# Patient Record
Sex: FEMALE | Race: WHITE | Employment: UNEMPLOYED | URBAN - METROPOLITAN AREA
[De-identification: names, ages, dates, MRNs, and addresses within clinical notes are randomized per-mention and may not be internally consistent; named-entity substitution may affect disease eponyms.]

---

## 2017-05-07 ENCOUNTER — HOSPITAL ENCOUNTER (EMERGENCY)
Age: 27
Discharge: HOME | End: 2017-05-07
Attending: EMERGENCY MEDICINE
Payer: MEDICARE

## 2017-05-07 VITALS
DIASTOLIC BLOOD PRESSURE: 60 MMHG | HEART RATE: 86 BPM | BODY MASS INDEX: 17.67 KG/M2 | OXYGEN SATURATION: 98 % | TEMPERATURE: 97.3 F | HEIGHT: 60 IN | RESPIRATION RATE: 18 BRPM | SYSTOLIC BLOOD PRESSURE: 106 MMHG | WEIGHT: 90 LBS

## 2017-05-07 DIAGNOSIS — M79.605 PAIN IN LEFT LEG: Primary | ICD-10-CM

## 2017-05-07 PROCEDURE — 99283 EMERGENCY DEPT VISIT LOW MDM: CPT

## 2017-05-07 RX ORDER — DIAZEPAM 2 MG/1
2 TABLET ORAL EVERY 8 HOURS PRN
Qty: 10 TABLET | Refills: 0 | Status: SHIPPED | OUTPATIENT
Start: 2017-05-07 | End: 2017-12-12

## 2017-05-07 RX ORDER — IBUPROFEN 800 MG/1
800 TABLET ORAL EVERY 8 HOURS PRN
Qty: 30 TABLET | Refills: 0 | Status: SHIPPED | OUTPATIENT
Start: 2017-05-07 | End: 2017-12-12

## 2017-05-07 ASSESSMENT — ENCOUNTER SYMPTOMS
DIARRHEA: 0
NAUSEA: 0
VOMITING: 0
BACK PAIN: 0
SHORTNESS OF BREATH: 0
COUGH: 0
ABDOMINAL PAIN: 0
COLOR CHANGE: 0

## 2017-05-07 ASSESSMENT — PAIN DESCRIPTION - LOCATION: LOCATION: LEG

## 2017-05-07 ASSESSMENT — PAIN SCALES - GENERAL: PAINLEVEL_OUTOF10: 1

## 2017-05-07 ASSESSMENT — PAIN DESCRIPTION - ORIENTATION: ORIENTATION: LEFT

## 2017-05-07 ASSESSMENT — PAIN DESCRIPTION - PAIN TYPE: TYPE: ACUTE PAIN

## 2017-05-07 ASSESSMENT — PAIN DESCRIPTION - DESCRIPTORS: DESCRIPTORS: ACHING

## 2017-12-07 DIAGNOSIS — M67.40 GANGLION CYST: Primary | ICD-10-CM

## 2017-12-12 ENCOUNTER — HOSPITAL ENCOUNTER (OUTPATIENT)
Dept: GENERAL RADIOLOGY | Age: 27
Discharge: HOME | End: 2017-12-12
Payer: MEDICARE

## 2017-12-12 ENCOUNTER — OFFICE VISIT (OUTPATIENT)
Dept: ORTHOPEDIC SURGERY | Age: 27
End: 2017-12-12
Payer: MEDICARE

## 2017-12-12 VITALS
BODY MASS INDEX: 17.67 KG/M2 | WEIGHT: 90 LBS | HEART RATE: 64 BPM | DIASTOLIC BLOOD PRESSURE: 60 MMHG | SYSTOLIC BLOOD PRESSURE: 102 MMHG | HEIGHT: 60 IN

## 2017-12-12 DIAGNOSIS — M67.40 GANGLION CYST: ICD-10-CM

## 2017-12-12 DIAGNOSIS — M25.531 RIGHT WRIST PAIN: Primary | ICD-10-CM

## 2017-12-12 PROCEDURE — 73110 X-RAY EXAM OF WRIST: CPT

## 2017-12-12 PROCEDURE — 99202 OFFICE O/P NEW SF 15 MIN: CPT | Performed by: PHYSICIAN ASSISTANT

## 2017-12-12 PROCEDURE — G8427 DOCREV CUR MEDS BY ELIG CLIN: HCPCS | Performed by: PHYSICIAN ASSISTANT

## 2017-12-12 PROCEDURE — G8484 FLU IMMUNIZE NO ADMIN: HCPCS | Performed by: PHYSICIAN ASSISTANT

## 2017-12-12 PROCEDURE — G8419 CALC BMI OUT NRM PARAM NOF/U: HCPCS | Performed by: PHYSICIAN ASSISTANT

## 2017-12-12 PROCEDURE — 4004F PT TOBACCO SCREEN RCVD TLK: CPT | Performed by: PHYSICIAN ASSISTANT

## 2018-02-26 DIAGNOSIS — M25.512 LEFT SHOULDER PAIN, UNSPECIFIED CHRONICITY: Primary | ICD-10-CM

## 2018-04-10 ENCOUNTER — HOSPITAL ENCOUNTER (EMERGENCY)
Age: 28
Discharge: HOME | End: 2018-04-11
Attending: EMERGENCY MEDICINE
Payer: MEDICARE

## 2018-04-10 ENCOUNTER — APPOINTMENT (OUTPATIENT)
Dept: GENERAL RADIOLOGY | Age: 28
End: 2018-04-10
Payer: MEDICARE

## 2018-04-10 VITALS
DIASTOLIC BLOOD PRESSURE: 70 MMHG | TEMPERATURE: 97.3 F | OXYGEN SATURATION: 97 % | HEART RATE: 80 BPM | SYSTOLIC BLOOD PRESSURE: 108 MMHG | BODY MASS INDEX: 16.69 KG/M2 | HEIGHT: 60 IN | RESPIRATION RATE: 15 BRPM | WEIGHT: 85 LBS

## 2018-04-10 DIAGNOSIS — M25.572 ACUTE LEFT ANKLE PAIN: Primary | ICD-10-CM

## 2018-04-10 PROCEDURE — 99284 EMERGENCY DEPT VISIT MOD MDM: CPT

## 2018-04-10 PROCEDURE — 73630 X-RAY EXAM OF FOOT: CPT

## 2018-04-10 PROCEDURE — 73610 X-RAY EXAM OF ANKLE: CPT

## 2018-04-10 RX ORDER — ACETAMINOPHEN 325 MG/1
650 TABLET ORAL ONCE
Status: DISCONTINUED | OUTPATIENT
Start: 2018-04-10 | End: 2018-04-10

## 2018-04-10 ASSESSMENT — PAIN SCALES - GENERAL: PAINLEVEL_OUTOF10: 9

## 2018-04-10 ASSESSMENT — PAIN DESCRIPTION - LOCATION: LOCATION: FOOT

## 2018-04-10 ASSESSMENT — PAIN DESCRIPTION - ORIENTATION: ORIENTATION: LEFT

## 2018-04-10 ASSESSMENT — PAIN DESCRIPTION - PAIN TYPE: TYPE: ACUTE PAIN

## 2018-04-17 ENCOUNTER — HOSPITAL ENCOUNTER (EMERGENCY)
Age: 28
Discharge: HOME | End: 2018-04-18
Attending: EMERGENCY MEDICINE
Payer: MEDICARE

## 2018-04-17 VITALS
OXYGEN SATURATION: 98 % | SYSTOLIC BLOOD PRESSURE: 94 MMHG | RESPIRATION RATE: 12 BRPM | BODY MASS INDEX: 16.3 KG/M2 | DIASTOLIC BLOOD PRESSURE: 52 MMHG | TEMPERATURE: 97.2 F | WEIGHT: 83 LBS | HEIGHT: 60 IN | HEART RATE: 70 BPM

## 2018-04-17 DIAGNOSIS — R11.2 NAUSEA AND VOMITING, INTRACTABILITY OF VOMITING NOT SPECIFIED, UNSPECIFIED VOMITING TYPE: Primary | ICD-10-CM

## 2018-04-17 LAB
ABSOLUTE EOS #: 0.32 K/UL (ref 0–0.44)
ABSOLUTE IMMATURE GRANULOCYTE: <0.03 K/UL (ref 0–0.3)
ABSOLUTE LYMPH #: 3.21 K/UL (ref 1.1–3.7)
ABSOLUTE MONO #: 0.6 K/UL (ref 0.1–1.2)
ANION GAP SERPL CALCULATED.3IONS-SCNC: 11 MMOL/L (ref 9–17)
BASOPHILS # BLD: 0 % (ref 0–2)
BASOPHILS ABSOLUTE: 0.04 K/UL (ref 0–0.2)
BUN BLDV-MCNC: 17 MG/DL (ref 6–20)
BUN/CREAT BLD: ABNORMAL (ref 9–20)
CALCIUM SERPL-MCNC: 8.7 MG/DL (ref 8.6–10.4)
CHLORIDE BLD-SCNC: 96 MMOL/L (ref 98–107)
CO2: 28 MMOL/L (ref 20–31)
CREAT SERPL-MCNC: 0.7 MG/DL (ref 0.5–0.9)
DIFFERENTIAL TYPE: NORMAL
EOSINOPHILS RELATIVE PERCENT: 4 % (ref 1–4)
GFR AFRICAN AMERICAN: >60 ML/MIN
GFR NON-AFRICAN AMERICAN: >60 ML/MIN
GFR SERPL CREATININE-BSD FRML MDRD: ABNORMAL ML/MIN/{1.73_M2}
GFR SERPL CREATININE-BSD FRML MDRD: ABNORMAL ML/MIN/{1.73_M2}
GLUCOSE BLD-MCNC: 86 MG/DL (ref 70–99)
HCT VFR BLD CALC: 37.9 % (ref 36.3–47.1)
HEMOGLOBIN: 12.5 G/DL (ref 11.9–15.1)
IMMATURE GRANULOCYTES: 0 %
LYMPHOCYTES # BLD: 36 % (ref 24–43)
MCH RBC QN AUTO: 30 PG (ref 25.2–33.5)
MCHC RBC AUTO-ENTMCNC: 33 G/DL (ref 28.4–34.8)
MCV RBC AUTO: 90.9 FL (ref 82.6–102.9)
MONOCYTES # BLD: 7 % (ref 3–12)
NRBC AUTOMATED: 0 PER 100 WBC
PDW BLD-RTO: 13 % (ref 11.8–14.4)
PLATELET # BLD: 204 K/UL (ref 138–453)
PLATELET ESTIMATE: NORMAL
PMV BLD AUTO: 10.2 FL (ref 8.1–13.5)
POTASSIUM SERPL-SCNC: 3.3 MMOL/L (ref 3.7–5.3)
RBC # BLD: 4.17 M/UL (ref 3.95–5.11)
RBC # BLD: NORMAL 10*6/UL
SEG NEUTROPHILS: 53 % (ref 36–65)
SEGMENTED NEUTROPHILS ABSOLUTE COUNT: 4.79 K/UL (ref 1.5–8.1)
SODIUM BLD-SCNC: 135 MMOL/L (ref 135–144)
WBC # BLD: 9 K/UL (ref 3.5–11.3)
WBC # BLD: NORMAL 10*3/UL

## 2018-04-17 PROCEDURE — 85025 COMPLETE CBC W/AUTO DIFF WBC: CPT

## 2018-04-17 PROCEDURE — 99284 EMERGENCY DEPT VISIT MOD MDM: CPT

## 2018-04-17 PROCEDURE — 2580000003 HC RX 258: Performed by: STUDENT IN AN ORGANIZED HEALTH CARE EDUCATION/TRAINING PROGRAM

## 2018-04-17 PROCEDURE — 80048 BASIC METABOLIC PNL TOTAL CA: CPT

## 2018-04-17 PROCEDURE — 96374 THER/PROPH/DIAG INJ IV PUSH: CPT

## 2018-04-17 PROCEDURE — 6360000002 HC RX W HCPCS: Performed by: STUDENT IN AN ORGANIZED HEALTH CARE EDUCATION/TRAINING PROGRAM

## 2018-04-17 RX ORDER — 0.9 % SODIUM CHLORIDE 0.9 %
1000 INTRAVENOUS SOLUTION INTRAVENOUS ONCE
Status: COMPLETED | OUTPATIENT
Start: 2018-04-17 | End: 2018-04-18

## 2018-04-17 RX ORDER — ONDANSETRON 2 MG/ML
4 INJECTION INTRAMUSCULAR; INTRAVENOUS ONCE
Status: COMPLETED | OUTPATIENT
Start: 2018-04-17 | End: 2018-04-17

## 2018-04-17 RX ADMIN — ONDANSETRON 4 MG: 2 INJECTION INTRAMUSCULAR; INTRAVENOUS at 23:14

## 2018-04-17 RX ADMIN — SODIUM CHLORIDE 1000 ML: 9 INJECTION, SOLUTION INTRAVENOUS at 23:14

## 2018-07-16 ENCOUNTER — HOSPITAL ENCOUNTER (EMERGENCY)
Age: 28
Discharge: HOME | End: 2018-07-16
Attending: EMERGENCY MEDICINE
Payer: MEDICARE

## 2018-07-16 ENCOUNTER — APPOINTMENT (OUTPATIENT)
Dept: GENERAL RADIOLOGY | Age: 28
End: 2018-07-16
Payer: MEDICARE

## 2018-07-16 VITALS
HEART RATE: 59 BPM | RESPIRATION RATE: 18 BRPM | TEMPERATURE: 97.7 F | SYSTOLIC BLOOD PRESSURE: 107 MMHG | DIASTOLIC BLOOD PRESSURE: 64 MMHG | OXYGEN SATURATION: 98 %

## 2018-07-16 DIAGNOSIS — F41.1 ANXIETY STATE: Primary | ICD-10-CM

## 2018-07-16 DIAGNOSIS — M79.672 LEFT FOOT PAIN: ICD-10-CM

## 2018-07-16 LAB
EKG ATRIAL RATE: 57 BPM
EKG P AXIS: 57 DEGREES
EKG P-R INTERVAL: 128 MS
EKG Q-T INTERVAL: 442 MS
EKG QRS DURATION: 84 MS
EKG QTC CALCULATION (BAZETT): 430 MS
EKG R AXIS: 76 DEGREES
EKG T AXIS: 54 DEGREES
EKG VENTRICULAR RATE: 57 BPM

## 2018-07-16 PROCEDURE — 99283 EMERGENCY DEPT VISIT LOW MDM: CPT

## 2018-07-16 PROCEDURE — 93005 ELECTROCARDIOGRAM TRACING: CPT

## 2018-07-16 PROCEDURE — 73630 X-RAY EXAM OF FOOT: CPT

## 2018-07-16 PROCEDURE — 6370000000 HC RX 637 (ALT 250 FOR IP): Performed by: EMERGENCY MEDICINE

## 2018-07-16 RX ORDER — HYDROXYZINE HYDROCHLORIDE 10 MG/1
10 TABLET, FILM COATED ORAL ONCE
Status: COMPLETED | OUTPATIENT
Start: 2018-07-16 | End: 2018-07-16

## 2018-07-16 RX ORDER — IBUPROFEN 800 MG/1
800 TABLET ORAL EVERY 8 HOURS PRN
Qty: 20 TABLET | Refills: 0 | Status: SHIPPED | OUTPATIENT
Start: 2018-07-16 | End: 2018-07-17

## 2018-07-16 RX ORDER — HYDROXYZINE HYDROCHLORIDE 10 MG/1
10 TABLET, FILM COATED ORAL EVERY 6 HOURS PRN
Qty: 6 TABLET | Refills: 0 | Status: SHIPPED | OUTPATIENT
Start: 2018-07-16 | End: 2018-07-18

## 2018-07-16 RX ORDER — IBUPROFEN 800 MG/1
800 TABLET ORAL ONCE
Status: COMPLETED | OUTPATIENT
Start: 2018-07-16 | End: 2018-07-16

## 2018-07-16 RX ADMIN — HYDROXYZINE HYDROCHLORIDE 10 MG: 10 TABLET ORAL at 19:33

## 2018-07-16 RX ADMIN — IBUPROFEN 800 MG: 800 TABLET ORAL at 20:26

## 2018-07-16 ASSESSMENT — PAIN SCALES - GENERAL
PAINLEVEL_OUTOF10: 8
PAINLEVEL_OUTOF10: 8

## 2018-07-16 ASSESSMENT — PAIN DESCRIPTION - LOCATION: LOCATION: CHEST

## 2018-07-16 ASSESSMENT — PAIN DESCRIPTION - DESCRIPTORS: DESCRIPTORS: DISCOMFORT

## 2018-07-16 ASSESSMENT — PAIN DESCRIPTION - ORIENTATION: ORIENTATION: MID

## 2018-07-16 NOTE — ED PROVIDER NOTES
years.   Denys Mix Sexual activity: Not on file     Other Topics Concern    Not on file     Social History Narrative    No narrative on file       Family History   Problem Relation Age of Onset    Diabetes Mother         G.Parents    Hypertension Mother         G.Parents    Diabetes Father     Hypertension Father     Stroke Father         G.Parents    Cancer Other         G.Parents, Pancrease and ??    Coronary Art Dis Other         G.Parents       Allergies:  Diclofenac; Morphine; and Pcn [penicillins]    Home Medications:  Prior to Admission medications    Medication Sig Start Date End Date Taking? Authorizing Provider   hydrOXYzine (ATARAX) 10 MG tablet Take 1 tablet by mouth every 6 hours as needed for Itching 7/16/18 7/18/18 Yes Kirk Gonzalez DO   ibuprofen (ADVIL;MOTRIN) 800 MG tablet Take 1 tablet by mouth every 8 hours as needed for Pain 7/16/18  Yes Kirk Gonzalez DO   Elastic Bandages & Supports (WRIST BRACE/LEFT SMALL) MISC Use as directed 9/28/13   LISA Sofia       REVIEW OF SYSTEMS    (2-9 systems for level 4, 10 or more for level 5)      Review of Systems   Constitutional: Negative for chills and fever. HENT: Negative for congestion and rhinorrhea. Eyes: Negative for pain and visual disturbance. Respiratory: Negative for cough and shortness of breath. Cardiovascular: Negative for chest pain and palpitations. Gastrointestinal: Negative for abdominal pain, nausea and vomiting. Genitourinary: Negative for difficulty urinating and dysuria. Musculoskeletal: Negative for neck pain. Left foot pain   Skin: Negative for rash and wound. Neurological: Negative for weakness and numbness. Psychiatric/Behavioral: Negative for self-injury and suicidal ideas. The patient is nervous/anxious.         PHYSICAL EXAM   (up to 7 for level 4, 8 or more for level 5)      INITIAL VITALS:   /64   Pulse 59   Temp 97.7 °F (36.5 °C) (Oral)   Resp 18   LMP 07/01/2018 SpO2 98%     Physical Exam   Constitutional: She is oriented to person, place, and time. She appears well-developed and well-nourished. No distress. HENT:   Head: Normocephalic and atraumatic. Mouth/Throat: Oropharynx is clear and moist.   Eyes: Conjunctivae and EOM are normal. Pupils are equal, round, and reactive to light. Neck: Neck supple. No tracheal deviation present. Cardiovascular: Normal rate, regular rhythm, normal heart sounds and intact distal pulses. Exam reveals no gallop and no friction rub. No murmur heard. Pulmonary/Chest: Effort normal and breath sounds normal. No respiratory distress. She has no wheezes. She has no rales. Abdominal: Soft. She exhibits no distension. There is no tenderness. There is no rebound and no guarding. Musculoskeletal: She exhibits no edema, tenderness or deformity. Mild tenderness to palpation left second toe. No significant swelling. Patient able to wiggle all toes left foot. Sensation intact throughout left foot. DP and PT pulses +2/4 left foot. Neurological: She is alert and oriented to person, place, and time. Skin: Skin is warm and dry. No rash noted. She is not diaphoretic. Psychiatric: She expresses no homicidal and no suicidal ideation.        DIFFERENTIAL  DIAGNOSIS     PLAN (LABS / IMAGING / EKG):  Orders Placed This Encounter   Procedures    XR FOOT LEFT (MIN 3 VIEWS)    EKG 12 Lead       MEDICATIONS ORDERED:  Orders Placed This Encounter   Medications    hydrOXYzine (ATARAX) tablet 10 mg    ibuprofen (ADVIL;MOTRIN) tablet 800 mg    hydrOXYzine (ATARAX) 10 MG tablet     Sig: Take 1 tablet by mouth every 6 hours as needed for Itching     Dispense:  6 tablet     Refill:  0    ibuprofen (ADVIL;MOTRIN) 800 MG tablet     Sig: Take 1 tablet by mouth every 8 hours as needed for Pain     Dispense:  20 tablet     Refill:  0       DDX: Anxiety, panic attack, malingering            Left foot pain, fracture, dislocation    DIAGNOSTIC

## 2018-07-17 ENCOUNTER — HOSPITAL ENCOUNTER (EMERGENCY)
Age: 28
Discharge: HOME | End: 2018-07-17
Attending: EMERGENCY MEDICINE
Payer: MEDICARE

## 2018-07-17 VITALS
OXYGEN SATURATION: 100 % | RESPIRATION RATE: 14 BRPM | SYSTOLIC BLOOD PRESSURE: 115 MMHG | DIASTOLIC BLOOD PRESSURE: 75 MMHG | TEMPERATURE: 97.3 F | HEART RATE: 70 BPM

## 2018-07-17 DIAGNOSIS — K08.89 PAIN, DENTAL: Primary | ICD-10-CM

## 2018-07-17 PROCEDURE — 6370000000 HC RX 637 (ALT 250 FOR IP): Performed by: EMERGENCY MEDICINE

## 2018-07-17 PROCEDURE — 99283 EMERGENCY DEPT VISIT LOW MDM: CPT

## 2018-07-17 RX ORDER — IBUPROFEN 800 MG/1
800 TABLET ORAL ONCE
Status: COMPLETED | OUTPATIENT
Start: 2018-07-17 | End: 2018-07-17

## 2018-07-17 RX ORDER — CLINDAMYCIN HYDROCHLORIDE 150 MG/1
450 CAPSULE ORAL ONCE
Status: COMPLETED | OUTPATIENT
Start: 2018-07-17 | End: 2018-07-17

## 2018-07-17 RX ORDER — CLINDAMYCIN HYDROCHLORIDE 150 MG/1
450 CAPSULE ORAL 3 TIMES DAILY
Qty: 90 CAPSULE | Refills: 0 | Status: SHIPPED | OUTPATIENT
Start: 2018-07-17 | End: 2018-07-27

## 2018-07-17 RX ORDER — IBUPROFEN 800 MG/1
800 TABLET ORAL EVERY 8 HOURS PRN
Qty: 30 TABLET | Refills: 0 | Status: SHIPPED | OUTPATIENT
Start: 2018-07-17 | End: 2019-01-15

## 2018-07-17 RX ADMIN — CLINDAMYCIN HYDROCHLORIDE 450 MG: 150 CAPSULE ORAL at 05:15

## 2018-07-17 RX ADMIN — IBUPROFEN 800 MG: 800 TABLET ORAL at 05:15

## 2018-07-17 ASSESSMENT — ENCOUNTER SYMPTOMS
ABDOMINAL PAIN: 0
COUGH: 0
EYE PAIN: 0
VOMITING: 0
TROUBLE SWALLOWING: 0
COUGH: 0
ABDOMINAL PAIN: 0
EYE PAIN: 0
VOMITING: 0
SHORTNESS OF BREATH: 0
NAUSEA: 0
SHORTNESS OF BREATH: 0
DIARRHEA: 0
EYE DISCHARGE: 0
NAUSEA: 0
SORE THROAT: 0
RHINORRHEA: 0

## 2018-07-17 ASSESSMENT — PAIN DESCRIPTION - DESCRIPTORS: DESCRIPTORS: ACHING

## 2018-07-17 ASSESSMENT — PAIN DESCRIPTION - ORIENTATION: ORIENTATION: RIGHT;LOWER;POSTERIOR

## 2018-07-17 ASSESSMENT — PAIN DESCRIPTION - FREQUENCY: FREQUENCY: CONTINUOUS

## 2018-07-17 ASSESSMENT — PAIN DESCRIPTION - LOCATION: LOCATION: TEETH

## 2018-07-17 ASSESSMENT — PAIN SCALES - GENERAL
PAINLEVEL_OUTOF10: 10
PAINLEVEL_OUTOF10: 10

## 2018-07-17 ASSESSMENT — PAIN DESCRIPTION - PAIN TYPE: TYPE: ACUTE PAIN

## 2018-07-17 NOTE — ED PROVIDER NOTES
[facility]     Emergency Department     Faculty Attestation    I performed a history and physical examination of the patient and discussed management with the resident. I reviewed the residents note and agree with the documented findings and plan of care. Any areas of disagreement are noted on the chart. I was personally present for the key portions of any procedures. I have documented in the chart those procedures where I was not present during the key portions. I have reviewed the emergency nurses triage note. I agree with the chief complaint, past medical history, past surgical history, allergies, medications, social and family history as documented unless otherwise noted below. Documentation of the HPI, Physical Exam and Medical Decision Making performed by medical students or scribes is based on my personal performance of the HPI, PE and MDM. For Physician Assistant/ Nurse Practitioner cases/documentation I have personally evaluated this patient and have completed at least one if not all key elements of the E/M (history, physical exam, and MDM). Additional findings are as noted. Primary Care Physician: NEIL Pedroza - CNP    History: This is a 32 y.o. female who presents to the Emergency Department with complaint of Dental pain. Is been ongoing for last 2 days. Patient denies any fever, chills or sweats. The patient denies any difficulty swallowing or shortness of breath    Physical:   oral temperature is 97.3 °F (36.3 °C). Her blood pressure is 115/75 and her pulse is 70. Her respiration is 14 and oxygen saturation is 100%. The patient has multiple dental caries noted. There is no tongue elevation noted. The patient has no abscess. Airways patent there is no pooling of oral secretions.      Impression: Dental caries    Plan: Antibiotics, analgesia and dentist follow-up    Pino Wren MD  Attending Emergency  Physician

## 2018-07-17 NOTE — ED PROVIDER NOTES
70   Temp 97.3 °F (36.3 °C) (Oral)   Resp 14   LMP 07/01/2018   SpO2 100%     Physical Exam   Constitutional: She is oriented to person, place, and time. She appears well-developed and well-nourished. HENT:   Head: Normocephalic and atraumatic. Mouth/Throat: Oropharynx is clear and moist.   Patient with dental clara to premolar of the right lower jaw, no periapical abscess   Eyes: Conjunctivae are normal. Pupils are equal, round, and reactive to light. Neck: Normal range of motion. Neck supple. Cardiovascular: Normal rate and regular rhythm. Exam reveals no gallop and no friction rub. No murmur heard. Pulmonary/Chest: Effort normal and breath sounds normal. No respiratory distress. She has no wheezes. She has no rales. Abdominal: Soft. Bowel sounds are normal. There is no tenderness. There is no rebound and no guarding. Musculoskeletal: Normal range of motion. She exhibits no edema. Neurological: She is alert and oriented to person, place, and time. She has normal reflexes. Skin: Skin is warm and dry. No rash noted. Psychiatric: She has a normal mood and affect. Thought content normal.   Nursing note and vitals reviewed. DIFFERENTIAL  DIAGNOSIS     PLAN (LABS / IMAGING / EKG):  No orders of the defined types were placed in this encounter. MEDICATIONS ORDERED:  Orders Placed This Encounter   Medications    clindamycin (CLEOCIN) capsule 450 mg    ibuprofen (ADVIL;MOTRIN) tablet 800 mg    clindamycin (CLEOCIN) 150 MG capsule     Sig: Take 3 capsules by mouth 3 times daily for 10 days     Dispense:  90 capsule     Refill:  0    ibuprofen (ADVIL;MOTRIN) 800 MG tablet     Sig: Take 1 tablet by mouth every 8 hours as needed for Pain     Dispense:  30 tablet     Refill:  0       DDX: Dental Clara, periapical abscess, pulpitis    DIAGNOSTIC RESULTS / EMERGENCY DEPARTMENT COURSE / MDM     LABS:  No results found for this visit on 07/17/18.     IMPRESSION: 78-year-old female with dental pain, with dental cavity to right lower jaw, no bony tenderness to the jaw, no swelling or abscess. We'll provide antibiotic, ibuprofen, patient states she will follow-up with her dentist.    Hal Parham:  None    EKG  None    All EKG's are interpreted by the Emergency Department Physician who either signs or Co-signs this chart in the absence of a cardiologist.    EMERGENCY DEPARTMENT COURSE:  Patient given first dose of antibiotic here in the emergency department, feeling better after ibuprofen, discussed discharge plan patient, she states she understands and agrees with discharge plan, will follow up with her dentist    PROCEDURES:  None    CONSULTS:  None    CRITICAL CARE:  None    FINAL IMPRESSION      1.  Pain, dental          DISPOSITION / PLAN     DISPOSITION Decision To Discharge 07/17/2018 05:31:37 AM      PATIENT REFERRED TO:  NEIL Calloway - CNP  [address]  [phone]    Schedule an appointment as soon as possible for a visit in 1 day        DISCHARGE MEDICATIONS:  Discharge Medication List as of 7/17/2018  5:33 AM      START taking these medications    Details   clindamycin (CLEOCIN) 150 MG capsule Take 3 capsules by mouth 3 times daily for 10 days, Disp-90 capsule, R-0Print             Carrie Lundberg DO  Emergency Medicine Resident    (Please note that portions of this note were completed with a voice recognition program.  Efforts were made to edit the dictations but occasionally words are mis-transcribed.)       Carrie Lundberg DO  Resident  07/17/18 [id number]

## 2018-12-20 ENCOUNTER — HOSPITAL ENCOUNTER (EMERGENCY)
Age: 28
Discharge: HOME | End: 2018-12-20
Attending: EMERGENCY MEDICINE
Payer: COMMERCIAL

## 2018-12-20 VITALS
SYSTOLIC BLOOD PRESSURE: 113 MMHG | TEMPERATURE: 98.3 F | OXYGEN SATURATION: 99 % | BODY MASS INDEX: 17.97 KG/M2 | RESPIRATION RATE: 15 BRPM | HEART RATE: 80 BPM | WEIGHT: 92 LBS | DIASTOLIC BLOOD PRESSURE: 69 MMHG

## 2018-12-20 DIAGNOSIS — F41.1 ANXIETY STATE: Primary | ICD-10-CM

## 2018-12-20 PROCEDURE — G0382 LEV 3 HOSP TYPE B ED VISIT: HCPCS

## 2018-12-20 ASSESSMENT — PAIN DESCRIPTION - DESCRIPTORS: DESCRIPTORS: ACHING

## 2018-12-20 ASSESSMENT — PAIN DESCRIPTION - PAIN TYPE: TYPE: ACUTE PAIN

## 2018-12-20 ASSESSMENT — ENCOUNTER SYMPTOMS
NAUSEA: 0
ABDOMINAL PAIN: 0
CHEST TIGHTNESS: 0
SHORTNESS OF BREATH: 0

## 2018-12-20 ASSESSMENT — PAIN DESCRIPTION - FREQUENCY: FREQUENCY: CONTINUOUS

## 2018-12-20 ASSESSMENT — PAIN DESCRIPTION - ORIENTATION: ORIENTATION: LOWER

## 2018-12-20 ASSESSMENT — PAIN SCALES - GENERAL: PAINLEVEL_OUTOF10: 5

## 2018-12-20 ASSESSMENT — PAIN DESCRIPTION - LOCATION: LOCATION: BACK

## 2018-12-20 ASSESSMENT — PAIN DESCRIPTION - ONSET: ONSET: ON-GOING

## 2018-12-20 NOTE — ED PROVIDER NOTES
Diabetes Mother         G.Parents    Hypertension Mother         G.Parents    Diabetes Father     Hypertension Father     Stroke Father         G.Parents    Cancer Other         G.Parents, Pancrease and ??    Coronary Art Dis Other         G.Parents       Allergies:  Diclofenac; Morphine; and Pcn [penicillins]    Home Medications:  Prior to Admission medications    Medication Sig Start Date End Date Taking? Authorizing Provider   ibuprofen (ADVIL;MOTRIN) 800 MG tablet Take 1 tablet by mouth every 8 hours as needed for Pain 7/17/18   Allegra Aase, DO   Elastic Bandages & Supports (WRIST BRACE/LEFT SMALL) MISC Use as directed 9/28/13   LISA Callahan       REVIEW OF SYSTEMS    (2-9 systems for level 4, 10 or more for level 5)      Review of Systems   Constitutional: Negative for diaphoresis, fatigue and fever. Respiratory: Negative for chest tightness and shortness of breath. Cardiovascular: Negative for chest pain and palpitations. Gastrointestinal: Negative for abdominal pain and nausea. Musculoskeletal: Negative for arthralgias and joint swelling. PHYSICAL EXAM   (up to 7 for level 4, 8 or more for level 5)      INITIAL VITALS:   /69   Pulse 80   Temp 98.3 °F (36.8 °C) (Oral)   Resp 15   Wt 92 lb (41.7 kg)   SpO2 99%   BMI 17.97 kg/m²     Physical Exam   Constitutional: She is oriented to person, place, and time. She appears well-developed and well-nourished. No distress. HENT:   Head: Normocephalic and atraumatic. Right Ear: External ear normal.   Left Ear: External ear normal.   Eyes: Pupils are equal, round, and reactive to light. Conjunctivae and EOM are normal. Right eye exhibits no discharge. Left eye exhibits no discharge. Neck: Normal range of motion. Cardiovascular: Normal rate and regular rhythm. Pulmonary/Chest: No stridor. No respiratory distress. Neurological: She is alert and oriented to person, place, and time. Skin: Skin is warm and dry.  She is

## 2019-01-15 ENCOUNTER — HOSPITAL ENCOUNTER (EMERGENCY)
Age: 29
Discharge: HOME | End: 2019-01-15
Attending: EMERGENCY MEDICINE
Payer: COMMERCIAL

## 2019-01-15 ENCOUNTER — APPOINTMENT (OUTPATIENT)
Dept: GENERAL RADIOLOGY | Age: 29
End: 2019-01-15
Payer: COMMERCIAL

## 2019-01-15 VITALS
SYSTOLIC BLOOD PRESSURE: 108 MMHG | OXYGEN SATURATION: 99 % | TEMPERATURE: 98.3 F | DIASTOLIC BLOOD PRESSURE: 67 MMHG | HEIGHT: 58 IN | RESPIRATION RATE: 16 BRPM | WEIGHT: 95 LBS | HEART RATE: 79 BPM | BODY MASS INDEX: 19.94 KG/M2

## 2019-01-15 DIAGNOSIS — S93.609A SPRAIN OF FOOT, UNSPECIFIED LATERALITY, INITIAL ENCOUNTER: Primary | ICD-10-CM

## 2019-01-15 PROCEDURE — 6370000000 HC RX 637 (ALT 250 FOR IP): Performed by: PHYSICIAN ASSISTANT

## 2019-01-15 PROCEDURE — 73610 X-RAY EXAM OF ANKLE: CPT

## 2019-01-15 PROCEDURE — 73630 X-RAY EXAM OF FOOT: CPT

## 2019-01-15 PROCEDURE — 73590 X-RAY EXAM OF LOWER LEG: CPT

## 2019-01-15 PROCEDURE — 99283 EMERGENCY DEPT VISIT LOW MDM: CPT

## 2019-01-15 RX ORDER — IBUPROFEN 800 MG/1
800 TABLET ORAL ONCE
Status: COMPLETED | OUTPATIENT
Start: 2019-01-15 | End: 2019-01-15

## 2019-01-15 RX ORDER — IBUPROFEN 800 MG/1
800 TABLET ORAL EVERY 8 HOURS PRN
Qty: 20 TABLET | Refills: 0 | Status: SHIPPED | OUTPATIENT
Start: 2019-01-15 | End: 2019-03-21

## 2019-01-15 RX ADMIN — IBUPROFEN 800 MG: 800 TABLET, FILM COATED ORAL at 18:54

## 2019-01-15 ASSESSMENT — PAIN DESCRIPTION - PAIN TYPE: TYPE: ACUTE PAIN

## 2019-01-15 ASSESSMENT — PAIN DESCRIPTION - LOCATION: LOCATION: ANKLE

## 2019-01-15 ASSESSMENT — ENCOUNTER SYMPTOMS
RHINORRHEA: 0
VOMITING: 0
SORE THROAT: 0
COLOR CHANGE: 0
BACK PAIN: 0
WHEEZING: 0
COUGH: 0
NAUSEA: 0

## 2019-01-15 ASSESSMENT — PAIN DESCRIPTION - ORIENTATION: ORIENTATION: LEFT

## 2019-01-15 ASSESSMENT — PAIN SCALES - GENERAL: PAINLEVEL_OUTOF10: 8

## 2019-01-17 ENCOUNTER — OFFICE VISIT (OUTPATIENT)
Dept: PRIMARY CARE CLINIC | Age: 29
End: 2019-01-17
Payer: COMMERCIAL

## 2019-01-17 VITALS
DIASTOLIC BLOOD PRESSURE: 72 MMHG | HEART RATE: 69 BPM | SYSTOLIC BLOOD PRESSURE: 118 MMHG | OXYGEN SATURATION: 99 % | WEIGHT: 101 LBS | BODY MASS INDEX: 21.11 KG/M2 | TEMPERATURE: 97.2 F

## 2019-01-17 DIAGNOSIS — F31.60 BIPOLAR DISORDER, MIXED (HCC): ICD-10-CM

## 2019-01-17 DIAGNOSIS — R53.1 WEAKNESS: Primary | ICD-10-CM

## 2019-01-17 LAB — GLUCOSE BLD-MCNC: 121 MG/DL

## 2019-01-17 PROCEDURE — G0444 DEPRESSION SCREEN ANNUAL: HCPCS | Performed by: INTERNAL MEDICINE

## 2019-01-17 PROCEDURE — 4004F PT TOBACCO SCREEN RCVD TLK: CPT | Performed by: INTERNAL MEDICINE

## 2019-01-17 PROCEDURE — 82962 GLUCOSE BLOOD TEST: CPT | Performed by: INTERNAL MEDICINE

## 2019-01-17 PROCEDURE — G8427 DOCREV CUR MEDS BY ELIG CLIN: HCPCS | Performed by: INTERNAL MEDICINE

## 2019-01-17 PROCEDURE — 99202 OFFICE O/P NEW SF 15 MIN: CPT | Performed by: INTERNAL MEDICINE

## 2019-01-17 PROCEDURE — G8420 CALC BMI NORM PARAMETERS: HCPCS | Performed by: INTERNAL MEDICINE

## 2019-01-17 PROCEDURE — G8484 FLU IMMUNIZE NO ADMIN: HCPCS | Performed by: INTERNAL MEDICINE

## 2019-01-17 ASSESSMENT — PATIENT HEALTH QUESTIONNAIRE - PHQ9
3. TROUBLE FALLING OR STAYING ASLEEP: 0
SUM OF ALL RESPONSES TO PHQ QUESTIONS 1-9: 12
5. POOR APPETITE OR OVEREATING: 1
SUM OF ALL RESPONSES TO PHQ QUESTIONS 1-9: 12
7. TROUBLE CONCENTRATING ON THINGS, SUCH AS READING THE NEWSPAPER OR WATCHING TELEVISION: 3
1. LITTLE INTEREST OR PLEASURE IN DOING THINGS: 1
8. MOVING OR SPEAKING SO SLOWLY THAT OTHER PEOPLE COULD HAVE NOTICED. OR THE OPPOSITE, BEING SO FIGETY OR RESTLESS THAT YOU HAVE BEEN MOVING AROUND A LOT MORE THAN USUAL: 1
10. IF YOU CHECKED OFF ANY PROBLEMS, HOW DIFFICULT HAVE THESE PROBLEMS MADE IT FOR YOU TO DO YOUR WORK, TAKE CARE OF THINGS AT HOME, OR GET ALONG WITH OTHER PEOPLE: 0
9. THOUGHTS THAT YOU WOULD BE BETTER OFF DEAD, OR OF HURTING YOURSELF: 0
SUM OF ALL RESPONSES TO PHQ9 QUESTIONS 1 & 2: 4
2. FEELING DOWN, DEPRESSED OR HOPELESS: 3
6. FEELING BAD ABOUT YOURSELF - OR THAT YOU ARE A FAILURE OR HAVE LET YOURSELF OR YOUR FAMILY DOWN: 0
4. FEELING TIRED OR HAVING LITTLE ENERGY: 3

## 2019-03-21 ENCOUNTER — HOSPITAL ENCOUNTER (EMERGENCY)
Age: 29
Discharge: HOME | End: 2019-03-21
Attending: EMERGENCY MEDICINE
Payer: MEDICARE

## 2019-03-21 ENCOUNTER — APPOINTMENT (OUTPATIENT)
Dept: GENERAL RADIOLOGY | Age: 29
End: 2019-03-21
Payer: MEDICARE

## 2019-03-21 VITALS
HEART RATE: 82 BPM | TEMPERATURE: 98.3 F | SYSTOLIC BLOOD PRESSURE: 104 MMHG | RESPIRATION RATE: 16 BRPM | BODY MASS INDEX: 20.69 KG/M2 | OXYGEN SATURATION: 96 % | WEIGHT: 99 LBS | DIASTOLIC BLOOD PRESSURE: 70 MMHG

## 2019-03-21 DIAGNOSIS — F41.1 ANXIETY STATE: ICD-10-CM

## 2019-03-21 DIAGNOSIS — R07.89 CHEST WALL PAIN: Primary | ICD-10-CM

## 2019-03-21 LAB
-: ABNORMAL
ABSOLUTE EOS #: 0.12 K/UL (ref 0–0.44)
ABSOLUTE IMMATURE GRANULOCYTE: <0.03 K/UL (ref 0–0.3)
ABSOLUTE LYMPH #: 1.72 K/UL (ref 1.1–3.7)
ABSOLUTE MONO #: 0.61 K/UL (ref 0.1–1.2)
AMORPHOUS: ABNORMAL
ANION GAP SERPL CALCULATED.3IONS-SCNC: 11 MMOL/L (ref 9–17)
BACTERIA: ABNORMAL
BASOPHILS # BLD: 0 % (ref 0–2)
BASOPHILS ABSOLUTE: 0.03 K/UL (ref 0–0.2)
BILIRUBIN URINE: NEGATIVE
BUN BLDV-MCNC: 13 MG/DL (ref 6–20)
BUN/CREAT BLD: NORMAL (ref 9–20)
CALCIUM SERPL-MCNC: 8.8 MG/DL (ref 8.6–10.4)
CASTS UA: ABNORMAL /LPF (ref 0–8)
CHLORIDE BLD-SCNC: 105 MMOL/L (ref 98–107)
CO2: 22 MMOL/L (ref 20–31)
COLOR: YELLOW
CREAT SERPL-MCNC: 0.89 MG/DL (ref 0.5–0.9)
CRYSTALS, UA: ABNORMAL /HPF
DIFFERENTIAL TYPE: ABNORMAL
EOSINOPHILS RELATIVE PERCENT: 2 % (ref 1–4)
EPITHELIAL CELLS UA: ABNORMAL /HPF (ref 0–5)
GFR AFRICAN AMERICAN: >60 ML/MIN
GFR NON-AFRICAN AMERICAN: >60 ML/MIN
GFR SERPL CREATININE-BSD FRML MDRD: NORMAL ML/MIN/{1.73_M2}
GFR SERPL CREATININE-BSD FRML MDRD: NORMAL ML/MIN/{1.73_M2}
GLUCOSE BLD-MCNC: 97 MG/DL (ref 70–99)
GLUCOSE URINE: NEGATIVE
HCG QUALITATIVE: NEGATIVE
HCT VFR BLD CALC: 40.8 % (ref 36.3–47.1)
HEMOGLOBIN: 13.6 G/DL (ref 11.9–15.1)
IMMATURE GRANULOCYTES: 0 %
KETONES, URINE: ABNORMAL
LEUKOCYTE ESTERASE, URINE: ABNORMAL
LYMPHOCYTES # BLD: 22 % (ref 24–43)
MCH RBC QN AUTO: 30 PG (ref 25.2–33.5)
MCHC RBC AUTO-ENTMCNC: 33.3 G/DL (ref 28.4–34.8)
MCV RBC AUTO: 89.9 FL (ref 82.6–102.9)
MONOCYTES # BLD: 8 % (ref 3–12)
MUCUS: ABNORMAL
NITRITE, URINE: NEGATIVE
NRBC AUTOMATED: 0 PER 100 WBC
OTHER OBSERVATIONS UA: ABNORMAL
PDW BLD-RTO: 13.6 % (ref 11.8–14.4)
PH UA: 6.5 (ref 5–8)
PLATELET # BLD: 219 K/UL (ref 138–453)
PLATELET ESTIMATE: ABNORMAL
PMV BLD AUTO: 10.4 FL (ref 8.1–13.5)
POTASSIUM SERPL-SCNC: 3.8 MMOL/L (ref 3.7–5.3)
PROTEIN UA: ABNORMAL
RBC # BLD: 4.54 M/UL (ref 3.95–5.11)
RBC # BLD: ABNORMAL 10*6/UL
RBC UA: ABNORMAL /HPF (ref 0–4)
RENAL EPITHELIAL, UA: ABNORMAL /HPF
SEG NEUTROPHILS: 68 % (ref 36–65)
SEGMENTED NEUTROPHILS ABSOLUTE COUNT: 5.46 K/UL (ref 1.5–8.1)
SODIUM BLD-SCNC: 138 MMOL/L (ref 135–144)
SPECIFIC GRAVITY UA: 1.02 (ref 1–1.03)
TRICHOMONAS: ABNORMAL
TURBIDITY: CLEAR
URINE HGB: ABNORMAL
UROBILINOGEN, URINE: NORMAL
WBC # BLD: 8 K/UL (ref 3.5–11.3)
WBC # BLD: ABNORMAL 10*3/UL
WBC UA: ABNORMAL /HPF (ref 0–5)
YEAST: ABNORMAL

## 2019-03-21 PROCEDURE — 84703 CHORIONIC GONADOTROPIN ASSAY: CPT

## 2019-03-21 PROCEDURE — 6360000002 HC RX W HCPCS: Performed by: STUDENT IN AN ORGANIZED HEALTH CARE EDUCATION/TRAINING PROGRAM

## 2019-03-21 PROCEDURE — 99285 EMERGENCY DEPT VISIT HI MDM: CPT

## 2019-03-21 PROCEDURE — 93005 ELECTROCARDIOGRAM TRACING: CPT

## 2019-03-21 PROCEDURE — 85025 COMPLETE CBC W/AUTO DIFF WBC: CPT

## 2019-03-21 PROCEDURE — 80048 BASIC METABOLIC PNL TOTAL CA: CPT

## 2019-03-21 PROCEDURE — 96374 THER/PROPH/DIAG INJ IV PUSH: CPT

## 2019-03-21 PROCEDURE — 81001 URINALYSIS AUTO W/SCOPE: CPT

## 2019-03-21 PROCEDURE — 71046 X-RAY EXAM CHEST 2 VIEWS: CPT

## 2019-03-21 RX ORDER — LORAZEPAM 2 MG/ML
0.5 INJECTION INTRAMUSCULAR ONCE
Status: COMPLETED | OUTPATIENT
Start: 2019-03-21 | End: 2019-03-21

## 2019-03-21 RX ADMIN — LORAZEPAM 0.5 MG: 2 INJECTION INTRAMUSCULAR; INTRAVENOUS at 19:23

## 2019-03-21 ASSESSMENT — PAIN DESCRIPTION - PROGRESSION: CLINICAL_PROGRESSION: GRADUALLY WORSENING

## 2019-03-21 ASSESSMENT — PAIN DESCRIPTION - ORIENTATION: ORIENTATION: LEFT

## 2019-03-21 ASSESSMENT — PAIN DESCRIPTION - LOCATION: LOCATION: CHEST

## 2019-03-21 ASSESSMENT — PAIN DESCRIPTION - ONSET: ONSET: SUDDEN

## 2019-03-21 ASSESSMENT — PAIN DESCRIPTION - PAIN TYPE: TYPE: ACUTE PAIN

## 2019-03-21 ASSESSMENT — PAIN SCALES - GENERAL: PAINLEVEL_OUTOF10: 9

## 2019-03-21 ASSESSMENT — PAIN DESCRIPTION - FREQUENCY: FREQUENCY: CONTINUOUS

## 2019-03-21 ASSESSMENT — PAIN DESCRIPTION - DIRECTION: RADIATING_TOWARDS: MIDDLE CHEST

## 2019-03-22 ASSESSMENT — ENCOUNTER SYMPTOMS
COUGH: 0
CONSTIPATION: 0
ABDOMINAL PAIN: 1
DIARRHEA: 0
VOMITING: 0
SHORTNESS OF BREATH: 1
NAUSEA: 0
WHEEZING: 0

## 2019-03-25 LAB
EKG ATRIAL RATE: 70 BPM
EKG P AXIS: 72 DEGREES
EKG P-R INTERVAL: 100 MS
EKG Q-T INTERVAL: 392 MS
EKG QRS DURATION: 72 MS
EKG QTC CALCULATION (BAZETT): 423 MS
EKG R AXIS: 86 DEGREES
EKG T AXIS: 69 DEGREES
EKG VENTRICULAR RATE: 70 BPM

## 2019-08-10 ENCOUNTER — HOSPITAL ENCOUNTER (EMERGENCY)
Age: 29
Discharge: HOME | End: 2019-08-10
Attending: EMERGENCY MEDICINE
Payer: MEDICARE

## 2019-08-10 VITALS
HEART RATE: 64 BPM | HEIGHT: 58 IN | WEIGHT: 100 LBS | TEMPERATURE: 97.9 F | SYSTOLIC BLOOD PRESSURE: 92 MMHG | BODY MASS INDEX: 20.99 KG/M2 | DIASTOLIC BLOOD PRESSURE: 51 MMHG | OXYGEN SATURATION: 99 % | RESPIRATION RATE: 18 BRPM

## 2019-08-10 DIAGNOSIS — K08.89 PAIN, DENTAL: Primary | ICD-10-CM

## 2019-08-10 PROCEDURE — 6370000000 HC RX 637 (ALT 250 FOR IP): Performed by: STUDENT IN AN ORGANIZED HEALTH CARE EDUCATION/TRAINING PROGRAM

## 2019-08-10 PROCEDURE — 99283 EMERGENCY DEPT VISIT LOW MDM: CPT

## 2019-08-10 RX ORDER — CLINDAMYCIN HYDROCHLORIDE 300 MG/1
300 CAPSULE ORAL 3 TIMES DAILY
Qty: 21 CAPSULE | Refills: 0 | Status: SHIPPED | OUTPATIENT
Start: 2019-08-10 | End: 2019-08-17

## 2019-08-10 RX ORDER — OXYCODONE HYDROCHLORIDE AND ACETAMINOPHEN 5; 325 MG/1; MG/1
2 TABLET ORAL ONCE
Status: COMPLETED | OUTPATIENT
Start: 2019-08-10 | End: 2019-08-10

## 2019-08-10 RX ADMIN — OXYCODONE HYDROCHLORIDE AND ACETAMINOPHEN 2 TABLET: 5; 325 TABLET ORAL at 05:17

## 2019-08-10 RX ADMIN — BENZOCAINE 1 EACH: 220 GEL, DENTIFRICE DENTAL at 05:37

## 2019-08-10 ASSESSMENT — PAIN SCALES - GENERAL
PAINLEVEL_OUTOF10: 10
PAINLEVEL_OUTOF10: 10

## 2019-08-10 ASSESSMENT — PAIN DESCRIPTION - LOCATION: LOCATION: TEETH

## 2019-08-10 ASSESSMENT — ENCOUNTER SYMPTOMS
SHORTNESS OF BREATH: 0
FACIAL SWELLING: 0
SINUS PAIN: 0
RHINORRHEA: 0
SORE THROAT: 0
SINUS PRESSURE: 0
ABDOMINAL PAIN: 0

## 2019-08-10 ASSESSMENT — PAIN DESCRIPTION - ORIENTATION: ORIENTATION: LEFT

## 2019-08-10 ASSESSMENT — PAIN DESCRIPTION - PAIN TYPE: TYPE: ACUTE PAIN

## 2019-08-10 NOTE — ED PROVIDER NOTES
[facility]     Emergency Department     Faculty Attestation    I performed a history and physical examination of the patient and discussed management with the resident. I reviewed the residents note and agree with the documented findings and plan of care. Any areas of disagreement are noted on the chart. I was personally present for the key portions of any procedures. I have documented in the chart those procedures where I was not present during the key portions. I have reviewed the emergency nurses triage note. I agree with the chief complaint, past medical history, past surgical history, allergies, medications, social and family history as documented unless otherwise noted below. For Physician Assistant/ Nurse Practitioner cases/documentation I have personally evaluated this patient and have completed at least one if not all key elements of the E/M (history, physical exam, and MDM). Additional findings are as noted. I have personally seen and evaluated the patient. I find the patient's history and physical exam are consistent with the NP/PA documentation. I agree with the care provided, treatment rendered, disposition and follow-up plan. No Abscess      Critical Care     Catherine Quintero M.D.   Attending Emergency  Physician              eBtty Kong MD  08/10/19 2084

## 2019-08-10 NOTE — ED NOTES
Bed: 17  Expected date: 8/10/19  Expected time: 4:50 AM  Means of arrival: [facility] SURGICAL AND CARDIOVASCULAR [facility]  Comments:  Medic 6     [facility]  08/10/19 7090

## 2019-08-17 ENCOUNTER — HOSPITAL ENCOUNTER (EMERGENCY)
Age: 29
Discharge: HOME | End: 2019-08-17
Attending: EMERGENCY MEDICINE
Payer: MEDICARE

## 2019-08-17 ENCOUNTER — APPOINTMENT (OUTPATIENT)
Dept: GENERAL RADIOLOGY | Age: 29
End: 2019-08-17
Payer: MEDICARE

## 2019-08-17 VITALS
OXYGEN SATURATION: 100 % | HEIGHT: 58 IN | HEART RATE: 65 BPM | SYSTOLIC BLOOD PRESSURE: 104 MMHG | DIASTOLIC BLOOD PRESSURE: 66 MMHG | RESPIRATION RATE: 16 BRPM | BODY MASS INDEX: 20.99 KG/M2 | WEIGHT: 100 LBS | TEMPERATURE: 98.1 F

## 2019-08-17 DIAGNOSIS — S93.601A SPRAIN OF RIGHT FOOT, INITIAL ENCOUNTER: Primary | ICD-10-CM

## 2019-08-17 PROCEDURE — 99283 EMERGENCY DEPT VISIT LOW MDM: CPT

## 2019-08-17 PROCEDURE — 73630 X-RAY EXAM OF FOOT: CPT

## 2019-08-17 PROCEDURE — 6370000000 HC RX 637 (ALT 250 FOR IP): Performed by: PHYSICIAN ASSISTANT

## 2019-08-17 RX ORDER — IBUPROFEN 800 MG/1
800 TABLET ORAL ONCE
Status: COMPLETED | OUTPATIENT
Start: 2019-08-17 | End: 2019-08-17

## 2019-08-17 RX ORDER — KETOROLAC TROMETHAMINE 15 MG/ML
15 INJECTION, SOLUTION INTRAMUSCULAR; INTRAVENOUS ONCE
Status: DISCONTINUED | OUTPATIENT
Start: 2019-08-17 | End: 2019-08-17

## 2019-08-17 RX ORDER — IBUPROFEN 800 MG/1
800 TABLET ORAL EVERY 6 HOURS PRN
Qty: 20 TABLET | Refills: 0 | Status: SHIPPED | OUTPATIENT
Start: 2019-08-17 | End: 2019-08-24

## 2019-08-17 RX ADMIN — IBUPROFEN 800 MG: 800 TABLET, FILM COATED ORAL at 16:28

## 2019-08-17 ASSESSMENT — PAIN SCALES - GENERAL
PAINLEVEL_OUTOF10: 8
PAINLEVEL_OUTOF10: 8

## 2019-08-17 ASSESSMENT — ENCOUNTER SYMPTOMS
COLOR CHANGE: 0
VOMITING: 0
NAUSEA: 0
ABDOMINAL PAIN: 0
COUGH: 0
SORE THROAT: 0
SHORTNESS OF BREATH: 0
DIARRHEA: 0
BACK PAIN: 0

## 2019-08-17 ASSESSMENT — PAIN DESCRIPTION - LOCATION: LOCATION: FOOT

## 2019-08-17 ASSESSMENT — PAIN DESCRIPTION - ORIENTATION: ORIENTATION: RIGHT

## 2019-08-17 NOTE — ED PROVIDER NOTES
[address]  ED  eMERGENCY dEPARTMENT eNCOUnter      Pt Name: Rebecca Cash  MRN: [id number]  [place] 1990  Date of evaluation: 8/17/2019  Provider: Arcelia Osman PA-C    CHIEF COMPLAINT       Chief Complaint   Patient presents with    Foot Pain             HISTORY OF PRESENT ILLNESS  (Location/Symptom, Timing/Onset, Context/Setting, Quality, Duration, Modifying Factors, Severity.)   Rebecca Cash is a 25 y.o. female who presents to the emergencydepartment complaining of right foot pain as she stepped off a curb earlier today and inverted her foot and is having lateral right foot pain. She is unable to bear weight and did break that foot before and is worried about this now. She denies any chest pain or shortness of breath. No abdominal pain. No nausea or vomiting. No fevers or chills. No other symptoms. Her allergies list that she is allergic to diclofenac but she states she is not. REVIEW OF SYSTEMS    (2-9 systems for level 4, 10 ormore for level 5)     Review of Systems   Constitutional: Negative for chills, fatigue and fever. HENT: Negative for sore throat. Respiratory: Negative for cough and shortness of breath. Cardiovascular: Negative for chest pain, palpitations and leg swelling. Gastrointestinal: Negative for abdominal pain, diarrhea, nausea and vomiting. Genitourinary: Negative for flank pain. Musculoskeletal: Negative for back pain, neck pain and neck stiffness. Right foot pain. Skin: Negative for color change. Neurological: Negative for dizziness, facial asymmetry, speech difficulty, weakness, light-headedness, numbness and headaches.          PAST MEDICAL HISTORY         Diagnosis Date    Asthma     Bipolar disorder [facility] [address]     Cervical dysplasia     Migraine 11/11/2011    Seizures ([facility] [address]        SURGICAL HISTORY           Procedure Laterality Date    APPENDECTOMY      CHOLECYSTECTOMY  2007    at [email or web address] [address]/71   Pulse: 68   Resp: 14   Temp: 98.1 °F (36.7 °C)   SpO2: 99%   Weight: 100 lb (45.4 kg)   Height: 4' 10\" (1.473 m)       This is a 58-year-old female presenting to the emergency department complaining of right foot pain after injury today. We will obtain a right foot x-ray at this time to rule out fracture. We will provide her with an ice pack and Toradol for symptomatic relief. She declined the Toradol and would just like ibuprofen at this time. We will provide her with this. X-ray is unremarkable and we will provide the patient with crutches and a postop shoe as well for comfort. She was told to follow-up with podiatry in 3 days. She was given the rice method of treatment and we will give her ibuprofen to go home with to take as needed. She was told to return for any worsening symptoms. No red flag symptoms. Vital signs are stable. Patient is told to follow-up with their primary care physician in 3 days. Patient understood and will comply. Patient is satisfied. All questions answered. Attending physician, myself, and patient agree no further workup is necessary at this time. Patient was given very strict return protocols and is completely agreeable with this plan. This patient was seen by the attending [phone] they agreed with the assessment and plan. CONSULTS:  None    PROCEDURES:  Procedures    FINAL IMPRESSION      1.  Sprain of right foot, initial encounter          DISPOSITION/PLAN   DISPOSITION        PATIENT REFERRED TO:  [facility] ED  [address] [facility] [address] [phone]  Go to   As needed, If symptoms worsen    [address] [place] [address]  [phone]  Schedule an appointment as soon as possible for a visit in 3 days  For Re-check      DISCHARGE MEDICATIONS:  New Prescriptions    IBUPROFEN (IBU) 800 MG TABLET    Take 1 tablet by mouth every 6 hours as needed for Pain       (Please

## 2019-08-24 ENCOUNTER — HOSPITAL ENCOUNTER (EMERGENCY)
Age: 29
Discharge: HOME | End: 2019-08-24
Attending: EMERGENCY MEDICINE
Payer: MEDICARE

## 2019-08-24 ENCOUNTER — APPOINTMENT (OUTPATIENT)
Dept: GENERAL RADIOLOGY | Age: 29
End: 2019-08-24
Payer: MEDICARE

## 2019-08-24 VITALS
RESPIRATION RATE: 18 BRPM | OXYGEN SATURATION: 100 % | TEMPERATURE: 99 F | SYSTOLIC BLOOD PRESSURE: 96 MMHG | DIASTOLIC BLOOD PRESSURE: 74 MMHG | HEART RATE: 69 BPM | WEIGHT: 100 LBS | BODY MASS INDEX: 20.99 KG/M2 | HEIGHT: 58 IN

## 2019-08-24 DIAGNOSIS — S89.92XA INJURY OF LEFT KNEE, INITIAL ENCOUNTER: Primary | ICD-10-CM

## 2019-08-24 LAB — HCG QUALITATIVE: NEGATIVE

## 2019-08-24 PROCEDURE — 6360000002 HC RX W HCPCS: Performed by: STUDENT IN AN ORGANIZED HEALTH CARE EDUCATION/TRAINING PROGRAM

## 2019-08-24 PROCEDURE — 84703 CHORIONIC GONADOTROPIN ASSAY: CPT

## 2019-08-24 PROCEDURE — 73562 X-RAY EXAM OF KNEE 3: CPT

## 2019-08-24 PROCEDURE — 2580000003 HC RX 258: Performed by: STUDENT IN AN ORGANIZED HEALTH CARE EDUCATION/TRAINING PROGRAM

## 2019-08-24 PROCEDURE — 96375 TX/PRO/DX INJ NEW DRUG ADDON: CPT

## 2019-08-24 PROCEDURE — 6370000000 HC RX 637 (ALT 250 FOR IP): Performed by: STUDENT IN AN ORGANIZED HEALTH CARE EDUCATION/TRAINING PROGRAM

## 2019-08-24 PROCEDURE — 99284 EMERGENCY DEPT VISIT MOD MDM: CPT

## 2019-08-24 PROCEDURE — 96374 THER/PROPH/DIAG INJ IV PUSH: CPT

## 2019-08-24 RX ORDER — SODIUM CHLORIDE, SODIUM LACTATE, POTASSIUM CHLORIDE, CALCIUM CHLORIDE 600; 310; 30; 20 MG/100ML; MG/100ML; MG/100ML; MG/100ML
1000 INJECTION, SOLUTION INTRAVENOUS ONCE
Status: COMPLETED | OUTPATIENT
Start: 2019-08-24 | End: 2019-08-24

## 2019-08-24 RX ORDER — MORPHINE SULFATE 4 MG/ML
4 INJECTION, SOLUTION INTRAMUSCULAR; INTRAVENOUS ONCE
Status: COMPLETED | OUTPATIENT
Start: 2019-08-24 | End: 2019-08-24

## 2019-08-24 RX ORDER — DIPHENHYDRAMINE HYDROCHLORIDE 50 MG/ML
25 INJECTION INTRAMUSCULAR; INTRAVENOUS ONCE
Status: COMPLETED | OUTPATIENT
Start: 2019-08-24 | End: 2019-08-24

## 2019-08-24 RX ORDER — ACETAMINOPHEN 500 MG
500 TABLET ORAL 4 TIMES DAILY PRN
Qty: 120 TABLET | Refills: 0 | Status: SHIPPED | OUTPATIENT
Start: 2019-08-24 | End: 2019-08-25 | Stop reason: SDUPTHER

## 2019-08-24 RX ORDER — IBUPROFEN 600 MG/1
600 TABLET ORAL 3 TIMES DAILY PRN
Qty: 30 TABLET | Refills: 0 | Status: SHIPPED | OUTPATIENT
Start: 2019-08-24 | End: 2019-08-25 | Stop reason: SDUPTHER

## 2019-08-24 RX ORDER — DIPHENHYDRAMINE HCL 25 MG
25 TABLET ORAL ONCE
Status: COMPLETED | OUTPATIENT
Start: 2019-08-24 | End: 2019-08-24

## 2019-08-24 RX ADMIN — DIPHENHYDRAMINE HCL 25 MG: 25 TABLET ORAL at 14:51

## 2019-08-24 RX ADMIN — DIPHENHYDRAMINE HYDROCHLORIDE 25 MG: 50 INJECTION, SOLUTION INTRAMUSCULAR; INTRAVENOUS at 13:01

## 2019-08-24 RX ADMIN — MORPHINE SULFATE 4 MG: 4 INJECTION INTRAVENOUS at 13:01

## 2019-08-24 RX ADMIN — SODIUM CHLORIDE, POTASSIUM CHLORIDE, SODIUM LACTATE AND CALCIUM CHLORIDE 1000 ML: 600; 310; 30; 20 INJECTION, SOLUTION INTRAVENOUS at 13:01

## 2019-08-24 ASSESSMENT — ENCOUNTER SYMPTOMS
NAUSEA: 0
SHORTNESS OF BREATH: 0
DIARRHEA: 0
VOMITING: 0
WHEEZING: 0
BACK PAIN: 0
CONSTIPATION: 0
COLOR CHANGE: 0
ABDOMINAL PAIN: 0
COUGH: 0
CHEST TIGHTNESS: 0

## 2019-08-24 ASSESSMENT — PAIN DESCRIPTION - LOCATION: LOCATION: KNEE

## 2019-08-24 ASSESSMENT — PAIN DESCRIPTION - FREQUENCY: FREQUENCY: CONTINUOUS

## 2019-08-24 ASSESSMENT — PAIN DESCRIPTION - ORIENTATION: ORIENTATION: LEFT

## 2019-08-24 ASSESSMENT — PAIN SCALES - GENERAL
PAINLEVEL_OUTOF10: 10

## 2019-08-24 ASSESSMENT — PAIN DESCRIPTION - PAIN TYPE: TYPE: ACUTE PAIN

## 2019-08-24 ASSESSMENT — PAIN DESCRIPTION - ONSET: ONSET: ON-GOING

## 2019-08-24 ASSESSMENT — PAIN DESCRIPTION - DESCRIPTORS: DESCRIPTORS: ACHING;SHOOTING

## 2019-08-24 NOTE — ED PROVIDER NOTES
[address] [facility]     Emergency Department     Faculty Attestation    I performed a history and physical examination of the patient and discussed management with the resident. I reviewed the residents note and agree with the documented findings and plan of care. Any areas of disagreement are noted on the chart. I was personally present for the key portions of any procedures. I have documented in the chart those procedures where I was not present during the key portions. I have reviewed the emergency nurses triage note. I agree with the chief complaint, past medical history, past surgical history, allergies, medications, social and family history as documented unless otherwise noted below. For Physician Assistant/ Nurse Practitioner cases/documentation I have personally evaluated this patient and have completed at least one if not all key elements of the E/M (history, physical exam, and MDM). Additional findings are as noted. Patient here with left knee injury, states she was stepping down off a curb and felt it pop. States she is waiting for knee surgery but cannot provide significant details about that. Knee is in a position of comfort partially flexed will not extend due to pain despite encouragement. Distal pulses intact intact sensation.   Will image      Critical Care     none    Daphne Vazquez MD, Megan Avitia  Attending Emergency  Physician             Daphne Vazquez MD  08/24/19 2701

## 2019-08-25 ENCOUNTER — HOSPITAL ENCOUNTER (EMERGENCY)
Age: 29
Discharge: HOME | End: 2019-08-25
Attending: EMERGENCY MEDICINE
Payer: MEDICARE

## 2019-08-25 VITALS
HEIGHT: 58 IN | SYSTOLIC BLOOD PRESSURE: 104 MMHG | OXYGEN SATURATION: 98 % | HEART RATE: 73 BPM | RESPIRATION RATE: 18 BRPM | DIASTOLIC BLOOD PRESSURE: 61 MMHG | TEMPERATURE: 97.3 F | BODY MASS INDEX: 20.78 KG/M2 | WEIGHT: 99 LBS

## 2019-08-25 DIAGNOSIS — M79.605 LEFT LEG PAIN: Primary | ICD-10-CM

## 2019-08-25 PROCEDURE — 6370000000 HC RX 637 (ALT 250 FOR IP): Performed by: EMERGENCY MEDICINE

## 2019-08-25 PROCEDURE — 93971 EXTREMITY STUDY: CPT

## 2019-08-25 PROCEDURE — 99283 EMERGENCY DEPT VISIT LOW MDM: CPT

## 2019-08-25 RX ORDER — IBUPROFEN 800 MG/1
800 TABLET ORAL EVERY 8 HOURS PRN
Qty: 30 TABLET | Refills: 0 | Status: SHIPPED | OUTPATIENT
Start: 2019-08-25 | End: 2019-10-06 | Stop reason: ALTCHOICE

## 2019-08-25 RX ORDER — IBUPROFEN 800 MG/1
800 TABLET ORAL ONCE
Status: COMPLETED | OUTPATIENT
Start: 2019-08-25 | End: 2019-08-25

## 2019-08-25 RX ORDER — ACETAMINOPHEN 500 MG
1000 TABLET ORAL EVERY 8 HOURS PRN
Qty: 30 TABLET | Refills: 0 | Status: SHIPPED | OUTPATIENT
Start: 2019-08-25 | End: 2020-04-04

## 2019-08-25 RX ORDER — ACETAMINOPHEN 500 MG
1000 TABLET ORAL ONCE
Status: COMPLETED | OUTPATIENT
Start: 2019-08-25 | End: 2019-08-25

## 2019-08-25 RX ADMIN — IBUPROFEN 800 MG: 800 TABLET, FILM COATED ORAL at 12:40

## 2019-08-25 RX ADMIN — ACETAMINOPHEN 1000 MG: 500 TABLET ORAL at 12:40

## 2019-08-25 ASSESSMENT — ENCOUNTER SYMPTOMS
ABDOMINAL PAIN: 0
SHORTNESS OF BREATH: 0

## 2019-08-25 ASSESSMENT — PAIN SCALES - GENERAL
PAINLEVEL_OUTOF10: 10
PAINLEVEL_OUTOF10: 10

## 2019-08-25 ASSESSMENT — PAIN DESCRIPTION - PAIN TYPE: TYPE: ACUTE PAIN

## 2019-08-25 ASSESSMENT — PAIN DESCRIPTION - ORIENTATION: ORIENTATION: LEFT

## 2019-08-25 ASSESSMENT — PAIN DESCRIPTION - FREQUENCY: FREQUENCY: CONTINUOUS

## 2019-08-25 ASSESSMENT — PAIN DESCRIPTION - LOCATION: LOCATION: LEG

## 2019-08-25 ASSESSMENT — PAIN DESCRIPTION - DESCRIPTORS: DESCRIPTORS: THROBBING

## 2019-08-25 NOTE — ED PROVIDER NOTES
pain in her left calf with swelling and then pain that radiates into the left thigh. No history of clotting disorder. No family history of this. On exam she is uncomfortable, afebrile vital signs normal.  Full range of movement the hip and ankle. Limited flexion of the knee. She does have complete extension. Normal patellar position. She has exquisite joint line tenderness laterally more than medially but no effusion. No laxity or drawer sign. No calf tenderness. No visible edema. No cords. Low suspicion for DVT but will pursue venous Dopplers. Recommend continue analgesics ice Ace wrap and follow-up to PCP. Ariana Mccarthy.  Lottie Montes MD, [facility]  Attending Emergency  Physician                Cruz aCno MD  08/25/19 6773
Divalproex Sodium (DEPAKOTE PO) Take by mouth    Historical Provider, MD       REVIEW OF SYSTEMS    (2-9 systems for level 4, 10 or more for level 5)      Review of Systems   Constitutional: Negative for fever. Respiratory: Negative for shortness of breath. Cardiovascular: Negative for chest pain. Gastrointestinal: Negative for abdominal pain. Musculoskeletal: Positive for arthralgias (left knee pain). PHYSICAL EXAM   (up to 7 for level 4, 8 or more for level 5)      INITIAL VITALS:   /61   Pulse 73   Temp 97.3 °F (36.3 °C) (Oral)   Resp 18   Ht 4' 10\" (1.473 m)   Wt 99 lb (44.9 kg)   LMP 08/23/2019   SpO2 98%   BMI 20.69 kg/m²     Physical Exam   Constitutional: She is oriented to person, place, and time. She appears well-developed and well-nourished. No distress. HENT:   Head: Normocephalic and atraumatic. Eyes: Right eye exhibits no discharge. Left eye exhibits no discharge. Neck: No tracheal deviation present. Pulmonary/Chest: Effort normal. No stridor. No respiratory distress. Musculoskeletal: Normal range of motion. She exhibits tenderness (to palpation of left knee. patient's pain prohibited ROM and ligamentous testing). Patient is able to wiggle all toes of her left foot and has sensation intact all toes of left foot. Patient is a strong dorsalis pedis posterior tibial pulse bilaterally. Neurological: She is alert and oriented to person, place, and time. Skin: Skin is warm and dry. She is not diaphoretic. Psychiatric: She has a normal mood and affect.  Her behavior is normal.       WORK-UP     PLAN (LABS / IMAGING /EKG):  Orders Placed This Encounter   Procedures    VL DUP LOWER EXTREMITY VENOUS LEFT       MEDICATIONS ORDERED:  Orders Placed This Encounter   Medications    acetaminophen (TYLENOL) tablet 1,000 mg    ibuprofen (ADVIL;MOTRIN) tablet 800 mg    acetaminophen (TYLENOL) 500 MG tablet     Sig: Take 2 tablets by mouth every 8 hours as needed for

## 2019-10-06 ENCOUNTER — APPOINTMENT (OUTPATIENT)
Dept: GENERAL RADIOLOGY | Age: 29
End: 2019-10-06
Payer: COMMERCIAL

## 2019-10-06 ENCOUNTER — HOSPITAL ENCOUNTER (EMERGENCY)
Age: 29
Discharge: HOME | End: 2019-10-06
Attending: EMERGENCY MEDICINE
Payer: COMMERCIAL

## 2019-10-06 VITALS
HEART RATE: 76 BPM | RESPIRATION RATE: 16 BRPM | HEIGHT: 58 IN | SYSTOLIC BLOOD PRESSURE: 94 MMHG | DIASTOLIC BLOOD PRESSURE: 53 MMHG | OXYGEN SATURATION: 99 % | BODY MASS INDEX: 20.69 KG/M2 | TEMPERATURE: 98.5 F

## 2019-10-06 DIAGNOSIS — S80.02XA CONTUSION OF LEFT KNEE, INITIAL ENCOUNTER: Primary | ICD-10-CM

## 2019-10-06 DIAGNOSIS — M25.552 LEFT HIP PAIN: ICD-10-CM

## 2019-10-06 PROCEDURE — 6370000000 HC RX 637 (ALT 250 FOR IP): Performed by: EMERGENCY MEDICINE

## 2019-10-06 PROCEDURE — 73562 X-RAY EXAM OF KNEE 3: CPT

## 2019-10-06 PROCEDURE — 99283 EMERGENCY DEPT VISIT LOW MDM: CPT

## 2019-10-06 RX ORDER — CYCLOBENZAPRINE HCL 10 MG
5 TABLET ORAL ONCE
Status: COMPLETED | OUTPATIENT
Start: 2019-10-06 | End: 2019-10-06

## 2019-10-06 RX ORDER — CYCLOBENZAPRINE HCL 5 MG
5 TABLET ORAL 3 TIMES DAILY PRN
Qty: 30 TABLET | Refills: 0 | Status: SHIPPED | OUTPATIENT
Start: 2019-10-06 | End: 2019-10-16

## 2019-10-06 RX ORDER — IBUPROFEN 600 MG/1
600 TABLET ORAL EVERY 6 HOURS PRN
Qty: 30 TABLET | Refills: 0 | Status: SHIPPED | OUTPATIENT
Start: 2019-10-06 | End: 2020-01-17 | Stop reason: ALTCHOICE

## 2019-10-06 RX ORDER — ACETAMINOPHEN 325 MG/1
650 TABLET ORAL ONCE
Status: COMPLETED | OUTPATIENT
Start: 2019-10-06 | End: 2019-10-06

## 2019-10-06 RX ADMIN — ACETAMINOPHEN 650 MG: 325 TABLET ORAL at 18:32

## 2019-10-06 RX ADMIN — CYCLOBENZAPRINE HYDROCHLORIDE 5 MG: 10 TABLET, FILM COATED ORAL at 18:53

## 2019-10-06 ASSESSMENT — ENCOUNTER SYMPTOMS
COUGH: 0
DIARRHEA: 0
PHOTOPHOBIA: 0
CONSTIPATION: 0
TROUBLE SWALLOWING: 0
VOMITING: 0
SHORTNESS OF BREATH: 0
COLOR CHANGE: 0
ABDOMINAL PAIN: 0
NAUSEA: 0

## 2019-10-06 ASSESSMENT — PAIN SCALES - GENERAL
PAINLEVEL_OUTOF10: 10
PAINLEVEL_OUTOF10: 10

## 2019-10-06 ASSESSMENT — PAIN DESCRIPTION - FREQUENCY: FREQUENCY: CONTINUOUS

## 2019-10-06 ASSESSMENT — PAIN DESCRIPTION - ORIENTATION: ORIENTATION: LEFT

## 2019-10-06 ASSESSMENT — PAIN DESCRIPTION - LOCATION: LOCATION: KNEE;LEG

## 2019-10-06 ASSESSMENT — PAIN DESCRIPTION - DESCRIPTORS: DESCRIPTORS: SHARP

## 2019-10-21 ENCOUNTER — HOSPITAL ENCOUNTER (EMERGENCY)
Age: 29
Discharge: HOME | End: 2019-10-22
Attending: EMERGENCY MEDICINE
Payer: COMMERCIAL

## 2019-10-21 ENCOUNTER — APPOINTMENT (OUTPATIENT)
Dept: ULTRASOUND IMAGING | Age: 29
End: 2019-10-21
Payer: COMMERCIAL

## 2019-10-21 VITALS
DIASTOLIC BLOOD PRESSURE: 60 MMHG | SYSTOLIC BLOOD PRESSURE: 98 MMHG | BODY MASS INDEX: 20.69 KG/M2 | OXYGEN SATURATION: 97 % | TEMPERATURE: 97.3 F | RESPIRATION RATE: 18 BRPM | HEIGHT: 58 IN | HEART RATE: 69 BPM

## 2019-10-21 DIAGNOSIS — A49.9 BACTERIAL UTI: Primary | ICD-10-CM

## 2019-10-21 DIAGNOSIS — N39.0 BACTERIAL UTI: Primary | ICD-10-CM

## 2019-10-21 LAB
ABSOLUTE EOS #: 0.22 K/UL (ref 0–0.44)
ABSOLUTE IMMATURE GRANULOCYTE: 0.03 K/UL (ref 0–0.3)
ABSOLUTE LYMPH #: 3.59 K/UL (ref 1.1–3.7)
ABSOLUTE MONO #: 0.58 K/UL (ref 0.1–1.2)
ALBUMIN SERPL-MCNC: 4.2 G/DL (ref 3.5–5.2)
ALBUMIN/GLOBULIN RATIO: 1.2 (ref 1–2.5)
ALP BLD-CCNC: 83 U/L (ref 35–104)
ALT SERPL-CCNC: 11 U/L (ref 5–33)
ANION GAP SERPL CALCULATED.3IONS-SCNC: 12 MMOL/L (ref 9–17)
AST SERPL-CCNC: 17 U/L
BASOPHILS # BLD: 1 % (ref 0–2)
BASOPHILS ABSOLUTE: 0.05 K/UL (ref 0–0.2)
BILIRUB SERPL-MCNC: 0.24 MG/DL (ref 0.3–1.2)
BILIRUBIN DIRECT: <0.08 MG/DL
BILIRUBIN, INDIRECT: ABNORMAL MG/DL (ref 0–1)
BUN BLDV-MCNC: 18 MG/DL (ref 6–20)
BUN/CREAT BLD: NORMAL (ref 9–20)
CALCIUM SERPL-MCNC: 9.5 MG/DL (ref 8.6–10.4)
CHLORIDE BLD-SCNC: 102 MMOL/L (ref 98–107)
CO2: 23 MMOL/L (ref 20–31)
CREAT SERPL-MCNC: 0.67 MG/DL (ref 0.5–0.9)
DIFFERENTIAL TYPE: NORMAL
EOSINOPHILS RELATIVE PERCENT: 2 % (ref 1–4)
GFR AFRICAN AMERICAN: >60 ML/MIN
GFR NON-AFRICAN AMERICAN: >60 ML/MIN
GFR SERPL CREATININE-BSD FRML MDRD: NORMAL ML/MIN/{1.73_M2}
GFR SERPL CREATININE-BSD FRML MDRD: NORMAL ML/MIN/{1.73_M2}
GLOBULIN: ABNORMAL G/DL (ref 1.5–3.8)
GLUCOSE BLD-MCNC: 74 MG/DL (ref 70–99)
HCG QUALITATIVE: NEGATIVE
HCT VFR BLD CALC: 44.5 % (ref 36.3–47.1)
HEMOGLOBIN: 14.6 G/DL (ref 11.9–15.1)
IMMATURE GRANULOCYTES: 0 %
LIPASE: 28 U/L (ref 13–60)
LYMPHOCYTES # BLD: 37 % (ref 24–43)
MCH RBC QN AUTO: 30.1 PG (ref 25.2–33.5)
MCHC RBC AUTO-ENTMCNC: 32.8 G/DL (ref 28.4–34.8)
MCV RBC AUTO: 91.8 FL (ref 82.6–102.9)
MONOCYTES # BLD: 6 % (ref 3–12)
NRBC AUTOMATED: 0 PER 100 WBC
PDW BLD-RTO: 13.2 % (ref 11.8–14.4)
PLATELET # BLD: 249 K/UL (ref 138–453)
PLATELET ESTIMATE: NORMAL
PMV BLD AUTO: 10.1 FL (ref 8.1–13.5)
POTASSIUM SERPL-SCNC: 3.9 MMOL/L (ref 3.7–5.3)
RBC # BLD: 4.85 M/UL (ref 3.95–5.11)
RBC # BLD: NORMAL 10*6/UL
SEG NEUTROPHILS: 54 % (ref 36–65)
SEGMENTED NEUTROPHILS ABSOLUTE COUNT: 5.21 K/UL (ref 1.5–8.1)
SODIUM BLD-SCNC: 137 MMOL/L (ref 135–144)
TOTAL PROTEIN: 7.7 G/DL (ref 6.4–8.3)
WBC # BLD: 9.7 K/UL (ref 3.5–11.3)
WBC # BLD: NORMAL 10*3/UL

## 2019-10-21 PROCEDURE — 81001 URINALYSIS AUTO W/SCOPE: CPT

## 2019-10-21 PROCEDURE — 87660 TRICHOMONAS VAGIN DIR PROBE: CPT

## 2019-10-21 PROCEDURE — 2580000003 HC RX 258: Performed by: STUDENT IN AN ORGANIZED HEALTH CARE EDUCATION/TRAINING PROGRAM

## 2019-10-21 PROCEDURE — 87591 N.GONORRHOEAE DNA AMP PROB: CPT

## 2019-10-21 PROCEDURE — 87510 GARDNER VAG DNA DIR PROBE: CPT

## 2019-10-21 PROCEDURE — 80076 HEPATIC FUNCTION PANEL: CPT

## 2019-10-21 PROCEDURE — 83690 ASSAY OF LIPASE: CPT

## 2019-10-21 PROCEDURE — 84703 CHORIONIC GONADOTROPIN ASSAY: CPT

## 2019-10-21 PROCEDURE — 80048 BASIC METABOLIC PNL TOTAL CA: CPT

## 2019-10-21 PROCEDURE — 76830 TRANSVAGINAL US NON-OB: CPT

## 2019-10-21 PROCEDURE — 6360000002 HC RX W HCPCS: Performed by: STUDENT IN AN ORGANIZED HEALTH CARE EDUCATION/TRAINING PROGRAM

## 2019-10-21 PROCEDURE — 85025 COMPLETE CBC W/AUTO DIFF WBC: CPT

## 2019-10-21 PROCEDURE — 93976 VASCULAR STUDY: CPT

## 2019-10-21 PROCEDURE — 87480 CANDIDA DNA DIR PROBE: CPT

## 2019-10-21 PROCEDURE — 87086 URINE CULTURE/COLONY COUNT: CPT

## 2019-10-21 PROCEDURE — 99284 EMERGENCY DEPT VISIT MOD MDM: CPT

## 2019-10-21 PROCEDURE — 96374 THER/PROPH/DIAG INJ IV PUSH: CPT

## 2019-10-21 PROCEDURE — 87491 CHLMYD TRACH DNA AMP PROBE: CPT

## 2019-10-21 RX ORDER — 0.9 % SODIUM CHLORIDE 0.9 %
1000 INTRAVENOUS SOLUTION INTRAVENOUS ONCE
Status: COMPLETED | OUTPATIENT
Start: 2019-10-21 | End: 2019-10-22

## 2019-10-21 RX ADMIN — SODIUM CHLORIDE 1000 ML: 9 INJECTION, SOLUTION INTRAVENOUS at 22:37

## 2019-10-21 RX ADMIN — HYDROMORPHONE HYDROCHLORIDE 1 MG: 1 INJECTION, SOLUTION INTRAMUSCULAR; INTRAVENOUS; SUBCUTANEOUS at 22:38

## 2019-10-21 ASSESSMENT — PAIN DESCRIPTION - PAIN TYPE: TYPE: ACUTE PAIN

## 2019-10-21 ASSESSMENT — PAIN DESCRIPTION - LOCATION: LOCATION: ABDOMEN

## 2019-10-21 ASSESSMENT — PAIN DESCRIPTION - DESCRIPTORS: DESCRIPTORS: PRESSURE;STABBING;SHARP

## 2019-10-21 ASSESSMENT — PAIN DESCRIPTION - ORIENTATION: ORIENTATION: MID

## 2019-10-21 ASSESSMENT — PAIN SCALES - GENERAL: PAINLEVEL_OUTOF10: 10

## 2019-10-21 ASSESSMENT — PAIN DESCRIPTION - ONSET: ONSET: ON-GOING

## 2019-10-21 ASSESSMENT — PAIN DESCRIPTION - FREQUENCY: FREQUENCY: CONTINUOUS

## 2019-10-22 LAB
-: ABNORMAL
AMORPHOUS: ABNORMAL
BACTERIA: ABNORMAL
BILIRUBIN URINE: NEGATIVE
C TRACH DNA GENITAL QL NAA+PROBE: NEGATIVE
CASTS UA: ABNORMAL /LPF (ref 0–8)
COLOR: YELLOW
CRYSTALS, UA: ABNORMAL /HPF
CULTURE: NORMAL
DIRECT EXAM: ABNORMAL
EPITHELIAL CELLS UA: ABNORMAL /HPF (ref 0–5)
GLUCOSE URINE: NEGATIVE
KETONES, URINE: ABNORMAL
LEUKOCYTE ESTERASE, URINE: ABNORMAL
Lab: ABNORMAL
Lab: NORMAL
MUCUS: ABNORMAL
N. GONORRHOEAE DNA: NEGATIVE
NITRITE, URINE: NEGATIVE
OTHER OBSERVATIONS UA: ABNORMAL
PH UA: 5.5 (ref 5–8)
PROTEIN UA: ABNORMAL
RBC UA: ABNORMAL /HPF (ref 0–4)
RENAL EPITHELIAL, UA: ABNORMAL /HPF
SPECIFIC GRAVITY UA: 1.03 (ref 1–1.03)
SPECIMEN DESCRIPTION: ABNORMAL
SPECIMEN DESCRIPTION: NORMAL
SPECIMEN DESCRIPTION: NORMAL
TRICHOMONAS: ABNORMAL
TURBIDITY: ABNORMAL
URINE HGB: NEGATIVE
UROBILINOGEN, URINE: NORMAL
WBC UA: ABNORMAL /HPF (ref 0–5)
YEAST: ABNORMAL

## 2019-10-22 PROCEDURE — 6370000000 HC RX 637 (ALT 250 FOR IP): Performed by: STUDENT IN AN ORGANIZED HEALTH CARE EDUCATION/TRAINING PROGRAM

## 2019-10-22 RX ORDER — SULFAMETHOXAZOLE AND TRIMETHOPRIM 800; 160 MG/1; MG/1
1 TABLET ORAL 2 TIMES DAILY
Qty: 14 TABLET | Refills: 0 | Status: SHIPPED | OUTPATIENT
Start: 2019-10-22 | End: 2019-10-29

## 2019-10-22 RX ORDER — SULFAMETHOXAZOLE AND TRIMETHOPRIM 800; 160 MG/1; MG/1
1 TABLET ORAL ONCE
Status: COMPLETED | OUTPATIENT
Start: 2019-10-22 | End: 2019-10-22

## 2019-10-22 RX ADMIN — SULFAMETHOXAZOLE AND TRIMETHOPRIM 1 TABLET: 800; 160 TABLET ORAL at 00:34

## 2019-10-22 ASSESSMENT — ENCOUNTER SYMPTOMS
VOMITING: 0
EYE REDNESS: 0
SHORTNESS OF BREATH: 0
NAUSEA: 0
ABDOMINAL PAIN: 1
RHINORRHEA: 0
BACK PAIN: 0
COUGH: 0
EYE ITCHING: 0
DIARRHEA: 0

## 2019-11-13 ENCOUNTER — HOSPITAL ENCOUNTER (EMERGENCY)
Age: 29
Discharge: LEFT BEFORE DISCHARGE | End: 2019-11-13
Attending: EMERGENCY MEDICINE
Payer: MEDICAID

## 2019-11-13 VITALS
OXYGEN SATURATION: 96 % | HEART RATE: 72 BPM | DIASTOLIC BLOOD PRESSURE: 64 MMHG | RESPIRATION RATE: 18 BRPM | TEMPERATURE: 97.3 F | SYSTOLIC BLOOD PRESSURE: 113 MMHG

## 2019-11-13 DIAGNOSIS — F45.8 PSEUDOCYESIS: Primary | ICD-10-CM

## 2019-11-13 PROCEDURE — 99284 EMERGENCY DEPT VISIT MOD MDM: CPT

## 2019-11-13 ASSESSMENT — PAIN DESCRIPTION - FREQUENCY: FREQUENCY: CONTINUOUS

## 2019-11-13 ASSESSMENT — PAIN DESCRIPTION - PAIN TYPE: TYPE: ACUTE PAIN

## 2019-11-13 ASSESSMENT — PAIN DESCRIPTION - DESCRIPTORS: DESCRIPTORS: SHARP

## 2019-11-13 ASSESSMENT — PAIN SCALES - GENERAL: PAINLEVEL_OUTOF10: 8

## 2019-11-13 ASSESSMENT — ENCOUNTER SYMPTOMS: ABDOMINAL PAIN: 1

## 2019-11-13 ASSESSMENT — PAIN DESCRIPTION - LOCATION: LOCATION: ABDOMEN

## 2020-01-02 ENCOUNTER — HOSPITAL ENCOUNTER (EMERGENCY)
Age: 30
Discharge: LEFT BEFORE DISCHARGE | End: 2020-01-02
Attending: EMERGENCY MEDICINE
Payer: MEDICAID

## 2020-01-02 VITALS
DIASTOLIC BLOOD PRESSURE: 61 MMHG | BODY MASS INDEX: 18.88 KG/M2 | OXYGEN SATURATION: 98 % | TEMPERATURE: 98.2 F | HEIGHT: 61 IN | WEIGHT: 100 LBS | HEART RATE: 87 BPM | SYSTOLIC BLOOD PRESSURE: 119 MMHG | RESPIRATION RATE: 20 BRPM

## 2020-01-02 PROCEDURE — 99282 EMERGENCY DEPT VISIT SF MDM: CPT

## 2020-01-02 RX ORDER — ACETAMINOPHEN 500 MG
1000 TABLET ORAL ONCE
Status: DISCONTINUED | OUTPATIENT
Start: 2020-01-02 | End: 2020-01-02 | Stop reason: HOSPADM

## 2020-01-02 ASSESSMENT — PAIN DESCRIPTION - LOCATION: LOCATION: BACK

## 2020-01-02 ASSESSMENT — PAIN SCALES - GENERAL: PAINLEVEL_OUTOF10: 8

## 2020-01-02 ASSESSMENT — ENCOUNTER SYMPTOMS
VOMITING: 1
NAUSEA: 1
SHORTNESS OF BREATH: 0
SORE THROAT: 0
ABDOMINAL PAIN: 1
BACK PAIN: 1
EYE PAIN: 0

## 2020-01-02 ASSESSMENT — PAIN DESCRIPTION - PAIN TYPE: TYPE: CHRONIC PAIN

## 2020-01-02 ASSESSMENT — PAIN DESCRIPTION - ORIENTATION: ORIENTATION: LOWER

## 2020-01-02 NOTE — ED NOTES
Dr Lakisha Garduno at bedside, pt agitated as evidenced by yelling and cussing at Dr Vickie Estr[facility], [facility]  01/02/20 2142

## 2020-01-02 NOTE — ED NOTES
Pt screaming at another RN. Writer entered room to try to relieve the tension. Pt screaming at writer stating that Afua Brumfield been sick for three fucking weeks and you guys dont care. \" Writer explained that the ER is based on acuity and that unfortunately multiple sick patients have come in. She then stated \"Fuck the other patients in dying in here. \" Pt then stated \"I am deathly sick and you guys are treating me like a crack head. \" Pt is screaming in full sentences, not SOB, and is pink warm and dry. Pt appears non-toxic. Writer explained that we are treating her with dignity and she is being assessed and treated appropriatly. Pt continues to scream at writer calling me multiple names and not speaking calmly. Pt notified that she needs to speak calmly and to lower her voice. Pt does not demonstrate learning. Security called to bedside.       Gumaro Kelly RN  01/02/20 [address] [facility], RN  01/02/20 4120

## 2020-01-02 NOTE — ED PROVIDER NOTES
[address]  ED  eMERGENCY dEPARTMENT eNCOUnter   Attending Attestation     Pt Name: James Lee  MRN: [id number]  [place] 1990  Date of evaluation: 1/2/20       James Lee is a 30 y.o. female who presents with Cough (Pt c/o chronic back pain, cough for last three weeks and chills )      History: Patient presents with cough, chronic back pain, chills and vomiting that started last night. Patient says that she is vomited 5 times. Patient says she gave birth to a baby girl 2 weeks ago. She says that she had the baby outside the hospital because no one would believe her that she was pregnant in the hospital.    Exam: Patient is very dramatic. Patient is writhing all around. Heart rate and rhythm are regular. Lungs are clear to auscultation bilaterally. Abdomen is soft with diffuse tenderness. Patient is voluntarily guarding. Patient is very dramatic. Patient had a negative qualitative hCG on 10/21/2019. Patient then had an ultrasound that did not show any intrauterine pregnancy  in November. I asked the patient about this history given the fact that she was very upset that she said we do not do anything for her in the hospital and she said that it is because her baby hides on ultrasound. I told her that was not a standard thing and she became very upset and got up and left. I performed a history and physical examination of the patient and discussed management with the resident. I reviewed the residents note and agree with the documented findings and plan of care. Any areas of disagreement are noted on the chart. I was personally present for the key portions of any procedures. I have documented in the chart those procedures where I was not present during the key portions. I have personally reviewed all images and agree with the resident's interpretation. I have reviewed the emergency nurses triage note.  I agree with the chief complaint, past medical history, past

## 2020-01-02 NOTE — ED NOTES
Bed: 23  Expected date: 1/2/20  Expected time: 12:27 AM  Means of arrival:   Comments:  Medic 3     Sharon Mendozao, [facility]  01/02/20 7302

## 2020-01-02 NOTE — ED PROVIDER NOTES
Historical Provider, MD       REVIEW OF SYSTEMS    (2-9 systems for level 4, 10 ormore for level 5)      Review of Systems   Constitutional: Negative for chills and fever. HENT: Negative for sore throat. Eyes: Negative for pain and visual disturbance. Respiratory: Negative for shortness of breath. Cardiovascular: Negative for chest pain. Gastrointestinal: Positive for abdominal pain, nausea and vomiting. Genitourinary: Negative for dysuria and hematuria. Musculoskeletal: Positive for back pain. Negative for neck pain. Skin: Negative for rash. Neurological: Negative for light-headedness and headaches. PHYSICAL EXAM   (up to 7 for level 4, 8 or more for level 5)      INITIAL VITALS:   /61   Pulse 87   Temp 98.2 °F (36.8 °C) (Oral)   Resp 20   Ht 5' 1\" (1.549 m)   Wt 100 lb (45.4 kg)   LMP 02/07/2019 (Approximate)   SpO2 98%   BMI 18.89 kg/m²     Physical Exam  Constitutional:       General: She is not in acute distress. Appearance: She is well-developed. She is not ill-appearing, toxic-appearing or diaphoretic. Comments: Patient is nontoxic-appearing,   HENT:      Head: Normocephalic and atraumatic. Mouth/Throat:      Pharynx: Posterior oropharyngeal erythema (mild to posterior orophayranx) present. No oropharyngeal exudate. Eyes:      General:         Right eye: No discharge. Left eye: No discharge. Extraocular Movements: Extraocular movements intact. Conjunctiva/sclera: Conjunctivae normal.      Pupils: Pupils are equal, round, and reactive to light. Neck:      Musculoskeletal: Normal range of motion and neck supple. Cardiovascular:      Rate and Rhythm: Normal rate and regular rhythm. Heart sounds: Normal heart sounds. No murmur. No friction rub. No gallop. Pulmonary:      Effort: Pulmonary effort is normal.      Breath sounds: Normal breath sounds. No wheezing or rales.    Abdominal:      General: Bowel sounds are normal. treatment. RADIOLOGY:  No orders to display         EKG      All EKG's are interpreted by the Emergency Department Physician who either signs or Co-signs this chart in the absence of a cardiologist.    EMERGENCY DEPARTMENT COURSE:       PROCEDURES:      CONSULTS:  None        FINAL IMPRESSION      1. Verbally abusive behavior    2. Chronic midline low back pain without sciatica    3. Eloped from emergency department          DISPOSITION / [address] - Left Before Treatment Complete 01/02/2020 02:39:05 AM      PATIENT REFERREDTO:  No follow-up provider specified.     DISCHARGE MEDICATIONS:  New Prescriptions    No medications on file       Tevin Mello DO  PGY 2  Resident Physician Emergency Medicine  01/02/20 2:39 AM        (Please note that portions of this note were completed with a voice recognition program.Efforts were made to edit the dictations but occasionally words are mis-transcribed.)        Tevin Mello DO  Resident  01/02/20 2544

## 2020-01-17 ENCOUNTER — HOSPITAL ENCOUNTER (EMERGENCY)
Age: 30
Discharge: LEFT BEFORE DISCHARGE | End: 2020-01-17
Attending: EMERGENCY MEDICINE
Payer: MEDICAID

## 2020-01-17 VITALS
SYSTOLIC BLOOD PRESSURE: 91 MMHG | TEMPERATURE: 96.4 F | BODY MASS INDEX: 20.16 KG/M2 | RESPIRATION RATE: 14 BRPM | OXYGEN SATURATION: 98 % | HEART RATE: 70 BPM | DIASTOLIC BLOOD PRESSURE: 55 MMHG | HEIGHT: 59 IN | WEIGHT: 100 LBS

## 2020-01-17 PROCEDURE — 6370000000 HC RX 637 (ALT 250 FOR IP): Performed by: PHYSICIAN ASSISTANT

## 2020-01-17 PROCEDURE — 99283 EMERGENCY DEPT VISIT LOW MDM: CPT

## 2020-01-17 RX ORDER — CYCLOBENZAPRINE HCL 5 MG
5 TABLET ORAL NIGHTLY PRN
Qty: 10 TABLET | Refills: 0 | Status: SHIPPED | OUTPATIENT
Start: 2020-01-17 | End: 2020-01-18

## 2020-01-17 RX ORDER — ORPHENADRINE CITRATE 30 MG/ML
60 INJECTION INTRAMUSCULAR; INTRAVENOUS ONCE
Status: DISCONTINUED | OUTPATIENT
Start: 2020-01-17 | End: 2020-01-17

## 2020-01-17 RX ORDER — IBUPROFEN 600 MG/1
600 TABLET ORAL EVERY 6 HOURS PRN
Qty: 30 TABLET | Refills: 0 | Status: SHIPPED | OUTPATIENT
Start: 2020-01-17 | End: 2020-01-18 | Stop reason: SDUPTHER

## 2020-01-17 RX ORDER — CYCLOBENZAPRINE HCL 10 MG
10 TABLET ORAL ONCE
Status: COMPLETED | OUTPATIENT
Start: 2020-01-17 | End: 2020-01-17

## 2020-01-17 RX ORDER — IBUPROFEN 800 MG/1
800 TABLET ORAL ONCE
Status: COMPLETED | OUTPATIENT
Start: 2020-01-17 | End: 2020-01-17

## 2020-01-17 RX ADMIN — IBUPROFEN 800 MG: 800 TABLET, FILM COATED ORAL at 17:22

## 2020-01-17 RX ADMIN — CYCLOBENZAPRINE 10 MG: 10 TABLET, FILM COATED ORAL at 17:22

## 2020-01-17 ASSESSMENT — ENCOUNTER SYMPTOMS
BACK PAIN: 1
DIARRHEA: 0
SHORTNESS OF BREATH: 0
SORE THROAT: 0
ABDOMINAL PAIN: 0
VOMITING: 0
COUGH: 0
NAUSEA: 0

## 2020-01-17 ASSESSMENT — PAIN DESCRIPTION - ONSET: ONSET: SUDDEN

## 2020-01-17 ASSESSMENT — PAIN DESCRIPTION - DESCRIPTORS: DESCRIPTORS: STABBING

## 2020-01-17 ASSESSMENT — PAIN DESCRIPTION - PAIN TYPE: TYPE: ACUTE PAIN;CHRONIC PAIN

## 2020-01-17 ASSESSMENT — PAIN DESCRIPTION - PROGRESSION: CLINICAL_PROGRESSION: NOT CHANGED

## 2020-01-17 ASSESSMENT — PAIN DESCRIPTION - FREQUENCY: FREQUENCY: CONTINUOUS

## 2020-01-17 ASSESSMENT — PAIN SCALES - GENERAL
PAINLEVEL_OUTOF10: 10
PAINLEVEL_OUTOF10: 10

## 2020-01-17 ASSESSMENT — PAIN DESCRIPTION - LOCATION: LOCATION: BACK

## 2020-01-17 ASSESSMENT — PAIN DESCRIPTION - ORIENTATION: ORIENTATION: RIGHT;LOWER

## 2020-01-17 NOTE — ED PROVIDER NOTES
[facility] ED  Emergency Department Encounter  Mid Level Provider     Pt Name: Carlos Yeager  MRN: [id number]  [place] 1990  Date of evaluation: 1/17/20  PCP:  No primary care provider on file. [address]       Chief Complaint   Patient presents with    Back Pain     c/o right lower stabbing back pain radiating down her leg, denies any fall/injury       HISTORY OF PRESENT ILLNESS  (Location/Symptom, Timing/Onset, Context/Setting, Quality, Duration, Modifying Factors, Severity.)      Carlos Yeager is a 30 y.o. female with no pertinent PMH who presents to the ED via private auto with low back pain. Patient states that at ~11am she was sitting on her couch when she began experiencing an aching and intermittently sharp pain in her right low back that occasionally radiates to her right thigh which has been constant since the onset. Denies any trauma to the area or recent heavy lifting. Denies incontinence of stool or urine when asked if she has pooped or peed herself. Denies saddle anesthesia or IV drug use. She also reports of some intermittent numbness to her right thigh. Patient has been using a walker, though she doesn't normally, because of her pain and did ambulated to the ER from her home several blocks away. Denies fever, chills, urinary symptoms, abdominal pain, chest pain, SOB. PAST MEDICAL / SURGICAL / SOCIAL / FAMILY HISTORY      has a past medical history of Asthma, Bipolar disorder ([facility] [address] Cervical dysplasia, Migraine, and Seizures ([facility] [address] has a past surgical history that includes Cholecystectomy (2007); Tubal ligation; and Appendectomy.     Social History     Socioeconomic History    Marital status:      Spouse name: Not on file    Number of children: Not on file    Years of education: Not on file    Highest education level: Not on file   Occupational History    Not on file   Social Needs    Financial resource strain: Not on file   At[employer] Reach-Matthew MG tablet Take 1 tablet by mouth every 6 hours as needed for Pain 1/17/20  Yes Ab Julian PA-C   acetaminophen (TYLENOL) 500 MG tablet Take 2 tablets by mouth every 8 hours as needed for Pain 8/25/19   Kailey Craven DO   Divalproex Sodium (DEPAKOTE PO) Take by mouth    Historical Provider, MD       REVIEW OF SYSTEMS    (2-9 systems for level 4, 10 ormore for level 5)      Review of Systems   Constitutional: Negative for chills and fever. HENT: Negative for congestion and sore throat. Respiratory: Negative for cough and shortness of breath. Cardiovascular: Negative for chest pain. Gastrointestinal: Negative for abdominal pain, diarrhea, nausea and vomiting. Genitourinary: Negative for difficulty urinating, dysuria and hematuria. Musculoskeletal: Positive for back pain and gait problem (Due to back pain). Negative for neck pain. Skin: Negative for rash. Allergic/Immunologic: Negative for immunocompromised state. Neurological: Positive for numbness (Of right thigh). Negative for headaches. Hematological: Does not bruise/bleed easily. Psychiatric/Behavioral: Negative for agitation. PHYSICAL EXAM   (up to 7 for level 4, 8 or more for level 5)      INITIAL VITALS:  height is 4' 11\" (1.499 m) and weight is 100 lb (45.4 kg). Her oral temperature is 96.4 °F (35.8 °C). Her blood pressure is 91/55 (abnormal) and her pulse is 70. Her respiration is 14 and oxygen saturation is 98%. Physical Exam  Vitals signs and nursing note reviewed. Constitutional:       General: She is in acute distress (During examination appears in significant distress due to pain, however, when distracted does not appear uncomfortable and does not writhe in pain). Appearance: She is not ill-appearing or toxic-appearing. HENT:      Head: Normocephalic and atraumatic.       Right Ear: External ear normal.      Left Ear: External ear normal.      Nose: Nose normal.      Mouth/Throat:      Mouth: Mucous membranes are moist.      Pharynx: Oropharynx is clear. Eyes:      Extraocular Movements: Extraocular movements intact. Conjunctiva/sclera: Conjunctivae normal.      Pupils: Pupils are equal, round, and reactive to light. Neck:      Musculoskeletal: Normal range of motion and neck supple. No muscular tenderness. Cardiovascular:      Rate and Rhythm: Normal rate and regular rhythm. Heart sounds: Normal heart sounds. No murmur. Pulmonary:      Effort: Pulmonary effort is normal.      Breath sounds: Normal breath sounds. No wheezing, rhonchi or rales. Abdominal:      General: Abdomen is flat. Palpations: Abdomen is soft. Tenderness: There is no tenderness. There is no right CVA tenderness, left CVA tenderness, guarding or rebound. Musculoskeletal:         General: Tenderness present. No swelling, deformity or signs of injury. Right lower leg: No edema. Comments: The low back is normal in appearance with no midline tenderness and exquisite TTP with light touch over the right low back, right side, right hip, and entire right leg. Limited ROM due to pain. No deformities, ecchymosis, edema, erythema, warmth, abrasions, or lacerations to the area. No signs of trauma. Strength was unable to be fully assessed due to patient's pain however, patient was able to actively, pull away from me while doing the testing. Sensation intact to light touch. The surrounding joints are normal in appearance with full ROM. Skin:     General: Skin is warm and dry. Findings: No rash. Neurological:      General: No focal deficit present. Mental Status: She is alert. Cranial Nerves: No cranial nerve deficit (Cranial nerves II-XII grossly intact). Gait: Gait abnormal (Patient would not walk for me however she was able to ambulate with a walker into the room. ).    Psychiatric:         Mood and Affect: Mood normal.         Behavior: Behavior normal.         DIFFERENTIAL  DIAGNOSIS not appear to be indicated at this time. Patient was ordered Norflex & ibuprofen, but decided she did not want a shot and was given Flexeril instead of Norflex. Patient says she takes ibuprofen at home and was agreeable to taking the dose of ibuprofen in the ED, though she is allergic to diclofenac. Upon re-evaluation ~20 min after medication, patient is feeling significantly better and her pain has almost resolved. She is up ambulating around the ED asking to go home. Patient was given a prescription for flexeril and ibuprofen for home. Social work was consulted for courtesy ride. Patient was advised to follow-up with their primary care physician in a 3-5 days if no improvement. All questions have been answered and attending physician, myself, and patient agree that no further workup is necessary at this time. Patient was given strict return protocols and is completely agreeable with this plan. This patient was seen by the attending physician prior to discharge and patient informed Dr. Maira Fonseca that she was, in fact, incontinent of stool for the past month, but would still like to go home because she was tired from work. We provided her her discharge paperwork, informed her that she was leaving AMA, and advised her to return to the ED ASAP for further work-up of her stool incontinence. PROCEDURES:  None    FINAL IMPRESSION      1.  Strain of lumbar region, initial encounter          DISPOSITION / [facility]:    See clinic list provided below to establish care          DISCHARGE MEDICATIONS:  Discharge Medication List as of 1/17/2020  5:57 PM      START taking these medications    Details   cyclobenzaprine (FLEXERIL) 5 MG tablet Take 1 tablet by mouth nightly as needed for Muscle spasms, Disp-10 tablet, R-0Print      ibuprofen (ADVIL;MOTRIN) 600 MG tablet Take 1 tablet by mouth every 6 hours as needed for Pain, Disp-30 tablet, R-0Print             Iesha Berger PA-C

## 2020-01-18 ENCOUNTER — APPOINTMENT (OUTPATIENT)
Dept: MRI IMAGING | Age: 30
End: 2020-01-18
Payer: MEDICAID

## 2020-01-18 ENCOUNTER — HOSPITAL ENCOUNTER (EMERGENCY)
Age: 30
Discharge: HOME | End: 2020-01-18
Attending: EMERGENCY MEDICINE
Payer: MEDICAID

## 2020-01-18 VITALS
OXYGEN SATURATION: 97 % | SYSTOLIC BLOOD PRESSURE: 107 MMHG | WEIGHT: 100 LBS | HEART RATE: 67 BPM | TEMPERATURE: 97.9 F | RESPIRATION RATE: 15 BRPM | BODY MASS INDEX: 20.2 KG/M2 | DIASTOLIC BLOOD PRESSURE: 59 MMHG

## 2020-01-18 PROCEDURE — 6370000000 HC RX 637 (ALT 250 FOR IP): Performed by: EMERGENCY MEDICINE

## 2020-01-18 PROCEDURE — 6360000002 HC RX W HCPCS: Performed by: EMERGENCY MEDICINE

## 2020-01-18 PROCEDURE — 72148 MRI LUMBAR SPINE W/O DYE: CPT

## 2020-01-18 PROCEDURE — 99283 EMERGENCY DEPT VISIT LOW MDM: CPT

## 2020-01-18 RX ORDER — DEXAMETHASONE 4 MG/1
6 TABLET ORAL ONCE
Status: COMPLETED | OUTPATIENT
Start: 2020-01-18 | End: 2020-01-18

## 2020-01-18 RX ORDER — CEPHALEXIN 500 MG/1
500 CAPSULE ORAL 4 TIMES DAILY
Qty: 28 CAPSULE | Refills: 0 | Status: SHIPPED | OUTPATIENT
Start: 2020-01-18 | End: 2020-01-25

## 2020-01-18 RX ORDER — PHENAZOPYRIDINE HYDROCHLORIDE 100 MG/1
200 TABLET, FILM COATED ORAL ONCE
Status: DISCONTINUED | OUTPATIENT
Start: 2020-01-18 | End: 2020-01-18 | Stop reason: HOSPADM

## 2020-01-18 RX ORDER — DIAZEPAM 2 MG/1
2 TABLET ORAL ONCE
Status: COMPLETED | OUTPATIENT
Start: 2020-01-18 | End: 2020-01-18

## 2020-01-18 RX ORDER — LIDOCAINE 50 MG/G
1 PATCH TOPICAL DAILY
Qty: 10 PATCH | Refills: 0 | Status: SHIPPED | OUTPATIENT
Start: 2020-01-18 | End: 2020-01-28

## 2020-01-18 RX ORDER — TRAMADOL HYDROCHLORIDE 50 MG/1
50 TABLET ORAL ONCE
Status: COMPLETED | OUTPATIENT
Start: 2020-01-18 | End: 2020-01-18

## 2020-01-18 RX ORDER — ONDANSETRON 4 MG/1
4 TABLET, ORALLY DISINTEGRATING ORAL ONCE
Status: COMPLETED | OUTPATIENT
Start: 2020-01-18 | End: 2020-01-18

## 2020-01-18 RX ORDER — LIDOCAINE 4 G/G
1 PATCH TOPICAL ONCE
Status: DISCONTINUED | OUTPATIENT
Start: 2020-01-18 | End: 2020-01-18 | Stop reason: HOSPADM

## 2020-01-18 RX ORDER — DIAZEPAM 2 MG/1
2 TABLET ORAL EVERY 8 HOURS PRN
Qty: 20 TABLET | Refills: 0 | Status: SHIPPED | OUTPATIENT
Start: 2020-01-18 | End: 2020-01-28

## 2020-01-18 RX ORDER — CEPHALEXIN 250 MG/1
500 CAPSULE ORAL ONCE
Status: DISCONTINUED | OUTPATIENT
Start: 2020-01-18 | End: 2020-01-18 | Stop reason: HOSPADM

## 2020-01-18 RX ORDER — IBUPROFEN 600 MG/1
600 TABLET ORAL EVERY 6 HOURS PRN
Qty: 30 TABLET | Refills: 0 | Status: SHIPPED | OUTPATIENT
Start: 2020-01-18 | End: 2020-02-06 | Stop reason: SDUPTHER

## 2020-01-18 RX ORDER — PHENAZOPYRIDINE HYDROCHLORIDE 200 MG/1
200 TABLET, FILM COATED ORAL 3 TIMES DAILY PRN
Qty: 6 TABLET | Refills: 0 | Status: SHIPPED | OUTPATIENT
Start: 2020-01-18 | End: 2020-01-21

## 2020-01-18 RX ADMIN — TRAMADOL HYDROCHLORIDE 50 MG: 50 TABLET, FILM COATED ORAL at 10:07

## 2020-01-18 RX ADMIN — DIAZEPAM 2 MG: 2 TABLET ORAL at 10:07

## 2020-01-18 RX ADMIN — ONDANSETRON 4 MG: 4 TABLET, ORALLY DISINTEGRATING ORAL at 10:07

## 2020-01-18 RX ADMIN — DEXAMETHASONE 6 MG: 4 TABLET ORAL at 10:06

## 2020-01-18 ASSESSMENT — PAIN DESCRIPTION - LOCATION
LOCATION: BACK
LOCATION: BACK

## 2020-01-18 ASSESSMENT — PAIN DESCRIPTION - PAIN TYPE
TYPE: ACUTE PAIN
TYPE: ACUTE PAIN

## 2020-01-18 ASSESSMENT — ENCOUNTER SYMPTOMS
VOMITING: 0
SHORTNESS OF BREATH: 0
BACK PAIN: 1
SORE THROAT: 0
COUGH: 0
EYE PAIN: 0
DIARRHEA: 0
ABDOMINAL PAIN: 0
NAUSEA: 0

## 2020-01-18 ASSESSMENT — PAIN DESCRIPTION - ORIENTATION: ORIENTATION: LOWER;RIGHT

## 2020-01-18 ASSESSMENT — PAIN DESCRIPTION - DESCRIPTORS
DESCRIPTORS: ACHING
DESCRIPTORS: ACHING;SHARP;SHOOTING

## 2020-01-18 ASSESSMENT — PAIN SCALES - GENERAL
PAINLEVEL_OUTOF10: 10
PAINLEVEL_OUTOF10: 9

## 2020-01-18 ASSESSMENT — PAIN DESCRIPTION - PROGRESSION: CLINICAL_PROGRESSION: GRADUALLY IMPROVING

## 2020-01-18 NOTE — ED NOTES
Patient returns from MRI, calls out x2 asking for food box  Awaiting MRI results  Significant other remains at bedside    Nondistressed  GCS=15    Will continue to monitor     Iban Del Rosario RN  01/18/20 1200

## 2020-01-18 NOTE — ED PROVIDER NOTES
[facility]     Emergency Department     Faculty Attestation    I performed a history and physical examination of the patient and discussed management with the resident. I have reviewed and agree with the residents findings including all diagnostic interpretations, and treatment plans as written at the time of my review. Any areas of disagreement are noted on the chart. I was personally present for the key portions of any procedures. I have documented in the chart those procedures where I was not present during the key portions. For Physician Assistant/ Nurse Practitioner cases/documentation I have personally evaluated this patient and have completed at least one if not all key elements of the E/M (history, physical exam, and MDM). Additional findings are as noted. The patient presents emergency room with complaints of worsening of back pain. Patient was seen yesterday and noted to have stool incontinence 2 days ago and was recommended to have an MRI however she left AMA at that time. She returns today for persistent pain as well as MRI. Patient does appear to be uncomfortable. Patient will be given analgesia and an MRI will be performed due to the history of a stool incontinence. (Please note that portions of this note were completed with a voice recognition program.  Efforts were made to edit the dictations but occasionally words are mis-transcribed.)    Solomon Garcia.  Harsh Yost MD, [facility], [place], operated [facility],[address]  Attending Emergency Medicine Physician        Eddie Frias MD  01/18/20 3895

## 2020-01-18 NOTE — ED PROVIDER NOTES
[facility] ED  Emergency Department Encounter  EmergencyMedicine Resident     Pt Name:Janet Ott  MRN: [id number]  [place] 1990  Date of evaluation: 1/18/20  PCP:  No primary care provider on file. CHIEF COMPLAINT       Chief Complaint   Patient presents with    Back Pain     Reports that she was seen last night in ER and told to come back to ER today for MRI. Denies injuries. Reports loss of bowels again today on self, on going x1  month       HISTORY OF PRESENT ILLNESS  (Location/Symptom, Timing/Onset, Context/Setting, Quality, Duration, Modifying Factors, Severity.)      Wade Coe is a 30 y.o. female who presents for a MRI. Patient presented to the ED yesterday with complaints of bilateral lower extremity weakness and numbness as well as fecal incontinence. She was determined to need a MRI of the lumbar spine, but left the ED AMA yesterday because she was tired from work. She is coming back today to get her MRI. She reports that there is been no significant change in her symptoms. She was able to ambulate okay with a walker yesterday. There was some snow overnight, patient was able to ambulate without her walker most of the way here to the emergency department and was carried by her  the rest of the way. She reports continued weakness and numbness in her bilateral lower extremities, worse on the right side. She has pain with urination and defecation. She can sometimes tell when she has to defecate sometimes she cannot tell. She can always tell when she has to urinate, but is it is painful and is unable to get to the restroom in time at times. She has no fevers or chills. No nausea or vomiting. No shortness of breath or chest pain. No IV drug use, no known trauma or injury. PAST MEDICAL / SURGICAL / SOCIAL / FAMILY HISTORY      has a past medical history of Asthma, Bipolar disorder ([address] Cervical dysplasia, Migraine, and Seizures ([address] has a past surgical history that includes Cholecystectomy (2007); Tubal ligation; and Appendectomy. Social History     Socioeconomic History    Marital status:      Spouse name: Not on file    Number of children: Not on file    Years of education: Not on file    Highest education level: Not on file   Occupational History    Not on file   Social Needs    Financial resource strain: Not on file    Food insecurity:     Worry: Not on file     Inability: Not on file    Transportation needs:     Medical: Not on file     Non-medical: Not on file   Tobacco Use    Smoking status: Current Every Day Smoker     Packs/day: 1.00     Years: 11.00     Pack years: 11.00     Types: Cigarettes    Smokeless tobacco: Never Used   Substance and Sexual Activity    Alcohol use: Yes     Alcohol/week: 0.0 standard drinks     Comment: occassional, heavy 3 years ago    Drug use: Yes     Types: Opiates , Marijuana     Comment: Hx Percocet abuse sober for 2 years.     Sexual activity: Not on file   Lifestyle    Physical activity:     Days per week: Not on file     Minutes per session: Not on file    Stress: Not on file   Relationships    Social connections:     Talks on phone: Not on file     Gets together: Not on file     Attends [employer] service: Not on file     Active member of club or organization: Not on file     Attends meetings of clubs or organizations: Not on file     Relationship status: Not on file    Intimate partner violence:     Fear of current or ex partner: Not on file     Emotionally abused: Not on file     Physically abused: Not on file     Forced sexual activity: Not on file   Other Topics Concern    Not on file   Social History Narrative    Not on file       Family History   Problem Relation Age of Onset    Diabetes Mother         G.Parents    Hypertension Mother         G.Parents    Diabetes Father     Hypertension Father     Stroke Father         G.Parents    Cancer Other         G.Parents, for environmental allergies and food allergies. Neurological: Positive for weakness and numbness. Negative for syncope. Hematological: Negative for adenopathy. Does not bruise/bleed easily. Psychiatric/Behavioral: Negative for confusion. The patient is not nervous/anxious. PHYSICAL EXAM   (up to 7 for level 4, 8 or more for level 5)      INITIAL VITALS:   BP (!) 107/59   Pulse 67   Temp 97.9 °F (36.6 °C) (Oral)   Resp 15   Wt 100 lb (45.4 kg)   LMP 01/07/2019   SpO2 97%   BMI 20.20 kg/m²     Physical Exam  Constitutional:       General: She is not in acute distress. Appearance: She is well-developed. HENT:      Head: Normocephalic and atraumatic. Eyes:      Conjunctiva/sclera: Conjunctivae normal.      Pupils: Pupils are equal, round, and reactive to light. Neck:      Musculoskeletal: Normal range of motion and neck supple. Cardiovascular:      Rate and Rhythm: Normal rate and regular rhythm. Heart sounds: Normal heart sounds. No murmur. No friction rub. No gallop. Pulmonary:      Effort: Pulmonary effort is normal. No respiratory distress. Breath sounds: Normal breath sounds. No wheezing. Abdominal:      General: Bowel sounds are normal. There is no distension. Palpations: Abdomen is soft. Tenderness: There is no tenderness. There is no guarding or rebound. Musculoskeletal:      Lumbar back: She exhibits decreased range of motion and tenderness. She exhibits no bony tenderness, no swelling and no edema. Back:    Skin:     General: Skin is warm and dry. Findings: No rash. Neurological:      Mental Status: She is alert and oriented to person, place, and time. GCS: GCS eye subscore is 4. GCS verbal subscore is 5. GCS motor subscore is 6. Sensory: Sensory deficit present. Motor: Weakness present.       Gait: Gait abnormal.      Comments: Patient reports a decreased sensation of bilateral lower extremities, worse in the right lower extremity. There is also weakness in the bilateral lower extremities, worse in the right lower extremity. The left lower extremity is 4 5 strength in all muscle groups. The right lower extremity is 0/5 with flexion and extension at the knee 1 of 5 with flexion at the ankle to 5 with extension at the ankle   Psychiatric:         Behavior: Behavior normal.         DIFFERENTIAL  DIAGNOSIS     PLAN ([facility] [place] / [facility] / EKG):  Orders Placed This Encounter   Procedures    MRI LUMBAR SPINE WO CONTRAST       MEDICATIONS ORDERED:  Orders Placed This Encounter   Medications    diazepam (VALIUM) tablet 2 mg    dexamethasone (DECADRON) tablet 6 mg    traMADol (ULTRAM) tablet 50 mg    ondansetron (ZOFRAN-ODT) disintegrating tablet 4 mg    lidocaine 4 % external patch 1 patch    cephALEXin (KEFLEX) capsule 500 mg    phenazopyridine (PYRIDIUM) tablet 200 mg    ibuprofen (ADVIL;MOTRIN) 600 MG tablet     Sig: Take 1 tablet by mouth every 6 hours as needed for Pain     Dispense:  30 tablet     Refill:  0    cephALEXin (KEFLEX) 500 MG capsule     Sig: Take 1 capsule by mouth 4 times daily for 7 days     Dispense:  28 capsule     Refill:  0    phenazopyridine (PYRIDIUM) 200 MG tablet     Sig: Take 1 tablet by mouth 3 times daily as needed for Pain (bladder spasm/pain)     Dispense:  6 tablet     Refill:  0    diazepam (VALIUM) 2 MG tablet     Sig: Take 1 tablet by mouth every 8 hours as needed for Anxiety (Pain/Spasm) for up to 10 doses. Dispense:  20 tablet     Refill:  0    lidocaine (LIDODERM) 5 %     Sig: Place 1 patch onto the skin daily for 10 days 12 hours on, 12 hours off. Dispense:  10 patch     Refill:  0       DIAGNOSTIC RESULTS / EMERGENCY DEPARTMENT COURSE / MDM     LABS:  No results found for this visit on 01/18/20.     RADIOLOGY:  Mri Lumbar Spine Wo Contrast    Result Date: 1/18/2020  EXAMINATION: MRI OF THE LUMBAR SPINE WITHOUT CONTRAST, 1/18/2020 11:09 am TECHNIQUE: Multiplanar multisequence MRI of the lumbar spine was performed without the administration of intravenous contrast. COMPARISON: February 27, 2016 HISTORY: ORDERING SYSTEM PROVIDED HISTORY: bowel incontinence, LE weakness TECHNOLOGIST PROVIDED HISTORY: bowel incontinence, LE weakness Is the patient pregnant?->No FINDINGS: BONES/ALIGNMENT: No acute fracture. No subluxation. No suspicious bone marrow replacing lesion. SPINAL CORD: Normal signal within the conus which terminates at approximately L1-L2. SOFT TISSUES: T2 hyperintensity surrounding the aorta at L2 is nonspecific but was present on the previous study. L1-L2: No central canal or foraminal stenosis. L2-L3: Mild right paracentral disc bulge without significant central canal or foraminal stenosis. L3-L4: No significant central canal or foraminal stenosis. L4-L5: Minimal broad-based disc bulge with extension into the left foramina does not cause significant central canal stenosis. Right foramina is patent. Disc bulge causes minimal left foraminal stenosis. L5-S1: No significant central canal stenosis. Disc bulge extends into the right foramina causing minimal right foraminal stenosis. Left foramina is patent. Minimal degenerative changes. No acute findings. Fluid signal intensity lesion adjacent to the aorta at L2 is unchanged since 2016 and suspected to be a benign cystic lesion. EKG  None    All EKG's are interpreted by the Emergency Department Physician who either signs or Co-signs this chart in the absence of a cardiologist.    EMERGENCY DEPARTMENT COURSE:  Patient seen and evaluated, she is histrionic and occasionally moaning out in pain. Nontoxic-appearing. On examination, patient is in regular rate and rhythm, lungs are clear to auscultation bilaterally. She has no reproducible chest pain, no abdominal pain. She does have superior tenderness to palpation in the right paraspinal right flank area that extends down into her right leg.   She also reports moderate sensation

## 2020-01-19 ENCOUNTER — HOSPITAL ENCOUNTER (EMERGENCY)
Age: 30
Discharge: HOME | End: 2020-01-20
Attending: EMERGENCY MEDICINE
Payer: MEDICAID

## 2020-01-19 ENCOUNTER — APPOINTMENT (OUTPATIENT)
Dept: GENERAL RADIOLOGY | Age: 30
End: 2020-01-19
Payer: MEDICAID

## 2020-01-19 VITALS
DIASTOLIC BLOOD PRESSURE: 85 MMHG | WEIGHT: 85 LBS | RESPIRATION RATE: 18 BRPM | OXYGEN SATURATION: 99 % | TEMPERATURE: 98.5 F | SYSTOLIC BLOOD PRESSURE: 127 MMHG | HEIGHT: 59 IN | BODY MASS INDEX: 17.14 KG/M2 | HEART RATE: 88 BPM

## 2020-01-19 LAB
-: NORMAL
ABSOLUTE EOS #: <0.03 K/UL (ref 0–0.44)
ABSOLUTE IMMATURE GRANULOCYTE: <0.03 K/UL (ref 0–0.3)
ABSOLUTE LYMPH #: 4.16 K/UL (ref 1.1–3.7)
ABSOLUTE MONO #: 0.65 K/UL (ref 0.1–1.2)
ALBUMIN SERPL-MCNC: 4.1 G/DL (ref 3.5–5.2)
ALBUMIN/GLOBULIN RATIO: 1.6 (ref 1–2.5)
ALP BLD-CCNC: 62 U/L (ref 35–104)
ALT SERPL-CCNC: 13 U/L (ref 5–33)
AMORPHOUS: NORMAL
ANION GAP SERPL CALCULATED.3IONS-SCNC: 13 MMOL/L (ref 9–17)
AST SERPL-CCNC: 13 U/L
BACTERIA: NORMAL
BASOPHILS # BLD: 1 % (ref 0–2)
BASOPHILS ABSOLUTE: 0.05 K/UL (ref 0–0.2)
BILIRUB SERPL-MCNC: 0.44 MG/DL (ref 0.3–1.2)
BILIRUBIN URINE: NEGATIVE
BUN BLDV-MCNC: 16 MG/DL (ref 6–20)
BUN/CREAT BLD: ABNORMAL (ref 9–20)
CALCIUM SERPL-MCNC: 9.2 MG/DL (ref 8.6–10.4)
CASTS UA: NORMAL /LPF (ref 0–8)
CHLORIDE BLD-SCNC: 101 MMOL/L (ref 98–107)
CO2: 24 MMOL/L (ref 20–31)
COLOR: YELLOW
COMMENT UA: ABNORMAL
CREAT SERPL-MCNC: 0.79 MG/DL (ref 0.5–0.9)
CRYSTALS, UA: NORMAL /HPF
DIFFERENTIAL TYPE: ABNORMAL
EOSINOPHILS RELATIVE PERCENT: 0 % (ref 1–4)
EPITHELIAL CELLS UA: NORMAL /HPF (ref 0–5)
GFR AFRICAN AMERICAN: >60 ML/MIN
GFR NON-AFRICAN AMERICAN: >60 ML/MIN
GFR SERPL CREATININE-BSD FRML MDRD: ABNORMAL ML/MIN/{1.73_M2}
GFR SERPL CREATININE-BSD FRML MDRD: ABNORMAL ML/MIN/{1.73_M2}
GLUCOSE BLD-MCNC: 84 MG/DL (ref 70–99)
GLUCOSE URINE: NEGATIVE
HCT VFR BLD CALC: 40 % (ref 36.3–47.1)
HEMOGLOBIN: 13.3 G/DL (ref 11.9–15.1)
IMMATURE GRANULOCYTES: 0 %
KETONES, URINE: NEGATIVE
LEUKOCYTE ESTERASE, URINE: ABNORMAL
LYMPHOCYTES # BLD: 39 % (ref 24–43)
MCH RBC QN AUTO: 29.6 PG (ref 25.2–33.5)
MCHC RBC AUTO-ENTMCNC: 33.3 G/DL (ref 28.4–34.8)
MCV RBC AUTO: 89.1 FL (ref 82.6–102.9)
MONOCYTES # BLD: 6 % (ref 3–12)
MUCUS: NORMAL
NITRITE, URINE: NEGATIVE
NRBC AUTOMATED: 0 PER 100 WBC
OTHER OBSERVATIONS UA: NORMAL
PDW BLD-RTO: 13.2 % (ref 11.8–14.4)
PH UA: 5.5 (ref 5–8)
PLATELET # BLD: 206 K/UL (ref 138–453)
PLATELET ESTIMATE: ABNORMAL
PMV BLD AUTO: 10.1 FL (ref 8.1–13.5)
POTASSIUM SERPL-SCNC: 3.5 MMOL/L (ref 3.7–5.3)
PROLACTIN: 18.31 UG/L (ref 4.79–23.3)
PROTEIN UA: ABNORMAL
RBC # BLD: 4.49 M/UL (ref 3.95–5.11)
RBC # BLD: ABNORMAL 10*6/UL
RBC UA: NORMAL /HPF (ref 0–4)
RENAL EPITHELIAL, UA: NORMAL /HPF
SEG NEUTROPHILS: 54 % (ref 36–65)
SEGMENTED NEUTROPHILS ABSOLUTE COUNT: 5.76 K/UL (ref 1.5–8.1)
SODIUM BLD-SCNC: 138 MMOL/L (ref 135–144)
SPECIFIC GRAVITY UA: 1.02 (ref 1–1.03)
TOTAL PROTEIN: 6.7 G/DL (ref 6.4–8.3)
TRICHOMONAS: NORMAL
TROPONIN INTERP: NORMAL
TROPONIN T: NORMAL NG/ML
TROPONIN, HIGH SENSITIVITY: 10 NG/L (ref 0–14)
TURBIDITY: ABNORMAL
URINE HGB: NEGATIVE
UROBILINOGEN, URINE: NORMAL
VALPROIC ACID LEVEL: <3 UG/ML (ref 50–125)
VALPROIC DATE LAST DOSE: ABNORMAL
VALPROIC DOSE AMOUNT: ABNORMAL
VALPROIC TIME LAST DOSE: ABNORMAL
WBC # BLD: 10.7 K/UL (ref 3.5–11.3)
WBC # BLD: ABNORMAL 10*3/UL
WBC UA: NORMAL /HPF (ref 0–5)
YEAST: NORMAL

## 2020-01-19 PROCEDURE — 80164 ASSAY DIPROPYLACETIC ACD TOT: CPT

## 2020-01-19 PROCEDURE — 84146 ASSAY OF PROLACTIN: CPT

## 2020-01-19 PROCEDURE — 96365 THER/PROPH/DIAG IV INF INIT: CPT

## 2020-01-19 PROCEDURE — 85025 COMPLETE CBC W/AUTO DIFF WBC: CPT

## 2020-01-19 PROCEDURE — 87086 URINE CULTURE/COLONY COUNT: CPT

## 2020-01-19 PROCEDURE — 81001 URINALYSIS AUTO W/SCOPE: CPT

## 2020-01-19 PROCEDURE — 71046 X-RAY EXAM CHEST 2 VIEWS: CPT

## 2020-01-19 PROCEDURE — 80053 COMPREHEN METABOLIC PANEL: CPT

## 2020-01-19 PROCEDURE — 99283 EMERGENCY DEPT VISIT LOW MDM: CPT

## 2020-01-19 PROCEDURE — 84484 ASSAY OF TROPONIN QUANT: CPT

## 2020-01-19 PROCEDURE — 2580000003 HC RX 258: Performed by: STUDENT IN AN ORGANIZED HEALTH CARE EDUCATION/TRAINING PROGRAM

## 2020-01-19 PROCEDURE — 2500000003 HC RX 250 WO HCPCS: Performed by: STUDENT IN AN ORGANIZED HEALTH CARE EDUCATION/TRAINING PROGRAM

## 2020-01-19 PROCEDURE — 93005 ELECTROCARDIOGRAM TRACING: CPT | Performed by: STUDENT IN AN ORGANIZED HEALTH CARE EDUCATION/TRAINING PROGRAM

## 2020-01-19 RX ORDER — NITROFURANTOIN 25; 75 MG/1; MG/1
100 CAPSULE ORAL 2 TIMES DAILY
Qty: 10 CAPSULE | Refills: 0 | Status: SHIPPED | OUTPATIENT
Start: 2020-01-19 | End: 2020-01-19 | Stop reason: SDUPTHER

## 2020-01-19 RX ORDER — LORAZEPAM 2 MG/ML
INJECTION INTRAMUSCULAR
Status: DISCONTINUED
Start: 2020-01-19 | End: 2020-01-19 | Stop reason: WASHOUT

## 2020-01-19 RX ORDER — NITROFURANTOIN 25; 75 MG/1; MG/1
100 CAPSULE ORAL ONCE
Status: DISCONTINUED | OUTPATIENT
Start: 2020-01-19 | End: 2020-01-20 | Stop reason: HOSPADM

## 2020-01-19 RX ORDER — NITROFURANTOIN 25; 75 MG/1; MG/1
100 CAPSULE ORAL 2 TIMES DAILY
Qty: 10 CAPSULE | Refills: 0 | Status: SHIPPED | OUTPATIENT
Start: 2020-01-19 | End: 2020-01-24

## 2020-01-19 RX ADMIN — VALPROATE SODIUM 1000 MG: 100 INJECTION, SOLUTION INTRAVENOUS at 23:21

## 2020-01-19 ASSESSMENT — PAIN DESCRIPTION - PAIN TYPE: TYPE: CHRONIC PAIN

## 2020-01-19 ASSESSMENT — PAIN DESCRIPTION - PROGRESSION: CLINICAL_PROGRESSION: NOT CHANGED

## 2020-01-19 ASSESSMENT — PAIN DESCRIPTION - LOCATION: LOCATION: BACK

## 2020-01-19 ASSESSMENT — PAIN DESCRIPTION - DESCRIPTORS: DESCRIPTORS: ACHING

## 2020-01-19 ASSESSMENT — PAIN DESCRIPTION - ORIENTATION: ORIENTATION: LOWER

## 2020-01-19 ASSESSMENT — PAIN DESCRIPTION - FREQUENCY: FREQUENCY: CONTINUOUS

## 2020-01-19 ASSESSMENT — PAIN SCALES - GENERAL: PAINLEVEL_OUTOF10: 9

## 2020-01-20 LAB
CULTURE: NORMAL
EKG ATRIAL RATE: 74 BPM
EKG P AXIS: 65 DEGREES
EKG P-R INTERVAL: 132 MS
EKG Q-T INTERVAL: 404 MS
EKG QRS DURATION: 84 MS
EKG QTC CALCULATION (BAZETT): 448 MS
EKG R AXIS: 80 DEGREES
EKG T AXIS: 69 DEGREES
EKG VENTRICULAR RATE: 74 BPM
Lab: NORMAL
SPECIMEN DESCRIPTION: NORMAL

## 2020-01-20 PROCEDURE — 93010 ELECTROCARDIOGRAM REPORT: CPT | Performed by: INTERNAL MEDICINE

## 2020-01-20 NOTE — ED NOTES
Pt has had x4 seizures with in 5 min, each lasting about 20 seconds. Dr. Aura Motta notified.       Heron Craig RN  01/19/20 1953

## 2020-01-20 NOTE — ED NOTES
Dr Susan Kelly & Dr Jamie Cantrell at bedside, d/c plans discussed with pt, pt does not agree with plans, states that she has had N/V at home, states that she has been having seizure while in ER, no seizure activity noted by staff, po challenge being offered to pt, pt refusing po challenge at this time, Peg LINDSAY at bedside, valproate 1 gram IV initiated as ok per Dr Latrice Pepe, [facility]  01/19/20 [id number]

## 2020-01-20 NOTE — ED PROVIDER NOTES
strain: Not on file    Food insecurity:     Worry: Not on file     Inability: Not on file    Transportation needs:     Medical: Not on file     Non-medical: Not on file   Tobacco Use    Smoking status: Current Every Day Smoker     Packs/day: 1.00     Years: 11.00     Pack years: 11.00     Types: Cigarettes    Smokeless tobacco: Never Used   Substance and Sexual Activity    Alcohol use: Yes     Alcohol/week: 0.0 standard drinks     Comment: occassional, heavy 3 years ago    Drug use: Yes     Types: Opiates , Marijuana     Comment: Hx Percocet abuse sober for 2 years.  Sexual activity: Not on file   Lifestyle    Physical activity:     Days per week: Not on file     Minutes per session: Not on file    Stress: Not on file   Relationships    Social connections:     Talks on phone: Not on file     Gets together: Not on file     Attends Church service: Not on file     Active member of club or organization: Not on file     Attends meetings of clubs or organizations: Not on file     Relationship status: Not on file    Intimate partner violence:     Fear of current or ex partner: Not on file     Emotionally abused: Not on file     Physically abused: Not on file     Forced sexual activity: Not on file   Other Topics Concern    Not on file   Social History Narrative    Not on file     Family History   Problem Relation Age of Onset    Diabetes Mother         G.Parents    Hypertension Mother         G.Parents    Diabetes Father     Hypertension Father     Stroke Father         G.Parents    Cancer Other         G.Parents, Pancrease and ??    Coronary Art Dis Other         G.Parents     Allergies   Allergen Reactions    Diclofenac Swelling     Patient reports she is not allergic to ibuprofen.  Morphine Itching    Pcn [Penicillins] Itching     Prior to Admission medications    Medication Sig Start Date End Date Taking?  Authorizing Provider   nitrofurantoin, macrocrystal-monohydrate, (MACROBID) 100 MG Troponin   Result Value Ref Range    Troponin, High Sensitivity 10 0 - 14 ng/L    Troponin T NOT REPORTED <0.03 ng/mL    Troponin Interp NOT REPORTED    Urinalysis Reflex to Culture   Result Value Ref Range    Color, UA YELLOW YELLOW    Turbidity UA CLOUDY (A) CLEAR    Glucose, Ur NEGATIVE NEGATIVE    Bilirubin Urine NEGATIVE NEGATIVE    Ketones, Urine NEGATIVE NEGATIVE    Specific Gravity, UA 1.025 1.005 - 1.030    Urine Hgb NEGATIVE NEGATIVE    pH, UA 5.5 5.0 - 8.0    Protein, UA TRACE (A) NEGATIVE    Urobilinogen, Urine Normal Normal    Nitrite, Urine NEGATIVE NEGATIVE    Leukocyte Esterase, Urine MODERATE (A) NEGATIVE    Urinalysis Comments NOT REPORTED    Microscopic Urinalysis   Result Value Ref Range    -          WBC, UA TOO NUMEROUS TO COUNT 0 - 5 /HPF    RBC, UA 10 TO 20 0 - 4 /HPF    Casts UA  0 - 8 /LPF     10 TO 20 HYALINE Reference range defined for non-centrifuged specimen. Crystals UA NOT REPORTED None /HPF    Epithelial Cells UA 2 TO 5 0 - 5 /HPF    Renal Epithelial, Urine NOT REPORTED 0 /HPF    Bacteria, UA NOT REPORTED None    Mucus, UA NOT REPORTED None    Trichomonas, UA NOT REPORTED None    Amorphous, UA NOT REPORTED None    Other Observations UA NOT REPORTED NOT REQ. Yeast, UA NOT REPORTED None     RADIOLOGY:  XR CHEST STANDARD (2 VW)   Final Result   1. No acute cardiopulmonary disease. Medical decision making  (MDM) / ED Course     This patient presents with Awake, alert, and appropriate. Presentation not consistent with acute organic dysfunction. Given the H&P, the patient is stable for discharge. Patient was found to have a urinary tract infection and was given a prescription for Macrobid as she is allergic to penicillins. The patient's valproate level was subtherapeutic and subsequently she received a gram of IV valproate. Patient does not know what her oral dosing regimen is and as result I did not feel comfortable prescribing antiepileptic medications.   A referral

## 2020-02-06 ENCOUNTER — HOSPITAL ENCOUNTER (EMERGENCY)
Age: 30
Discharge: HOME | End: 2020-02-06
Attending: EMERGENCY MEDICINE
Payer: MEDICAID

## 2020-02-06 ENCOUNTER — APPOINTMENT (OUTPATIENT)
Dept: GENERAL RADIOLOGY | Age: 30
End: 2020-02-06
Payer: MEDICAID

## 2020-02-06 VITALS
HEIGHT: 58 IN | DIASTOLIC BLOOD PRESSURE: 57 MMHG | TEMPERATURE: 98.2 F | OXYGEN SATURATION: 97 % | HEART RATE: 66 BPM | SYSTOLIC BLOOD PRESSURE: 103 MMHG | BODY MASS INDEX: 19.94 KG/M2 | WEIGHT: 95 LBS | RESPIRATION RATE: 18 BRPM

## 2020-02-06 PROCEDURE — 73030 X-RAY EXAM OF SHOULDER: CPT

## 2020-02-06 PROCEDURE — 6370000000 HC RX 637 (ALT 250 FOR IP): Performed by: STUDENT IN AN ORGANIZED HEALTH CARE EDUCATION/TRAINING PROGRAM

## 2020-02-06 PROCEDURE — 99283 EMERGENCY DEPT VISIT LOW MDM: CPT

## 2020-02-06 RX ORDER — IBUPROFEN 600 MG/1
600 TABLET ORAL EVERY 6 HOURS PRN
Qty: 30 TABLET | Refills: 0 | Status: SHIPPED | OUTPATIENT
Start: 2020-02-06 | End: 2020-09-13 | Stop reason: SDUPTHER

## 2020-02-06 RX ORDER — IBUPROFEN 800 MG/1
800 TABLET ORAL ONCE
Status: COMPLETED | OUTPATIENT
Start: 2020-02-06 | End: 2020-02-06

## 2020-02-06 RX ADMIN — IBUPROFEN 800 MG: 800 TABLET, FILM COATED ORAL at 20:37

## 2020-02-06 ASSESSMENT — ENCOUNTER SYMPTOMS
ABDOMINAL PAIN: 0
SHORTNESS OF BREATH: 0
NAUSEA: 0
VOMITING: 0
WHEEZING: 0
COUGH: 0

## 2020-02-06 ASSESSMENT — PAIN DESCRIPTION - ORIENTATION: ORIENTATION: RIGHT

## 2020-02-06 ASSESSMENT — PAIN DESCRIPTION - LOCATION: LOCATION: ARM;SHOULDER

## 2020-02-06 ASSESSMENT — PAIN SCALES - GENERAL: PAINLEVEL_OUTOF10: 10

## 2020-02-06 ASSESSMENT — PAIN DESCRIPTION - FREQUENCY: FREQUENCY: CONTINUOUS

## 2020-02-06 ASSESSMENT — PAIN DESCRIPTION - PAIN TYPE: TYPE: ACUTE PAIN

## 2020-02-06 ASSESSMENT — PAIN DESCRIPTION - DESCRIPTORS: DESCRIPTORS: ACHING

## 2020-02-07 NOTE — ED PROVIDER NOTES
[facility]  ED  Emergency Department Encounter  Emergency Medicine Resident     Pt Name: Brinda Joy  MRN: [id number]  [place] 1990  Date of evaluation: 2/6/20  PCP:  No primary care provider on file. CHIEF COMPLAINT       Chief Complaint   Patient presents with    Fall     Slip and fall on ice. No LOC       HISTORY OFPRESENT ILLNESS  (Location/Symptom, Timing/Onset, Context/Setting, Quality, Duration, Modifying Factors,Severity.)      Brinda Joy is a 29 yo female who presents with mechanical slip and fall with right shoulder pain. Patient states earlier today she slipped on the ice and fell onto her right shoulder causing extreme pain with numbness and tingling into her arm. She denies any head trauma, neck pain. She denies weakness but states that the arm is weak due to pain. Denies taking any blood thinners. Denies any other trauma or injuries or pain. PAST MEDICAL / SURGICAL / SOCIAL / FAMILY HISTORY      has a past medical history of Asthma, Bipolar disorder ([facility] [address] Cervical dysplasia, Migraine, and Seizures ([facility] [address] has a past surgical history that includes Cholecystectomy (2007); Tubal ligation; and Appendectomy. Social History     Socioeconomic History    Marital status:      Spouse name: Not on file    Number of children: Not on file    Years of education: Not on file    Highest education level: Not on file   Occupational History    Not on file   Social Needs    Financial resource strain: Not on file    Food insecurity:     Worry: Not on file     Inability: Not on file    Transportation needs:     Medical: Not on file     Non-medical: Not on file   Tobacco Use    Smoking status: Current Every Day Smoker     Packs/day: 0.20     Years: 11.00     Pack years: 2.20     Types: Cigarettes    Smokeless tobacco: Never Used   Substance and Sexual Activity    Alcohol use:  Yes     Alcohol/week: 0.0 standard drinks     Comment: occassional, heavy 3 No edema. Left lower leg: No edema. Comments: Tenderness over the right shoulder and scapula, no overlying ecchymosis, abrasions, lacerations. Skin:     General: Skin is warm and dry. Neurological:      General: No focal deficit present. Mental Status: She is alert and oriented to person, place, and time. DIFFERENTIAL  DIAGNOSIS     PLAN (LABS / IMAGING / EKG):  Orders Placed This Encounter   Procedures    XR SHOULDER RIGHT (MIN 2 VIEWS)    [facility] [employer] Sling and Swathe, Right       MEDICATIONS ORDERED:  Orders Placed This Encounter   Medications    ibuprofen (ADVIL;MOTRIN) tablet 800 mg    ibuprofen (ADVIL;MOTRIN) 600 MG tablet     Sig: Take 1 tablet by mouth every 6 hours as needed for Pain     Dispense:  30 tablet     Refill:  0       DDX:     Initial MDM/Plan/ED course: 30 y.o. female who presents with mechanical slip and fall on the ice landing on her right shoulder now presenting with right shoulder pain. On exam vitals are normal patient is in no acute distress. Patient did not hit her head or lose consciousness or have a seizure. No focal neurological deficits. No midline cervical tenderness, no blood thinners. Physical exam reveals tenderness over the right shoulder, trapezius, cervical paraspinal musculature on the right. Right shoulder x-ray including view of the scapula was obtained and showed no evidence of fracture. Patient was treated with anti-inflammatories. Patient requested a sling for work. I instructed the patient that I cannot provide this however I advised that she frequently takes the arm out of the sling and moves around so she does not develop a frozen shoulder. Patient agreed with this plan and was discharged home with Motrin.     DIAGNOSTIC RESULTS / EMERGENCY DEPARTMENT COURSE / MDM     LABS:  Labs Reviewed - No data to display      RADIOLOGY:  Xr Shoulder Right (min 2 Views)    Result Date: 2/6/2020  EXAMINATION: THREE XRAY VIEWS

## 2020-02-15 ENCOUNTER — HOSPITAL ENCOUNTER (EMERGENCY)
Age: 30
Discharge: LEFT BEFORE DISCHARGE | End: 2020-02-16
Attending: EMERGENCY MEDICINE
Payer: MEDICAID

## 2020-02-15 VITALS
HEIGHT: 58 IN | SYSTOLIC BLOOD PRESSURE: 109 MMHG | OXYGEN SATURATION: 98 % | DIASTOLIC BLOOD PRESSURE: 66 MMHG | BODY MASS INDEX: 19.94 KG/M2 | HEART RATE: 65 BPM | WEIGHT: 95 LBS | RESPIRATION RATE: 18 BRPM | TEMPERATURE: 97.3 F

## 2020-02-15 PROCEDURE — 6370000000 HC RX 637 (ALT 250 FOR IP): Performed by: EMERGENCY MEDICINE

## 2020-02-15 PROCEDURE — 99284 EMERGENCY DEPT VISIT MOD MDM: CPT

## 2020-02-15 RX ORDER — IBUPROFEN 800 MG/1
800 TABLET ORAL ONCE
Status: COMPLETED | OUTPATIENT
Start: 2020-02-15 | End: 2020-02-15

## 2020-02-15 RX ORDER — ACETAMINOPHEN 500 MG
1000 TABLET ORAL ONCE
Status: COMPLETED | OUTPATIENT
Start: 2020-02-15 | End: 2020-02-15

## 2020-02-15 RX ADMIN — IBUPROFEN 800 MG: 800 TABLET, FILM COATED ORAL at 23:36

## 2020-02-15 RX ADMIN — ACETAMINOPHEN 1000 MG: 500 TABLET ORAL at 23:35

## 2020-02-15 ASSESSMENT — PAIN SCALES - GENERAL
PAINLEVEL_OUTOF10: 9
PAINLEVEL_OUTOF10: 9

## 2020-02-15 ASSESSMENT — PAIN DESCRIPTION - PROGRESSION: CLINICAL_PROGRESSION: NOT CHANGED

## 2020-02-15 ASSESSMENT — PAIN DESCRIPTION - ORIENTATION: ORIENTATION: RIGHT;LEFT

## 2020-02-15 ASSESSMENT — PAIN DESCRIPTION - LOCATION: LOCATION: BACK;LEG

## 2020-02-15 ASSESSMENT — PAIN DESCRIPTION - ONSET: ONSET: ON-GOING

## 2020-02-15 ASSESSMENT — PAIN DESCRIPTION - FREQUENCY: FREQUENCY: CONTINUOUS

## 2020-02-15 ASSESSMENT — PAIN DESCRIPTION - DESCRIPTORS: DESCRIPTORS: ACHING;TINGLING;SHARP

## 2020-02-15 ASSESSMENT — PAIN DESCRIPTION - PAIN TYPE: TYPE: ACUTE PAIN

## 2020-02-16 ENCOUNTER — APPOINTMENT (OUTPATIENT)
Dept: MRI IMAGING | Age: 30
End: 2020-02-16
Payer: MEDICAID

## 2020-02-16 PROCEDURE — 6360000004 HC RX CONTRAST MEDICATION: Performed by: EMERGENCY MEDICINE

## 2020-02-16 PROCEDURE — 72158 MRI LUMBAR SPINE W/O & W/DYE: CPT

## 2020-02-16 PROCEDURE — A9579 GAD-BASE MR CONTRAST NOS,1ML: HCPCS | Performed by: EMERGENCY MEDICINE

## 2020-02-16 RX ORDER — SODIUM CHLORIDE 0.9 % (FLUSH) 0.9 %
10 SYRINGE (ML) INJECTION PRN
Status: DISCONTINUED | OUTPATIENT
Start: 2020-02-16 | End: 2020-02-16 | Stop reason: HOSPADM

## 2020-02-16 RX ADMIN — GADOTERIDOL 8 ML: 279.3 INJECTION, SOLUTION INTRAVENOUS at 03:39

## 2020-02-16 ASSESSMENT — ENCOUNTER SYMPTOMS
ABDOMINAL PAIN: 0
SORE THROAT: 0
NAUSEA: 0
SHORTNESS OF BREATH: 0
BACK PAIN: 1

## 2020-02-16 NOTE — ED PROVIDER NOTES
well-developed. She is not diaphoretic. HENT:      Head: Normocephalic and atraumatic. Right Ear: External ear normal.      Left Ear: External ear normal.   Eyes:      General: No scleral icterus. Right eye: No discharge. Left eye: No discharge. Neck:      Musculoskeletal: Normal range of motion. Trachea: No tracheal deviation. Pulmonary:      Effort: Pulmonary effort is normal. No respiratory distress. Breath sounds: No stridor. Musculoskeletal: Normal range of motion. Skin:     General: Skin is warm and dry. Neurological:      Mental Status: She is alert and oriented to person, place, and time. Coordination: Coordination normal.      Comments: The pt shifting in bed moving bilateral feet but when asked to move she will not provide effort. The pt has sensation intact to painful stimuli but notes it feels different below the low back. She also has normal DTRs bilaterally in knee and ankle. Psychiatric:         Behavior: Behavior normal.           Comments  Given chief complaint and symptoms of possible Epidural abscess of lumbar cord will obtain MRI if normal unlikely to be organic cause. Price DO, RDMS.   Attending Emergency Physician          Codi Gonzalez DO  02/16/20 [id number]

## 2020-02-16 NOTE — ED NOTES
Patient resting in bed comfortably, in no apparent distress  Respirations even and non-labored  No other needs at this time  Will continue to follow plan of care     Jamie Santo RN  02/16/20 [id number]

## 2020-02-16 NOTE — ED NOTES
Pt becomes upset when resident is in the room. Pt is verbally aggressive and angry. Pt gets up out of bed and puts herself in the wheelchair without any difficulty. Pts family member wheels her out of the ED without any problem.       Des Chawla RN  02/16/20 [id number]

## 2020-02-16 NOTE — ED PROVIDER NOTES
Sexual Activity    Alcohol use: Yes     Alcohol/week: 0.0 standard drinks     Comment: occassional, heavy 3 years ago    Drug use: Yes     Types: Opiates , Marijuana     Comment: Hx Percocet abuse sober for 2 years.  Sexual activity: Not on file   Lifestyle    Physical activity:     Days per week: Not on file     Minutes per session: Not on file    Stress: Not on file   Relationships    Social connections:     Talks on phone: Not on file     Gets together: Not on file     Attends Sabianist service: Not on file     Active member of club or organization: Not on file     Attends meetings of clubs or organizations: Not on file     Relationship status: Not on file    Intimate partner violence:     Fear of current or ex partner: Not on file     Emotionally abused: Not on file     Physically abused: Not on file     Forced sexual activity: Not on file   Other Topics Concern    Not on file   Social History Narrative    Not on file       Family History   Problem Relation Age of Onset    Diabetes Mother         G.Parents    Hypertension Mother         G.Parents    Diabetes Father     Hypertension Father     Stroke Father         G.Parents    Cancer Other         G.Parents, Pancrease and ??    Coronary Art Dis Other         G.Parents       Allergies:  Diclofenac; Morphine; and Pcn [penicillins]    Home Medications:  Prior to Admission medications    Medication Sig Start Date End Date Taking? Authorizing Provider   ibuprofen (ADVIL;MOTRIN) 600 MG tablet Take 1 tablet by mouth every 6 hours as needed for Pain 2/6/20   Akash Hallman, DO   acetaminophen (TYLENOL) 500 MG tablet Take 2 tablets by mouth every 8 hours as needed for Pain 8/25/19   Narvis Bathe, DO   Divalproex Sodium (DEPAKOTE PO) Take by mouth    Historical Provider, MD       REVIEW OF SYSTEMS    (2-9 systems for level 4, 10 or more for level 5)      Review of Systems   Constitutional: Negative for chills and fever. HENT: Negative for sore throat. TECHNOLOGIST PROVIDED HISTORY: fall/trauma, pain Reason for Exam: Fall (Slip and fall on ice. No LOC) Acuity: Acute Type of Exam: Initial FINDINGS: Glenohumeral joint is normally aligned. No evidence of acute fracture or dislocation. No abnormal periarticular calcifications. The [facility] joint is unremarkable in appearance. Visualized lung is unremarkable. Unremarkable right shoulder. Mri Lumbar Spine Wo Contrast    Result Date: 1/18/2020  EXAMINATION: MRI OF THE LUMBAR SPINE WITHOUT CONTRAST, 1/18/2020 11:09 am TECHNIQUE: Multiplanar multisequence MRI of the lumbar spine was performed without the administration of intravenous contrast. COMPARISON: February 27, 2016 HISTORY: ORDERING SYSTEM PROVIDED HISTORY: bowel incontinence, LE weakness TECHNOLOGIST PROVIDED HISTORY: bowel incontinence, LE weakness Is the patient pregnant?->No FINDINGS: BONES/ALIGNMENT: No acute fracture. No subluxation. No suspicious bone marrow replacing lesion. SPINAL CORD: Normal signal within the conus which terminates at approximately L1-L2. SOFT TISSUES: T2 hyperintensity surrounding the aorta at L2 is nonspecific but was present on the previous study. L1-L2: No central canal or foraminal stenosis. L2-L3: Mild right paracentral disc bulge without significant central canal or foraminal stenosis. L3-L4: No significant central canal or foraminal stenosis. L4-L5: Minimal broad-based disc bulge with extension into the left foramina does not cause significant central canal stenosis. Right foramina is patent. Disc bulge causes minimal left foraminal stenosis. L5-S1: No significant central canal stenosis. Disc bulge extends into the right foramina causing minimal right foraminal stenosis. Left foramina is patent. Minimal degenerative changes. No acute findings. Fluid signal intensity lesion adjacent to the aorta at L2 is unchanged since 2016 and suspected to be a benign cystic lesion.      Mri Lumbar Spine W Wo Contrast    Result

## 2020-03-24 ENCOUNTER — HOSPITAL ENCOUNTER (EMERGENCY)
Age: 30
Discharge: HOME | End: 2020-03-24
Attending: EMERGENCY MEDICINE
Payer: MEDICAID

## 2020-03-24 VITALS
SYSTOLIC BLOOD PRESSURE: 124 MMHG | TEMPERATURE: 98.2 F | HEART RATE: 80 BPM | OXYGEN SATURATION: 96 % | HEIGHT: 58 IN | WEIGHT: 95 LBS | BODY MASS INDEX: 19.94 KG/M2 | DIASTOLIC BLOOD PRESSURE: 61 MMHG | RESPIRATION RATE: 16 BRPM

## 2020-03-24 PROCEDURE — 99283 EMERGENCY DEPT VISIT LOW MDM: CPT

## 2020-03-24 RX ORDER — ALBUTEROL SULFATE 90 UG/1
2 AEROSOL, METERED RESPIRATORY (INHALATION) 4 TIMES DAILY PRN
Qty: 3 INHALER | Refills: 1 | Status: ON HOLD | OUTPATIENT
Start: 2020-03-24 | End: 2020-11-18 | Stop reason: HOSPADM

## 2020-03-24 RX ORDER — ACETAMINOPHEN 325 MG/1
325 TABLET ORAL EVERY 6 HOURS PRN
Qty: 120 TABLET | Refills: 0 | Status: SHIPPED | OUTPATIENT
Start: 2020-03-24 | End: 2020-04-04

## 2020-03-24 ASSESSMENT — PAIN DESCRIPTION - DESCRIPTORS: DESCRIPTORS: ACHING

## 2020-03-24 ASSESSMENT — PAIN DESCRIPTION - LOCATION: LOCATION: LEG

## 2020-03-24 ASSESSMENT — PAIN SCALES - GENERAL: PAINLEVEL_OUTOF10: 9

## 2020-03-24 ASSESSMENT — PAIN DESCRIPTION - PAIN TYPE: TYPE: ACUTE PAIN

## 2020-03-24 ASSESSMENT — PAIN DESCRIPTION - ORIENTATION: ORIENTATION: RIGHT;LEFT

## 2020-03-24 NOTE — ED PROVIDER NOTES
[facility]  ED  Emergency Department Encounter  Emergency Medicine Resident     Pt Name: Bharath Amin  MRN: [id number]  [place] 1990  Date of evaluation: 3/24/20  PCP:  No primary care provider on file. CHIEF COMPLAINT       Chief Complaint   Patient presents with    Fever    Abdominal Pain    Cough       HISTORY OFPRESENT ILLNESS  (Location/Symptom, Timing/Onset, Context/Setting, Quality, Duration, Modifying Factors,Severity.)      Bharath Amin is a 30 y.o. female who presents with concern for cough. Patient states that 3 days ago she had a fever that was treated with Motrin. Patient denies any chest pain, shortness of breath, nausea, vomiting, fever, chills currently. Patient has been able to speak in full sentences without difficulty. Patient has no other problems. Patient states she has a history of asthma, however she has no documented asthma medications in her chart. Patient states she has inhalers. PAST MEDICAL / SURGICAL / SOCIAL / FAMILY HISTORY      has a past medical history of Asthma, Bipolar disorder ([facility] [address] Cervical dysplasia, Migraine, and Seizures ([facility] [address] has a past surgical history that includes Cholecystectomy (2007); Tubal ligation; and Appendectomy. Social History     Socioeconomic History    Marital status:      Spouse name: Not on file    Number of children: Not on file    Years of education: Not on file    Highest education level: Not on file   Occupational History    Not on file   Social Needs    Financial resource strain: Not on file    Food insecurity     Worry: Not on file     Inability: Not on file    Transportation needs     Medical: Not on file     Non-medical: Not on file   Tobacco Use    Smoking status: Current Every Day Smoker     Packs/day: 0.20     Years: 11.00     Pack years: 2.20     Types: Cigarettes    Smokeless tobacco: Never Used   Substance and Sexual Activity    Alcohol use:  Yes     Alcohol/week: 0.0 standard drinks     Comment: occassional, heavy 3 years ago    Drug use: Yes     Types: Opiates , Marijuana     Comment: Hx Percocet abuse sober for 2 years.  Sexual activity: Not on file   Lifestyle    Physical activity     Days per week: Not on file     Minutes per session: Not on file    Stress: Not on file   Relationships    Social connections     Talks on phone: Not on file     Gets together: Not on file     Attends Jewish service: Not on file     Active member of club or organization: Not on file     Attends meetings of clubs or organizations: Not on file     Relationship status: Not on file    Intimate partner violence     Fear of current or ex partner: Not on file     Emotionally abused: Not on file     Physically abused: Not on file     Forced sexual activity: Not on file   Other Topics Concern    Not on file   Social History Narrative    Not on file       Family History   Problem Relation Age of Onset    Diabetes Mother         G.Parents    Hypertension Mother         G.Parents    Diabetes Father     Hypertension Father     Stroke Father         G.Parents    Cancer Other         G.Parents, Pancrease and ??    Coronary Art Dis Other         G.Parents        Allergies:  Diclofenac; Morphine; and Pcn [penicillins]    Home Medications:  Prior to Admission medications    Medication Sig Start Date End Date Taking?  Authorizing Provider   acetaminophen (TYLENOL) 325 MG tablet Take 1 tablet by mouth every 6 hours as needed for Pain 3/24/20  Yes Cyn Mckinney, DO   albuterol sulfate HFA (VENTOLIN HFA) 108 (90 Base) MCG/ACT inhaler Inhale 2 puffs into the lungs 4 times daily as needed for Wheezing 3/24/20  Yes Cyn Mckinney, DO   ibuprofen (ADVIL;MOTRIN) 600 MG tablet Take 1 tablet by mouth every 6 hours as needed for Pain 2/6/20   Joanna Fix, DO   acetaminophen (TYLENOL) 500 MG tablet Take 2 tablets by mouth every 8 hours as needed for Pain 8/25/19   Basim Pride, DO   Divalproex 325 MG tablet     Sig: Take 1 tablet by mouth every 6 hours as needed for Pain     Dispense:  120 tablet     Refill:  0    albuterol sulfate HFA (VENTOLIN HFA) 108 (90 Base) MCG/ACT inhaler     Sig: Inhale 2 puffs into the lungs 4 times daily as needed for Wheezing     Dispense:  3 Inhaler     Refill:  1       DDX: Upper respiratory infection, cough, cold, asthma exacerbation    Initial MDM/Plan: 30 y.o. female who presents with concerns for cough, fever couple days ago that was treated with Motrin. Patient states she has a history of asthma and she is out of her inhalers. Patient has no auditory wheezes here, no tissue sounds on auscultation. Patient is alert and oriented, patient is afebrile, speaking in full sentences with normal vital signs. Patient appears well. Plan to discharge patient home with Tylenol and nebulizer therapy. DIAGNOSTIC RESULTS / EMERGENCYDEPARTMENT COURSE / MDM     LABS:  Labs Reviewed - No data to display      RADIOLOGY:  No results found. EKG  NA     All EKG's are interpreted by the Emergency Department Physicianwho either signs or Co-signs this chart in the absence of a cardiologist.    EMERGENCY DEPARTMENT COURSE:   Will discharge patient home with medication such as Tylenol and albuterol for inhaler. PROCEDURES:  None    CONSULTS:  None    CRITICAL CARE:  Please see attending note    FINAL IMPRESSION      1.  Cough          DISPOSITION / PLAN     DISPOSITION Decision To Discharge 03/24/2020 03:43:29 AM      PATIENT REFERRED TO:  [facility]  [address] [place] [address]  [phone]  Call   As needed      DISCHARGE MEDICATIONS:  New Prescriptions    ACETAMINOPHEN (TYLENOL) 325 MG TABLET    Take 1 tablet by mouth every 6 hours as needed for Pain    ALBUTEROL SULFATE HFA (VENTOLIN HFA) 108 (90 BASE) MCG/ACT INHALER    Inhale 2 puffs into the lungs 4 times daily as needed for Wheezing       Melody Wei DO  Emergency Medicine

## 2020-03-24 NOTE — ED TRIAGE NOTES
Pt arrived to the ED with c/o cough, and the \"virus symptoms\" Pt is alert and oriented x4. Pt's vitals are stable. Pt also c/o being out of tylenol at home.

## 2020-04-04 ENCOUNTER — HOSPITAL ENCOUNTER (EMERGENCY)
Age: 30
Discharge: HOME | End: 2020-04-04
Attending: EMERGENCY MEDICINE
Payer: MEDICAID

## 2020-04-04 ENCOUNTER — APPOINTMENT (OUTPATIENT)
Dept: CT IMAGING | Age: 30
End: 2020-04-04
Payer: MEDICAID

## 2020-04-04 VITALS
SYSTOLIC BLOOD PRESSURE: 122 MMHG | HEART RATE: 72 BPM | OXYGEN SATURATION: 98 % | WEIGHT: 105 LBS | RESPIRATION RATE: 18 BRPM | TEMPERATURE: 98.1 F | BODY MASS INDEX: 22.04 KG/M2 | HEIGHT: 58 IN | DIASTOLIC BLOOD PRESSURE: 76 MMHG

## 2020-04-04 PROCEDURE — 70486 CT MAXILLOFACIAL W/O DYE: CPT

## 2020-04-04 PROCEDURE — 99283 EMERGENCY DEPT VISIT LOW MDM: CPT

## 2020-04-04 PROCEDURE — 6370000000 HC RX 637 (ALT 250 FOR IP): Performed by: PHYSICIAN ASSISTANT

## 2020-04-04 RX ORDER — OXYCODONE HYDROCHLORIDE AND ACETAMINOPHEN 5; 325 MG/1; MG/1
1 TABLET ORAL EVERY 6 HOURS PRN
Qty: 10 TABLET | Refills: 0 | Status: SHIPPED | OUTPATIENT
Start: 2020-04-04 | End: 2020-04-07

## 2020-04-04 RX ORDER — ACETAMINOPHEN 325 MG/1
650 TABLET ORAL ONCE
Status: COMPLETED | OUTPATIENT
Start: 2020-04-04 | End: 2020-04-04

## 2020-04-04 RX ORDER — CEPHALEXIN 500 MG/1
500 CAPSULE ORAL 4 TIMES DAILY
Qty: 28 CAPSULE | Refills: 0 | Status: SHIPPED | OUTPATIENT
Start: 2020-04-04 | End: 2020-04-11

## 2020-04-04 RX ORDER — OXYCODONE HYDROCHLORIDE AND ACETAMINOPHEN 5; 325 MG/1; MG/1
1 TABLET ORAL ONCE
Status: COMPLETED | OUTPATIENT
Start: 2020-04-04 | End: 2020-04-04

## 2020-04-04 RX ADMIN — OXYCODONE HYDROCHLORIDE AND ACETAMINOPHEN 1 TABLET: 5; 325 TABLET ORAL at 20:38

## 2020-04-04 RX ADMIN — ACETAMINOPHEN 650 MG: 325 TABLET ORAL at 19:27

## 2020-04-04 ASSESSMENT — PAIN DESCRIPTION - ORIENTATION: ORIENTATION: LEFT

## 2020-04-04 ASSESSMENT — PAIN SCALES - GENERAL
PAINLEVEL_OUTOF10: 10

## 2020-04-04 ASSESSMENT — PAIN DESCRIPTION - ONSET: ONSET: SUDDEN

## 2020-04-04 ASSESSMENT — PAIN DESCRIPTION - PAIN TYPE: TYPE: ACUTE PAIN

## 2020-04-04 ASSESSMENT — PAIN DESCRIPTION - DESCRIPTORS: DESCRIPTORS: THROBBING

## 2020-04-04 ASSESSMENT — PAIN DESCRIPTION - LOCATION: LOCATION: EYE

## 2020-04-04 ASSESSMENT — PAIN DESCRIPTION - FREQUENCY: FREQUENCY: CONTINUOUS
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
## 2020-04-05 ASSESSMENT — ENCOUNTER SYMPTOMS
COLOR CHANGE: 0
NAUSEA: 0
COUGH: 0
VOMITING: 0
ABDOMINAL PAIN: 0
EYE DISCHARGE: 0
SHORTNESS OF BREATH: 0
DIARRHEA: 0
EYE PAIN: 0
SORE THROAT: 0
EYE REDNESS: 0
BACK PAIN: 0
PHOTOPHOBIA: 0
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
## 2020-04-10 ENCOUNTER — HOSPITAL ENCOUNTER (EMERGENCY)
Age: 30
Discharge: LEFT BEFORE DISCHARGE | End: 2020-04-10
Attending: EMERGENCY MEDICINE
Payer: MEDICAID

## 2020-04-10 VITALS
WEIGHT: 105 LBS | BODY MASS INDEX: 21.95 KG/M2 | HEART RATE: 76 BPM | DIASTOLIC BLOOD PRESSURE: 61 MMHG | TEMPERATURE: 98.5 F | RESPIRATION RATE: 16 BRPM | OXYGEN SATURATION: 96 % | SYSTOLIC BLOOD PRESSURE: 100 MMHG

## 2020-04-10 PROCEDURE — 99283 EMERGENCY DEPT VISIT LOW MDM: CPT

## 2020-04-10 RX ORDER — ACETAMINOPHEN 325 MG/1
650 TABLET ORAL ONCE
Status: DISCONTINUED | OUTPATIENT
Start: 2020-04-10 | End: 2020-04-10 | Stop reason: HOSPADM

## 2020-04-10 ASSESSMENT — ENCOUNTER SYMPTOMS
RHINORRHEA: 0
SHORTNESS OF BREATH: 0
COUGH: 0
SORE THROAT: 0
NAUSEA: 0
VOMITING: 0
ABDOMINAL PAIN: 0
PHOTOPHOBIA: 0

## 2020-04-10 ASSESSMENT — PAIN DESCRIPTION - ORIENTATION: ORIENTATION: LEFT

## 2020-04-10 ASSESSMENT — PAIN DESCRIPTION - LOCATION: LOCATION: HEAD;EYE

## 2020-04-10 ASSESSMENT — PAIN DESCRIPTION - DESCRIPTORS: DESCRIPTORS: ACHING;SHARP

## 2020-04-10 ASSESSMENT — PAIN DESCRIPTION - PAIN TYPE: TYPE: ACUTE PAIN

## 2020-04-10 ASSESSMENT — PAIN SCALES - GENERAL: PAINLEVEL_OUTOF10: 8

## 2020-04-10 ASSESSMENT — PAIN DESCRIPTION - FREQUENCY: FREQUENCY: CONTINUOUS

## 2020-04-11 ENCOUNTER — CARE COORDINATION (OUTPATIENT)
Dept: CARE COORDINATION | Age: 30
End: 2020-04-11

## 2020-04-11 NOTE — ED NOTES
Pt reports \"If all they're giving me is tylenol, then I will take it at home. I've got things to do, I'm leaving. \"  Pt stable. Left ED. Dr. Ac Grimes notified.         Guero Wild RN  04/10/20 2014

## 2020-04-24 ENCOUNTER — CARE COORDINATION (OUTPATIENT)
Dept: CARE COORDINATION | Age: 30
End: 2020-04-24

## 2020-05-10 ENCOUNTER — HOSPITAL ENCOUNTER (EMERGENCY)
Age: 30
Discharge: STILL ADMITTED OR ADMINISTRATIVE | End: 2020-05-10
Attending: EMERGENCY MEDICINE
Payer: MEDICAID

## 2020-05-10 ENCOUNTER — HOSPITAL ENCOUNTER (EMERGENCY)
Age: 30
Discharge: HOME | End: 2020-05-10
Attending: EMERGENCY MEDICINE
Payer: MEDICAID

## 2020-05-10 VITALS
TEMPERATURE: 98.6 F | RESPIRATION RATE: 16 BRPM | WEIGHT: 100 LBS | HEIGHT: 57 IN | HEART RATE: 60 BPM | OXYGEN SATURATION: 97 % | SYSTOLIC BLOOD PRESSURE: 110 MMHG | BODY MASS INDEX: 21.57 KG/M2 | DIASTOLIC BLOOD PRESSURE: 65 MMHG

## 2020-05-10 PROCEDURE — 99282 EMERGENCY DEPT VISIT SF MDM: CPT

## 2020-05-10 PROCEDURE — 6370000000 HC RX 637 (ALT 250 FOR IP): Performed by: STUDENT IN AN ORGANIZED HEALTH CARE EDUCATION/TRAINING PROGRAM

## 2020-05-10 RX ORDER — ACETAMINOPHEN 500 MG
1000 TABLET ORAL ONCE
Status: COMPLETED | OUTPATIENT
Start: 2020-05-10 | End: 2020-05-10

## 2020-05-10 RX ORDER — CLINDAMYCIN HYDROCHLORIDE 150 MG/1
450 CAPSULE ORAL 3 TIMES DAILY
Qty: 63 CAPSULE | Refills: 0 | Status: SHIPPED | OUTPATIENT
Start: 2020-05-10 | End: 2020-05-17

## 2020-05-10 RX ADMIN — ACETAMINOPHEN 1000 MG: 500 TABLET ORAL at 21:01

## 2020-05-10 ASSESSMENT — ENCOUNTER SYMPTOMS
BACK PAIN: 0
SORE THROAT: 0
TROUBLE SWALLOWING: 0

## 2020-05-10 ASSESSMENT — PAIN SCALES - GENERAL
PAINLEVEL_OUTOF10: 10
PAINLEVEL_OUTOF10: 10

## 2020-05-10 ASSESSMENT — PAIN DESCRIPTION - LOCATION: LOCATION: TEETH;LEG

## 2020-05-10 ASSESSMENT — PAIN DESCRIPTION - PAIN TYPE: TYPE: ACUTE PAIN

## 2020-05-10 NOTE — ED PROVIDER NOTES
Pt left prior to my evaluation.   I was not involved in this patient's care     Swetha Blair DO  Resident  05/10/20 1925

## 2020-05-11 NOTE — ED PROVIDER NOTES
[facility] ED  Emergency Department Encounter  EmergencyMedicine Resident     Pt Jose Justen  MRN: [id number]  [place] 1990  Date of evaluation: 5/10/20  PCP:  No primary care provider on file. CHIEF COMPLAINT       Chief Complaint   Patient presents with    Dental Pain    Leg Pain     bilateral        HISTORY OF PRESENT ILLNESS  (Location/Symptom, Timing/Onset, Context/Setting, Quality, Duration, Modifying Factors, Severity.)      Francisco Frank is a 30 y.o. female who presents with right lower dental pain for approximately 1 hour and acute on chronic bilateral leg pain. She denies any injury to her legs and states that is due to a history of multiple sclerosis, however this is not noted in the chart. Additionally patient requests hydrocodone by name. She states that she has been referred to a dentist but has not made a follow-up appointment. She reports that she used ibuprofen approximately 3 hours prior to arrival without significant improvement in symptoms. She has a past history of bipolar disorder. PAST MEDICAL / SURGICAL / SOCIAL / FAMILY HISTORY      has a past medical history of Asthma, Bipolar disorder ([facility] [address] Cervical dysplasia, Migraine, and Seizures ([facility] [address] has a past surgical history that includes Cholecystectomy (2007); Tubal ligation; and Appendectomy.     Social History     Socioeconomic History    Marital status:      Spouse name: Not on file    Number of children: Not on file    Years of education: Not on file    Highest education level: Not on file   Occupational History    Not on file   Social Needs    Financial resource strain: Not on file    Food insecurity     Worry: Not on file     Inability: Not on file    Transportation needs     Medical: Not on file     Non-medical: Not on file   Tobacco Use    Smoking status: Current Every Day Smoker     Packs/day: 0.20     Years: 11.00     Pack years: 2.20     Types: Cigarettes   

## 2020-05-16 ENCOUNTER — HOSPITAL ENCOUNTER (EMERGENCY)
Age: 30
Discharge: HOME | End: 2020-05-16
Attending: EMERGENCY MEDICINE
Payer: MEDICAID

## 2020-05-16 VITALS
DIASTOLIC BLOOD PRESSURE: 73 MMHG | BODY MASS INDEX: 20.38 KG/M2 | HEIGHT: 58 IN | OXYGEN SATURATION: 97 % | HEART RATE: 85 BPM | WEIGHT: 97.1 LBS | SYSTOLIC BLOOD PRESSURE: 130 MMHG | RESPIRATION RATE: 18 BRPM | TEMPERATURE: 98.5 F

## 2020-05-16 PROCEDURE — 99282 EMERGENCY DEPT VISIT SF MDM: CPT

## 2020-05-16 PROCEDURE — 6370000000 HC RX 637 (ALT 250 FOR IP): Performed by: STUDENT IN AN ORGANIZED HEALTH CARE EDUCATION/TRAINING PROGRAM

## 2020-05-16 RX ORDER — IBUPROFEN 400 MG/1
600 TABLET ORAL ONCE
Status: COMPLETED | OUTPATIENT
Start: 2020-05-16 | End: 2020-05-16

## 2020-05-16 RX ORDER — CYCLOBENZAPRINE HCL 5 MG
5 TABLET ORAL 2 TIMES DAILY PRN
Qty: 20 TABLET | Refills: 0 | Status: SHIPPED | OUTPATIENT
Start: 2020-05-16 | End: 2020-05-26

## 2020-05-16 RX ORDER — CYCLOBENZAPRINE HCL 10 MG
10 TABLET ORAL ONCE
Status: COMPLETED | OUTPATIENT
Start: 2020-05-16 | End: 2020-05-16

## 2020-05-16 RX ADMIN — IBUPROFEN 600 MG: 400 TABLET, FILM COATED ORAL at 21:38

## 2020-05-16 RX ADMIN — CYCLOBENZAPRINE 10 MG: 10 TABLET, FILM COATED ORAL at 21:39

## 2020-05-16 ASSESSMENT — PAIN DESCRIPTION - LOCATION: LOCATION: BACK

## 2020-05-16 ASSESSMENT — PAIN DESCRIPTION - FREQUENCY: FREQUENCY: CONTINUOUS

## 2020-05-16 ASSESSMENT — PAIN SCALES - GENERAL: PAINLEVEL_OUTOF10: 10

## 2020-05-16 ASSESSMENT — PAIN DESCRIPTION - DESCRIPTORS: DESCRIPTORS: BURNING

## 2020-05-17 ASSESSMENT — ENCOUNTER SYMPTOMS
BACK PAIN: 1
VOMITING: 0
COUGH: 0
SHORTNESS OF BREATH: 0
ABDOMINAL PAIN: 0
RHINORRHEA: 0
NAUSEA: 0

## 2020-05-17 NOTE — ED NOTES
PT to ED via triage a/o x3 with c/o back pain. Pt stated she began having back pain around 6pm today. Pt denies any meds at home. Pt denies any recent injury to back. Pt stated her back pain is chronic.    Vitals complete, call light in reach  Will continue to monitor     You Ortega RN  05/16/20 9891

## 2020-05-17 NOTE — ED PROVIDER NOTES
[address] [facility]     Emergency Department     Faculty Attestation        I performed a history and physical examination of the patient and discussed management with the resident. I reviewed the residents note and agree with the documented findings and plan of care. Any areas of disagreement are noted on the chart. I was personally present for the key portions of any procedures. I have documented in the chart those procedures where I was not present during the key portions. I have reviewed the emergency nurses triage note. I agree with the chief complaint, past medical history, past surgical history, allergies, medications, social and family history as documented unless otherwise noted below. For Physician Assistant/ Nurse Practitioner cases/documentation I have personally evaluated this patient and have completed at least one if not all key elements of the E/M (history, physical exam, and MDM). Additional findings are as noted. Vital Signs: BP: 130/73  Pulse: 85  Resp: 18  Temp: 98.5 °F (36.9 °C) SpO2: 97 %  PCP:  No primary care provider on file. Pertinent Comments:         Critical Care  None    This patient was evaluated in the Emergency Department for symptoms described in the history of present illness. He/she was evaluated in the context of the global COVID-19 pandemic, which necessitated consideration that the patient might be at risk for infection with the SARS-CoV-2 virus that causes COVID-19. Institutional protocols and algorithms that pertain to the evaluation of patients at risk for COVID-19 are in a state of rapid change based on information released by regulatory bodies including the CDC and federal and state organizations. These policies and algorithms were followed during the patient's care in the ED.     (Please note that portions of this note were completed with a voice recognition program. Efforts were made to edit the dictations but

## 2020-05-22 ENCOUNTER — HOSPITAL ENCOUNTER (EMERGENCY)
Age: 30
Discharge: LEFT BEFORE DISCHARGE | End: 2020-05-22
Attending: EMERGENCY MEDICINE
Payer: MEDICAID

## 2020-05-22 VITALS
RESPIRATION RATE: 18 BRPM | HEART RATE: 74 BPM | SYSTOLIC BLOOD PRESSURE: 90 MMHG | DIASTOLIC BLOOD PRESSURE: 53 MMHG | OXYGEN SATURATION: 100 % | TEMPERATURE: 97.5 F

## 2020-05-22 PROCEDURE — 99282 EMERGENCY DEPT VISIT SF MDM: CPT

## 2020-05-22 ASSESSMENT — PAIN DESCRIPTION - FREQUENCY: FREQUENCY: CONTINUOUS

## 2020-05-22 ASSESSMENT — PAIN DESCRIPTION - PAIN TYPE: TYPE: ACUTE PAIN

## 2020-05-22 ASSESSMENT — PAIN SCALES - GENERAL: PAINLEVEL_OUTOF10: 10

## 2020-05-22 ASSESSMENT — PAIN DESCRIPTION - PROGRESSION: CLINICAL_PROGRESSION: NOT CHANGED

## 2020-05-22 ASSESSMENT — PAIN DESCRIPTION - LOCATION: LOCATION: BACK

## 2020-05-23 NOTE — ED NOTES
Patient upset with Dr. Em Ramirez treatment plans.   Patient eloped  Before triage completed     Jaqueline Siu RN  05/22/20 2034

## 2020-05-23 NOTE — ED PROVIDER NOTES
[facility]     Emergency Department     Faculty Attestation    I performed a history and physical examination of the patient and discussed management with the resident. I have reviewed and agree with the residents findings including all diagnostic interpretations, and treatment plans as written. Any areas of disagreement are noted on the chart. I was personally present for the key portions of any procedures. I have documented in the chart those procedures where I was not present during the key portions. I have reviewed the emergency nurses triage note. I agree with the chief complaint, past medical history, past surgical history, allergies, medications, social and family history as documented unless otherwise noted below. Documentation of the HPI, Physical Exam and Medical Decision Making performed by [email or web address] is based on my personal performance of the HPI, PE and MDM. For Physician Assistant/ Nurse Practitioner cases/documentation I have personally evaluated this patient and have completed at least one if not all key elements of the E/M (history, physical exam, and MDM). Additional findings are as noted. Patient with reported fall with loss of consciousness yesterday while washing the dishes. He states she woke up to her  who laid her down. And then she has had worsening right-sided back pain. And unable to walk. Patient comes in in a wheelchair but then states that she is been in a wheelchair and unable to walk her whole life and she has a history of MS. She followed with a doctor out in [place] where she lived previously in [place] but has recently moved back however patient has been through this ER multiple times and frequently over the past few years. Patient story frequently changes.   She states she is having significant back pain and the last time she was here she was told to follow-up with a neurosurgeon but no name was provided to her.    Patient on exam is sitting in a wheelchair. She actively is moving all of her legs well discussing her history. However with an attempt to have her move her legs on command patient is unable to do so. She does have 2+ patellar reflexes bilaterally. She states she cannot feel anything on her right side. Patient's chart was reviewed viewed shortly after this discussion. Shows she did have an MRI in February of this year. That did show some L5-S1 mild right neural foraminal narrowing secondary to encroachment by a foraminal disc extrusion. There also was an L2-L3 through L5-S1 minimal to mild spondylo-low cyst without associated central canal compromise. This was reviewed with the patient. And no neurosurgery follow-up was recommended. Patient then asked what we were going to do about her pain today. I then told her she was going to get some Tylenol. Shortly after this discussion. Patient spoke with the nurse and she wanted to leave.     Berkley Hodge D.O, M.P.H  Attending Emergency Medicine Physician         Berkley Hodge DO  05/22/20 2034

## 2020-06-27 ENCOUNTER — HOSPITAL ENCOUNTER (EMERGENCY)
Age: 30
Discharge: HOME | End: 2020-06-27
Attending: EMERGENCY MEDICINE
Payer: MEDICAID

## 2020-06-27 VITALS
TEMPERATURE: 98.2 F | SYSTOLIC BLOOD PRESSURE: 102 MMHG | DIASTOLIC BLOOD PRESSURE: 69 MMHG | HEART RATE: 88 BPM | RESPIRATION RATE: 16 BRPM | OXYGEN SATURATION: 98 %

## 2020-06-27 PROCEDURE — 6370000000 HC RX 637 (ALT 250 FOR IP): Performed by: GENERAL PRACTICE

## 2020-06-27 PROCEDURE — 99282 EMERGENCY DEPT VISIT SF MDM: CPT

## 2020-06-27 PROCEDURE — 6360000002 HC RX W HCPCS: Performed by: GENERAL PRACTICE

## 2020-06-27 RX ORDER — DIPHENHYDRAMINE HCL 25 MG
25 TABLET ORAL ONCE
Status: COMPLETED | OUTPATIENT
Start: 2020-06-27 | End: 2020-06-27

## 2020-06-27 RX ORDER — DIPHENHYDRAMINE HCL 25 MG
25 CAPSULE ORAL EVERY 6 HOURS PRN
Qty: 12 CAPSULE | Refills: 0 | Status: SHIPPED | OUTPATIENT
Start: 2020-06-27 | End: 2020-07-25

## 2020-06-27 RX ORDER — DEXAMETHASONE 4 MG/1
10 TABLET ORAL ONCE
Status: COMPLETED | OUTPATIENT
Start: 2020-06-27 | End: 2020-06-27

## 2020-06-27 RX ADMIN — DEXAMETHASONE 10 MG: 4 TABLET ORAL at 23:02

## 2020-06-27 RX ADMIN — DIPHENHYDRAMINE HCL 25 MG: 25 TABLET ORAL at 23:01

## 2020-06-27 ASSESSMENT — ENCOUNTER SYMPTOMS
WHEEZING: 0
STRIDOR: 0
COUGH: 0
ABDOMINAL PAIN: 0
VOMITING: 0
SHORTNESS OF BREATH: 0
NAUSEA: 0
CHOKING: 0
CHEST TIGHTNESS: 0

## 2020-06-28 NOTE — ED PROVIDER NOTES
[facility] ED  Emergency Department Encounter  EmergencyMedicine Resident     Pt Chio Benson  MRN: [id number]  [place] 1990  Date of evaluation: 6/27/20  PCP:  No primary care provider on file. CHIEF COMPLAINT       Chief Complaint   Patient presents with    Allergic Reaction    Dental Pain       HISTORY OF PRESENT ILLNESS  (Location/Symptom, Timing/Onset, Context/Setting, Quality, Duration, Modifying Factors, Severity.)      Rob Alcantara is a 30 y.o. female who presents with concerns for allergic reaction. Patient states she is allergic to peanuts however she ate some apricots as well as a couple almonds and felt like her right side of her face was swelling, however patient states that she is been on the mycin for impacted right molar, patient denies any shortness of breath difficulty swallowing, skin rashes. Patient felt like her feet were swelling. Patient denies ever having to use an EpiPen or being prescribed an EpiPen. Patient came in in a wheelchair, states that she has multiple sclerosis. PAST MEDICAL / SURGICAL / SOCIAL / FAMILY HISTORY      has a past medical history of Asthma, Bipolar disorder ([facility] [address] Cervical dysplasia, Migraine, and Seizures ([facility] [address] has a past surgical history that includes Cholecystectomy (2007); Tubal ligation; and Appendectomy.     Social History     Socioeconomic History    Marital status:      Spouse name: Not on file    Number of children: Not on file    Years of education: Not on file    Highest education level: Not on file   Occupational History    Not on file   Social Needs    Financial resource strain: Not on file    Food insecurity     Worry: Not on file     Inability: Not on file    Transportation needs     Medical: Not on file     Non-medical: Not on file   Tobacco Use    Smoking status: Current Every Day Smoker     Packs/day: 0.20     Years: 11.00     Pack years: 2.20     Types: Cigarettes    PO) Take by mouth    Historical Provider, MD       REVIEW OF SYSTEMS    (2-9 systems for level 4, 10 or more for level 5)      Review of Systems   Constitutional: Negative for activity change, appetite change, chills and fever. HENT: Positive for dental problem. Negative for congestion. Respiratory: Negative for cough, choking, chest tightness, shortness of breath, wheezing and stridor. Cardiovascular: Negative for chest pain. Gastrointestinal: Negative for abdominal pain, nausea and vomiting. Musculoskeletal: Positive for gait problem. Skin: Negative for rash and wound. Neurological: Negative for dizziness, syncope, speech difficulty, weakness and headaches. Psychiatric/Behavioral: The patient is nervous/anxious. PHYSICAL EXAM   (up to 7 for level 4, 8 or more for level 5)      INITIAL VITALS:   /69   Pulse 88   Temp 98.2 °F (36.8 °C)   Resp 16   LMP 02/07/2019 (Approximate)   SpO2 98%     Physical Exam  Constitutional:       General: She is not in acute distress. Appearance: Normal appearance. She is normal weight. She is not ill-appearing, toxic-appearing or diaphoretic. Comments: Patient is soaking wet, states that she had to walk here and it was raining, is shivering   HENT:      Head: Normocephalic and atraumatic. Nose: Nose normal.      Mouth/Throat:      Mouth: Mucous membranes are moist.      Pharynx: No oropharyngeal exudate or posterior oropharyngeal erythema. Comments: Decayed right lower molar, no evidence of abscess. Neurological:      Mental Status: She is alert. DIFFERENTIAL  DIAGNOSIS     PLAN (LABS / IMAGING / EKG):  No orders of the defined types were placed in this encounter.       MEDICATIONS ORDERED:  Orders Placed This Encounter   Medications    diphenhydrAMINE (BENADRYL) tablet 25 mg    dexamethasone (DECADRON) tablet 10 mg    diphenhydrAMINE (BENADRYL) 25 MG capsule     Sig: Take 1 capsule by mouth every 6 hours as needed for Itching     Dispense:  12 capsule     Refill:  0       DDX: Allergic reaction, dental pain    DIAGNOSTIC RESULTS / EMERGENCY DEPARTMENT COURSE / MDM   :  No results found for this visit on 06/27/20. RADIOLOGY:  None    EKG  None    All EKG's are interpreted by the Emergency Department Physician who either signs or Co-signs this chart in the absence of a cardiologist.    EMERGENCY DEPARTMENT COURSE/IMPRESSION: 17-year-old female presenting for concern after eating apricots that she was having allergic reaction, upon arrival emergency department patient was speaking in full sentences, no respiratory distress, 98% on room air, patient was soaking wet was placed in gown given warm blanket. Patient was given Benadryl and Decadron. Reevaluated patient and 40 minutes patient is in her wheelchair wheeling around the department asking if she can go. Patient denies any shortness of breath, no skin rashes, no swelling. Patient was discharged with additional return precautions given Benadryl. All questions were answered. PROCEDURES:  None    CONSULTS:  None    CRITICAL CARE:  None    FINAL IMPRESSION      1. Allergic reaction, initial encounter    2.  Pain, dental          DISPOSITION / PLAN     DISPOSITION Decision To Discharge 06/27/2020 11:32:01 PM      PATIENT REFERRED TO:  [facility]  [address]  [address] [address]  [phone]  In 1 week  As needed, If symptoms worsen    [facility]  [address] [facility] [address]  [phone]    As needed, If symptoms worsen      DISCHARGE MEDICATIONS:  Discharge Medication List as of 6/27/2020 11:34 PM      START taking these medications    Details   diphenhydrAMINE (BENADRYL) 25 MG capsule Take 1 capsule by mouth every 6 hours as needed for Itching, Disp-12 capsule, R-0Print             Paula Mariano DO  Emergency Medicine Resident    (Please note that portions of thisnote were completed with a voice recognition program.  Efforts

## 2020-06-28 NOTE — ED NOTES
Patient states that she is allergic to peanuts and she ate apricots and almonds now complaining that all her extermities are swollen. This nurse is not seeing the swelling. Airway intact.       Coni Martinez RN  06/27/20 4752

## 2020-06-28 NOTE — ED PROVIDER NOTES
[facility]     Emergency Department     Faculty Attestation    I performed a history and physical examination of the patient and discussed management with the resident. I reviewed the residents note and agree with the documented findings including all diagnostic interpretations and plan of care. Any areas of disagreement are noted on the chart. I was personally present for the key portions of any procedures. I have documented in the chart those procedures where I was not present during the key portions. I have reviewed the emergency nurses triage note. I agree with the chief complaint, past medical history, past surgical history, allergies, medications, social and family history as documented unless otherwise noted below. Documentation of the HPI, Physical Exam and Medical Decision Making performed by [email or web address] is based on my personal performance of the HPI, PE and MDM. For Physician Assistant/ Nurse Practitioner cases/documentation I have personally evaluated this patient and have completed at least one if not all key elements of the E/M (history, physical exam, and MDM). Additional findings are as noted. This patient was evaluated in the Emergency Department for symptoms described in the history of present illness. He/she was evaluated in the context of the global COVID-19 pandemic, which necessitated consideration that the patient might be at risk for infection with the SARS-CoV-2 virus that causes COVID-19. Institutional protocols and algorithms that pertain to the evaluation of patients at risk for COVID-19 are in a state of rapid change based on information released by regulatory bodies including the CDC and federal and state organizations. These policies and algorithms were followed during the patient's care in the ED. Primary Care Physician: No primary care provider on file. History:  This is a 30 y.o. female who presents to the Emergency

## 2020-07-25 ENCOUNTER — HOSPITAL ENCOUNTER (EMERGENCY)
Age: 30
Discharge: HOME | End: 2020-07-26
Attending: EMERGENCY MEDICINE
Payer: MEDICAID

## 2020-07-25 ENCOUNTER — APPOINTMENT (OUTPATIENT)
Dept: CT IMAGING | Age: 30
End: 2020-07-25
Payer: MEDICAID

## 2020-07-25 PROCEDURE — 70450 CT HEAD/BRAIN W/O DYE: CPT

## 2020-07-25 PROCEDURE — 99283 EMERGENCY DEPT VISIT LOW MDM: CPT

## 2020-07-25 RX ORDER — ACETAMINOPHEN 500 MG
1000 TABLET ORAL ONCE
Status: COMPLETED | OUTPATIENT
Start: 2020-07-26 | End: 2020-07-26

## 2020-07-25 ASSESSMENT — PAIN DESCRIPTION - DESCRIPTORS: DESCRIPTORS: SHARP

## 2020-07-25 ASSESSMENT — PAIN DESCRIPTION - PROGRESSION: CLINICAL_PROGRESSION: NOT CHANGED

## 2020-07-25 ASSESSMENT — PAIN DESCRIPTION - PAIN TYPE: TYPE: ACUTE PAIN

## 2020-07-25 ASSESSMENT — PAIN SCALES - GENERAL: PAINLEVEL_OUTOF10: 10

## 2020-07-25 ASSESSMENT — PAIN DESCRIPTION - FREQUENCY: FREQUENCY: CONTINUOUS

## 2020-07-25 ASSESSMENT — PAIN DESCRIPTION - ORIENTATION: ORIENTATION: RIGHT;LEFT;UPPER

## 2020-07-25 ASSESSMENT — PAIN DESCRIPTION - ONSET: ONSET: ON-GOING

## 2020-07-26 ENCOUNTER — APPOINTMENT (OUTPATIENT)
Dept: CT IMAGING | Age: 30
End: 2020-07-26
Payer: MEDICAID

## 2020-07-26 VITALS
BODY MASS INDEX: 19.94 KG/M2 | TEMPERATURE: 98.2 F | OXYGEN SATURATION: 96 % | SYSTOLIC BLOOD PRESSURE: 104 MMHG | HEART RATE: 79 BPM | WEIGHT: 95 LBS | DIASTOLIC BLOOD PRESSURE: 68 MMHG | RESPIRATION RATE: 14 BRPM | HEIGHT: 58 IN

## 2020-07-26 LAB
CHP ED QC CHECK: YES
PREGNANCY TEST URINE, POC: NEGATIVE

## 2020-07-26 PROCEDURE — 6370000000 HC RX 637 (ALT 250 FOR IP): Performed by: STUDENT IN AN ORGANIZED HEALTH CARE EDUCATION/TRAINING PROGRAM

## 2020-07-26 PROCEDURE — 81025 URINE PREGNANCY TEST: CPT

## 2020-07-26 PROCEDURE — 70450 CT HEAD/BRAIN W/O DYE: CPT

## 2020-07-26 PROCEDURE — 72125 CT NECK SPINE W/O DYE: CPT

## 2020-07-26 PROCEDURE — 99284 EMERGENCY DEPT VISIT MOD MDM: CPT

## 2020-07-26 RX ADMIN — ACETAMINOPHEN 1000 MG: 500 TABLET ORAL at 00:11

## 2020-07-26 ASSESSMENT — PAIN DESCRIPTION - ORIENTATION: ORIENTATION: POSTERIOR;MID

## 2020-07-26 ASSESSMENT — PAIN SCALES - GENERAL
PAINLEVEL_OUTOF10: 10
PAINLEVEL_OUTOF10: 8

## 2020-07-26 ASSESSMENT — ENCOUNTER SYMPTOMS
NAUSEA: 0
ABDOMINAL PAIN: 0
WHEEZING: 0
PHOTOPHOBIA: 0
SHORTNESS OF BREATH: 0
RHINORRHEA: 0
VOMITING: 0

## 2020-07-26 ASSESSMENT — PAIN DESCRIPTION - ONSET: ONSET: SUDDEN

## 2020-07-26 ASSESSMENT — PAIN - FUNCTIONAL ASSESSMENT: PAIN_FUNCTIONAL_ASSESSMENT: ACTIVITIES ARE NOT PREVENTED

## 2020-07-26 ASSESSMENT — PAIN DESCRIPTION - PAIN TYPE: TYPE: ACUTE PAIN

## 2020-07-26 ASSESSMENT — PAIN DESCRIPTION - FREQUENCY: FREQUENCY: CONTINUOUS

## 2020-07-26 ASSESSMENT — PAIN DESCRIPTION - LOCATION: LOCATION: HEAD;NECK

## 2020-07-26 ASSESSMENT — PAIN DESCRIPTION - PROGRESSION: CLINICAL_PROGRESSION: NOT CHANGED

## 2020-07-26 ASSESSMENT — PAIN DESCRIPTION - DESCRIPTORS: DESCRIPTORS: ACHING

## 2020-07-26 NOTE — ED PROVIDER NOTES
FACULTY SIGN-OUT  ADDENDUM       Patient: Jovi Myers   MRN: [id number]  PCP:  No primary care provider on file. Attestation  I was available and discussed any additional care issues that arose and coordinated the management plans with the resident(s) caring for the patient during my duty period. Any areas of disagreement with resident's documentation of care or procedures are noted on the chart. I was personally present for the key portions of any/all procedures during my duty period. I have documented in the chart those procedures where I was not present during the key portions. The patient's initial evaluation and plan have been discussed with the prior provider who initially evaluated the patient. Pertinent Comments: The patient is a 30 y.o. female taken in signout with history of multiple sclerosis causing transfer and wheelchair with mechanical fall with head injury and persistent headache. Neuro intact at her baseline otherwise  We are awaiting CT head and symptomatic control with reevaluation after    ED COURSE      The patient was given the following medications:  No orders of the defined types were placed in this encounter.       RECENT VITALS:                   (Please note that portions of this note were completed with a voice recognition program.  Efforts were made to edit the dictations but occasionally words are mis-transcribed.)    MD Yasmine Esquivel  Attending Emergency Medicine Physician        Kwabena Lowe MD  07/25/20 5678

## 2020-07-26 NOTE — ED NOTES
Resting quietly  Call light at side  Waiting for CT  Will continue to monitor patient         Rick Hamilton RN  07/25/20 0939

## 2020-07-26 NOTE — ED NOTES
Patient to room 40, in personal wheelchair  Patient is a 30year old female who while getting out of her wheelchair fell today  Patient complains of tenderness to RUE, left chest near clavicle and to forehead  Patient has a history of MS and normally uses a wheelchair  NAD  Will continue to assess       Maricarmen Hudson RN  07/25/20 2023

## 2020-07-26 NOTE — ED NOTES
Abrasions cleansed, bacitracin applied and dressings applied (to RUE and left chest)     Queta Evangelista RN  07/25/20 5718

## 2020-07-26 NOTE — ED PROVIDER NOTES
[facility] ED     Emergency Department     Faculty Attestation    I performed a history and physical examination of the patient and discussed management with the resident. I reviewed the residents note and agree with the documented findings and plan of care. Any areas of disagreement are noted on the chart. I was personally present for the key portions of any procedures. I have documented in the chart those procedures where I was not present during the key portions. I have reviewed the emergency nurses triage note. I agree with the chief complaint, past medical history, past surgical history, allergies, medications, social and family history as documented unless otherwise noted below. For Physician Assistant/ Nurse Practitioner cases/documentation I have personally evaluated this patient and have completed at least one if not all key elements of the E/M (history, physical exam, and MDM). Additional findings are as noted. Patient with history of MS presents after she had a fall today. Patient says she was getting up from her wheelchair and took a couple of steps and then fell onto the concrete. She denies any loss of consciousness. She complains of pain to her head as well as abrasions to her extremities. On exam, patient is resting comfortably in the bed. She is alert and oriented and answering my questions appropriately. There is no cervical midline tenderness. Will get CT scan of the head and reassess.       Pratik Issa MD  Attending Emergency  Physician              Juma Mora MD  07/25/20 7009

## 2020-07-26 NOTE — ED PROVIDER NOTES
Alcohol/week: 0.0 standard drinks     Comment: occassional, heavy 3 years ago    Drug use: Not Currently     Types: Opiates , Marijuana     Comment: Hx Percocet abuse sober for 2 years.  Sexual activity: Not on file   Lifestyle    Physical activity     Days per week: Not on file     Minutes per session: Not on file    Stress: Not on file   Relationships    Social connections     Talks on phone: Not on file     Gets together: Not on file     Attends Christian service: Not on file     Active member of club or organization: Not on file     Attends meetings of clubs or organizations: Not on file     Relationship status: Not on file    Intimate partner violence     Fear of current or ex partner: Not on file     Emotionally abused: Not on file     Physically abused: Not on file     Forced sexual activity: Not on file   Other Topics Concern    Not on file   Social History Narrative    Not on file       Family History   Problem Relation Age of Onset    Diabetes Mother         G.Parents    Hypertension Mother         G.Parents    Diabetes Father     Hypertension Father     Stroke Father         G.Parents    Cancer Other         G.Parents, Pancrease and ??    Coronary Art Dis Other         G.Parents        Allergies:  Diclofenac; Morphine; and Pcn [penicillins]    Home Medications:  Prior to Admission medications    Medication Sig Start Date End Date Taking? Authorizing Provider   albuterol sulfate HFA (VENTOLIN HFA) 108 (90 Base) MCG/ACT inhaler Inhale 2 puffs into the lungs 4 times daily as needed for Wheezing 3/24/20   Cyn Mckinney, DO   ibuprofen (ADVIL;MOTRIN) 600 MG tablet Take 1 tablet by mouth every 6 hours as needed for Pain 2/6/20   Mer Rouge Fess, DO       REVIEW OFSYSTEMS    (2-9 systems for level 4, 10 or more for level 5)      Review of Systems   Constitutional: Negative for chills and fever. HENT: Negative for congestion and rhinorrhea.     Eyes: Negative for photophobia and visual disturbance. Respiratory: Negative for shortness of breath and wheezing. Cardiovascular: Negative for chest pain and palpitations. Gastrointestinal: Negative for abdominal pain, nausea and vomiting. Musculoskeletal: Negative for neck pain. Skin: Positive for wound. Neurological: Negative for dizziness and headaches. PHYSICAL EXAM   (up to 7 for level 4, 8 or more forlevel 5)      INITIAL VITALS:   ED Triage Vitals   BP Temp Temp src Pulse Resp SpO2 Height Weight   -- -- -- -- -- -- -- --       Physical Exam  Constitutional:       Appearance: She is well-developed. HENT:      Head: Normocephalic and atraumatic. Eyes:      Conjunctiva/sclera: Conjunctivae normal.      Pupils: Pupils are equal, round, and reactive to light. Neck:      Musculoskeletal: Normal range of motion and neck supple. Cardiovascular:      Rate and Rhythm: Normal rate. Heart sounds: Normal heart sounds. Pulmonary:      Effort: Pulmonary effort is normal. No respiratory distress. Abdominal:      Palpations: Abdomen is soft. Tenderness: There is no abdominal tenderness. Musculoskeletal: Normal range of motion. General: No deformity. Neurological:      Mental Status: She is alert and oriented to person, place, and time. Comments: Occultly with finger-to-nose given that she states she has blurry vision. No other focal neurological deficit. DIFFERENTIAL  DIAGNOSIS     PLAN (LABS / IMAGING / EKG):  Orders Placed This Encounter   Procedures    CT HEAD WO CONTRAST    Misc nursing order (specify)       MEDICATIONS ORDERED:  Orders Placed This Encounter   Medications    acetaminophen (TYLENOL) tablet 1,000 mg       Initial MDM/Plan: 30 y.o. female who presents with fall onto concrete after getting up out of her wheelchair. Patient occasionally falls. History of MS. She states she has blurry vision.   Given the fall now with blurry vision I feel that CT head is appropriate to rule out acute intracranial hemorrhage. She does not have any midline neck pain and she has full range of motion of the cervical spine. Do not feel that fracture or dislocation is likely and do not feel that CT cervical spine is appropriate. Tetanus up-to-date. We will dressed with bacitracin and gauze. Will administer Tylenol. Will reassess. Disposition to be determined. DIAGNOSTIC RESULTS / EMERGENCYDEPARTMENT COURSE / MDM     LABS:  Labs Reviewed - No data to display      RADIOLOGY:  Ct Head Wo Contrast    Result Date: 7/25/2020  EXAMINATION: CT OF THE HEAD WITHOUT CONTRAST  7/25/2020 11:20 pm TECHNIQUE: CT of the head was performed without the administration of intravenous contrast. Dose modulation, iterative reconstruction, and/or weight based adjustment of the mA/kV was utilized to reduce the radiation dose to as low as reasonably achievable. COMPARISON: May 29, 2013. HISTORY: ORDERING SYSTEM PROVIDED HISTORY: fall, double vision, hx of MS TECHNOLOGIST PROVIDED HISTORY: fall, double vision, hx of MS Is the patient pregnant?->No FINDINGS: BRAIN/VENTRICLES: There is no acute intracranial hemorrhage, mass effect or midline shift. No abnormal extra-axial fluid collection. The gray-white differentiation is maintained without evidence of an acute infarct. There is no evidence of hydrocephalus. ORBITS: The visualized portion of the orbits demonstrate no acute abnormality. SINUSES: The visualized paranasal sinuses and mastoid air cells demonstrate no acute abnormality. SOFT TISSUES/SKULL:  No acute abnormality of the visualized skull or soft tissues. No acute intracranial abnormality. EKG  none    All EKG's are interpreted by the Emergency Department Physicianwho either signs or Co-signs this chart in the absence of a cardiologist.    EMERGENCY DEPARTMENT COURSE:  ED Course as of Jul 26 5329   Sat Jul 25, 2020   2347 CT head wnl.  Will re-evaluate patient    [KD]      ED Course User Index  [KD] Elena Gomez Digioia, DO   Patient states her vision is back to normal.  No focal neurological deficit she is feeling better. Patient states that she recently was found to have a positive pregnancy test at home. Bedside transabdominal ultrasound showing a thickened uterus with a gestational sac and possibly a yolk sac. This is likely representative of early pregnancy. Patient has a midwife that she follows up with. I did provide patient neurology follow-up as she has not yet established with a neurologist here in this area. Return precautions have been discussed and patient is appropriate for discharge at this time. PROCEDURES:  None    CONSULTS:  None    CRITICAL CARE:  Please see attending note    FINAL IMPRESSION      1.  Fall, initial encounter          DISPOSITION / PLAN     DISPOSITION Decision To Discharge 07/26/2020 12:03:39 AM      PATIENT REFERRED TO:  [facility]  [address] [place] [id number]  [phone]  Schedule an appointment as soon as possible for a visit       [facility] ED  [address] [place] [address]  [phone]    As needed      DISCHARGE MEDICATIONS:  Discharge Medication List as of 7/26/2020 12:04 AM          Sangeeta Pearson DO  Emergency Medicine Resident    (Please note that portions of this note were completed with a voice recognition program.Efforts were made to edit the dictations but occasionally words are mis-transcribed.)        Sangeeta Pearson DO  Resident  07/26/20 [id number]

## 2020-07-27 LAB — HCG, PREGNANCY URINE (POC): NEGATIVE

## 2020-07-27 ASSESSMENT — ENCOUNTER SYMPTOMS
VOMITING: 0
CONSTIPATION: 0
DIARRHEA: 0
SORE THROAT: 0
ABDOMINAL PAIN: 0
TROUBLE SWALLOWING: 0
SHORTNESS OF BREATH: 0
NAUSEA: 0
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
## 2020-07-29 ENCOUNTER — APPOINTMENT (OUTPATIENT)
Dept: GENERAL RADIOLOGY | Age: 30
End: 2020-07-29
Payer: MEDICAID

## 2020-07-29 ENCOUNTER — HOSPITAL ENCOUNTER (EMERGENCY)
Age: 30
Discharge: HOME | End: 2020-07-29
Attending: EMERGENCY MEDICINE
Payer: MEDICAID

## 2020-07-29 VITALS
HEART RATE: 88 BPM | DIASTOLIC BLOOD PRESSURE: 63 MMHG | RESPIRATION RATE: 18 BRPM | WEIGHT: 95 LBS | OXYGEN SATURATION: 98 % | HEIGHT: 58 IN | BODY MASS INDEX: 19.94 KG/M2 | SYSTOLIC BLOOD PRESSURE: 106 MMHG | TEMPERATURE: 97.8 F

## 2020-07-29 PROCEDURE — 6370000000 HC RX 637 (ALT 250 FOR IP): Performed by: EMERGENCY MEDICINE

## 2020-07-29 PROCEDURE — 99283 EMERGENCY DEPT VISIT LOW MDM: CPT

## 2020-07-29 PROCEDURE — 73562 X-RAY EXAM OF KNEE 3: CPT

## 2020-07-29 RX ORDER — ACETAMINOPHEN 500 MG
500 TABLET ORAL EVERY 6 HOURS PRN
Qty: 20 TABLET | Refills: 0 | Status: SHIPPED | OUTPATIENT
Start: 2020-07-29 | End: 2020-08-20 | Stop reason: SDUPTHER

## 2020-07-29 RX ORDER — ACETAMINOPHEN 325 MG/1
650 TABLET ORAL ONCE
Status: COMPLETED | OUTPATIENT
Start: 2020-07-29 | End: 2020-07-29

## 2020-07-29 RX ADMIN — ACETAMINOPHEN 650 MG: 325 TABLET ORAL at 10:08

## 2020-07-29 ASSESSMENT — ENCOUNTER SYMPTOMS
VOMITING: 0
ABDOMINAL DISTENTION: 0
RHINORRHEA: 0
NAUSEA: 0
DIARRHEA: 0
COUGH: 0
WHEEZING: 0
SORE THROAT: 0
CONSTIPATION: 0
SHORTNESS OF BREATH: 0

## 2020-07-29 ASSESSMENT — PAIN DESCRIPTION - DESCRIPTORS: DESCRIPTORS: SHOOTING

## 2020-07-29 ASSESSMENT — PAIN DESCRIPTION - PAIN TYPE: TYPE: ACUTE PAIN;CHRONIC PAIN

## 2020-07-29 ASSESSMENT — PAIN SCALES - GENERAL
PAINLEVEL_OUTOF10: 8
PAINLEVEL_OUTOF10: 8

## 2020-07-29 ASSESSMENT — PAIN DESCRIPTION - FREQUENCY: FREQUENCY: CONTINUOUS

## 2020-07-29 ASSESSMENT — PAIN DESCRIPTION - ORIENTATION: ORIENTATION: LEFT

## 2020-07-29 ASSESSMENT — PAIN DESCRIPTION - LOCATION: LOCATION: KNEE

## 2020-07-29 ASSESSMENT — PAIN DESCRIPTION - ONSET: ONSET: ON-GOING

## 2020-07-29 NOTE — ED NOTES
Pt to ED with complaints of left knee being \"locked\". Pt states this happens occasionally d/t \"smelling the rain coming in\" Pt with PMS intact. Pt unable to bend left knee. Pt has hx of MS and states that this could be that as well.      Katelin Chapman RN  07/29/20 4102

## 2020-07-29 NOTE — ED NOTES
Pt presents to ED in w/c a&o x4. Pt comes with complaint of \"waking up with L knee unable to bend. \" pt states she was supposed to have surgery on knee approx 6-8 years ago but \"Dr. Audrey Guidry answer my phone calls. \"     Lamonte Robertson RN  07/29/20 [id number]

## 2020-07-29 NOTE — ED PROVIDER NOTES
[facility]  ED  Emergency Department        Pt Name: Isabel Ojeda  MRN: [id number]  [place] 1990  Date of evaluation: 7/29/20    CHIEF COMPLAINT       Chief Complaint   Patient presents with    Knee Pain       HISTORY OF PRESENT ILLNESS  (Location/Symptom, Timing/Onset, Context/Setting, Quality, Duration, ModifyingFactors, Severity.)      Isabel Ojeda is a 30 y.o. female who presents with having her knee lock up, today. Patient reports she has a \"bone missing\" in her left knee and was supposed to have surgery 6 years ago by Dr. Richad Spurling. But has not had insurance since that time. Patient reports occasional knee locking up, and this happened today. She is unable to completely extend at the knee. And has pain,denies any trauma. Does not like to take pain mediations and so has not taken anything for pain. Reports h/o MS. PAST MEDICAL / SURGICAL / SOCIAL / FAMILY HISTORY      has a past medical history of Asthma, Bipolar disorder ([facility] [address] Cervical dysplasia, Migraine, and Seizures ([facility] [address] has a past surgical history that includes Cholecystectomy (2007); Tubal ligation; and Appendectomy. Social History     Socioeconomic History    Marital status:      Spouse name: Not on file    Number of children: Not on file    Years of education: Not on file    Highest education level: Not on file   Occupational History    Not on file   Social Needs    Financial resource strain: Not on file    Food insecurity     Worry: Not on file     Inability: Not on file    Transportation needs     Medical: Not on file     Non-medical: Not on file   Tobacco Use    Smoking status: Current Every Day Smoker     Packs/day: 0.20     Years: 11.00     Pack years: 2.20     Types: Cigarettes    Smokeless tobacco: Never Used   Substance and Sexual Activity    Alcohol use:  Yes     Alcohol/week: 0.0 standard drinks     Comment: occassional, heavy 3 years ago    Drug use: Not Currently     Types: change, appetite change, fatigue and fever. HENT: Negative for congestion, rhinorrhea and sore throat. Respiratory: Negative for cough, shortness of breath and wheezing. Cardiovascular: Negative for chest pain, palpitations and leg swelling. Gastrointestinal: Negative for abdominal distention, constipation, diarrhea, nausea and vomiting. Genitourinary: Negative for decreased urine volume and dysuria. Musculoskeletal: Positive for arthralgias, gait problem, joint swelling and myalgias. Skin: Negative for rash. Neurological: Negative for dizziness, weakness, light-headedness, numbness and headaches. PHYSICAL EXAM   (up to 7 for level 4, 8 or more for level 5)     INITIAL VITALS:   /63   Pulse 88   Temp 97.8 °F (36.6 °C) (Oral)   Resp 18   Ht 4' 10\" (1.473 m)   Wt 95 lb (43.1 kg)   LMP 07/06/2020   SpO2 98%   BMI 19.86 kg/m²     Physical Exam  Vitals signs and nursing note reviewed. Constitutional:       Comments: Nontoxic appearing, answering all questions appropriately no signs of distress   HENT:      Head: Normocephalic and atraumatic. Eyes:      General:         Right eye: No discharge. Left eye: No discharge. Conjunctiva/sclera: Conjunctivae normal.   Cardiovascular:      Rate and Rhythm: Normal rate and regular rhythm. Heart sounds: Normal heart sounds. No murmur. No friction rub. No gallop. Pulmonary:      Effort: Pulmonary effort is normal. No respiratory distress. Breath sounds: Normal breath sounds. No wheezing or rales. Chest:      Chest wall: No tenderness. Abdominal:      General: There is no distension. Palpations: Abdomen is soft. There is no mass. Tenderness: There is no abdominal tenderness. There is no guarding or rebound. Musculoskeletal:      Comments: Left knee held in slight flexion, tenderness diffusely upon palpation, scattered bruises noted over knee, in various stages of healing.  Patient resists extension, but with distraction with talking she is able to full extend, does appear to have pain with movement, no obvious deformity noted, patella tender with ballotment. Patella does not appear to be dislocated. Cap refill <2 seconds, and pedal pulse 2+   Skin:     General: Skin is warm and dry. Findings: No rash. Neurological:      Mental Status: She is alert and oriented to person, place, and time. DIFFERENTIAL  DIAGNOSIS     Patient with chronic Left knee pain, \"knee locked up\" able to fully extend the knee with passive ROM, with distraction from activity. Patient accompanied by \"\" who is known to myself and tends to get verbally abusive during encounters. He is trying to have me evaluated him for an injury he sustained yesterday, I told him if he wants to be evaluated he needs to check in. Patient does no appear to have a patellar dislocation, will plan on xray imaging, likely ace wrap, crutches, and discharge with ortho follow up    PLAN (LABS / IMAGING / EKG):  Orders Placed This Encounter   Procedures    XR KNEE LEFT (3 VIEWS)    Apply ice to affected area    Crutches    Apply ace wrap       MEDICATIONS ORDERED:  Orders Placed This Encounter   Medications    acetaminophen (TYLENOL) tablet 650 mg    acetaminophen (APAP EXTRA STRENGTH) 500 MG tablet     Sig: Take 1 tablet by mouth every 6 hours as needed for Pain     Dispense:  20 tablet     Refill:  0       DIAGNOSTIC RESULTS / EMERGENCY DEPARTMENT COURSE / MDM     LABS:  No results found for this visit on 07/29/20. IMPRESSION: chronic knee pain    RADIOLOGY:  XR KNEE LEFT (3 VIEWS)   Final Result   No acute abnormality of the knee. EMERGENCY DEPARTMENT COURSE:  Patient was updated on results of imaging, and told he has all bones intact. Plan for ace wrap and cruthces        FINAL IMPRESSION      1.  Chronic pain of left knee          DISPOSITION / PLAN     DISPOSITION Discharge - Pending Orders Complete 07/29/2020 10:17:52 AM      PATIENT REFERRED TO:  [address], [address] [address]  [facility]  [phone]  Call in 2 days        DISCHARGE MEDICATIONS:  New Prescriptions    ACETAMINOPHEN (APAP EXTRA STRENGTH) 500 MG TABLET    Take 1 tablet by mouth every 6 hours as needed for Pain       Rosamond Schaumann  10:29 AM    Attending Emergency Physician  [facility] ED    (Please note that portions of this note were completed with a voice recognition program.  Aston Farfan made to edit the dictations but occasionally words are mis-transcribed.)              Rodrigo Perez DO  07/29/20 1029

## 2020-08-02 ENCOUNTER — HOSPITAL ENCOUNTER (EMERGENCY)
Age: 30
Discharge: HOME | End: 2020-08-02
Attending: EMERGENCY MEDICINE
Payer: MEDICAID

## 2020-08-02 VITALS
OXYGEN SATURATION: 98 % | TEMPERATURE: 98.7 F | DIASTOLIC BLOOD PRESSURE: 68 MMHG | WEIGHT: 95 LBS | BODY MASS INDEX: 19.94 KG/M2 | HEART RATE: 67 BPM | RESPIRATION RATE: 16 BRPM | HEIGHT: 58 IN | SYSTOLIC BLOOD PRESSURE: 111 MMHG

## 2020-08-02 LAB — D-DIMER QUANTITATIVE: 0.19 MG/L FEU

## 2020-08-02 PROCEDURE — 85379 FIBRIN DEGRADATION QUANT: CPT

## 2020-08-02 PROCEDURE — 99283 EMERGENCY DEPT VISIT LOW MDM: CPT

## 2020-08-02 PROCEDURE — 6370000000 HC RX 637 (ALT 250 FOR IP): Performed by: STUDENT IN AN ORGANIZED HEALTH CARE EDUCATION/TRAINING PROGRAM

## 2020-08-02 RX ORDER — ACETAMINOPHEN 500 MG
1000 TABLET ORAL ONCE
Status: COMPLETED | OUTPATIENT
Start: 2020-08-02 | End: 2020-08-02

## 2020-08-02 RX ORDER — OXYCODONE HYDROCHLORIDE 5 MG/1
5 TABLET ORAL ONCE
Status: COMPLETED | OUTPATIENT
Start: 2020-08-02 | End: 2020-08-02

## 2020-08-02 RX ADMIN — OXYCODONE HYDROCHLORIDE 5 MG: 5 TABLET ORAL at 23:37

## 2020-08-02 RX ADMIN — ACETAMINOPHEN 1000 MG: 500 TABLET ORAL at 22:49

## 2020-08-02 ASSESSMENT — ENCOUNTER SYMPTOMS
EYE ITCHING: 0
NAUSEA: 0
SINUS PRESSURE: 0
SORE THROAT: 0
COUGH: 0
SINUS PAIN: 0
ABDOMINAL DISTENTION: 0
SHORTNESS OF BREATH: 0
CONSTIPATION: 0
DIARRHEA: 0
ABDOMINAL PAIN: 0
EYE PAIN: 0

## 2020-08-02 ASSESSMENT — PAIN DESCRIPTION - LOCATION: LOCATION: LEG

## 2020-08-02 ASSESSMENT — PAIN SCALES - GENERAL: PAINLEVEL_OUTOF10: 8

## 2020-08-02 ASSESSMENT — PAIN DESCRIPTION - ORIENTATION: ORIENTATION: LEFT

## 2020-08-03 NOTE — ED NOTES
Pt to ED with complaints of left leg pain x2 days  Pt states that yesterday her left ankle was severely swollen but has since subsided  Pt denies any injury to her left leg and can barely put any weight on it  Pt states that she all has some numbness and tingling  Pt states that she does have history of DVT, denies use of any anticoagulants  Pt denies any chest pain or SOB      Salvador Salvador RN  08/02/20 0298

## 2020-08-03 NOTE — ED PROVIDER NOTES
[facility] ED     Emergency Department     Faculty Attestation        I performed a history and physical examination of the patient and discussed management with the resident. I reviewed the residents note and agree with the documented findings and plan of care. Any areas of disagreement are noted on the chart. I was personally present for the key portions of any procedures. I have documented in the chart those procedures where I was not present during the key portions. I have reviewed the emergency nurses triage note. I agree with the chief complaint, past medical history, past surgical history, allergies, medications, social and family history as documented unless otherwise noted below. For Physician Assistant/ Nurse Practitioner cases/documentation I have personally evaluated this patient and have completed at least one if not all key elements of the E/M (history, physical exam, and MDM). Additional findings are as noted. Vital Signs: BP: 111/68  Pulse: 67  Resp: 16  Temp: 98.7 °F (37.1 °C) SpO2: 98 %  PCP:  No primary care provider on file. Pertinent Comments:     Patient is a 25-year-old female with history of chronic weakness since birth in her left lower extremity with blood clot in the left lower extremity 8 years ago when pregnant. Patient states there is been no obvious injury but has noticed some intermittent swelling and pain in her calf and concern for recurrent DVT. Currently no anticoagulation occurring for the last several years as well as no IVC filter. On examination patient has normal color with no erythema or warmth or signs of infection but does have calf pain with minimal swelling however strong DP/PT pulses are palpated and equal bilaterally with capillary refill less than 2 seconds. Of note, patient denies any chest pain or shortness of breath/palpitations. Plan:  We will obtain d-dimer (secondary to being after 10 PM and no Doppler available), as well as bedside point femoral endpoint popliteal vein examination with bedside ER ultrasound. Symptomatic control attempted as well    Critical Care  None    This patient was evaluated in the Emergency Department for symptoms described in the history of present illness. He/she was evaluated in the context of the global COVID-19 pandemic, which necessitated consideration that the patient might be at risk for infection with the SARS-CoV-2 virus that causes COVID-19. Institutional protocols and algorithms that pertain to the evaluation of patients at risk for COVID-19 are in a state of rapid change based on information released by regulatory bodies including the CDC and federal and state organizations. These policies and algorithms were followed during the patient's care in the ED. (Please note that portions of this note were completed with a voice recognition program. Efforts were made to edit the dictations but occasionally words are mis-transcribed.  Whenever words are used in this note in any gender, they shall be construed as though they were used in the gender appropriate to the circumstances; and whenever words are used in this note in the singular or plural form, they shall be construed as though they were used in the form appropriate to the circumstances.)    MD Constance Pugh  Attending Emergency Medicine Physician              Mariama Mosher MD  08/02/20 2643       Mariama Mosher MD  08/02/20 9137       Mariama Mosher MD  08/02/20 9434

## 2020-08-03 NOTE — ED PROVIDER NOTES
[facility] ED  Emergency Department Encounter  EmergencyMedicine Resident     Pt Geraldo Rodas  MRN: [id number]  [place] 1990  Date of evaluation: 8/2/20  PCP:  No primary care provider on file. CHIEF COMPLAINT       Chief Complaint   Patient presents with    Leg Pain     left leg        HISTORY OF PRESENT ILLNESS  (Location/Symptom, Timing/Onset, Context/Setting, Quality, Duration, Modifying Factors, Severity.)      Regina Verde is a 30 y.o. female w/PMHx of MS and previous DVT during pregnancy in 2012 who presents with left lower extremity swelling, pain, numbness and tingling of one day duration. She was recently seen for left sided knee pain which she states she has been told in the past that she will require surgery. She is not on OCP's but is an active smoker. Takes only seroquel at home. Has taken 600mg ibuprofen 3 hours prior to arrival.    PAST MEDICAL / SURGICAL / SOCIAL / FAMILY HISTORY      has a past medical history of Asthma, Bipolar disorder ([facility] [address] Cervical dysplasia, Migraine, and Seizures ([facility] [address] Reviewed, no new additions      has a past surgical history that includes Cholecystectomy (2007); Tubal ligation; and Appendectomy. Reviewed, no new additions     Social History     Socioeconomic History    Marital status:      Spouse name: Not on file    Number of children: Not on file    Years of education: Not on file    Highest education level: Not on file   Occupational History    Not on file   Social Needs    Financial resource strain: Not on file    Food insecurity     Worry: Not on file     Inability: Not on file    Transportation needs     Medical: Not on file     Non-medical: Not on file   Tobacco Use    Smoking status: Current Every Day Smoker     Packs/day: 0.20     Years: 11.00     Pack years: 2.20     Types: Cigarettes    Smokeless tobacco: Never Used   Substance and Sexual Activity    Alcohol use:  Yes     Alcohol/week: 0.0 standard drinks     Comment: occassional, heavy 3 years ago    Drug use: Not Currently     Types: Opiates , Marijuana     Comment: Hx Percocet abuse sober for 2 years.  Sexual activity: Not on file   Lifestyle    Physical activity     Days per week: Not on file     Minutes per session: Not on file    Stress: Not on file   Relationships    Social connections     Talks on phone: Not on file     Gets together: Not on file     Attends Mandaeism service: Not on file     Active member of club or organization: Not on file     Attends meetings of clubs or organizations: Not on file     Relationship status: Not on file    Intimate partner violence     Fear of current or ex partner: Not on file     Emotionally abused: Not on file     Physically abused: Not on file     Forced sexual activity: Not on file   Other Topics Concern    Not on file   Social History Narrative    Not on file       Family History   Problem Relation Age of Onset    Diabetes Mother         G.Parents    Hypertension Mother         G.Parents    Diabetes Father     Hypertension Father     Stroke Father         G.Parents    Cancer Other         G.Parents, Pancrease and ??    Coronary Art Dis Other         G.Parents       Allergies:  Diclofenac; Morphine; and Pcn [penicillins]    Home Medications:  Prior to Admission medications    Medication Sig Start Date End Date Taking?  Authorizing Provider   acetaminophen (APAP EXTRA STRENGTH) 500 MG tablet Take 1 tablet by mouth every 6 hours as needed for Pain 7/29/20   [place]minster Spotted, DO   Divalproex Sodium (DEPAKOTE PO) Take by mouth    Historical Provider, MD   albuterol sulfate HFA (VENTOLIN HFA) 108 (90 Base) MCG/ACT inhaler Inhale 2 puffs into the lungs 4 times daily as needed for Wheezing 3/24/20   Cyn Mckinney, DO   ibuprofen (ADVIL;MOTRIN) 600 MG tablet Take 1 tablet by mouth every 6 hours as needed for Pain 2/6/20   Caleb Gamble, DO       REVIEW OF SYSTEMS    (2-9 systems for level 4, 10 or tablet 5 mg       DDX: r/o DVT, referred pain, herniated disk, chronic knee pain, septic joint,    DIAGNOSTIC RESULTS / EMERGENCY DEPARTMENT COURSE / MDM   LAB RESULTS:  Results for orders placed or performed during the hospital encounter of 08/02/20   D-Dimer, Quantitative   Result Value Ref Range    D-Dimer, Quant 0.19 mg/L FEU       IMPRESSION: Brenda Mcnamara is a 30 y.o. woman presenting for 1 day of left lower extremity pain and swelling. Admits to personal history of DVT, MS and tobacco use as well as a family history of clotting disorders. D-dimer negative. Bedside US negative. Low concern for DVT. Pain likely related to chronic knee pain. RADIOLOGY:  None    EKG  None    All EKG's are interpreted by the Emergency Department Physician who either signs or Co-signs this chart in the absence of a cardiologist.    EMERGENCY DEPARTMENT COURSE:  Patient seen and evaluated, VSS and non-toxic in appearance. Bedside ultrasound performed, femoral and popliteal veins fully compressible. D-dimer collected and negative. Pain managed in ED with tylenol and 1x oxycodone. Recommended for discharge given negative DVT workup, however instructed to return or further evaluation if she develops fever, inability to bear weight, bowel or bladder dysfunction. Recommended follow up with PCP (list provided). PROCEDURES:  None     CONSULTS:  None    CRITICAL CARE:  Please see attending note    FINAL IMPRESSION      1. Left leg pain          DISPOSITION / PLAN     DISPOSITION Decision To Discharge 08/02/2020 11:33:28 PM      PATIENT REFERRED TO:  No follow-up provider specified.     DISCHARGE MEDICATIONS:  Discharge Medication List as of 8/2/2020 11:35 PM          Rita Henson DO  Emergency Medicine Resident    (Please note that portions of thisnote were completed with a voice recognition program.  Efforts were made to edit the dictations but occasionally words are mis-transcribed.)       Rita eHnson DO  Resident  08/03/20 0004

## 2020-08-14 ENCOUNTER — APPOINTMENT (OUTPATIENT)
Dept: GENERAL RADIOLOGY | Age: 30
End: 2020-08-14
Payer: MEDICAID

## 2020-08-14 ENCOUNTER — HOSPITAL ENCOUNTER (EMERGENCY)
Age: 30
Discharge: LEFT BEFORE DISCHARGE | End: 2020-08-15
Attending: EMERGENCY MEDICINE
Payer: MEDICAID

## 2020-08-14 VITALS
RESPIRATION RATE: 14 BRPM | HEIGHT: 58 IN | HEART RATE: 71 BPM | SYSTOLIC BLOOD PRESSURE: 97 MMHG | WEIGHT: 95 LBS | TEMPERATURE: 98.3 F | OXYGEN SATURATION: 96 % | DIASTOLIC BLOOD PRESSURE: 51 MMHG | BODY MASS INDEX: 19.94 KG/M2

## 2020-08-14 PROCEDURE — 73630 X-RAY EXAM OF FOOT: CPT

## 2020-08-14 PROCEDURE — 6370000000 HC RX 637 (ALT 250 FOR IP): Performed by: STUDENT IN AN ORGANIZED HEALTH CARE EDUCATION/TRAINING PROGRAM

## 2020-08-14 PROCEDURE — 99283 EMERGENCY DEPT VISIT LOW MDM: CPT

## 2020-08-14 RX ORDER — ACETAMINOPHEN 325 MG/1
650 TABLET ORAL ONCE
Status: COMPLETED | OUTPATIENT
Start: 2020-08-14 | End: 2020-08-14

## 2020-08-14 RX ADMIN — ACETAMINOPHEN 650 MG: 325 TABLET ORAL at 23:18

## 2020-08-14 ASSESSMENT — PAIN SCALES - GENERAL: PAINLEVEL_OUTOF10: 10

## 2020-08-15 ASSESSMENT — ENCOUNTER SYMPTOMS
ABDOMINAL PAIN: 0
CHEST TIGHTNESS: 0
COLOR CHANGE: 0

## 2020-08-15 NOTE — ED NOTES
RN heard yelling at nurses station. Patient rolling back into room. Few minutes later both patient and male visitor left.       Royce Bonilla RN  08/15/20 8744

## 2020-08-15 NOTE — ED PROVIDER NOTES
[address] [facility]  ED  Emergency Department Encounter  EmergencyMedicine Resident     Pt Demetri Alarcon  MRN: [id number]  [place] 1990  Date of evaluation: 8/14/20  PCP:  No primary care provider on file. CHIEF COMPLAINT       Chief Complaint   Patient presents with    Leg Swelling     Leg swelling again. Seen recently for this       HISTORY OF PRESENT ILLNESS  (Location/Symptom, Timing/Onset, Context/Setting, Quality, Duration, Modifying Factors, Severity.)      Khoa Rojas is a 30 y.o. female who presents with acute on chronic left leg pain and swelling. Patient states she has had this pain for more than 2 weeks, was seen a number of times in the emergency department and told to return if he gets worse. Says that she had Doppler DVT study and d-dimer and blood work done to rule out blood clots and that was all normal.  Says that she cannot walk on her leg at this time. Past medical history of MS that is untreated. Denies headache, dizziness, bowel or bladder dysfunction, shortness of breath. Smokes half a pack per day, denies alcohol, denies street drugs    PAST MEDICAL / SURGICAL / SOCIAL / FAMILY HISTORY      has a past medical history of Asthma, Bipolar disorder ([facility] [address] Cervical dysplasia, Migraine, and Seizures ([facility] [address] has a past surgical history that includes Cholecystectomy (2007); Tubal ligation; and Appendectomy.     Social History     Socioeconomic History    Marital status:      Spouse name: Not on file    Number of children: Not on file    Years of education: Not on file    Highest education level: Not on file   Occupational History    Not on file   Social Needs    Financial resource strain: Not on file    Food insecurity     Worry: Not on file     Inability: Not on file    Transportation needs     Medical: Not on file     Non-medical: Not on file   Tobacco Use    Smoking status: Current Every Day Smoker     Packs/day: 0.20     Years: 11.00 Pack years: 2.20     Types: Cigarettes    Smokeless tobacco: Never Used   Substance and Sexual Activity    Alcohol use: Yes     Alcohol/week: 0.0 standard drinks     Comment: occassional, heavy 3 years ago    Drug use: Not Currently     Types: Opiates , Marijuana     Comment: Hx Percocet abuse sober for 2 years.  Sexual activity: Not on file   Lifestyle    Physical activity     Days per week: Not on file     Minutes per session: Not on file    Stress: Not on file   Relationships    Social connections     Talks on phone: Not on file     Gets together: Not on file     Attends Mandaen service: Not on file     Active member of club or organization: Not on file     Attends meetings of clubs or organizations: Not on file     Relationship status: Not on file    Intimate partner violence     Fear of current or ex partner: Not on file     Emotionally abused: Not on file     Physically abused: Not on file     Forced sexual activity: Not on file   Other Topics Concern    Not on file   Social History Narrative    Not on file       Family History   Problem Relation Age of Onset    Diabetes Mother         G.Parents    Hypertension Mother         G.Parents    Diabetes Father     Hypertension Father     Stroke Father         G.Parents    Cancer Other         G.Parents, Pancrease and ??    Coronary Art Dis Other         G.Parents       Allergies:  Claritin [loratadine]; Diclofenac; Morphine; and Pcn [penicillins]    Home Medications:  Prior to Admission medications    Medication Sig Start Date End Date Taking?  Authorizing Provider   acetaminophen (APAP EXTRA STRENGTH) 500 MG tablet Take 1 tablet by mouth every 6 hours as needed for Pain 7/29/20   [facility],    Divalproex Sodium (DEPAKOTE PO) Take by mouth    Historical Provider, MD   albuterol sulfate HFA (VENTOLIN HFA) 108 (90 Base) MCG/ACT inhaler Inhale 2 puffs into the lungs 4 times daily as needed for Wheezing 3/24/20   Ladi Mckinney, DO normal.      Breath sounds: Normal breath sounds. No wheezing. Abdominal:      General: Abdomen is flat. Bowel sounds are normal.      Tenderness: There is no abdominal tenderness. Musculoskeletal: Normal range of motion. General: Tenderness present. No swelling, deformity or signs of injury. Right lower leg: No edema. Left lower leg: No edema. Comments: There is no swelling, range of motion passively is normal, poor participation with active range of motion. She does grimace in pain. Strength is 5/5 throughout. Skin:     General: Skin is warm and dry. Capillary Refill: Capillary refill takes less than 2 seconds. Coloration: Skin is not jaundiced. Neurological:      General: No focal deficit present. Mental Status: She is alert and oriented to person, place, and time. Mental status is at baseline. Motor: No weakness. Psychiatric:         Mood and Affect: Mood normal.         DIFFERENTIAL  DIAGNOSIS     PLAN (LABS / IMAGING / EKG):  Orders Placed This Encounter   Procedures    XR FOOT LEFT (MIN 3 VIEWS)       MEDICATIONS ORDERED:  Orders Placed This Encounter   Medications    acetaminophen (TYLENOL) tablet 650 mg           DIAGNOSTIC RESULTS / EMERGENCY DEPARTMENT COURSE / MDM     LABS:  No results found for this visit on 08/14/20. RADIOLOGY:  None    EKG  None    All EKG's are interpreted by the Emergency Department Physician who either signs or Co-signs this chart in the absence of a cardiologist.    EMERGENCY DEPARTMENT COURSE:  Patient is calm and comfortable in ED bed. She is breathing comfortably unlabored on room air.  is at bedside. Physical exam is completely unremarkable except for tenderness to palpation over the base of the ankle and fifth metatarsal on the left side. Patient says that she has a chronic fracture from when she was 8years old and has had chronic leg pain since then. Denies any acute trauma.     Previous ED visit notes reviewed, DVT work-up negative    We will plan for x-ray and Tylenol for pain control. Left calf measured 12-1/8 inch, right calf measured 12-1/8 inch at the same point. When explained the normal x-ray results to the patient, and previously normal blood work and DVT work-up she became upset. Demanding admission for \"testing\". High suspicion that the patient is malingering for admission. She came out to the nurses station and public areas became very upset yelling at nursing and physician staff about requiring admission. Dariela, said she is going to [facility]. PROCEDURES:  None    CONSULTS:  None    CRITICAL CARE:  None    FINAL IMPRESSION      1. Pain of left lower extremity    2. Malingering          DISPOSITION / PLAN     DISPOSITION Eloped - Left Before Treatment Complete 08/15/2020 12:22:00 AM      PATIENT REFERRED TO:  No follow-up provider specified.     DISCHARGE MEDICATIONS:  New Prescriptions    No medications on file       Monica Ahn MD  Emergency Medicine Resident    (Please note that portions of thisnote were completed with a voice recognition program.  Efforts were made to edit the dictations but occasionally words are mis-transcribed.)       Monica Ahn MD  Resident  08/15/20 1984

## 2020-08-20 ENCOUNTER — HOSPITAL ENCOUNTER (EMERGENCY)
Age: 30
Discharge: HOME | End: 2020-08-20
Attending: EMERGENCY MEDICINE
Payer: MEDICAID

## 2020-08-20 VITALS
WEIGHT: 95 LBS | BODY MASS INDEX: 19.94 KG/M2 | TEMPERATURE: 97.5 F | HEIGHT: 58 IN | HEART RATE: 54 BPM | OXYGEN SATURATION: 98 % | DIASTOLIC BLOOD PRESSURE: 53 MMHG | RESPIRATION RATE: 14 BRPM | SYSTOLIC BLOOD PRESSURE: 91 MMHG

## 2020-08-20 PROCEDURE — 99281 EMR DPT VST MAYX REQ PHY/QHP: CPT

## 2020-08-20 PROCEDURE — 6370000000 HC RX 637 (ALT 250 FOR IP): Performed by: EMERGENCY MEDICINE

## 2020-08-20 RX ORDER — DIPHENHYDRAMINE HCL 25 MG
25 CAPSULE ORAL EVERY 6 HOURS PRN
Qty: 20 CAPSULE | Refills: 0 | Status: SHIPPED | OUTPATIENT
Start: 2020-08-20 | End: 2020-08-25

## 2020-08-20 RX ORDER — ACETAMINOPHEN 500 MG
1000 TABLET ORAL EVERY 8 HOURS PRN
Qty: 21 TABLET | Refills: 0 | Status: SHIPPED | OUTPATIENT
Start: 2020-08-20 | End: 2020-09-13 | Stop reason: SDUPTHER

## 2020-08-20 RX ORDER — DIPHENHYDRAMINE HCL 25 MG
50 TABLET ORAL ONCE
Status: COMPLETED | OUTPATIENT
Start: 2020-08-20 | End: 2020-08-20

## 2020-08-20 RX ORDER — ACETAMINOPHEN 500 MG
1000 TABLET ORAL ONCE
Status: COMPLETED | OUTPATIENT
Start: 2020-08-20 | End: 2020-08-20

## 2020-08-20 RX ADMIN — ACETAMINOPHEN 1000 MG: 500 TABLET ORAL at 06:33

## 2020-08-20 RX ADMIN — DIPHENHYDRAMINE HCL 50 MG: 25 TABLET ORAL at 06:33

## 2020-08-20 ASSESSMENT — ENCOUNTER SYMPTOMS
STRIDOR: 0
COLOR CHANGE: 1
NAUSEA: 0
SHORTNESS OF BREATH: 0
VOMITING: 0

## 2020-08-20 ASSESSMENT — PAIN SCALES - GENERAL
PAINLEVEL_OUTOF10: 10
PAINLEVEL_OUTOF10: 10

## 2020-08-20 ASSESSMENT — PAIN DESCRIPTION - PAIN TYPE: TYPE: ACUTE PAIN

## 2020-08-20 ASSESSMENT — PAIN DESCRIPTION - ORIENTATION: ORIENTATION: LEFT;MID

## 2020-08-20 ASSESSMENT — PAIN DESCRIPTION - LOCATION: LOCATION: FINGER (COMMENT WHICH ONE)

## 2020-08-20 NOTE — ED PROVIDER NOTES
[facility]     Emergency Department     Faculty Attestation    I performed a history and physical examination of the patient and discussed management with the resident. I have reviewed and agree with the residents findings including all diagnostic interpretations, and treatment plans as written. Any areas of disagreement are noted on the chart. I was personally present for the key portions of any procedures. I have documented in the chart those procedures where I was not present during the key portions. I have reviewed the emergency nurses triage note. I agree with the chief complaint, past medical history, past surgical history, allergies, medications, social and family history as documented unless otherwise noted below. Documentation of the HPI, Physical Exam and Medical Decision Making performed by [email or web address] is based on my personal performance of the HPI, PE and MDM. For Physician Assistant/ Nurse Practitioner cases/documentation I have personally evaluated this patient and have completed at least one if not all key elements of the E/M (history, physical exam, and MDM). Additional findings are as noted. 29 yo F bee sting L  finger yesterday, no throat complaint, no fever, no vomit, no sob, dT current,   pe vss posterior pharynx clear, clear speech, neck supple, minimal swelling L 3rd digit, nv intact, compartment soft, tendon function intact,     No anaphylaxis,     EKG Interpretation    Interpreted by me      CRITICAL CARE: There was a high probability of clinically significant/life threatening deterioration in this patient's condition which required my urgent intervention. Total critical care time was 0 minutes. This excludes any time for separately reportable procedures.        [address]  05/56/89 8905

## 2020-08-20 NOTE — ED NOTES
Pt arrived with c/o swollen left middle finger she states was stung by a wasp yesterday. Pt states she went to be last night and when she woke today it was swollen. Pt states she is allergic, states she did not take any medication. Noted swelling to left middle finger, PMS intact with slight limited movement. Pt alert and oriented. Denies SOB difficulty breathing or swallowing. Will continue to monitor.         Adelaida Dunham RN  08/20/20 0999

## 2020-08-20 NOTE — ED PROVIDER NOTES
Behavior: Behavior normal.         DIFFERENTIAL  DIAGNOSIS   PLAN (LABS / IMAGING / EKG):  Orders Placed This Encounter   Procedures    Apply ice       MEDICATIONS ORDERED:  Orders Placed This Encounter   Medications    diphenhydrAMINE (BENADRYL) tablet 50 mg    acetaminophen (TYLENOL) tablet 1,000 mg    acetaminophen (APAP EXTRA STRENGTH) 500 MG tablet     Sig: Take 2 tablets by mouth every 8 hours as needed for Pain     Dispense:  21 tablet     Refill:  0    diphenhydrAMINE (BENADRYL) 25 MG capsule     Sig: Take 1 capsule by mouth every 6 hours as needed for Itching     Dispense:  20 capsule     Refill:  0     DIAGNOSTIC RESULTS / EMERGENCYDEPARTMENT COURSE / MDM   LABS:  Labs Reviewed - No data to display    RADIOLOGY:  No results found. EMERGENCY DEPARTMENT COURSE:       MDM  Number of Diagnoses or Management Options  Wasp sting, accidental or unintentional, initial encounter:   Diagnosis management comments: 70-year-old female with insect bite to left hand. Local reaction, no signs of anaphylaxis. Given Benadryl and Tylenol in the ED       Amount and/or Complexity of Data Reviewed  Review and summarize past medical records: yes  Discuss the patient with other providers: yes    Patient Progress  Patient progress: stable      CONSULTS:  None    CRITICAL CARE:  Please see attending note    FINAL IMPRESSION     1. Wasp sting, accidental or unintentional, initial encounter        DISPOSITION / PLAN   DISPOSITION Decision To Discharge 08/20/2020 06:22:43 AM      Evaluation and treatment course in the ED, and plan of care upon discharge was discussed in length with the patient. Patient had no further questions prior to being discharged and was instructed to return to the ED for new or worsening symptoms.   Any changes to existing medications or new prescriptions were reviewed with patient and they expressed understanding of how to correctly take their medications and the possible side effects. PATIENT REFERRED TO:  [facility] ED  [address] [facility] [address]  [phone]    As needed, If symptoms worsen      DISCHARGE MEDICATIONS:  Discharge Medication List as of 8/20/2020  6:41 AM      START taking these medications    Details   diphenhydrAMINE (BENADRYL) 25 MG capsule Take 1 capsule by mouth every 6 hours as needed for Itching, Disp-20 capsule,R-0Print             [facility]  Emergency Medicine Resident Physician, PGY-3    (Please note that portions of this note were completed with a voice recognition program.  Efforts were made to edit the dictations but occasionally words are mis-transcribed.)        Boris Perez DO  Resident  08/20/20 0995

## 2020-09-12 ENCOUNTER — HOSPITAL ENCOUNTER (EMERGENCY)
Age: 30
Discharge: HOME | End: 2020-09-12
Attending: EMERGENCY MEDICINE
Payer: MEDICAID

## 2020-09-12 VITALS
TEMPERATURE: 97.7 F | WEIGHT: 95 LBS | OXYGEN SATURATION: 96 % | DIASTOLIC BLOOD PRESSURE: 61 MMHG | RESPIRATION RATE: 16 BRPM | BODY MASS INDEX: 19.86 KG/M2 | SYSTOLIC BLOOD PRESSURE: 94 MMHG | HEART RATE: 72 BPM

## 2020-09-12 NOTE — ED PROVIDER NOTES
[facility] ED  Emergency Department  Faculty Attestation     PERTINENT ATTENDING PHYSICIAN COMMENTS:    Patient left the emergency department prior to taking history or performing physical examination. The patient may have had care initiated by the resident.     Juan Diggs MD, [facility]  Attending Emergency  Physician    (Please note that portions of this note were completed with a voice recognition program.  Efforts were made to edit the dictations but occasionally words are mis-transcribed.)        Juan Diggs MD  09/12/20 2489

## 2020-09-12 NOTE — ED PROVIDER NOTES
[facility] ED  Emergency Department Encounter  Emergency Medicine Resident     Pt Name: Libia Ramirez  MRN: [id number]  [place] 1990  Date of evaluation: 9/12/20  PCP:  No primary care provider on file. CHIEF COMPLAINT       Chief Complaint   Patient presents with    Tailbone Pain       HISTORY OFPRESENT ILLNESS  (Location/Symptom, Timing/Onset, Context/Setting, Quality, Duration, Modifying Factors,Severity.)      Libia Ramirez is a 30 y.o. female who left without being seen. PAST MEDICAL / SURGICAL / SOCIAL / FAMILY HISTORY      has a past medical history of Asthma, Bipolar disorder ([facility] [address] Cervical dysplasia, Migraine, and Seizures ([facility] [address] has a past surgical history that includes Cholecystectomy (2007); Tubal ligation; and Appendectomy. Social History     Socioeconomic History    Marital status:      Spouse name: Not on file    Number of children: Not on file    Years of education: Not on file    Highest education level: Not on file   Occupational History    Not on file   Social Needs    Financial resource strain: Not on file    Food insecurity     Worry: Not on file     Inability: Not on file    Transportation needs     Medical: Not on file     Non-medical: Not on file   Tobacco Use    Smoking status: Current Every Day Smoker     Packs/day: 0.20     Years: 11.00     Pack years: 2.20     Types: Cigarettes    Smokeless tobacco: Never Used   Substance and Sexual Activity    Alcohol use: Yes     Alcohol/week: 0.0 standard drinks     Comment: occassional, heavy 3 years ago    Drug use: Not Currently     Types: Opiates , Marijuana     Comment: Hx Percocet abuse sober for 2 years.     Sexual activity: Not on file   Lifestyle    Physical activity     Days per week: Not on file     Minutes per session: Not on file    Stress: Not on file   Relationships    Social connections     Talks on phone: Not on file     Gets together: Not on file     Attends Jain service: Not on file     Active member of club or organization: Not on file     Attends meetings of clubs or organizations: Not on file     Relationship status: Not on file    Intimate partner violence     Fear of current or ex partner: Not on file     Emotionally abused: Not on file     Physically abused: Not on file     Forced sexual activity: Not on file   Other Topics Concern    Not on file   Social History Narrative    Not on file       Family History   Problem Relation Age of Onset    Diabetes Mother         G.Parents    Hypertension Mother         G.Parents    Diabetes Father     Hypertension Father     Stroke Father         G.Parents    Cancer Other         G.Parents, Pancrease and ??    Coronary Art Dis Other         G.Parents        Allergies:  Claritin [loratadine]; Diclofenac; Morphine; and Pcn [penicillins]    Home Medications:  Prior to Admission medications    Medication Sig Start Date End Date Taking? Authorizing Provider   acetaminophen (APAP EXTRA STRENGTH) 500 MG tablet Take 2 tablets by mouth every 8 hours as needed for Pain 8/20/20   Aaliyah Arnold, DO   Divalproex Sodium (DEPAKOTE PO) Take by mouth    Historical Provider, MD   albuterol sulfate HFA (VENTOLIN HFA) 108 (90 Base) MCG/ACT inhaler Inhale 2 puffs into the lungs 4 times daily as needed for Wheezing 3/24/20   Cyn Mckinney, DO   ibuprofen (ADVIL;MOTRIN) 600 MG tablet Take 1 tablet by mouth every 6 hours as needed for Pain 2/6/20   Jed Diallo, DO       REVIEW OFSYSTEMS    (2-9 systems for level 4, 10 or more for level 5)      Left before being seen or evaluated.   PHYSICAL EXAM   (up to 7 for level 4, 8 or more forlevel 5)      INITIAL VITALS:   ED Triage Vitals   BP Temp Temp Source Pulse Resp SpO2 Height Weight   09/12/20 1600 09/12/20 1549 09/12/20 1549 09/12/20 1600 09/12/20 1600 09/12/20 1600 -- 09/12/20 1600   94/61 97.7 °F (36.5 °C) Temporal 72 16 96 %  95 lb (43.1 kg)       Physical Exam left before being evaluated    DIFFERENTIAL  DIAGNOSIS     PLAN (LABS / IMAGING / EKG):  No orders of the defined types were placed in this encounter. MEDICATIONS ORDERED:  No orders of the defined types were placed in this encounter. DDX: NA     Initial MDM/Plan: 30 y.o. female who left emergency department before being evaluated. DIAGNOSTIC RESULTS / EMERGENCYDEPARTMENT COURSE / MDM     LABS:  Labs Reviewed - No data to display      RADIOLOGY:  No results found. EKG  NA     All EKG's are interpreted by the Emergency Department Physicianwho either signs or Co-signs this chart in the absence of a cardiologist.    EMERGENCY DEPARTMENT COURSE:     Eloped       PROCEDURES:  None    CONSULTS:  None    CRITICAL CARE:  Please see attending note    FINAL IMPRESSION      1. Eloped from emergency department          DISPOSITION / [address] - Left Before Treatment Complete 09/12/2020 04:29:36 PM      PATIENT REFERRED TO:  No follow-up provider specified.     DISCHARGE MEDICATIONS:  New Prescriptions    No medications on file       Romina Graves DO  Emergency Medicine Resident    (Please note that portions of this note were completed with a voice recognition program.Efforts were made to edit the dictations but occasionally words are mis-transcribed.)         Romina Graves DO  Resident  09/12/20 3209

## 2020-09-13 ENCOUNTER — HOSPITAL ENCOUNTER (EMERGENCY)
Age: 30
Discharge: HOME | End: 2020-09-13
Attending: EMERGENCY MEDICINE
Payer: MEDICAID

## 2020-09-13 VITALS
OXYGEN SATURATION: 97 % | SYSTOLIC BLOOD PRESSURE: 126 MMHG | HEART RATE: 65 BPM | RESPIRATION RATE: 18 BRPM | DIASTOLIC BLOOD PRESSURE: 69 MMHG | TEMPERATURE: 98.4 F

## 2020-09-13 PROCEDURE — 6370000000 HC RX 637 (ALT 250 FOR IP): Performed by: STUDENT IN AN ORGANIZED HEALTH CARE EDUCATION/TRAINING PROGRAM

## 2020-09-13 PROCEDURE — 99283 EMERGENCY DEPT VISIT LOW MDM: CPT

## 2020-09-13 RX ORDER — ACETAMINOPHEN 500 MG
500 TABLET ORAL EVERY 8 HOURS PRN
Qty: 30 TABLET | Refills: 0 | Status: SHIPPED | OUTPATIENT
Start: 2020-09-13 | End: 2020-10-07 | Stop reason: SDUPTHER

## 2020-09-13 RX ORDER — IBUPROFEN 600 MG/1
600 TABLET ORAL EVERY 8 HOURS PRN
Qty: 30 TABLET | Refills: 0 | Status: ON HOLD | OUTPATIENT
Start: 2020-09-13 | End: 2020-11-18 | Stop reason: HOSPADM

## 2020-09-13 RX ORDER — LIDOCAINE 4 G/G
1 PATCH TOPICAL ONCE
Status: DISCONTINUED | OUTPATIENT
Start: 2020-09-13 | End: 2020-09-13 | Stop reason: HOSPADM

## 2020-09-13 RX ORDER — IBUPROFEN 400 MG/1
400 TABLET ORAL ONCE
Status: COMPLETED | OUTPATIENT
Start: 2020-09-13 | End: 2020-09-13

## 2020-09-13 RX ORDER — ACETAMINOPHEN 325 MG/1
650 TABLET ORAL ONCE
Status: COMPLETED | OUTPATIENT
Start: 2020-09-13 | End: 2020-09-13

## 2020-09-13 RX ORDER — LIDOCAINE 4 G/G
1 PATCH TOPICAL DAILY
Qty: 10 PATCH | Refills: 0 | Status: SHIPPED | OUTPATIENT
Start: 2020-09-13 | End: 2020-09-22

## 2020-09-13 RX ADMIN — IBUPROFEN 400 MG: 400 TABLET, FILM COATED ORAL at 20:56

## 2020-09-13 RX ADMIN — ACETAMINOPHEN 650 MG: 325 TABLET ORAL at 20:54

## 2020-09-13 ASSESSMENT — ENCOUNTER SYMPTOMS
SHORTNESS OF BREATH: 0
SORE THROAT: 0
RHINORRHEA: 0
EYE ITCHING: 0
NAUSEA: 0
COUGH: 0
BACK PAIN: 1
VOMITING: 0
EYE REDNESS: 0

## 2020-09-13 ASSESSMENT — PAIN SCALES - GENERAL: PAINLEVEL_OUTOF10: 10

## 2020-09-14 NOTE — ED PROVIDER NOTES
[facility]     Emergency Department     Faculty Attestation    I performed a history and physical examination of the patient and discussed management with the resident. I reviewed the resident´s note and agree with the documented findings and plan of care. Any areas of disagreement are noted on the chart. I was personally present for the key portions of any procedures. I have documented in the chart those procedures where I was not present during the key portions. I have reviewed the emergency nurses triage note. I agree with the chief complaint, past medical history, past surgical history, allergies, medications, social and family history as documented unless otherwise noted below. For Physician Assistant/ Nurse Practitioner cases/documentation I have personally evaluated this patient and have completed at least one if not all key elements of the E/M (history, physical exam, and MDM). Additional findings are as noted. Pain over the coccyx without signs of trauma or infection, no signs of abscess, this is chronic pain from an old injury. Patient states she has a history of MS but does not see a physician for this and we encouraged her to start following up in our clinic. Patient has normal deep tendon reflexes in the lower extremities.      Trinity Arteaga MD  09/13/20 4829

## 2020-09-14 NOTE — ED PROVIDER NOTES
[facility] ED  Emergency Department Encounter  Emergency Medicine Resident     Pt Name: Yulisa Mcmanus  MRN: [id number]  [place] 1990  Date ofevaluation: 9/13/20  PCP:  No primary care provider on file. CHIEF COMPLAINT       Chief Complaint   Patient presents with    Back Pain     lower back pain, tailbone that has been present for the past few months. pt staters she has had this pain since she had an injury to tailbone years ago but pain has became more severe over the past few days. Pt has chronic complications with bowel and bladder, incontinence, denies any new issues. HISTORY OF PRESENT ILLNESS  (Location/Symptom, Timing/Onset, Context/Setting, Quality, Duration, Modifying Factors, Severity, Associated signs/symptoms)     Yulisa Mcmanus is a 30 y.o. female who presents with acute on chronic coccyx pain. Patient reports that in 2018 she fell and injured herself, had pain. She denies any recent trauma but states her pain has recurred and currently rates a 10/10. She reports is worse when sitting down has been having to sit on a pillow. Reports that she has a history of MS and has some chronic numbness and tingling in her legs as well as chronic urinary incontinence but denies any new symptoms related to this. No fevers, chills, chest pain, shortness of breath, new weakness numbness or tingling of her legs, saddle anesthesia, or history of IV drug use or recent surgeries to her spine (had an epidural in the distant past). Of note, patient has had numerous MRIs of her spine including 2 within the last several months, as well as several others of the lumbar spine since 2011. None of these have resulted with any significant pathology. PAST MEDICAL / SURGICAL / SOCIAL / FAMILY HISTORY      has a past medical history of Asthma, Bipolar disorder ([address] Cervical dysplasia, Migraine, and Seizures ([facility] [address]      has a past surgical history that includes Cholecystectomy (2007); Tubal ligation; and Appendectomy. Social History     Socioeconomic History    Marital status:      Spouse name: Not on file    Number of children: Not on file    Years of education: Not on file    Highest education level: Not on file   Occupational History    Not on file   Social Needs    Financial resource strain: Not on file    Food insecurity     Worry: Not on file     Inability: Not on file    Transportation needs     Medical: Not on file     Non-medical: Not on file   Tobacco Use    Smoking status: Current Every Day Smoker     Packs/day: 0.20     Years: 11.00     Pack years: 2.20     Types: Cigarettes    Smokeless tobacco: Never Used   Substance and Sexual Activity    Alcohol use: Yes     Alcohol/week: 0.0 standard drinks     Comment: occassional, heavy 3 years ago    Drug use: Not Currently     Types: Opiates , Marijuana     Comment: Hx Percocet abuse sober for 2 years.     Sexual activity: Not on file   Lifestyle    Physical activity     Days per week: Not on file     Minutes per session: Not on file    Stress: Not on file   Relationships    Social connections     Talks on phone: Not on file     Gets together: Not on file     Attends Orthodoxy service: Not on file     Active member of club or organization: Not on file     Attends meetings of clubs or organizations: Not on file     Relationship status: Not on file    Intimate partner violence     Fear of current or ex partner: Not on file     Emotionally abused: Not on file     Physically abused: Not on file     Forced sexual activity: Not on file   Other Topics Concern    Not on file   Social History Narrative    Not on file       Family History   Problem Relation Age of Onset    Diabetes Mother         G.Parents    Hypertension Mother         G.Parents    Diabetes Father     Hypertension Father     Stroke Father         G.Parents    Cancer Other         G.Parents, Pancrease and ??    Coronary Art Dis Other [place].Parents       Allergies:  Claritin [loratadine]; Diclofenac; Morphine; and Pcn [penicillins]    Home Medications:  Prior to Admission medications    Medication Sig Start Date End Date Taking? Authorizing Provider   ibuprofen (ADVIL;MOTRIN) 600 MG tablet Take 1 tablet by mouth every 8 hours as needed for Pain 9/13/20  Yes Shiv Jacobs DO   acetaminophen (APAP EXTRA STRENGTH) 500 MG tablet Take 1 tablet by mouth every 8 hours as needed for Pain 9/13/20  Yes Shiv Jacobs DO   lidocaine 4 % external patch Place 1 patch onto the skin daily for 10 days 9/13/20 9/23/20 Yes Shiv Jacobs DO   Divalproex Sodium (DEPAKOTE PO) Take by mouth    Historical Provider, MD   albuterol sulfate HFA (VENTOLIN HFA) 108 (90 Base) MCG/ACT inhaler Inhale 2 puffs into the lungs 4 times daily as needed for Wheezing 3/24/20   Cyn Mckinney, DO       REVIEW OF SYSTEMS    (2-9 systems for level 4, 10 or more for level 5)      Review of Systems   Constitutional: Negative for chills and fever. HENT: Negative for rhinorrhea and sore throat. Eyes: Negative for redness and itching. Respiratory: Negative for cough and shortness of breath. Cardiovascular: Negative for chest pain. Gastrointestinal: Negative for nausea and vomiting. Genitourinary: Negative for difficulty urinating. Musculoskeletal: Positive for back pain (coccyx pain). Negative for neck pain. Neurological: Negative for weakness and numbness (no saddle anesthesia). PHYSICAL EXAM   (up to 7 for level 4, 8 or more for level 5)      INITIAL VITALS:   /69   Pulse 65   Temp 98.4 °F (36.9 °C) (Oral)   Resp 18   LMP 09/13/2020 (Exact Date)   SpO2 97%     Physical Exam  Vitals signs and nursing note reviewed. Constitutional:       General: She is not in acute distress. Appearance: Normal appearance. She is normal weight. She is not ill-appearing, toxic-appearing or diaphoretic. HENT:      Head: Normocephalic and atraumatic.    Eyes: lidocaine 4 % external patch     Sig: Place 1 patch onto the skin daily for 10 days     Dispense:  10 patch     Refill:  0    lidocaine 4 % external patch 1 patch       DIAGNOSTIC RESULTS / EMERGENCYDEPARTMENT COURSE / MDM     LABS:  No results found for this visit on 09/13/20. EMERGENCY DEPARTMENT COURSE:         MDM: 49-year-old female presenting with coccyx pain. Denies any red flag symptoms. On exam is well-appearing no distress pupils unremarkable. Heart regular rate and rhythm, lungs are clear auscultation bilaterally under soft nontender. She has pain over her coccyx however there is no abscess or otherwise obvious abnormality. No midline CTL or S spine tenderness to palpation. Gives poor effort with strength testing however is moving her legs around in the bed without any significant issue. Sensations active bilateral lower extremities. She has 2+ patellar and Achilles reflexes. No clonus noted. She has been seen numerous times and has had numerous MRIs of her lumbar spine including 2 within the past several months both of which were negative for any acute pathology. Do not feel the need for any additional imaging today. Will treat symptomatically. PROCEDURES:  none    CONSULTS:  None    FINAL IMPRESSION      1.  Coccyx pain          DISPOSITION / PLAN     DISPOSITION Decision To Discharge 09/13/2020 08:53:30 PM      PATIENT REFERRED TO:  [facility] ED  [address] [address]  [phone]  Go to   If symptoms worsen    [facility] [address] [facility]  [id number] [facility]  [phone]  Schedule an appointment as soon as possible for a visit   Follow up of this visit      DISCHARGE MEDICATIONS:  Discharge Medication List as of 9/13/2020  9:06 PM      START taking these medications    Details   lidocaine 4 % external patch Place 1 patch onto the skin daily for 10 days, Transdermal, DAILY Starting Sun 9/13/2020, Until Wed 9/23/2020, For 10 days, Disp-10 patch,R-0, Print             Dory Wayne DO  Emergency Medicine Resident  [address]     (Please note that portions of this note were completed with a voice recognition program.  Efforts were made to edit thedictations but occasionally words are mis-transcribed.)       Dory Wayne DO  Resident  09/13/20 9503

## 2020-09-20 ENCOUNTER — HOSPITAL ENCOUNTER (EMERGENCY)
Age: 30
Discharge: LEFT BEFORE DISCHARGE | End: 2020-09-20
Attending: EMERGENCY MEDICINE
Payer: MEDICAID

## 2020-09-20 VITALS
SYSTOLIC BLOOD PRESSURE: 104 MMHG | TEMPERATURE: 98.4 F | BODY MASS INDEX: 19.94 KG/M2 | HEART RATE: 62 BPM | DIASTOLIC BLOOD PRESSURE: 62 MMHG | HEIGHT: 58 IN | RESPIRATION RATE: 14 BRPM | OXYGEN SATURATION: 100 % | WEIGHT: 95 LBS

## 2020-09-20 PROCEDURE — 99283 EMERGENCY DEPT VISIT LOW MDM: CPT

## 2020-09-20 ASSESSMENT — PAIN DESCRIPTION - LOCATION: LOCATION: WRIST

## 2020-09-20 ASSESSMENT — PAIN DESCRIPTION - PAIN TYPE: TYPE: ACUTE PAIN

## 2020-09-20 ASSESSMENT — PAIN DESCRIPTION - PROGRESSION: CLINICAL_PROGRESSION: NOT CHANGED

## 2020-09-20 ASSESSMENT — PAIN SCALES - GENERAL: PAINLEVEL_OUTOF10: 7

## 2020-09-20 ASSESSMENT — PAIN DESCRIPTION - ONSET: ONSET: ON-GOING

## 2020-09-20 ASSESSMENT — PAIN DESCRIPTION - ORIENTATION: ORIENTATION: RIGHT

## 2020-09-20 ASSESSMENT — PAIN DESCRIPTION - FREQUENCY: FREQUENCY: INTERMITTENT

## 2020-09-20 ASSESSMENT — PAIN DESCRIPTION - DESCRIPTORS: DESCRIPTORS: NUMBNESS

## 2020-09-20 NOTE — ED PROVIDER NOTES
Patient was here for wrist pain and has been seen in the emergency department multiple times with elopement occurring several those times. Immediately after resident presented the case to me I went to see the patient unfortunately, she had eloped prior to this. She is welcome back at any time she changes her mind and will resume treatment and assessment.       Lis Curtis MD [facility]  Attending Emergency Medicine Physician     Hari Suarez MD  09/20/20 [id number]

## 2020-09-20 NOTE — ED NOTES
Pt. To ER room 11 via wheelchair from triage  Pt. Presents with (R) wrist cyst and (R) wrist hand/nubmness intermittently for the last few months. Pt. Has intact cyst to (R) wrist, no drainage. Pt. Denies all other concerns at this time. Vitals stable. Call light given. Awaiting further orders.  Will continue to monitor and reassess     Zeina Hoff RN  09/20/20 1111

## 2020-09-20 NOTE — ED NOTES
Pt. States, \"we will come back,\" and proceeds to exit the ER via wheelchair with her significant other      Cheko Conde RN  09/20/20 9608

## 2020-09-20 NOTE — ED PROVIDER NOTES
[facility] ED  Emergency Department Encounter  Emergency Medicine Resident     Pt Name: Елена Valencia  MRN: [id number]  [place] 1990  Date of evaluation: 9/20/20  PCP:  No primary care provider on file. CHIEF COMPLAINT       Chief Complaint   Patient presents with    Cyst     (R) wrist    Numbness     (R) hand/wrist       HISTORY OFPRESENT ILLNESS  (Location/Symptom, Timing/Onset, Context/Setting, Quality, Duration, Modifying Factors,Severity.)      Елена Valencia is a 30 y.o. female who presents in left emergency department without being evaluated by me. PAST MEDICAL / SURGICAL / SOCIAL / FAMILY HISTORY      has a past medical history of Asthma, Bipolar disorder ([facility] [address] Cervical dysplasia, Migraine, and Seizures ([facility] [address] has a past surgical history that includes Cholecystectomy (2007); Tubal ligation; and Appendectomy. Social History     Socioeconomic History    Marital status:      Spouse name: Not on file    Number of children: Not on file    Years of education: Not on file    Highest education level: Not on file   Occupational History    Not on file   Social Needs    Financial resource strain: Not on file    Food insecurity     Worry: Not on file     Inability: Not on file    Transportation needs     Medical: Not on file     Non-medical: Not on file   Tobacco Use    Smoking status: Current Every Day Smoker     Packs/day: 0.20     Years: 11.00     Pack years: 2.20     Types: Cigarettes    Smokeless tobacco: Never Used   Substance and Sexual Activity    Alcohol use: Yes     Alcohol/week: 0.0 standard drinks     Comment: occassional, heavy 3 years ago    Drug use: Not Currently     Types: Opiates , Marijuana     Comment: Hx Percocet abuse sober for 2 years.     Sexual activity: Not on file   Lifestyle    Physical activity     Days per week: Not on file     Minutes per session: Not on file    Stress: Not on file   Relationships    Social connections congestion  Respiratory ROS - No cough, No shortness of breath  Cardiovascular ROS - No chest pain, No palpitations   Gastrointestinal ROS - No abdominal pain, No nausea, No vomiting  Genito-Urinary ROS - No dysuria, Nohematuria  Musculoskeletal ROS - No back pain, No neck pain  Dermatological ROS - No wound, No rash  PHYSICAL EXAM   (up to 7 for level 4, 8 or more forlevel 5)      INITIAL VITALS:   ED Triage Vitals [09/20/20 1054]   BP Temp Temp Source Pulse Resp SpO2 Height Weight   -- 98.4 °F (36.9 °C) Oral -- -- -- -- --       Physical Exam patient not evaluated, left before being seen    DIFFERENTIAL  DIAGNOSIS     PLAN (LABS / IMAGING / EKG):  No orders of the defined types were placed in this encounter. MEDICATIONS ORDERED:  No orders of the defined types were placed in this encounter. DDX:     Initial MDM/Plan: 30 y.o. female who presents and left without being seen by me or attending physician. DIAGNOSTIC RESULTS / EMERGENCYDEPARTMENT COURSE / MDM     LABS:  Labs Reviewed - No data to display      RADIOLOGY:  No results found. EKG  NA     All EKG's are interpreted by the Emergency Department Physicianwho either signs or Co-signs this chart in the absence of a cardiologist.    EMERGENCY DEPARTMENT COURSE:     Patient left before being evaluated. PROCEDURES:  None    CONSULTS:  None    CRITICAL CARE:  Please see attending note    FINAL IMPRESSION      eloped    DISPOSITION / PLAN     DISPOSITION Eloped - Left Before Treatment Complete 09/20/2020 11:55:21 AM      PATIENT REFERRED TO:  No follow-up provider specified.     DISCHARGE MEDICATIONS:  Discharge Medication List as of 9/20/2020 11:59 AM          Daniel Beckman DO  Emergency Medicine Resident    (Please note that portions of this note were completed with a voice recognition program.Efforts were made to edit the dictations but occasionally words are mis-transcribed.)        Daniel Beckman DO  Resident  09/20/20 3970

## 2020-09-22 ENCOUNTER — HOSPITAL ENCOUNTER (EMERGENCY)
Age: 30
Discharge: HOME | End: 2020-09-22
Attending: EMERGENCY MEDICINE
Payer: MEDICAID

## 2020-09-22 VITALS
RESPIRATION RATE: 18 BRPM | HEART RATE: 66 BPM | HEIGHT: 58 IN | BODY MASS INDEX: 19.94 KG/M2 | TEMPERATURE: 98.3 F | DIASTOLIC BLOOD PRESSURE: 56 MMHG | WEIGHT: 95 LBS | SYSTOLIC BLOOD PRESSURE: 96 MMHG | OXYGEN SATURATION: 100 %

## 2020-09-22 PROCEDURE — 6370000000 HC RX 637 (ALT 250 FOR IP): Performed by: EMERGENCY MEDICINE

## 2020-09-22 PROCEDURE — 99281 EMR DPT VST MAYX REQ PHY/QHP: CPT

## 2020-09-22 RX ORDER — LIDOCAINE 4 G/G
1 PATCH TOPICAL ONCE
Status: DISCONTINUED | OUTPATIENT
Start: 2020-09-22 | End: 2020-09-22 | Stop reason: HOSPADM

## 2020-09-22 RX ORDER — KETOROLAC TROMETHAMINE 30 MG/ML
30 INJECTION, SOLUTION INTRAMUSCULAR; INTRAVENOUS ONCE
Status: DISCONTINUED | OUTPATIENT
Start: 2020-09-22 | End: 2020-09-22

## 2020-09-22 RX ORDER — LIDOCAINE 50 MG/G
1 PATCH TOPICAL DAILY
Qty: 15 PATCH | Refills: 0 | Status: SHIPPED | OUTPATIENT
Start: 2020-09-22 | End: 2020-10-02

## 2020-09-22 ASSESSMENT — ENCOUNTER SYMPTOMS
VOMITING: 0
EYE PAIN: 0
COUGH: 0
SHORTNESS OF BREATH: 0
SORE THROAT: 0
DIARRHEA: 0
ABDOMINAL PAIN: 0
NAUSEA: 0

## 2020-09-22 ASSESSMENT — PAIN DESCRIPTION - LOCATION: LOCATION: BACK

## 2020-09-22 ASSESSMENT — PAIN DESCRIPTION - PAIN TYPE: TYPE: CHRONIC PAIN

## 2020-09-22 ASSESSMENT — PAIN SCALES - GENERAL: PAINLEVEL_OUTOF10: 10

## 2020-09-23 NOTE — ED PROVIDER NOTES
[address] [place] [address] ED  Emergency Department Encounter  EmergencyMedicine Resident     Pt Cheryle Cruz  MRN: [id number]  [place] 1990  Date of evaluation: 9/22/20  PCP:  No primary care provider on file. CHIEF COMPLAINT       Chief Complaint   Patient presents with    Medication Refill     Pt requests medication refill of lidocaine patches for back        HISTORY OF PRESENT ILLNESS  (Location/Symptom, Timing/Onset, Context/Setting, Quality, Duration, Modifying Factors, Severity.)      Елена Valencia is a 30 y.o. female who presents with complaints of wanting a refill of the lidocaine patches that she uses for her chronic coccygeal pain as well as wanting evaluation of her right wrist ganglion cyst.  Patient reports that she has this chronic pain in her lower back and that the Motrin or Tylenol does not really work for her, but she does believe that the lidocaine patches patches help a lot and would like a refill of that. She reports no numbness no tingling or weakness. She has normal urination and defecation. No saddle anesthesia. She is not on any blood thinners. No new trauma or injury that she can recall. Patient is been seen multiple times here for various issues and has a history of MS and is trying to get in with the neurologist for further treatment. She reports that she has had this ganglion cyst for the past several weeks and would like treatment for that as well. Intermittently she will get some numbness in her hand especially in the thumb area secondary to the cyst, but does not have any at this time. The cyst is slightly tender. No redness or swelling around the cyst.  No other issues. No fevers or chills. No nausea vomiting. No chest pain, shortness of breath or cough. No abdominal pain. Normal urination and defecation.     PAST MEDICAL / SURGICAL / SOCIAL / FAMILY HISTORY      has a past medical history of Asthma, Bipolar disorder ([address] Cervical dysplasia, Migraine, and Seizures ([facility] [address] has a past surgical history that includes Cholecystectomy (2007); Tubal ligation; and Appendectomy. Social History     Socioeconomic History    Marital status:      Spouse name: Not on file    Number of children: Not on file    Years of education: Not on file    Highest education level: Not on file   Occupational History    Not on file   Social Needs    Financial resource strain: Not on file    Food insecurity     Worry: Not on file     Inability: Not on file    Transportation needs     Medical: Not on file     Non-medical: Not on file   Tobacco Use    Smoking status: Current Every Day Smoker     Packs/day: 0.20     Years: 11.00     Pack years: 2.20     Types: Cigarettes    Smokeless tobacco: Never Used   Substance and Sexual Activity    Alcohol use: Yes     Alcohol/week: 0.0 standard drinks     Comment: occassional, heavy 3 years ago    Drug use: Not Currently     Types: Opiates , Marijuana     Comment: Hx Percocet abuse sober for 2 years.     Sexual activity: Not on file   Lifestyle    Physical activity     Days per week: Not on file     Minutes per session: Not on file    Stress: Not on file   Relationships    Social connections     Talks on phone: Not on file     Gets together: Not on file     Attends Sikh service: Not on file     Active member of club or organization: Not on file     Attends meetings of clubs or organizations: Not on file     Relationship status: Not on file    Intimate partner violence     Fear of current or ex partner: Not on file     Emotionally abused: Not on file     Physically abused: Not on file     Forced sexual activity: Not on file   Other Topics Concern    Not on file   Social History Narrative    Not on file       Family History   Problem Relation Age of Onset    Diabetes Mother         G.Parents    Hypertension Mother         G.Parents    Diabetes Father     Hypertension Father     Stroke Father G.Parents    Cancer Other         G.Parents, Pancrease and ??    Coronary Art Dis Other         G.Parents       Allergies:  Claritin [loratadine]; Diclofenac; Morphine; and Pcn [penicillins]    Home Medications:  Prior to Admission medications    Medication Sig Start Date End Date Taking? Authorizing Provider   lidocaine (LIDODERM) 5 % Place 1 patch onto the skin daily for 10 days 12 hours on, 12 hours off. 9/22/20 10/2/20 Yes Crystal Strange MD   ibuprofen (ADVIL;MOTRIN) 600 MG tablet Take 1 tablet by mouth every 8 hours as needed for Pain 9/13/20   Salvador Frausto DO   acetaminophen (APAP EXTRA STRENGTH) 500 MG tablet Take 1 tablet by mouth every 8 hours as needed for Pain 9/13/20   Salvador Frausto DO   Divalproex Sodium (DEPAKOTE PO) Take by mouth    Historical Provider, MD   albuterol sulfate HFA (VENTOLIN HFA) 108 (90 Base) MCG/ACT inhaler Inhale 2 puffs into the lungs 4 times daily as needed for Wheezing 3/24/20   Cyn Mckinney, DO       REVIEW OF SYSTEMS    (2-9 systems for level 4, 10 or more for level 5)      Review of Systems   Constitutional: Negative for activity change, appetite change and fever. HENT: Negative for congestion and sore throat. Eyes: Negative for pain and visual disturbance. Respiratory: Negative for cough and shortness of breath. Cardiovascular: Negative for chest pain and leg swelling. Gastrointestinal: Negative for abdominal pain, diarrhea, nausea and vomiting. Endocrine: Negative for polyphagia and polyuria. Genitourinary: Negative for dysuria and frequency. Musculoskeletal: Positive for arthralgias, joint swelling and myalgias. Skin: Negative for rash and wound. Allergic/Immunologic: Negative for environmental allergies and food allergies. Neurological: Negative for syncope and weakness. Hematological: Negative for adenopathy. Does not bruise/bleed easily. Psychiatric/Behavioral: Negative for confusion. The patient is not nervous/anxious. PHYSICAL EXAM   (up to 7 for level 4, 8 or more for level 5)      INITIAL VITALS:   BP (!) 96/56   Pulse 66   Temp 98.3 °F (36.8 °C) (Oral)   Resp 18   Ht 4' 10\" (1.473 m)   Wt 95 lb (43.1 kg)   LMP 09/13/2020 (Exact Date)   SpO2 100%   BMI 19.86 kg/m²     Physical Exam  Constitutional:       General: She is not in acute distress. Appearance: She is underweight. HENT:      Head: Normocephalic and atraumatic. Eyes:      Conjunctiva/sclera: Conjunctivae normal.      Pupils: Pupils are equal, round, and reactive to light. Neck:      Musculoskeletal: Normal range of motion and neck supple. Cardiovascular:      Rate and Rhythm: Normal rate and regular rhythm. Heart sounds: Normal heart sounds. No murmur. No friction rub. No gallop. Pulmonary:      Effort: Pulmonary effort is normal. No respiratory distress. Breath sounds: Normal breath sounds. No wheezing, rhonchi or rales. Abdominal:      General: Bowel sounds are normal. There is no distension. Palpations: Abdomen is soft. Tenderness: There is no abdominal tenderness. There is no right CVA tenderness, left CVA tenderness, guarding or rebound. Musculoskeletal: Normal range of motion. Right wrist: She exhibits tenderness and swelling. She exhibits normal range of motion, no bony tenderness and no effusion. Arms:    Skin:     General: Skin is warm and dry. Findings: No rash. Neurological:      Mental Status: She is alert and oriented to person, place, and time. GCS: GCS eye subscore is 4. GCS verbal subscore is 5. GCS motor subscore is 6. Sensory: Sensation is intact. Motor: Motor function is intact.       Comments: Neuro exam intact   Psychiatric:         Behavior: Behavior normal.         DIFFERENTIAL  DIAGNOSIS     PLAN (LABS / IMAGING / EKG):  Orders Placed This Encounter   Procedures    [address] Wrist Brace, Right       MEDICATIONS ORDERED:  Orders Placed This Encounter   Medications    lidocaine 4 % external patch 1 patch    DISCONTD: ketorolac (TORADOL) injection 30 mg    lidocaine (LIDODERM) 5 %     Sig: Place 1 patch onto the skin daily for 10 days 12 hours on, 12 hours off. Dispense:  15 patch     Refill:  0       DIAGNOSTIC RESULTS / EMERGENCY DEPARTMENT COURSE / [facility]     LABS:  No results found for this visit on 09/22/20. RADIOLOGY:  None    EKG  None    All EKG's are interpreted by the Emergency Department Physician who either signs or Co-signs this chart in the absence of a cardiologist.    EMERGENCY DEPARTMENT COURSE:  Patient seen and evaluated. She is sitting in the bed comfortably, in no acute distress. Nontoxic-appearing. On examination, patient is very thin. She is in regular rate and rhythm, lungs are clear to auscultation bilaterally. She has no reproducible chest pain, no abdominal pain. She is neurovascularly intact. She does have direct point tenderness over the coccyx with no deformity noted no erythema, induration or drainage no sign of infection over the site. No sign of trauma. Patient also has a ganglion cyst noted on the volar aspect of the right wrist near the base of the first MCP. She does have full range of motion and currently her sensation is intact but she reports that she does lose sensation in that part of the hand. No sign of infection around the cyst no erythema or induration. Discussed the findings and treatment plan. Patient was given lidocaine patch for her back. She is also given a brace wrist brace for her ganglion cyst and general surgery clinic follow-up information if desired. Patient voiced understanding of the findings and plan and is in agreement. Stable and ready for discharge home. PROCEDURES:  None    CONSULTS:  None    CRITICAL CARE:  None    FINAL IMPRESSION      1. Coccygeal pain, chronic    2.  Ganglion cyst of volar aspect of right wrist          DISPOSITION / PLAN     DISPOSITION Decision To Discharge 09/22/2020 08:09:04 PM      PATIENT REFERRED TO:  [facility]   [phone]  Call in 2 days  To establish a PCP and follow-up.     [address] [place] [id number] [phone]  Call in 2 days  To schedule surgery follow-up to discuss possible removal of your ganglion cyst.      DISCHARGE MEDICATIONS:  Discharge Medication List as of 9/22/2020  8:12 PM      START taking these medications    Details   lidocaine (LIDODERM) 5 % Place 1 patch onto the skin daily for 10 days 12 hours on, 12 hours off., Disp-15 patch,R-0Print             Jeyson King MD  Emergency Medicine Resident    (Please note that portions of thisnote were completed with a voice recognition program.  Efforts were made to edit the dictations but occasionally words are mis-transcribed.)       Jeyson King MD  Resident  09/22/20 [id number]

## 2020-09-23 NOTE — ED PROVIDER NOTES
[facility]     Emergency Department     Faculty Attestation    I performed a history and physical examination of the patient and discussed management with the resident. I reviewed the residents note and agree with the documented findings and plan of care. Any areas of disagreement are noted on the chart. I was personally present for the key portions of any procedures. I have documented in the chart those procedures where I was not present during the key portions. I have reviewed the emergency nurses triage note. I agree with the chief complaint, past medical history, past surgical history, allergies, medications, social and family history as documented unless otherwise noted below. For Physician Assistant/ Nurse Practitioner cases/documentation I have personally evaluated this patient and have completed at least one if not all key elements of the E/M (history, physical exam, and MDM). Additional findings are as noted. I have personally seen and evaluated the patient. I find the patient's history and physical exam are consistent with the NP/PA documentation. I agree with the care provided, treatment rendered, disposition and follow-up plan. Chronic back pain, unchanged, here for refill of lidocaine patches. No new numbness or tingling. Also complaining of raised bump on her wrist that has been present since she was born. When she presses on it it makes her hand numb. She denies any redness or swelling. No fevers or chills. Exam:  General: Sitting on the bed, awake, alert and in no acute distress  CV: normal rate and regular rhythm  Lungs: Breathing comfortably on room air with no tachypnea, hypoxia, or increased work of breathing  MSK: raised, mobile lesion on wrist. No redness or swelling. Neuro: awake, alert, ambulating around room without assistance.  No numbness or tingling    Plan:  Refill lidocaine patch  Refer to general surgery for likely ganglion cyst  Discharge home        Fern Forde MD   Attending Emergency  Physician              Fern Forde MD  09/22/20 2026

## 2020-10-07 ENCOUNTER — HOSPITAL ENCOUNTER (EMERGENCY)
Age: 30
Discharge: HOME | End: 2020-10-07
Attending: EMERGENCY MEDICINE
Payer: MEDICAID

## 2020-10-07 VITALS
SYSTOLIC BLOOD PRESSURE: 107 MMHG | DIASTOLIC BLOOD PRESSURE: 67 MMHG | TEMPERATURE: 97.4 F | OXYGEN SATURATION: 100 % | HEART RATE: 72 BPM | RESPIRATION RATE: 16 BRPM

## 2020-10-07 PROCEDURE — 6370000000 HC RX 637 (ALT 250 FOR IP): Performed by: EMERGENCY MEDICINE

## 2020-10-07 PROCEDURE — 99282 EMERGENCY DEPT VISIT SF MDM: CPT

## 2020-10-07 RX ORDER — ACETAMINOPHEN 500 MG
1000 TABLET ORAL ONCE
Status: COMPLETED | OUTPATIENT
Start: 2020-10-07 | End: 2020-10-07

## 2020-10-07 RX ORDER — ACETAMINOPHEN 500 MG
500 TABLET ORAL EVERY 8 HOURS PRN
Qty: 30 TABLET | Refills: 0 | Status: SHIPPED | OUTPATIENT
Start: 2020-10-07 | End: 2020-10-29 | Stop reason: SDUPTHER

## 2020-10-07 RX ORDER — ORPHENADRINE CITRATE 30 MG/ML
60 INJECTION INTRAMUSCULAR; INTRAVENOUS ONCE
Status: DISCONTINUED | OUTPATIENT
Start: 2020-10-07 | End: 2020-10-07 | Stop reason: HOSPADM

## 2020-10-07 RX ADMIN — ACETAMINOPHEN 1000 MG: 500 TABLET ORAL at 09:18

## 2020-10-07 ASSESSMENT — ENCOUNTER SYMPTOMS
VOMITING: 0
WHEEZING: 0
SORE THROAT: 0
RHINORRHEA: 0
CONSTIPATION: 0
BACK PAIN: 1
ABDOMINAL DISTENTION: 0
DIARRHEA: 0
SHORTNESS OF BREATH: 0
COUGH: 0
NAUSEA: 0

## 2020-10-07 ASSESSMENT — PAIN DESCRIPTION - ORIENTATION: ORIENTATION: LOWER;MID;RIGHT;LEFT

## 2020-10-07 ASSESSMENT — PAIN DESCRIPTION - ONSET: ONSET: ON-GOING

## 2020-10-07 ASSESSMENT — PAIN DESCRIPTION - PAIN TYPE: TYPE: ACUTE PAIN

## 2020-10-07 ASSESSMENT — PAIN SCALES - WONG BAKER: WONGBAKER_NUMERICALRESPONSE: 2

## 2020-10-07 ASSESSMENT — PAIN SCALES - GENERAL: PAINLEVEL_OUTOF10: 5

## 2020-10-07 ASSESSMENT — PAIN DESCRIPTION - LOCATION: LOCATION: BACK

## 2020-10-07 ASSESSMENT — PAIN DESCRIPTION - DESCRIPTORS: DESCRIPTORS: ACHING

## 2020-10-07 ASSESSMENT — PAIN DESCRIPTION - FREQUENCY: FREQUENCY: CONTINUOUS

## 2020-10-07 NOTE — ED PROVIDER NOTES
[facility]  ED  Emergency Department        Pt Name: Missy Gomes  MRN: [id number]  [place] 1990  Date of evaluation: 10/7/20    CHIEF COMPLAINT       Chief Complaint   Patient presents with    Back Pain       HISTORY OF PRESENT ILLNESS  (Location/Symptom, Timing/Onset, Context/Setting, Quality, Duration, ModifyingFactors, Severity.)      Missy Gomes is a 30 y.o. female who presents with back spasm, reports woke up this morning with back pain, and her significant other typically cracks it but he was unable to do so this morning, and pain has continued. Patient also c/o pain to her coccyz, reports fracture 2 years ago and still having pain, is using lidoderm patching. Is in a wheelchair. No fevers no chills    Patient well known to ER. PAST MEDICAL / SURGICAL / SOCIAL / FAMILY HISTORY      has a past medical history of Asthma, Bipolar disorder ([facility] [address] Cervical dysplasia, Migraine, and Seizures ([facility] [address] has a past surgical history that includes Cholecystectomy (2007); Tubal ligation; and Appendectomy. Social History     Socioeconomic History    Marital status:      Spouse name: Not on file    Number of children: Not on file    Years of education: Not on file    Highest education level: Not on file   Occupational History    Not on file   Social Needs    Financial resource strain: Not on file    Food insecurity     Worry: Not on file     Inability: Not on file    Transportation needs     Medical: Not on file     Non-medical: Not on file   Tobacco Use    Smoking status: Current Every Day Smoker     Packs/day: 0.20     Years: 11.00     Pack years: 2.20     Types: Cigarettes    Smokeless tobacco: Never Used   Substance and Sexual Activity    Alcohol use: Yes     Alcohol/week: 0.0 standard drinks     Comment: occassional, heavy 3 years ago    Drug use: Not Currently     Types: Opiates , Marijuana     Comment: Hx Percocet abuse sober for 2 years.     Sexual activity: Not on file   Lifestyle    Physical activity     Days per week: Not on file     Minutes per session: Not on file    Stress: Not on file   Relationships    Social connections     Talks on phone: Not on file     Gets together: Not on file     Attends Holiness service: Not on file     Active member of club or organization: Not on file     Attends meetings of clubs or organizations: Not on file     Relationship status: Not on file    Intimate partner violence     Fear of current or ex partner: Not on file     Emotionally abused: Not on file     Physically abused: Not on file     Forced sexual activity: Not on file   Other Topics Concern    Not on file   Social History Narrative    Not on file       Family History   Problem Relation Age of Onset    Diabetes Mother         G.Parents    Hypertension Mother         G.Parents    Diabetes Father     Hypertension Father     Stroke Father         G.Parents    Cancer Other         G.Parents, Pancrease and ??    Coronary Art Dis Other         G.Parents       Allergies:  Claritin [loratadine]; Diclofenac; Morphine; and Pcn [penicillins]    Home Medications:  Prior to Admission medications    Medication Sig Start Date End Date Taking? Authorizing Provider   ibuprofen (ADVIL;MOTRIN) 600 MG tablet Take 1 tablet by mouth every 8 hours as needed for Pain 9/13/20   Swetha Brizuela, DO   acetaminophen (APAP EXTRA STRENGTH) 500 MG tablet Take 1 tablet by mouth every 8 hours as needed for Pain 9/13/20   Swetha Brizuela, DO   Divalproex Sodium (DEPAKOTE PO) Take by mouth    Historical Provider, MD   albuterol sulfate HFA (VENTOLIN HFA) 108 (90 Base) MCG/ACT inhaler Inhale 2 puffs into the lungs 4 times daily as needed for Wheezing 3/24/20   Cyn Mckinney, DO       REVIEW OF SYSTEMS    (2-9 systems for level 4, 10 or more for level 5)      Review of Systems   Constitutional: Negative for activity change, appetite change, fatigue and fever.    HENT: Negative for congestion, rhinorrhea and sore throat. Respiratory: Negative for cough, shortness of breath and wheezing. Cardiovascular: Positive for leg swelling. Negative for chest pain and palpitations. Gastrointestinal: Negative for abdominal distention, constipation, diarrhea, nausea and vomiting. Genitourinary: Negative for decreased urine volume and dysuria. Musculoskeletal: Positive for back pain and gait problem (chronic). Skin: Negative for rash. Neurological: Negative for dizziness, weakness, light-headedness, numbness and headaches. PHYSICAL EXAM   (up to 7 for level 4, 8 or more for level 5)     INITIAL VITALS:   /67   Pulse 72   Temp 97.4 °F (36.3 °C)   Resp 16   LMP 09/13/2020 (Exact Date)   SpO2 100%     Physical Exam  Vitals signs and nursing note reviewed. Constitutional:       General: She is not in acute distress. Appearance: She is well-developed. Comments: Non toxic appearing, speaking in full sentences without signs of distress. Sitting in wheelchair, during exam anywhere she is palpated, she cries out in pain. She spontaneously moves her bilateral lower extremities, and walks herself around the room in the wheelchair,   HENT:      Head: Normocephalic and atraumatic. Eyes:      General:         Right eye: No discharge. Left eye: No discharge. Conjunctiva/sclera: Conjunctivae normal.   Cardiovascular:      Rate and Rhythm: Normal rate and regular rhythm. Heart sounds: Normal heart sounds. No murmur. No friction rub. No gallop. Pulmonary:      Effort: Pulmonary effort is normal. No respiratory distress. Breath sounds: Normal breath sounds. No stridor. No wheezing, rhonchi or rales. Chest:      Chest wall: No tenderness. Abdominal:      General: There is no distension. Palpations: Abdomen is soft. There is no mass. Tenderness: There is no abdominal tenderness. There is no guarding or rebound.       Hernia: No hernia is present. Musculoskeletal:      Comments: Thoracic and lumbar paraspinal msk ttp, no midline ttp, no rash or step off notes. Skin:     General: Skin is warm and dry. Findings: No rash. Neurological:      Mental Status: She is alert and oriented to person, place, and time. DIFFERENTIAL  DIAGNOSIS     Patient well known to Er. Drug seeking, multiple complaints,   In the past has been in wheel chair and reports unable to ambulate for h/o MS. In history taking she report she has been ambulating, but then changes her story, does repots she has to work today at 10:30 at a [address] [facility] [employer] in [facility]. Plan for norflex injection, and tylenol. And discharge, she is neuro intact. She has been instructed for her coccyx pain to use a towel and wrap like donut to take pressure off it. Only d/c with tylenol at this time. Low concern for any epidural abscess, or spinal fracture    PLAN (LABS / IMAGING / EKG):  No orders of the defined types were placed in this encounter. MEDICATIONS ORDERED:  Orders Placed This Encounter   Medications    orphenadrine (NORFLEX) injection 60 mg    acetaminophen (TYLENOL) tablet 1,000 mg       DIAGNOSTIC RESULTS / EMERGENCY DEPARTMENT COURSE / MDM     LABS:  No results found for this visit on 10/07/20. IMPRESSION: back spasm      EMERGENCY DEPARTMENT COURSE:  Home with tylenol    FINAL IMPRESSION      1.  Bilateral low back pain without sciatica, unspecified chronicity          DISPOSITION / PLAN     DISPOSITION Decision To Discharge 10/07/2020 09:10:18 AM      PATIENT REFERRED TO:  [facility]  [address] [place] [address]  [phone]  Go to   If symptoms worsen      DISCHARGE MEDICATIONS:  New Prescriptions    No medications on file       Michela Cline  9:11 AM    Attending Emergency Physician  [address] [facility]    (Please note that portions of this note were completed with a voice recognition program.  Effortswere made to edit the dictations but occasionally words are mis-transcribed.)              Ken Harding,   10/07/20 1497

## 2020-10-24 ENCOUNTER — HOSPITAL ENCOUNTER (OUTPATIENT)
Age: 30
Setting detail: OBSERVATION
Discharge: HOME | End: 2020-10-24
Attending: EMERGENCY MEDICINE | Admitting: EMERGENCY MEDICINE
Payer: MEDICAID

## 2020-10-24 VITALS
OXYGEN SATURATION: 98 % | DIASTOLIC BLOOD PRESSURE: 72 MMHG | RESPIRATION RATE: 18 BRPM | HEART RATE: 74 BPM | WEIGHT: 95 LBS | HEIGHT: 58 IN | TEMPERATURE: 98 F | SYSTOLIC BLOOD PRESSURE: 112 MMHG | BODY MASS INDEX: 19.94 KG/M2

## 2020-10-24 PROBLEM — R32 BOWEL AND BLADDER INCONTINENCE: Status: ACTIVE | Noted: 2020-10-24

## 2020-10-24 PROBLEM — R15.9 BOWEL AND BLADDER INCONTINENCE: Status: ACTIVE | Noted: 2020-10-24

## 2020-10-24 LAB
-: ABNORMAL
ABSOLUTE EOS #: 0.12 K/UL (ref 0–0.44)
ABSOLUTE IMMATURE GRANULOCYTE: <0.03 K/UL (ref 0–0.3)
ABSOLUTE LYMPH #: 3.79 K/UL (ref 1.1–3.7)
ABSOLUTE MONO #: 0.47 K/UL (ref 0.1–1.2)
ALBUMIN SERPL-MCNC: 4.2 G/DL (ref 3.5–5.2)
ALBUMIN/GLOBULIN RATIO: 1.8 (ref 1–2.5)
ALP BLD-CCNC: 64 U/L (ref 35–104)
ALT SERPL-CCNC: 10 U/L (ref 5–33)
AMORPHOUS: ABNORMAL
ANION GAP SERPL CALCULATED.3IONS-SCNC: 9 MMOL/L (ref 9–17)
AST SERPL-CCNC: 13 U/L
BACTERIA: ABNORMAL
BASOPHILS # BLD: 0 % (ref 0–2)
BASOPHILS ABSOLUTE: 0.04 K/UL (ref 0–0.2)
BILIRUB SERPL-MCNC: 0.28 MG/DL (ref 0.3–1.2)
BILIRUBIN URINE: NEGATIVE
BUN BLDV-MCNC: 18 MG/DL (ref 6–20)
BUN/CREAT BLD: ABNORMAL (ref 9–20)
C-REACTIVE PROTEIN: 2 MG/L (ref 0–5)
CALCIUM SERPL-MCNC: 9.3 MG/DL (ref 8.6–10.4)
CASTS UA: ABNORMAL /LPF (ref 0–8)
CHLORIDE BLD-SCNC: 98 MMOL/L (ref 98–107)
CO2: 25 MMOL/L (ref 20–31)
COLOR: YELLOW
COMMENT UA: ABNORMAL
CREAT SERPL-MCNC: 0.82 MG/DL (ref 0.5–0.9)
CRYSTALS, UA: ABNORMAL /HPF
DIFFERENTIAL TYPE: ABNORMAL
EOSINOPHILS RELATIVE PERCENT: 1 % (ref 1–4)
EPITHELIAL CELLS UA: ABNORMAL /HPF (ref 0–5)
GFR AFRICAN AMERICAN: >60 ML/MIN
GFR NON-AFRICAN AMERICAN: >60 ML/MIN
GFR SERPL CREATININE-BSD FRML MDRD: ABNORMAL ML/MIN/{1.73_M2}
GFR SERPL CREATININE-BSD FRML MDRD: ABNORMAL ML/MIN/{1.73_M2}
GLUCOSE BLD-MCNC: 91 MG/DL (ref 70–99)
GLUCOSE URINE: NEGATIVE
HCT VFR BLD CALC: 42.4 % (ref 36.3–47.1)
HEMOGLOBIN: 14.3 G/DL (ref 11.9–15.1)
IMMATURE GRANULOCYTES: 0 %
KETONES, URINE: ABNORMAL
LEUKOCYTE ESTERASE, URINE: ABNORMAL
LYMPHOCYTES # BLD: 41 % (ref 24–43)
MCH RBC QN AUTO: 31.2 PG (ref 25.2–33.5)
MCHC RBC AUTO-ENTMCNC: 33.7 G/DL (ref 28.4–34.8)
MCV RBC AUTO: 92.4 FL (ref 82.6–102.9)
MONOCYTES # BLD: 5 % (ref 3–12)
MUCUS: ABNORMAL
NITRITE, URINE: NEGATIVE
NRBC AUTOMATED: 0 PER 100 WBC
OTHER OBSERVATIONS UA: ABNORMAL
PDW BLD-RTO: 13.2 % (ref 11.8–14.4)
PH UA: 6 (ref 5–8)
PLATELET # BLD: 223 K/UL (ref 138–453)
PLATELET ESTIMATE: ABNORMAL
PMV BLD AUTO: 10 FL (ref 8.1–13.5)
POTASSIUM SERPL-SCNC: 3.6 MMOL/L (ref 3.7–5.3)
PROTEIN UA: ABNORMAL
RBC # BLD: 4.59 M/UL (ref 3.95–5.11)
RBC # BLD: ABNORMAL 10*6/UL
RBC UA: ABNORMAL /HPF (ref 0–4)
RENAL EPITHELIAL, UA: ABNORMAL /HPF
SEDIMENTATION RATE, ERYTHROCYTE: 4 MM (ref 0–20)
SEG NEUTROPHILS: 53 % (ref 36–65)
SEGMENTED NEUTROPHILS ABSOLUTE COUNT: 4.84 K/UL (ref 1.5–8.1)
SODIUM BLD-SCNC: 132 MMOL/L (ref 135–144)
SPECIFIC GRAVITY UA: 1.03 (ref 1–1.03)
TOTAL PROTEIN: 6.6 G/DL (ref 6.4–8.3)
TRICHOMONAS: ABNORMAL
TURBIDITY: ABNORMAL
URINE HGB: NEGATIVE
UROBILINOGEN, URINE: NORMAL
WBC # BLD: 9.3 K/UL (ref 3.5–11.3)
WBC # BLD: ABNORMAL 10*3/UL
WBC UA: ABNORMAL /HPF (ref 0–5)
YEAST: ABNORMAL

## 2020-10-24 PROCEDURE — G0378 HOSPITAL OBSERVATION PER HR: HCPCS

## 2020-10-24 PROCEDURE — 86140 C-REACTIVE PROTEIN: CPT

## 2020-10-24 PROCEDURE — 87086 URINE CULTURE/COLONY COUNT: CPT

## 2020-10-24 PROCEDURE — 86403 PARTICLE AGGLUT ANTBDY SCRN: CPT

## 2020-10-24 PROCEDURE — 81001 URINALYSIS AUTO W/SCOPE: CPT

## 2020-10-24 PROCEDURE — 80053 COMPREHEN METABOLIC PANEL: CPT

## 2020-10-24 PROCEDURE — 85025 COMPLETE CBC W/AUTO DIFF WBC: CPT

## 2020-10-24 PROCEDURE — 85652 RBC SED RATE AUTOMATED: CPT

## 2020-10-24 PROCEDURE — 99285 EMERGENCY DEPT VISIT HI MDM: CPT

## 2020-10-24 RX ORDER — SODIUM CHLORIDE 0.9 % (FLUSH) 0.9 %
10 SYRINGE (ML) INJECTION EVERY 12 HOURS SCHEDULED
Status: CANCELLED | OUTPATIENT
Start: 2020-10-24

## 2020-10-24 RX ORDER — ACETAMINOPHEN 325 MG/1
650 TABLET ORAL EVERY 4 HOURS PRN
Status: CANCELLED | OUTPATIENT
Start: 2020-10-24

## 2020-10-24 RX ORDER — SODIUM CHLORIDE 0.9 % (FLUSH) 0.9 %
10 SYRINGE (ML) INJECTION PRN
Status: CANCELLED | OUTPATIENT
Start: 2020-10-24

## 2020-10-24 RX ORDER — IBUPROFEN 800 MG/1
800 TABLET ORAL EVERY 8 HOURS PRN
Status: CANCELLED | OUTPATIENT
Start: 2020-10-24

## 2020-10-24 RX ORDER — ALBUTEROL SULFATE 2.5 MG/3ML
2.5 SOLUTION RESPIRATORY (INHALATION) EVERY 6 HOURS PRN
Status: CANCELLED | OUTPATIENT
Start: 2020-10-24

## 2020-10-24 ASSESSMENT — PAIN DESCRIPTION - PAIN TYPE: TYPE: ACUTE PAIN

## 2020-10-24 ASSESSMENT — PAIN DESCRIPTION - ORIENTATION: ORIENTATION: RIGHT;LEFT;ANTERIOR;POSTERIOR

## 2020-10-24 ASSESSMENT — PAIN DESCRIPTION - ONSET: ONSET: ON-GOING

## 2020-10-24 ASSESSMENT — PAIN - FUNCTIONAL ASSESSMENT: PAIN_FUNCTIONAL_ASSESSMENT: INTOLERABLE, UNABLE TO DO ANY ACTIVE OR PASSIVE ACTIVITIES

## 2020-10-24 ASSESSMENT — PAIN DESCRIPTION - LOCATION: LOCATION: NECK

## 2020-10-24 ASSESSMENT — PAIN DESCRIPTION - DESCRIPTORS: DESCRIPTORS: BURNING;ACHING

## 2020-10-24 ASSESSMENT — PAIN SCALES - GENERAL: PAINLEVEL_OUTOF10: 10

## 2020-10-24 ASSESSMENT — PAIN DESCRIPTION - PROGRESSION: CLINICAL_PROGRESSION: RAPIDLY WORSENING

## 2020-10-24 ASSESSMENT — PAIN DESCRIPTION - FREQUENCY: FREQUENCY: CONTINUOUS

## 2020-10-25 ENCOUNTER — HOSPITAL ENCOUNTER (EMERGENCY)
Age: 30
Discharge: LEFT BEFORE DISCHARGE | End: 2020-10-25
Attending: EMERGENCY MEDICINE
Payer: MEDICAID

## 2020-10-25 ENCOUNTER — APPOINTMENT (OUTPATIENT)
Dept: MRI IMAGING | Age: 30
End: 2020-10-25
Payer: MEDICAID

## 2020-10-25 VITALS
WEIGHT: 95 LBS | RESPIRATION RATE: 20 BRPM | BODY MASS INDEX: 19.94 KG/M2 | HEART RATE: 66 BPM | TEMPERATURE: 98.2 F | OXYGEN SATURATION: 98 % | SYSTOLIC BLOOD PRESSURE: 114 MMHG | HEIGHT: 58 IN | DIASTOLIC BLOOD PRESSURE: 77 MMHG

## 2020-10-25 LAB
CULTURE: ABNORMAL
Lab: ABNORMAL
SPECIMEN DESCRIPTION: ABNORMAL

## 2020-10-25 PROCEDURE — 96374 THER/PROPH/DIAG INJ IV PUSH: CPT

## 2020-10-25 PROCEDURE — 72158 MRI LUMBAR SPINE W/O & W/DYE: CPT

## 2020-10-25 PROCEDURE — A9576 INJ PROHANCE MULTIPACK: HCPCS | Performed by: STUDENT IN AN ORGANIZED HEALTH CARE EDUCATION/TRAINING PROGRAM

## 2020-10-25 PROCEDURE — 72157 MRI CHEST SPINE W/O & W/DYE: CPT

## 2020-10-25 PROCEDURE — 6360000002 HC RX W HCPCS: Performed by: STUDENT IN AN ORGANIZED HEALTH CARE EDUCATION/TRAINING PROGRAM

## 2020-10-25 PROCEDURE — 6360000004 HC RX CONTRAST MEDICATION: Performed by: STUDENT IN AN ORGANIZED HEALTH CARE EDUCATION/TRAINING PROGRAM

## 2020-10-25 PROCEDURE — 99283 EMERGENCY DEPT VISIT LOW MDM: CPT

## 2020-10-25 PROCEDURE — 96375 TX/PRO/DX INJ NEW DRUG ADDON: CPT

## 2020-10-25 PROCEDURE — 72156 MRI NECK SPINE W/O & W/DYE: CPT

## 2020-10-25 PROCEDURE — 2580000003 HC RX 258: Performed by: STUDENT IN AN ORGANIZED HEALTH CARE EDUCATION/TRAINING PROGRAM

## 2020-10-25 RX ORDER — ONDANSETRON 2 MG/ML
4 INJECTION INTRAMUSCULAR; INTRAVENOUS ONCE
Status: COMPLETED | OUTPATIENT
Start: 2020-10-25 | End: 2020-10-25

## 2020-10-25 RX ORDER — LORAZEPAM 2 MG/ML
1 INJECTION INTRAMUSCULAR ONCE
Status: COMPLETED | OUTPATIENT
Start: 2020-10-25 | End: 2020-10-25

## 2020-10-25 RX ORDER — SODIUM CHLORIDE 0.9 % (FLUSH) 0.9 %
10 SYRINGE (ML) INJECTION PRN
Status: DISCONTINUED | OUTPATIENT
Start: 2020-10-25 | End: 2020-10-25 | Stop reason: HOSPADM

## 2020-10-25 RX ADMIN — LORAZEPAM 1 MG: 2 INJECTION INTRAMUSCULAR; INTRAVENOUS at 08:34

## 2020-10-25 RX ADMIN — Medication 10 ML: at 11:11

## 2020-10-25 RX ADMIN — ONDANSETRON 4 MG: 2 INJECTION INTRAMUSCULAR; INTRAVENOUS at 11:10

## 2020-10-25 RX ADMIN — GADOTERIDOL 8 ML: 279.3 INJECTION, SOLUTION INTRAVENOUS at 10:12

## 2020-10-25 ASSESSMENT — ENCOUNTER SYMPTOMS
SHORTNESS OF BREATH: 0
COUGH: 0
BACK PAIN: 1
PHOTOPHOBIA: 1
BACK PAIN: 1
SHORTNESS OF BREATH: 0
COUGH: 0
NAUSEA: 0
WHEEZING: 0
VOMITING: 0
ABDOMINAL PAIN: 0
ABDOMINAL PAIN: 0

## 2020-10-25 ASSESSMENT — PAIN SCALES - GENERAL: PAINLEVEL_OUTOF10: 9

## 2020-10-25 ASSESSMENT — PAIN DESCRIPTION - FREQUENCY: FREQUENCY: CONTINUOUS

## 2020-10-25 ASSESSMENT — PAIN DESCRIPTION - LOCATION: LOCATION: BACK

## 2020-10-25 NOTE — ED NOTES
Pt resting on stretcher, no respiratory distress noted, pt updated on plan of care, will continue to monitor, call light in reach. Pt. Significant other at bedside.       Brian Huerta RN  10/24/20 6722

## 2020-10-25 NOTE — ED NOTES
ED to inpatient nurses report    Chief Complaint   Patient presents with    Incontinence     states ms puri, 2 incontinent episodes per week for the past 2 months    Neck Pain     today    Numbness     on left hand, left leg  \"whole spine\"    Back Pain     6 months       Present to ED from home. LOC: alert and orientated to name, place, date   Vital signs   Vitals:    10/24/20 2045 10/24/20 2053 10/24/20 2056   BP:  112/72    Pulse:  74    Resp:  18    Temp: 98 °F (36.7 °C)     TempSrc: Temporal     SpO2:  98%    Weight:   95 lb (43.1 kg)   Height:   4' 10\" (1.473 m)      Oxygen Baseline room air    Current needs required none. LDAs:   Peripheral IV 10/24/20 Right Antecubital (Active)   Site Assessment Clean;Dry; Intact 10/24/20 2213   Line Status Blood return noted;Brisk blood return;Normal saline locked;Specimen collected; Flushed 10/24/20 2213   Dressing Status Clean;Dry; Intact 10/24/20 2213   Dressing Intervention New 10/24/20 2213     Mobility: Fully dependent  Pending ED orders: none  Present condition: stable  Code Status: full code  Consults:  []  Hospitalist  Completed  [] yes [] no  []  Medicine  Completed  [] yes [] No  []  Cardiology  Completed  [] yes [] No  []  GI   Completed  [] yes [] No  [x]  Neurology  Completed  [x] yes [] No  []  Nephrology Completed  [] yes [] No  []  Vascular  Completed  [] yes [] No   []  Surgery  Completed  [] yes [] No   []  Urology  Completed  [] yes [] No   []  Plastics  Completed  [] yes [] No   []  ENT  Completed  [] yes [] No   []  Other Completed  [] yes [] No  Pertinent event(s) n/a  Pertinent event(s) n/a  Electronically signed by Lawerence Hamman, RN on 10/24/2020 at 11:11 PM       Lawerence Hamman, [address] [place] [address]  10/24/20 [address] [employer]

## 2020-10-25 NOTE — ED PROVIDER NOTES
[facility]  ED  Emergency Department Encounter  Emergency Medicine Resident     Pt Name: Ginger Little  MRN: [id number]  [place] 1990  Date of evaluation: 10/24/20  PCP:  No primary care provider on file. CHIEF COMPLAINT       Chief Complaint   Patient presents with    Incontinence     states ms exhasterbation, 2 incontinent episodes per week for the past 2 months    Neck Pain     today    Numbness     on left hand, left leg  \"whole spine\"    Back Pain     6 months        HISTORY OFPRESENT ILLNESS  (Location/Symptom, Timing/Onset, Context/Setting, Quality, Duration, Modifying Factors,Severity.)      Ginger Little is a 29 yo female who presents with neck pain, back pain, numbness, tingling, incontinence. Patient states that she is history of MS and she was the age of 10 and during her flares she is sometimes restrained to a wheelchair. She states that for the past few weeks she has been having nontraumatic neck and back pain with numbness and tingling to her arms and legs as well as urinary and bowel incontinence. She denies any car accidents or trauma or fevers or chills or history of IV drug use. PAST MEDICAL / SURGICAL / SOCIAL / FAMILY HISTORY      has a past medical history of Asthma, Bipolar disorder ([facility] [address] Cervical dysplasia, Migraine, and Seizures ([facility] [address] has a past surgical history that includes Cholecystectomy (2007); Tubal ligation; and Appendectomy.      Social History     Socioeconomic History    Marital status:      Spouse name: Not on file    Number of children: Not on file    Years of education: Not on file    Highest education level: Not on file   Occupational History    Not on file   Social Needs    Financial resource strain: Not on file    Food insecurity     Worry: Not on file     Inability: Not on file    Transportation needs     Medical: Not on file     Non-medical: Not on file   Tobacco Use    Smoking status: Current Every Day Heart sounds: No murmur. No gallop. Pulmonary:      Effort: Pulmonary effort is normal. No respiratory distress. Breath sounds: Normal breath sounds. No stridor. No wheezing, rhonchi or rales. Abdominal:      General: There is no distension. Palpations: Abdomen is soft. Tenderness: There is no abdominal tenderness. There is no guarding. Musculoskeletal:         General: No tenderness or signs of injury. Right lower leg: No edema. Left lower leg: No edema. Skin:     General: Skin is warm and dry. Findings: No rash. Neurological:      Mental Status: She is alert. Cranial Nerves: No cranial nerve deficit. Comments: 2/5 strength in bilateral upper and lower extremities however when moving on her own the patient seems to have normal strength. 2/4 patellar and Achilles reflexes bilaterally. Subjective loss of sensation to bilateral arms and legs. DIFFERENTIAL  DIAGNOSIS     PLAN (LABS / IMAGING / EKG):  Orders Placed This Encounter   Procedures    Culture, Urine    MRI CERVICAL SPINE WO CONTRAST    MRI LUMBAR SPINE WO CONTRAST    MRI THORACIC SPINE WO CONTRAST    CBC WITH AUTO DIFFERENTIAL    C-REACTIVE PROTEIN    Comprehensive Metabolic Panel    Sedimentation Rate    Urinalysis Reflex to Culture    Microscopic Urinalysis    Inpatient consult to Neurology    PATIENT STATUS (FROM ED OR OR/PROCEDURAL) Observation       MEDICATIONS ORDERED:  No orders of the defined types were placed in this encounter. DDX: MS flare, malingering    Initial MDM/Plan/ED course: 30 y.o. female who presents with bilateral arm and extremity weakness for the past few months as well as urinary and bowel incontinence. On exam vitals normal patient in no acute distress. Patient did arrive in a wheelchair and states that her  had to pick her up to put her on the bed and she cannot walk.   On physical exam there is 2/5 strength in all extremities and severe loss of sensation in all extremities and patient admits to urinary and bowel incontinence, no other deficits noted, patellar reflexes 2/4 normal bilaterally. Patient's exam does seem to fluctuate as when I have not examined her she is using all 4 of her extremities pretty normally. Neurology was consulted due to history of MS and the fact that this is not acute or traumatic and has been chronic. Neurology recommending lab work as well as MRI of the cervical, thoracic, and lumbar spine. After talking with neurology attending MRI can be not emergently in the observation unit. Patient initially agreed to admission to observation however decided to leave [address] because of her anxiety. Patient verbalized her understanding that leaving without full evaluation could lead to permanent disability or death. DIAGNOSTIC RESULTS / EMERGENCY DEPARTMENT COURSE / MDM     LABS:  Labs Reviewed   CBC WITH AUTO DIFFERENTIAL - Abnormal; Notable for the following components:       Result Value    Absolute Lymph # 3.79 (*)     All other components within normal limits   COMPREHENSIVE METABOLIC PANEL - Abnormal; Notable for the following components:    Sodium 132 (*)     Potassium 3.6 (*)     Total Bilirubin 0.28 (*)     All other components within normal limits   URINE RT REFLEX TO CULTURE - Abnormal; Notable for the following components:    Turbidity UA CLOUDY (*)     Ketones, Urine TRACE (*)     Protein, UA 1+ (*)     Leukocyte Esterase, Urine MODERATE (*)     All other components within normal limits   MICROSCOPIC URINALYSIS - Abnormal; Notable for the following components:    Bacteria, UA FEW (*)     All other components within normal limits   CULTURE, URINE   C-REACTIVE PROTEIN   SEDIMENTATION RATE         RADIOLOGY:  No results found.       EKG      All EKG's are interpreted by the [facility] Physician who either signs or Co-signs this chart in the absence of a cardiologist.      PROCEDURES:  None    CONSULTS:  IP CONSULT TO NEUROLOGY    CRITICAL CARE:  Please see attending note    FINAL IMPRESSION      1. General weakness    2. Incontinence of feces, unspecified fecal incontinence type    3. Urinary incontinence, unspecified type          DISPOSITION / PLAN     DISPOSITION Watkins 10/24/2020 11:45:35 PM      PATIENT REFERRED TO:  No follow-up provider specified.     DISCHARGE MEDICATIONS:  Discharge Medication List as of 10/24/2020 11:46 PM          Carlos Wolf DO  Emergency Medicine Resident    (Please note that portions of this note were completed with a voice recognition program.Efforts were made to edit the dictations but occasionally words are mis-transcribed.)       Cecy Simpson DO  Resident  10/25/20 0001

## 2020-10-25 NOTE — ED PROVIDER NOTES
[facility] ED  Emergency Department Encounter  EmergencyMedicine Resident     Pt Yadiel Casey  MRN: [id number]  [place] 1990  Date of evaluation: 10/25/20  PCP:  No primary care provider on file. CHIEF COMPLAINT       Chief Complaint   Patient presents with    Back Pain     \"here for a MRI on the spine\"       HISTORY OF PRESENT ILLNESS  (Location/Symptom, Timing/Onset, Context/Setting, Quality, Duration, Modifying Factors, Severity.)      Paul Mcgowan is a 30 y.o. female who presents with breast complaints including request for an MRI stating that she is having a multiple sclerosis flare. Patient states that she is having significant thoracic lumbar and cervical back pain she has inability to to ambulate on her own requiring her boyfriend to lift her out of a wheelchair. Patient states she is she is having urinary and fecal incontinence intermittent vision loss she is having numbness and loss of sensation of her lower extremities. She denies any fevers or chills. She has been eating and drinking normally. States this in the past she has had \"epidural injections\" for her pain    PAST MEDICAL / SURGICAL / SOCIAL / FAMILY HISTORY      has a past medical history of Asthma, Bipolar disorder ([facility] Cervical dysplasia, Migraine, and Seizures ([facility] has a past surgical history that includes Cholecystectomy (2007); Tubal ligation; and Appendectomy.       Social History     Socioeconomic History    Marital status:      Spouse name: Not on file    Number of children: Not on file    Years of education: Not on file    Highest education level: Not on file   Occupational History    Not on file   Social Needs    Financial resource strain: Not on file    Food insecurity     Worry: Not on file     Inability: Not on file    Transportation needs     Medical: Not on file     Non-medical: Not on file   Tobacco Use    Smoking status: Current Every Day Smoker Packs/day: 1.00     Years: 11.00     Pack years: 11.00     Types: Cigarettes    Smokeless tobacco: Never Used   Substance and Sexual Activity    Alcohol use: Not Currently     Alcohol/week: 0.0 standard drinks     Comment: occassional, heavy 3 years ago    Drug use: Not Currently     Types: Opiates , Marijuana     Comment: Hx Percocet abuse sober for 2 years.  Sexual activity: Not on file   Lifestyle    Physical activity     Days per week: Not on file     Minutes per session: Not on file    Stress: Not on file   Relationships    Social connections     Talks on phone: Not on file     Gets together: Not on file     Attends Bahai service: Not on file     Active member of club or organization: Not on file     Attends meetings of clubs or organizations: Not on file     Relationship status: Not on file    Intimate partner violence     Fear of current or ex partner: Not on file     Emotionally abused: Not on file     Physically abused: Not on file     Forced sexual activity: Not on file   Other Topics Concern    Not on file   Social History Narrative    Not on file       Family History   Problem Relation Age of Onset    Diabetes Mother         G.Parents    Hypertension Mother         G.Parents    Diabetes Father     Hypertension Father     Stroke Father         G.Parents    Cancer Other         G.Parents, Pancrease and ??    Coronary Art Dis Other         G.Parents       Allergies:  Claritin [loratadine]; Diclofenac; Morphine; and Pcn [penicillins]    Home Medications:  Prior to Admission medications    Medication Sig Start Date End Date Taking?  Authorizing Provider   acetaminophen (APAP EXTRA STRENGTH) 500 MG tablet Take 1 tablet by mouth every 8 hours as needed for Pain 10/7/20   Unruly Glass,    ibuprofen (ADVIL;MOTRIN) 600 MG tablet Take 1 tablet by mouth every 8 hours as needed for Pain 9/13/20   Hood Rios,    Divalproex Sodium (DEPAKOTE PO) Take by mouth    Historical Provider, MD   albuterol sulfate HFA (VENTOLIN HFA) 108 (90 Base) MCG/ACT inhaler Inhale 2 puffs into the lungs 4 times daily as needed for Wheezing 3/24/20   Cyn Mckinney DO       REVIEW OF SYSTEMS    (2-9 systems for level 4, 10 or more for level 5)      Review of Systems   Constitutional: Positive for chills and fatigue. Negative for fever. HENT: Negative for congestion. Eyes: Positive for photophobia and visual disturbance. Respiratory: Negative for cough and shortness of breath. Cardiovascular: Negative for chest pain. Gastrointestinal: Negative for abdominal pain. Genitourinary: Positive for difficulty urinating. Musculoskeletal: Positive for back pain, gait problem, neck pain and neck stiffness. Skin: Negative for wound. Neurological: Positive for weakness and numbness. Psychiatric/Behavioral: Positive for behavioral problems. PHYSICAL EXAM   (up to 7 for level 4, 8 or more for level 5)      INITIAL VITALS:   /77   Pulse 66   Temp 98.2 °F (36.8 °C) (Oral)   Resp 20   Ht 4' 10\" (1.473 m)   Wt 95 lb (43.1 kg)   LMP 10/16/2020 (Approximate)   SpO2 98%   BMI 19.86 kg/m²     Physical Exam  Vitals signs reviewed. Constitutional:       Comments: Underweight, unkempt   HENT:      Head: Normocephalic and atraumatic. Right Ear: External ear normal.      Left Ear: External ear normal.      Nose: Nose normal.      Mouth/Throat:      Mouth: Mucous membranes are moist.   Eyes:      General: No scleral icterus. Right eye: No discharge. Left eye: No discharge. Conjunctiva/sclera: Conjunctivae normal.      Pupils: Pupils are equal, round, and reactive to light. Comments: No afferent pupillary defect   Neck:      Musculoskeletal: Normal range of motion. Comments: normal rom when not assessing patient. When examinating patient she complains of difficulty moving her neck. Cardiovascular:      Rate and Rhythm: Normal rate.    Pulmonary:      Breath sounds: Normal breath sounds. Abdominal:      Palpations: Abdomen is soft. Tenderness: There is no abdominal tenderness. Musculoskeletal:         General: No swelling, tenderness, deformity or signs of injury. Right lower leg: No edema. Left lower leg: No edema. Skin:     General: Skin is warm. Capillary Refill: Capillary refill takes less than 2 seconds. Neurological:      Mental Status: She is alert. GCS: GCS eye subscore is 4. GCS verbal subscore is 5. GCS motor subscore is 6. Cranial Nerves: No dysarthria or facial asymmetry. Sensory: Sensation is intact. Comments: Neurologic exam challenging as patient is largely nonparticipatory. Negative burleson's, +marsh's, she is having photophobia when I assess her pupils closing her eyes tightly and refusing to open them but does not appear bothered by the bright lights of the room. She states she is unable to bear weight and has her significant other lift her out of her wheelchair onto the cot but can move all extremities once on the bed removing her laced up boots rapidly and without difficulty. She flexes spine to do so without difficulty or pain and flexes her knees hips however when I range her joints she complains of significant back pain. Negative babinski b/l. Psychiatric:         Attention and Perception: Attention normal.         Mood and Affect: Mood is elated. Speech: Speech is rapid and pressured. Behavior: Behavior is hyperactive. Cognition and Memory: Memory is impaired. Judgment: Judgment is impulsive.          DIFFERENTIAL  DIAGNOSIS     PLAN (LABS / IMAGING / EKG):  Orders Placed This Encounter   Procedures    MRI CERVICAL SPINE W WO CONTRAST    MRI THORACIC SPINE W WO CONTRAST    MRI LUMBAR SPINE W WO CONTRAST    MRI BRAIN W WO CONTRAST    External Referral To Physical Therapy    External Referral To Occupational Therapy    Inpatient consult to Neurology    EEG awake and drowsy    Place CT Compatible peripheral IV       MEDICATIONS ORDERED:  Orders Placed This Encounter   Medications    LORazepam (ATIVAN) injection 1 mg       DDX: multiple sclerosis, non-organic causes    DIAGNOSTIC RESULTS / EMERGENCY DEPARTMENT COURSE / MDM   LAB RESULTS:  No results found for this visit on 10/25/20. IMPRESSION: alert anxious, underweight unkempt 25-year-old female presenting in a wheelchair with her significant other she is complaining of numerous neurologic deficits and requesting an MRI. Patient states she was unable to have this done last evening. Her pain is out of proportion to her examination. Her neurologic examination is limited by poor patient participation. I have a very low suspicion for a neurologic basis of her symptoms as the aspects of her exam which she allows me to complete are without anatomical or physiological basis/pattern. Review of records does not show a formal diagnosis of any neurologic condition including her stated MS. Will proceed with mri imaging to further elucidate a neurologic cause of patient's symptoms. RADIOLOGY:  Mri Cervical Spine W Wo Contrast    Result Date: 10/25/2020  EXAMINATION: MRI OF THE CERVICAL SPINE WITHOUT AND WITH CONTRAST,  10/25/2020 8:49 am TECHNIQUE: Multiplanar multisequence MRI of the cervical spine was performed without and with the administration of intravenous contrast. COMPARISON: May 5, 2012 HISTORY: ORDERING SYSTEM PROVIDED HISTORY: Pain neuro deficits eval for MS exacerbation other causes. TECHNOLOGIST PROVIDED HISTORY: Pain neuro deficits eval for MS exacerbation other causes. Is the patient pregnant?->No Reason for Exam: Pain neuro deficits eval for MS exacerbation other causes ORDERING SYSTEM PROVIDED HISTORY: Pain neuro deficits eval for MS exac. or other causes. TECHNOLOGIST PROVIDED HISTORY: Pain neuro deficits eval for MS exac. or other causes.  Is the patient pregnant?->No Reason for Exam: Pain neuro deficits eval for MS exacerbation other causes ORDERING SYSTEM PROVIDED HISTORY: Pain neuro deficits eval for MS exac and other causes. TECHNOLOGIST PROVIDED HISTORY: Pain neuro deficits eval for MS exac and other causes. Is the patient pregnant?->No Reason for Exam: Pain neuro deficits eval for MS exacerbation other causes. FINDINGS: BONES/ALIGNMENT: No acute fracture. No subluxation. No suspicious bone marrow replacing lesion. SPINAL CORD: Mild increased signal is seen within the cervical spinal cord anteriorly from C3-C7. SOFT TISSUES: No prevertebral soft tissue swelling. No acute paraspinal abnormality. No abnormal postcontrast enhancement. No enhancing spinal cord lesion. DEGENERATIVE CHANGES: No significant central canal or foraminal stenosis. Minimal disc bulges from C3-C5. Mild increased signal within the cervical spinal cord from C3-C7 is nonspecific but could be related to history of demyelinating disease. Mri Thoracic Spine W Wo Contrast    Result Date: 10/25/2020  EXAMINATION: MRI OF THE THORACIC SPINE WITHOUT AND WITH CONTRAST,  10/25/2020 8:49 am TECHNIQUE: Multiplanar multisequence MRI of the thoracic spine was performed without and with the administration of intravenous contrast. COMPARISON: May 5, 2012 HISTORY: ORDERING SYSTEM PROVIDED HISTORY: Pain neuro deficits eval for MS exac. or other causes. TECHNOLOGIST PROVIDED HISTORY: Pain neuro deficits eval for MS exac. or other causes. Is the patient pregnant?->No Reason for Exam: Pain neuro deficits eval for MS exacerbation other causes. FINDINGS: BONES/ALIGNMENT: No acute fracture. No subluxation. No suspicious bone marrow replacing lesion. SPINAL CORD: No abnormal signal within the thoracic spinal cord. SOFT TISSUES:  No soft tissue abnormality. No abnormal postcontrast enhancement. DEGENERATIVE CHANGES: No significant central canal or foraminal stenosis. Negative MRI of the thoracic spine.      Mri Lumbar Spine W Wo Contrast    Result Date: 10/25/2020  EXAMINATION: MRI OF THE LUMBAR SPINE WITHOUT AND WITH CONTRAST  10/25/2020 8:49 am TECHNIQUE: Multiplanar multisequence MRI of the lumbar spine was performed without and with the administration of intravenous contrast. COMPARISON: February 16, 2020 May 5, 2012 HISTORY: ORDERING SYSTEM PROVIDED HISTORY: pain neuro deficits eval for ms exac and other causes TECHNOLOGIST PROVIDED HISTORY: pain neuro deficits eval for ms exac and other causes Is the patient pregnant?->No Reason for Exam: pain neuro deficits eval for ms exacerbation other causes FINDINGS: BONES/ALIGNMENT: No acute fracture. No subluxation. No suspicious bone marrow replacing lesion. SPINAL CORD:  Normal signal within the conus which terminates at L1-L2. No abnormal postcontrast enhancement. SOFT TISSUES: No acute paraspinal abnormality. Cystic appearing area adjacent to the aorta has been present since 2012 and may represent a benign lesion such as a lymphatic malformation or duplication cyst. DEGENERATIVE CHANGES: Mild degenerative changes similar to the previous study. No acute findings. EKG  none    All EKG's are interpreted by the Emergency Department Physician who either signs or Co-signs this chart in the absence of a cardiologist.    EMERGENCY DEPARTMENT COURSE:  ED Course as of Oct 25 1647   Sun Oct 25, 2020   1138 Given nonspecific c spine findings will consult neuro    [BG]   1259 Spoke with dr. Andrea Heller neuro will see pt. [BG]      ED Course User Index  [BG] Nikhil Screws, DO     After neurology saw patient and before they could make recommendations the patient got into an argument with her significant other and subsequently left the hospital prior to completion of her workup. PROCEDURES:  none    CONSULTS:  None    CRITICAL CARE:  Please see attending note    FINAL IMPRESSION      1. Neurologic abnormality    2. Bowel and bladder incontinence    3.  Seizures ([address]         DISPOSITION / PLAN     DISPOSITION  left before treatment complete      PATIENT REFERRED TO:  Rogelio Ag MD  [address]  [phone]    Schedule an appointment as soon as possible for a visit in 3 weeks  MS, seizures      DISCHARGE MEDICATIONS:  Discharge Medication List as of 10/25/2020  1:42 PM          Jm Stephenson DO  Emergency Medicine Resident    (Please note that portions of thisnote were completed with a voice recognition program.  Efforts were made to edit the dictations but occasionally words are mis-transcribed.)       Jm Stephenson DO  Resident  10/25/20 4045

## 2020-10-25 NOTE — ED NOTES
Yelling heard from pt's room, pt and significant other arguing. Pt yelling at her significant other about a female that she heard on the phone with him. Pt informed that the yelling was inappropriate and that if this was going to continue that the significant other needs to leave. Pt's significant other telling writer \"Just let us be. Leave\". Writer asked pt's significant other to waiting in waiting room until pt's treatment was complete because the yelling and arguing was not going to be tolerated and was inappropriate. Pt began yelling at [address] stating \"He is not going anywhere. He is staying here. Leave us alone\" and continued to yell at [address]. SUZANNE Wright at bedside. Pt stating that she wants to leave and does not want to be here if her significant other is not allowed to be in the same room as her. Significant other is refusing to leave the room and pt stating that she wants to leave and is getting dressed to leave. Ney Eli, Student Nurse at bedside to remove IV. [facility] PD remains at bedside. Dr. Arturo Hightower notified and at bedside.      Elvin Larson, RN  10/25/20 2482

## 2020-10-25 NOTE — ED NOTES
Security called to bedside. Pt is arguing and screaming at family at bedside. Pt is cursing and being inapropriate. Jania RN at bedside to attempt to diffuse situation. Asked family member to leave. Patient continues to scream and yell and is demanding that her  be able to stay.       Cheikh Zafar, MARJAN  10/25/20 [id number]

## 2020-10-25 NOTE — ED NOTES
Neurology resident at bedside to evaluate the patient     [facility]  10/24/20 9668

## 2020-10-25 NOTE — ED NOTES
Urine specimen obtained, labeled, and sent to lab via tube system     [facility]  10/24/20 2907

## 2020-10-25 NOTE — CONSULTS
or constipation    GENITOURINARY: negative for incontinence or retention    MUSCULOSKELETAL: negative for neck, negative for extremity pain  Positive for back pain (refer from cervical to lumbar)   NEUROLOGICAL: Negative for seizures, headaches, confusion, aphasia, dysarthria  Positive for numbness, weakness   PSYCHIATRIC: negative for agitation, hallucination, SI/HI   SKIN Negative for spontaneous contusions, rashes, or lesions      PHYSICAL EXAM:    Vitals:  /77   Pulse 66   Temp 98.2 °F (36.8 °C) (Oral)   Resp 20   Ht 4' 10\" (1.473 m)   Wt 95 lb (43.1 kg)   LMP 10/16/2020 (Approximate)   SpO2 98%   BMI 19.86 kg/m²      General Appearance:  Appropiate    MENTAL STATUS:  Alert, Oriented x 3 (Person, Place, Time),    Good Mood, Intact memory (Recent, Remote), No confusion,    Normal speech, Normal language (Spontaneous, Comprehension, Naming, Repetition, Reading, Writing)   No hallucination or delusion   Appropriate, Follows 1, 2, 3 step command, cross over   Normal Glabellar Response   CRANIAL NERVES: II     -      Visual fields intact to confrontation (blink to threat). Report some double vision in each eye independently  Fundoscopic Exam: Not tested  Pupillary Reflex (2 & 3):  PERR (R 3mm, L 3mm) to direct and consensual response bilaterally    III,IV,VI -  EOMs full intact (Patient with difficulty moving eyes due to light sensitivity but able to go to extreme on each direction, no pain), no ptosis, no diplopia, no nystagmus    V     -     Normal facial sensation bilaterally    VII    -     Normal facial symmetry    VIII   -     Intact hearing bilaterally    IX,X -     Symmetrical palate    XI    -     Symmetrical shoulder shrug    XII   -     Midline tongue, no atrophy   MOTOR FUNCTION: Observation: No fasciculations, no atrophy, No tremors/involuntary movements in all 4 extremities proximally and distally    Passive Movement: Normal tone and bulk in all 4 extremities proximally and (Babinski) Response: No response     Negative [facility]   STATION and GAIT  Deferred due to weakness, wheelchair              Additional Examination Elements and Findings:     LUNGS:  CTA x 2  CARDIOVASCULAR:  RRR, S1, S2    DATA  Recent Results (from the past 24 hour(s))   CBC WITH AUTO DIFFERENTIAL    Collection Time: 10/24/20 10:12 PM   Result Value Ref Range    WBC 9.3 3.5 - 11.3 k/uL    RBC 4.59 3.95 - 5.11 m/uL    Hemoglobin 14.3 11.9 - 15.1 g/dL    Hematocrit 42.4 36.3 - 47.1 %    MCV 92.4 82.6 - 102.9 fL    MCH 31.2 25.2 - 33.5 pg    MCHC 33.7 28.4 - 34.8 g/dL    RDW 13.2 11.8 - 14.4 %    Platelets 090 058 - 774 k/uL    MPV 10.0 8.1 - 13.5 fL    NRBC Automated 0.0 0.0 per 100 WBC    Differential Type NOT REPORTED     Seg Neutrophils 53 36 - 65 %    Lymphocytes 41 24 - 43 %    Monocytes 5 3 - 12 %    Eosinophils % 1 1 - 4 %    Basophils 0 0 - 2 %    Immature Granulocytes 0 0 %    Segs Absolute 4.84 1.50 - 8.10 k/uL    Absolute Lymph # 3.79 (H) 1.10 - 3.70 k/uL    Absolute Mono # 0.47 0.10 - 1.20 k/uL    Absolute Eos # 0.12 0.00 - 0.44 k/uL    Basophils Absolute 0.04 0.00 - 0.20 k/uL    Absolute Immature Granulocyte <0.03 0.00 - 0.30 k/uL    WBC Morphology NOT REPORTED     RBC Morphology NOT REPORTED     Platelet Estimate NOT REPORTED    C-REACTIVE PROTEIN    Collection Time: 10/24/20 10:12 PM   Result Value Ref Range    CRP 2.0 0.0 - 5.0 mg/L   Comprehensive Metabolic Panel    Collection Time: 10/24/20 10:12 PM   Result Value Ref Range    Glucose 91 70 - 99 mg/dL    BUN 18 6 - 20 mg/dL    CREATININE 0.82 0.50 - 0.90 mg/dL    Bun/Cre Ratio NOT REPORTED 9 - 20    Calcium 9.3 8.6 - 10.4 mg/dL    Sodium 132 (L) 135 - 144 mmol/L    Potassium 3.6 (L) 3.7 - 5.3 mmol/L    Chloride 98 98 - 107 mmol/L    CO2 25 20 - 31 mmol/L    Anion Gap 9 9 - 17 mmol/L    Alkaline Phosphatase 64 35 - 104 U/L    ALT 10 5 - 33 U/L    AST 13 <32 U/L    Total Bilirubin 0.28 (L) 0.3 - 1.2 mg/dL    Total Protein 6.6 6.4 - 8.3 g/dL    Alb 4.2 3.5 - 5.2 g/dL    Albumin/Globulin Ratio 1.8 1.0 - 2.5    GFR Non-African American >60 >60 mL/min    GFR African American >60 >60 mL/min    GFR Comment          GFR Staging NOT REPORTED    Sedimentation Rate    Collection Time: 10/24/20 10:12 PM   Result Value Ref Range    Sed Rate 4 0 - 20 mm   Urinalysis Reflex to Culture    Collection Time: 10/24/20 11:21 PM    Specimen: Urine, clean catch   Result Value Ref Range    Color, UA YELLOW YELLOW    Turbidity UA CLOUDY (A) CLEAR    Glucose, Ur NEGATIVE NEGATIVE    Bilirubin Urine NEGATIVE NEGATIVE    Ketones, Urine TRACE (A) NEGATIVE    Specific Gravity, UA 1.028 1.005 - 1.030    Urine Hgb NEGATIVE NEGATIVE    pH, UA 6.0 5.0 - 8.0    Protein, UA 1+ (A) NEGATIVE    Urobilinogen, Urine Normal Normal    Nitrite, Urine NEGATIVE NEGATIVE    Leukocyte Esterase, Urine MODERATE (A) NEGATIVE    Urinalysis Comments NOT REPORTED    Microscopic Urinalysis    Collection Time: 10/24/20 11:21 PM   Result Value Ref Range    -          WBC, UA TOO NUMEROUS TO COUNT 0 - 5 /HPF    RBC, UA 2 TO 5 0 - 4 /HPF    Casts UA  0 - 8 /LPF     10 TO 20 HYALINE Reference range defined for non-centrifuged specimen. Crystals, UA NOT REPORTED None /HPF    Epithelial Cells UA 5 TO 10 0 - 5 /HPF    Renal Epithelial, UA NOT REPORTED 0 /HPF    Bacteria, UA FEW (A) None    Mucus, UA NOT REPORTED None    Trichomonas, UA NOT REPORTED None    Amorphous, UA NOT REPORTED None    Other Observations UA NOT REPORTED NOT REQ. Yeast, UA NOT REPORTED None     IMAGING    1. Cervical Spine MRI W/WO (10/25/2020)  Impression    Mild increased signal within the cervical spinal cord from C3-C7 is    nonspecific but could be related to history of demyelinating disease.           2.  Thoracic Spine MRI W/WO (10/25/2020)  Impression    Negative MRI of the thoracic spine.           3. Lumbar MRI W/WO (10/25/2020)  Impression    No acute findings.           4. Head CT WO (7/26/2020)  Impression    No acute RI W/WO done. Patient will benefit from Physical therapy and Occupational therapy. There where some instances for ED staff where patient was able to get out of bed and move around. Other times will refer is unable to move extremities. If is Multiple Sclerosis will recommend to start education along with patient about Disease Modifying Treatments. RECOMMENDATIONS:   1. Will recommend to have Brain MRI W/WO  2. Will recommend to have evaluation by Physical Therapy and Occupational Therapy  3. Will consider Lumbar Puncture once Brain MRI W/WO is done. 4. Continue current treatment and home medications. 5. Will need evaluation for DMT once Brain MRI W/WO is done. 6. Will follow up with General neurology in 3-4 weeks for evaluation    Thank you for the consultation. Will follow.  Case discussed with Dr. Ramiro Green MD [facility]  Adult General Neurology Resident PGY-4  10/25/2020 at 1:30PM

## 2020-10-25 NOTE — ED PROVIDER NOTES
[address] [facility]     Emergency Department     Faculty Attestation    I performed a history and physical examination of the patient and discussed management with the resident. I reviewed the residents note and agree with the documented findings including all diagnostic interpretations and plan of care. Any areas of disagreement are noted on the chart. I was personally present for the key portions of any procedures. I have documented in the chart those procedures where I was not present during the key portions. I have reviewed the emergency nurses triage note. I agree with the chief complaint, past medical history, past surgical history, allergies, medications, social and family history as documented unless otherwise noted below. Documentation of the HPI, Physical Exam and Medical Decision Making performed by [email or web address] is based on my personal performance of the HPI, PE and MDM. For Physician Assistant/ Nurse Practitioner cases/documentation I have personally evaluated this patient and have completed at least one if not all key elements of the E/M (history, physical exam, and MDM). Additional findings are as noted. This patient was evaluated in the Emergency Department for symptoms described in the history of present illness. He/she was evaluated in the context of the global COVID-19 pandemic, which necessitated consideration that the patient might be at risk for infection with the SARS-CoV-2 virus that causes COVID-19. Institutional protocols and algorithms that pertain to the evaluation of patients at risk for COVID-19 are in a state of rapid change based on information released by regulatory bodies including the CDC and federal and state organizations. These policies and algorithms were followed during the patient's care in the ED. Primary Care Physician: No primary care provider on file. History:  This is a 30 y.o. female who presents to the Emergency Department with complaint of back pain. Patient was seen yesterday for same and had MRI of the cervical thoracic and lumbar spine recommended by the neurology service however she left AGAINST MEDICAL ADVICE at that time. She reports urinary incontinence as well as diffuse numbness. She reports that she has had significant pain in the back since fracturing the coccyx 2 years ago. She reports that she has a history of MS however there is no confirmed [place] documentation to support this. She reports that she was diagnosed with this at the age of 10 by her family doctor. Does not have a neurologist that she follows with, although she reports she saw a neurologist briefly when she came back from [place] several years ago. Physical:     height is 4' 10\" (1.473 m) and weight is 95 lb (43.1 kg). Her oral temperature is 98.2 °F (36.8 °C). Her blood pressure is 114/77 and her pulse is 66. Her respiration is 20 and oxygen saturation is 98%.    30 y.o. female no acute distress, mildly anxious, borderline pressured speech, cardiac exam regular rate and rhythm no murmurs rubs gallops, pulmonary clear bilaterally, diffuse tenderness to the upper and lower back to minimal touch. Patient keeps feet plantar flexed but does not show evidence of contracture. Plantarflexion strength is good however dorsiflexion is 2 out of 5 although I do have some suspicion that this is due to poor effort    Impression: back pain, reported weakness. I did review patient's medical records fairly extensively including reading through all MRIs we have on record related to this patient with no evidence of any lesions present on neurologic imaging that would suggest multiple sclerosis. In context of documentation of patient's behavior in the emergency department I suspect there may be some form of secondary gain involved, specifically seems consistent with Munchhausen's.     Plan: Proceed with MRI cervical thoracic and lumbar spine recommended by neurology on last visit. If significant findings may require admission. Nonopioid analgesia.       Elidia Srivastava MD, [facility] Cherry  Attending Emergency Physician         Rodrigue Aguilar MD  10/25/20 1301

## 2020-10-25 NOTE — ED NOTES
Pt. verbalizes to staff that she does not want to stay over night for evaluation because her spouse is unable to stay. Pt. States she will be back in the morning for further evaluation. Dr. Yisel Ferguson and writer at bedside to fill our [place] paperwork and explain possible risks of leaving without full medical evaluation. Pt. Verbalized understanding of these risks.       Kalpana Galvin RN  10/24/20 1134

## 2020-10-25 NOTE — ED NOTES
Pt arrived to the ED via wheelchair for MRI of her spine. Pt was here yesterday, but left AMA because of anxiety about MRI. Pt was told to come back when she felt ready for the MRI. Pt has complaints on spinal pain near the neck and in the lower back. Patient states that she has numbness in both of her lower extremities. Patient states pain to be a 9/10. Patient appears to be very anxious. Patient is currently in bed resting. Will continue to monitor.      Irl Ahr  10/25/20 [id number]

## 2020-10-25 NOTE — CONSULTS
[facility] Neurology   [facility]            Date:   10/25/2020  Patient name:  Rohini Alan  Date of admission:  10/24/2020  8:46 PM  MRN:   [id number]  Account:  [de-identified]  YOB: 1990  PCP:    No primary care provider on file. Room:   20/20  Code Status:    Prior    Chief Complaint:     Chief Complaint   Patient presents with    Incontinence     states ms exhasterbation, 2 incontinent episodes per week for the past 2 months    Neck Pain     today    Numbness     on left hand, left leg  \"whole spine\"    Back Pain     6 months      History Obtained From:     patient, electronic medical record    History of Present Illness:     24-year-old female past medical history of multiple sclerosis presenting with bilateral arm and lower extremity weakness with worsening urinary and bowel incontinence over the past 2 years. As per the patient she used to live in [place] and supposedly had been diagnosed with MS 10 years ago however has not taken any treatments for that. Patient is a frequent flyer to the ER with more than 10 visits in the past 4 months with similar complaints. We were consulted for possible flareup of MS. Had multiple MRIs of the lumbar spine done showing mild foraminal stenosis with no focal lesions. CT of the head in July 2020 normal.  Initial work-up including CRP and CBC normal.  Plan to initiate MRI of the cervical, thoracic and lumbar spine with admission to the obs unit.     Past Medical History:     Past Medical History:   Diagnosis Date    Asthma     Bipolar disorder ([facility] [address]     Cervical dysplasia     Migraine 11/11/2011    Seizures ([facility] [address]         Past Surgical History:     Past Surgical History:   Procedure Laterality Date    APPENDECTOMY      CHOLECYST[address]OMY  [address]    at [address]          Medications Prior to Admission:     Prior to Admission medications    Medication Sig Start Date End Date Taking? Authorizing Provider   acetaminophen (APAP EXTRA STRENGTH) 500 MG tablet Take 1 tablet by mouth every 8 hours as needed for Pain 10/7/20   Elizabeth Leon, DO   ibuprofen (ADVIL;MOTRIN) 600 MG tablet Take 1 tablet by mouth every 8 hours as needed for Pain 9/13/20   Marty Rios, DO   Divalproex Sodium (DEPAKOTE PO) Take by mouth    Historical Provider, MD   albuterol sulfate HFA (VENTOLIN HFA) 108 (90 Base) MCG/ACT inhaler Inhale 2 puffs into the lungs 4 times daily as needed for Wheezing 3/24/20   Cyn Mckinney,         Allergies:     Claritin [loratadine]; Diclofenac; Morphine; and Pcn [penicillins]    Social History:     Tobacco:    reports that she has been smoking cigarettes. She has a 11.00 pack-year smoking history. She has never used smokeless tobacco.  Alcohol:      reports previous alcohol use. Drug Use:  reports previous drug use. Drugs: Opiates  and Marijuana. Family History:     Family History   Problem Relation Age of Onset    Diabetes Mother         G.Parents    Hypertension Mother         G.Parents    Diabetes Father     Hypertension Father     Stroke Father         G.Parents    Cancer Other         G.Parents, Pancrease and ??    Coronary Art Dis Other         G.Parents       Review of Systems:     ROS:  Constitutional  Negative for fever and chills    HEENT  Negative for ear discharge, ear pain, nosebleed    Eyes  Negative for photophobia, pain and discharge    Respiratory  Negative for hemoptysis and sputum    Cardiovascular  Negative for orthopnea, claudication and PND    Gastrointestinal  Negative for abdominal pain, diarrhea, blood in stool    Musculoskeletal   Generalized body aches more in joints. Skin  Negative for rash or itching    Neurological  numbness and weakness. Endo/heme/allergies  Negative for polydipsia, environmental allergy    Psychiatric/behavioral  Negative for suicidal ideation.  Patient is not anxious        Physical Exam:   BP 112/72   Pulse 74   Temp 98 °F (36.7 °C) (Temporal)   Resp 18   Ht 4' 10\" (1.473 m)   Wt 95 lb (43.1 kg)   LMP 10/16/2020 (Approximate)   SpO2 98%   BMI 19.86 kg/m²   Temp (24hrs), Av °F (36.7 °C), Min:98 °F (36.7 °C), Max:98 °F (36.7 °C)    No results for input(s): POCGLU in the last 72 hours. No intake or output data in the 24 hours ending 10/25/20 0002    General Appearance:  alert, in distress due to pain. HEENT:  normocephalic, atraumatic. Lungs: Bilateral equal air entry, clear to ausculation, no wheezing, rales or rhonchi, normal effort  Cardiovascular: normal rate, regular rhythm, no murmur, gallop, rub. Abdomen: Soft, nontender, nondistended, normal bowel sounds, no hepatomegaly or splenomegaly    NEUROLOGIC EXAMINATION  MENTAL STATUS:  Alert, Oriented x 3 (Person, Place, Time),    Good Mood, Intact memory (Recent, Remote), No confusion,    Normal speech, Normal language (Spontaneous, Comprehension, Naming, Repetition, Reading, Writing)   No hallucination or delusion   Appropriate, Follows 1, 2, 3 step command, cross over   Normal Glabellar Response   CRANIAL NERVES: II     -      Visual fields intact to confrontation (blink to threat) and reporting fingers in all 4 quadrants on each eye, Negative visual extinction    III,IV,VI -  EOMs full intact, no ptosis, no diplopia, no nystagmus    V     -     Normal facial sensation bilaterally    VII    -     Normal facial symmetry    VIII   -     Intact hearing bilaterally (Finger Rub, Rinne's and Ranny Mirian)    IX,X -     Symmetrical palate    XI    -     Symmetrical shoulder shrug    XII   -     Midline tongue, no atrophy   MOTOR & SENSORY FUNCTION: Observation: No fasciculations, no atrophy, No tremors/involuntary movements in all 4 extremities proximally and distally    Passive Movement: Normal tone and bulk in all 4 extremities proximally and distally    Active Movement:  RUE: Patient refused to move all of her extremities.   Stated that she had no sensation in her hands and feet however positive response to pinprick and pain.     Drift: None           REFLEX FUNCTION:  Right Triceps (C7): 2/2                                             Left Tricep (C7): 2/2   Right Bicep (C5, C6): 2/2                                         Left Bicep (C5, C6): 2/2   Right Brachiocephalic (C6): 2/2                               Left Brachiocephalic (C6): 2/2     Right Patella (L4): 2/2                                              Left Patella (L4): 2/2   Right Achilles (S1): 2/2                                            Left Achilles (S1): 2/2      Stover Sign: Negative   Plantar (Babinski) Response: Flexor     Negative Kerning & Brudzinski   STATION and GAIT  Normal gait and tandem station, normal tip toes and heel walking, Negative Romberg's     Investigations:      Laboratory Testing:  Recent Results (from the past 24 hour(s))   CBC WITH AUTO DIFFERENTIAL    Collection Time: 10/24/20 10:12 PM   Result Value Ref Range    WBC 9.3 3.5 - 11.3 k/uL    RBC 4.59 3.95 - 5.11 m/uL    Hemoglobin 14.3 11.9 - 15.1 g/dL    Hematocrit 42.4 36.3 - 47.1 %    MCV 92.4 82.6 - 102.9 fL    MCH 31.2 25.2 - 33.5 pg    MCHC 33.7 28.4 - 34.8 g/dL    RDW 13.2 11.8 - 14.4 %    Platelets 008 741 - 775 k/uL    MPV 10.0 8.1 - 13.5 fL    NRBC Automated 0.0 0.0 per 100 WBC    Differential Type NOT REPORTED     Seg Neutrophils 53 36 - 65 %    Lymphocytes 41 24 - 43 %    Monocytes 5 3 - 12 %    Eosinophils % 1 1 - 4 %    Basophils 0 0 - 2 %    Immature Granulocytes 0 0 %    Segs Absolute 4.84 1.50 - 8.10 k/uL    Absolute Lymph # 3.79 (H) 1.10 - 3.70 k/uL    Absolute Mono # 0.47 0.10 - 1.20 k/uL    Absolute Eos # 0.12 0.00 - 0.44 k/uL    Basophils Absolute 0.04 0.00 - 0.20 k/uL    Absolute Immature Granulocyte <0.03 0.00 - 0.30 k/uL    WBC Morphology NOT REPORTED     RBC Morphology NOT REPORTED     Platelet Estimate NOT REPORTED    C-REACTIVE PROTEIN    Collection Time: 10/24/20 10:12 PM   Result Value None    Trichomonas, UA NOT REPORTED None    Amorphous, UA NOT REPORTED None    Other Observations UA NOT REPORTED NOT REQ. Yeast, UA NOT REPORTED None       Imaging/Diagnostics:    1. Assessment & Differential Dx:      Primary Problem  <principal problem not specified>    Active Hospital Problems    Diagnosis Date Noted    Bowel and bladder incontinence [R32, R15.9] 10/24/2020       49-year-old female with history of multiple sclerosis, bipolar 1 disorder, with no radiological evidence on MRI of the brain or cervical spine. UTI on labs. Self-reported 25 pound weight loss over the last 2 months. Negative CRP, ESR. Negative CBC and BMP. Multiple ER visits for similar complaints over the last 12 months. Negative MRI of the lumbar spine for any space-occupying lesions. MRI Lumbar Spine W WO Contrast 2/16/2020  Impression    1. L5/S1 mild right neural foraminal narrowing secondary to encroachment by    foraminal disc extrusion. 2. L2/L3 through L5/S1 minimal-to-mild spondylosis without associated central    canal compromise, as discussed above. 3. Unremarkable appearance of the conus medullaris.                  CT Head WO Contrast 7/26/2020     Impression    No acute abnormality of the head.               Plan:     1. Plan admit to observation unit with neurologist consult. MRI of the cervical, thoracic and lumbar spine in the a.m. Recommend treating UTI. As per ER resident, patient eloped and left AMA stating that she would return in the morning.       Emily Roldan MD  [facility], [place]         10/25/2020, 12:10 AM

## 2020-10-25 NOTE — ED PROVIDER NOTES
[facility]     Emergency Department     Faculty Attestation    I performed a history and physical examination of the patient and discussed management with the resident. I have reviewed and agree with the residents findings including all diagnostic interpretations, and treatment plans as written at the time of my review. Any areas of disagreement are noted on the chart. I was personally present for the key portions of any procedures. I have documented in the chart those procedures where I was not present during the key portions. For Physician Assistant/ Nurse Practitioner cases/documentation I have personally evaluated this patient and have completed at least one if not all key elements of the E/M (history, physical exam, and MDM). Additional findings are as noted. This patient was evaluated in the Emergency Department for symptoms described in the history of present illness. The patient was evaluated in the context of the global COVID-19 pandemic, which necessitated consideration that the patient might be at risk for infection with the SARS-CoV-2 virus that causes COVID-19. Institutional protocols and algorithms that pertain to the evaluation of patients at risk for COVID-19 are in a state of rapid change based on information released by regulatory bodies including the CDC and federal and state organizations. These policies and algorithms were followed during the patient's care in the ED. Primary Care Physician: No primary care provider on file. History: This is a 30 y.o. female who presents to the Emergency Department with complaint of neck pain, back pain, numbness. This been ongoing for last couple of weeks. Patient denies any trauma. The patient states she has a history of MS but does not follow up with her neurologist.    Physical:   height is 4' 10\" (1.473 m) and weight is 95 lb (43.1 kg). Her temporal temperature is 98 °F (36.7 °C).  Her blood pressure is 112/72 and her pulse is 74. Her respiration is 18 and oxygen saturation is 98%. Awake alert moving all extremities nontoxic-appearing acute distress. Impression: Neck and back pain, numbness    Plan: Neurology consultation      (Please note that portions of this note were completed with a voice recognition program.  Efforts were made to edit the dictations but occasionally words are mis-transcribed.)    Andrey Setting.  Jorge Tucker MD, [facility]  Attending Emergency Medicine Physician        Antoni Gloria MD  10/24/20 1029

## 2020-10-25 NOTE — ED NOTES
Pt presents to the ED with c/o neck and back pain starting yesterday. Pt. Has a hx. Of multiple sclerosis and since yesterday has had an increase in neck and back pain. Pt states the pain is in her spine and pt. Now states a decrease use of lower extremities and numbness as well. Pt. States an incontinence starting yesterday as well as difficulty ambulating. Pt. Denies any nausea or vomiting at this time. Pt. Denies changes in bowel patterns. Pt resting on stretcher, no respiratory distress noted, pt updated on plan of care, will continue to monitor, call light in reach. Pt. Significant other at bedside.       Brian Huerta RN  10/24/20 2128

## 2020-10-29 ENCOUNTER — HOSPITAL ENCOUNTER (EMERGENCY)
Age: 30
Discharge: HOME | End: 2020-10-29
Attending: EMERGENCY MEDICINE
Payer: MEDICAID

## 2020-10-29 VITALS
DIASTOLIC BLOOD PRESSURE: 55 MMHG | BODY MASS INDEX: 20.48 KG/M2 | HEART RATE: 83 BPM | OXYGEN SATURATION: 97 % | WEIGHT: 98 LBS | SYSTOLIC BLOOD PRESSURE: 88 MMHG | TEMPERATURE: 98.6 F | RESPIRATION RATE: 16 BRPM

## 2020-10-29 PROCEDURE — 99282 EMERGENCY DEPT VISIT SF MDM: CPT

## 2020-10-29 PROCEDURE — 6370000000 HC RX 637 (ALT 250 FOR IP): Performed by: STUDENT IN AN ORGANIZED HEALTH CARE EDUCATION/TRAINING PROGRAM

## 2020-10-29 RX ORDER — ACETAMINOPHEN 500 MG
500 TABLET ORAL EVERY 8 HOURS PRN
Qty: 30 TABLET | Refills: 0 | Status: ON HOLD | OUTPATIENT
Start: 2020-10-29 | End: 2020-11-18 | Stop reason: HOSPADM

## 2020-10-29 RX ORDER — CYCLOBENZAPRINE HCL 10 MG
10 TABLET ORAL NIGHTLY PRN
Qty: 20 TABLET | Refills: 0 | Status: SHIPPED | OUTPATIENT
Start: 2020-10-29 | End: 2020-11-08

## 2020-10-29 RX ORDER — CYCLOBENZAPRINE HCL 10 MG
10 TABLET ORAL ONCE
Status: COMPLETED | OUTPATIENT
Start: 2020-10-29 | End: 2020-10-29

## 2020-10-29 RX ORDER — ACETAMINOPHEN 500 MG
1000 TABLET ORAL ONCE
Status: COMPLETED | OUTPATIENT
Start: 2020-10-29 | End: 2020-10-29

## 2020-10-29 RX ADMIN — ACETAMINOPHEN 1000 MG: 500 TABLET ORAL at 20:48

## 2020-10-29 RX ADMIN — CYCLOBENZAPRINE HYDROCHLORIDE 10 MG: 10 TABLET, FILM COATED ORAL at 20:48

## 2020-10-29 ASSESSMENT — PAIN DESCRIPTION - PROGRESSION: CLINICAL_PROGRESSION: GRADUALLY WORSENING

## 2020-10-29 ASSESSMENT — ENCOUNTER SYMPTOMS
NAUSEA: 0
VOMITING: 0
ABDOMINAL PAIN: 0
BACK PAIN: 1
SHORTNESS OF BREATH: 0
COUGH: 0
WHEEZING: 0

## 2020-10-29 ASSESSMENT — PAIN DESCRIPTION - PAIN TYPE: TYPE: CHRONIC PAIN

## 2020-10-29 ASSESSMENT — PAIN DESCRIPTION - LOCATION: LOCATION: BACK

## 2020-10-29 ASSESSMENT — PAIN DESCRIPTION - FREQUENCY: FREQUENCY: CONTINUOUS

## 2020-10-29 ASSESSMENT — PAIN DESCRIPTION - ONSET: ONSET: ON-GOING

## 2020-10-29 ASSESSMENT — PAIN DESCRIPTION - DESCRIPTORS: DESCRIPTORS: DISCOMFORT;ACHING;THROBBING

## 2020-10-29 ASSESSMENT — PAIN SCALES - GENERAL
PAINLEVEL_OUTOF10: 10
PAINLEVEL_OUTOF10: 10

## 2020-10-30 NOTE — ED PROVIDER NOTES
[facility]  ED  Emergency Department Encounter  Emergency Medicine Resident     Pt Name: Sebastián Lutz  MRN: [id number]  [place] 1990  Date of evaluation: 10/29/20  PCP:  No primary care provider on file. CHIEF COMPLAINT       Chief Complaint   Patient presents with    Back Pain       HISTORY OFPRESENT ILLNESS  (Location/Symptom, Timing/Onset, Context/Setting, Quality, Duration, Modifying Factors,Severity.)      Sebastián Lutz is a 31 yo female who presents with chronic back pain. Patient states that she has a history of MS and that she has had symptoms for years. She states that she has blurry vision, numbness and tingling in her arms and legs, she is wheelchair-bound although I have seen her ambulate. She states that she has weakness in her extremities and urinary bowel incontinence. I did see the patient for the same thing about a week ago, no changes in symptoms. These are all chronic. She did get MRIs of her whole spine the last time she was here. No new symptoms. PAST MEDICAL / SURGICAL / SOCIAL / FAMILY HISTORY      has a past medical history of Asthma, Bipolar disorder ([facility] [address] Cervical dysplasia, Migraine, and Seizures ([facility] [address] has a past surgical history that includes Cholecystectomy (2007); Tubal ligation; and Appendectomy.      Social History     Socioeconomic History    Marital status:      Spouse name: Not on file    Number of children: Not on file    Years of education: Not on file    Highest education level: Not on file   Occupational History    Not on file   Social Needs    Financial resource strain: Not on file    Food insecurity     Worry: Not on file     Inability: Not on file    Transportation needs     Medical: Not on file     Non-medical: Not on file   Tobacco Use    Smoking status: Current Every Day Smoker     Packs/day: 1.00     Years: 11.00     Pack years: 11.00     Types: Cigarettes    Smokeless tobacco: Never Used Substance and Sexual Activity    Alcohol use: Not Currently     Alcohol/week: 0.0 standard drinks     Comment: occassional, heavy 3 years ago    Drug use: Not Currently     Types: Opiates , Marijuana     Comment: Hx Percocet abuse sober for 2 years.  Sexual activity: Not on file   Lifestyle    Physical activity     Days per week: Not on file     Minutes per session: Not on file    Stress: Not on file   Relationships    Social connections     Talks on phone: Not on file     Gets together: Not on file     Attends Sikh service: Not on file     Active member of club or organization: Not on file     Attends meetings of clubs or organizations: Not on file     Relationship status: Not on file    Intimate partner violence     Fear of current or ex partner: Not on file     Emotionally abused: Not on file     Physically abused: Not on file     Forced sexual activity: Not on file   Other Topics Concern    Not on file   Social History Narrative    Not on file       Family History   Problem Relation Age of Onset    Diabetes Mother         G.Parents    Hypertension Mother         G.Parents    Diabetes Father     Hypertension Father     Stroke Father         G.Parents    Cancer Other         G.Parents, Pancrease and ??    Coronary Art Dis Other         G.Parents          Allergies:  Claritin [loratadine]; Diclofenac; Morphine; and Pcn [penicillins]    Home Medications:  Prior to Admission medications    Medication Sig Start Date End Date Taking?  Authorizing Provider   acetaminophen (APAP EXTRA STRENGTH) 500 MG tablet Take 1 tablet by mouth every 8 hours as needed for Pain 10/29/20  Yes Graham Cortez, DO   cyclobenzaprine (FLEXERIL) 10 MG tablet Take 1 tablet by mouth nightly as needed for Muscle spasms 10/29/20 11/8/20 Yes Graham Cortez, DO   ibuprofen (ADVIL;MOTRIN) 600 MG tablet Take 1 tablet by mouth every 8 hours as needed for Pain 9/13/20   Hood Rios, DO   Divalproex Sodium (DEPAKOTE PO) Take by mouth    Historical Provider, MD   albuterol sulfate HFA (VENTOLIN HFA) 108 (90 Base) MCG/ACT inhaler Inhale 2 puffs into the lungs 4 times daily as needed for Wheezing 3/24/20   Cyn Mckinney,        REVIEW OFSYSTEMS    (2-9 systems for level 4, 10 or more for level 5)      Review of Systems   Constitutional: Negative for chills and fever. HENT: Negative. Eyes: Negative for visual disturbance. Respiratory: Negative for cough, shortness of breath and wheezing. Cardiovascular: Negative for chest pain, palpitations and leg swelling. Gastrointestinal: Negative for abdominal pain, nausea and vomiting. Genitourinary: Negative for dysuria and hematuria. Chronic urinary and bowel incontinence. Musculoskeletal: Positive for back pain and neck pain. Skin: Negative for rash and wound. Neurological: Positive for weakness and numbness. Negative for dizziness, syncope, facial asymmetry, light-headedness and headaches. PHYSICAL EXAM   (up to 7 for level 4, 8 or more forlevel 5)      INITIAL VITALS:   ED Triage Vitals [10/29/20 1958]   BP Temp Temp Source Pulse Resp SpO2 Height Weight   (!) 88/55 98.6 °F (37 °C) Temporal 83 16 97 % -- 98 lb (44.5 kg)       Physical Exam  Vitals signs and nursing note reviewed. Constitutional:       General: She is not in acute distress. Appearance: Normal appearance. She is not ill-appearing, toxic-appearing or diaphoretic. HENT:      Head: Normocephalic and atraumatic. Eyes:      Extraocular Movements: Extraocular movements intact. Pupils: Pupils are equal, round, and reactive to light. Neck:      Musculoskeletal: Normal range of motion and neck supple. No neck rigidity or muscular tenderness. Cardiovascular:      Rate and Rhythm: Normal rate and regular rhythm. Heart sounds: No murmur. No gallop. Pulmonary:      Effort: Pulmonary effort is normal.      Breath sounds: Normal breath sounds.    Abdominal:      General: There is no distension. Palpations: Abdomen is soft. Tenderness: There is no abdominal tenderness. There is no guarding. Musculoskeletal:         General: No tenderness. Right lower leg: No edema. Left lower leg: No edema. Skin:     General: Skin is warm and dry. Neurological:      General: No focal deficit present. Mental Status: She is alert and oriented to person, place, and time. Cranial Nerves: No cranial nerve deficit. Motor: No weakness. Coordination: Coordination normal.      Comments: There is subjective sensory deficits throughout entire body. Subjective and wavering decrease in strength in all extremities that is equal.  No focal deficits. Patient arrives in wheelchair however she has been seen ambulating. DIFFERENTIAL  DIAGNOSIS     PLAN (LABS / IMAGING / EKG):  No orders of the defined types were placed in this encounter. MEDICATIONS ORDERED:  Orders Placed This Encounter   Medications    cyclobenzaprine (FLEXERIL) tablet 10 mg    acetaminophen (TYLENOL) tablet 1,000 mg    acetaminophen (APAP EXTRA STRENGTH) 500 MG tablet     Sig: Take 1 tablet by mouth every 8 hours as needed for Pain     Dispense:  30 tablet     Refill:  0    cyclobenzaprine (FLEXERIL) 10 MG tablet     Sig: Take 1 tablet by mouth nightly as needed for Muscle spasms     Dispense:  20 tablet     Refill:  0       Initial MDM/Plan/ED course: 30 y.o. female who presents with chronic back pain and chronic neurological deficits that have not changed. On exam vitals are normal for the patient and she is in no acute distress. She does arrive in a wheelchair however patient has been seen ambulating without difficulty. Physical exam similar to previous visits with subjective sensory loss and weakness that is equal throughout all extremities but does waver and change.   As patient has been seen here for the same complaints multiple times and there are no new neurological complaints, no new testing was done. Patient was seen previously and had MRI of the cervical, thoracic, lumbar spine but eloped before neurology's recommendations. Instructed patient to follow-up outpatient with neurology for further evaluation and probable outpatient MRI brain. Patient treated with muscle relaxers as she stating that she is having muscle spasms. Patient discharged home. DIAGNOSTIC RESULTS / EMERGENCY DEPARTMENT COURSE / MDM     LABS:  Labs Reviewed - No data to display      RADIOLOGY:  No results found. EKG      All EKG's are interpreted by the [facility] Physician who either signs or Co-signs this chart in the absence of a cardiologist.      PROCEDURES:  None    CONSULTS:  None    CRITICAL CARE:  Please see attending note    FINAL IMPRESSION      1.  Chronic bilateral low back pain with bilateral sciatica          DISPOSITION / PLAN     DISPOSITION Decision To Discharge 10/29/2020 08:38:27 PM      PATIENT REFERRED TO:  Park Lopez MD  [address] [place] [address] [address] [phone]    Schedule an appointment as soon as possible for a visit   Follow up      DISCHARGE MEDICATIONS:  Discharge Medication List as of 10/29/2020  8:42 PM      START taking these medications    Details   cyclobenzaprine (FLEXERIL) 10 MG tablet Take 1 tablet by mouth nightly as needed for Muscle spasms, Disp-20 tablet,R-0Print             Delilah Pennington DO  Emergency Medicine Resident    (Please note that portions of this note were completed with a voice recognition program.Efforts were made to edit the dictations but occasionally words are mis-transcribed.)       Paco Ortiz DO  Resident  10/29/20 2092

## 2020-10-30 NOTE — ED PROVIDER NOTES
[facility]  ED  eMERGENCY dEPARTMENT eNCOUnter   Attending Attestation     Pt Name: Destiny Estes  MRN: [id number]  [place] 1990  Date of evaluation: 10/29/20       Destiny Estes is a 30 y.o. female who presents with Back Pain      History: pt presents with acute on chronic back pain. Pt states her legs are going out again. Pt has been experiencing these symptoms chronically. Pt had MRI within the last week. Exam: HRRR, lungs CTABL, abdomen soft and non tender. Pt well appearing. Pt is hyperesthetic over the entire spine exhibiting point tenderness on each vertebra with only light touch. Plan for muscle relaxant and discharge to follow with neurology. I performed a history and physical examination of the patient and discussed management with the resident. I reviewed the residents note and agree with the documented findings and plan of care. Any areas of disagreement are noted on the chart. I was personally present for the key portions of any procedures. I have documented in the chart those procedures where I was not present during the key portions. I have personally reviewed all images and agree with the resident's interpretation. I have reviewed the emergency nurses triage note. I agree with the chief complaint, past medical history, past surgical history, allergies, medications, social and family history as documented unless otherwise noted below. Documentation of the HPI, Physical Exam and Medical Decision Making performed by medical students or scribes is based on my personal performance of the HPI, PE and MDM. For Phys Assistant/ Nurse Practitioner cases/documentation I have had a face to face evaluation of this patient and have completed at least one if not all key elements of the E/M (history, physical exam, and MDM). Additional findings are as noted.     For APC cases I have personally evaluated and examined the patient in conjunction with the APC and agree with the treatment plan and disposition of the patient as recorded by the APC.     Mabel Hernandez MD  Attending Emergency  Physician       Yoandy Magallon MD  10/29/20 [id number]

## 2020-10-30 NOTE — ED NOTES
Pt arrived to ED c/o back pain. 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 Pt denies injury to her back. Pt reports hx of MS and states she is now unable to ambulate. Pt reports intermittent numbness and blurred vision related to her MS. Pt reports pain throughout her entire back with extreme pain to touch. Pt is alert and oriented. RR even and NL.       Phil Kumari RN  10/29/20 2011

## 2020-11-06 ENCOUNTER — HOSPITAL ENCOUNTER (EMERGENCY)
Age: 30
Discharge: HOME | End: 2020-11-06
Attending: EMERGENCY MEDICINE
Payer: MEDICAID

## 2020-11-06 VITALS
TEMPERATURE: 97.1 F | OXYGEN SATURATION: 96 % | RESPIRATION RATE: 18 BRPM | DIASTOLIC BLOOD PRESSURE: 58 MMHG | SYSTOLIC BLOOD PRESSURE: 101 MMHG | HEART RATE: 86 BPM

## 2020-11-06 PROCEDURE — 99281 EMR DPT VST MAYX REQ PHY/QHP: CPT

## 2020-11-06 PROCEDURE — 6370000000 HC RX 637 (ALT 250 FOR IP): Performed by: STUDENT IN AN ORGANIZED HEALTH CARE EDUCATION/TRAINING PROGRAM

## 2020-11-06 PROCEDURE — 6360000002 HC RX W HCPCS: Performed by: STUDENT IN AN ORGANIZED HEALTH CARE EDUCATION/TRAINING PROGRAM

## 2020-11-06 RX ORDER — LIDOCAINE 4 G/G
1 PATCH TOPICAL ONCE
Status: DISCONTINUED | OUTPATIENT
Start: 2020-11-06 | End: 2020-11-06 | Stop reason: HOSPADM

## 2020-11-06 RX ORDER — ORPHENADRINE CITRATE 30 MG/ML
60 INJECTION INTRAMUSCULAR; INTRAVENOUS ONCE
Status: DISCONTINUED | OUTPATIENT
Start: 2020-11-06 | End: 2020-11-06 | Stop reason: HOSPADM

## 2020-11-06 RX ORDER — KETOROLAC TROMETHAMINE 30 MG/ML
30 INJECTION, SOLUTION INTRAMUSCULAR; INTRAVENOUS ONCE
Status: DISCONTINUED | OUTPATIENT
Start: 2020-11-06 | End: 2020-11-06 | Stop reason: HOSPADM

## 2020-11-06 ASSESSMENT — ENCOUNTER SYMPTOMS
SHORTNESS OF BREATH: 0
EYE REDNESS: 0
ABDOMINAL PAIN: 0
EYE DISCHARGE: 0
BACK PAIN: 1

## 2020-11-06 ASSESSMENT — PAIN SCALES - GENERAL: PAINLEVEL_OUTOF10: 10

## 2020-11-06 NOTE — ED NOTES
Patient refused IM injections, states she is too afraid of needles. She removed lidocaine patch shortly after application, states it was burning her back.      Araceli Saab RN  11/06/20 1556

## 2020-11-06 NOTE — ED NOTES
Dr Gonzalez Simpler at bedside, patient becomes agitated when told she would not be given any anxiety medications. She leaves ED yelling that she is \"going to report this place\".      Ric Randall RN  11/06/20 7790

## 2020-11-06 NOTE — ED PROVIDER NOTES
[address] [facility]     Emergency Department     Faculty Attestation        I performed a history and physical examination of the patient and discussed management with the resident. I reviewed the residents note and agree with the documented findings and plan of care. Any areas of disagreement are noted on the chart. I was personally present for the key portions of any procedures. I have documented in the chart those procedures where I was not present during the key portions. I have reviewed the emergency nurses triage note. I agree with the chief complaint, past medical history, past surgical history, allergies, medications, social and family history as documented unless otherwise noted below. For mid-level providers such as nurse practitioners as well as physicians assistants:    I have personally seen and evaluated the patient. I find the patient's history and physical exam are consistent with NP/PA documentation. I agree with the care provided, treatment rendered, disposition, & follow-up plan. Additional findings are as noted. Vital Signs: BP (!) 101/58   Pulse 86   Temp 97.1 °F (36.2 °C) (Temporal)   Resp 18   LMP 10/16/2020 (Approximate)   SpO2 96%   PCP:  No primary care provider on file. Pertinent Comments:     Here for worsening acute on chronic back pain. Questionable history of MS. Multiple recent visits to the emergency department including MRIs of the cervical, thoracic and lumbar spine at the end of October which were negative.       Critical Care  None          Humble Lopez MD  Attending Emergency Medicine Physician              Gayle Couch MD  11/06/20 7908

## 2020-11-06 NOTE — ED PROVIDER NOTES
Worry: Not on file     Inability: Not on file    Transportation needs     Medical: Not on file     Non-medical: Not on file   Tobacco Use    Smoking status: Current Every Day Smoker     Packs/day: 1.00     Years: 11.00     Pack years: 11.00     Types: Cigarettes    Smokeless tobacco: Never Used   Substance and Sexual Activity    Alcohol use: Not Currently     Alcohol/week: 0.0 standard drinks     Comment: occassional, heavy 3 years ago    Drug use: Not Currently     Types: Opiates , Marijuana     Comment: Hx Percocet abuse sober for 2 years.  Sexual activity: Not on file   Lifestyle    Physical activity     Days per week: Not on file     Minutes per session: Not on file    Stress: Not on file   Relationships    Social connections     Talks on phone: Not on file     Gets together: Not on file     Attends Druze service: Not on file     Active member of club or organization: Not on file     Attends meetings of clubs or organizations: Not on file     Relationship status: Not on file    Intimate partner violence     Fear of current or ex partner: Not on file     Emotionally abused: Not on file     Physically abused: Not on file     Forced sexual activity: Not on file   Other Topics Concern    Not on file   Social History Narrative    Not on file       Family History   Problem Relation Age of Onset    Diabetes Mother         G.Parents    Hypertension Mother         G.Parents    Diabetes Father     Hypertension Father     Stroke Father         G.Parents    Cancer Other         G.Parents, Pancrease and ??    Coronary Art Dis Other         G.Parents       Allergies:  Claritin [loratadine]; Diclofenac; Morphine; and Pcn [penicillins]    Home Medications:  Prior to Admission medications    Medication Sig Start Date End Date Taking?  Authorizing Provider   acetaminophen (APAP EXTRA STRENGTH) 500 MG tablet Take 1 tablet by mouth every 8 hours as needed for Pain 10/29/20   Arlene Squires, DO cyclobenzaprine (FLEXERIL) 10 MG tablet Take 1 tablet by mouth nightly as needed for Muscle spasms 10/29/20 11/8/20  Griselda Lora, DO   ibuprofen (ADVIL;MOTRIN) 600 MG tablet Take 1 tablet by mouth every 8 hours as needed for Pain 9/13/20   Veronica Swift DO   Divalproex Sodium (DEPAKOTE PO) Take by mouth    Historical Provider, MD   albuterol sulfate HFA (VENTOLIN HFA) 108 (90 Base) MCG/ACT inhaler Inhale 2 puffs into the lungs 4 times daily as needed for Wheezing 3/24/20   Cyn Mckinney, DO       REVIEW OF SYSTEMS    (2-9 systems for level 4, 10 or more for level 5)      Review of Systems   Constitutional: Negative for chills and fever. Eyes: Negative for discharge and redness. Respiratory: Negative for shortness of breath. Cardiovascular: Negative for chest pain. Gastrointestinal: Negative for abdominal pain. Genitourinary: Negative for flank pain. Musculoskeletal: Positive for back pain and gait problem. Skin: Negative for rash. Allergic/Immunologic: Positive for environmental allergies. Neurological: Negative for headaches. Psychiatric/Behavioral: Negative for agitation and confusion. PHYSICAL EXAM   (up to 7 for level 4, 8 or more for level 5)     INITIAL VITALS:    temporal temperature is 97.1 °F (36.2 °C). Her blood pressure is 101/58 (abnormal) and her pulse is 86. Her respiration is 18 and oxygen saturation is 96%. Physical Exam  Vitals signs and nursing note reviewed. Constitutional:       Appearance: She is well-developed. HENT:      Head: Normocephalic and atraumatic. Nose: Nose normal.      Mouth/Throat:      Mouth: Mucous membranes are moist.   Eyes:      General: No scleral icterus. Conjunctiva/sclera: Conjunctivae normal.      Pupils: Pupils are equal, round, and reactive to light. Cardiovascular:      Rate and Rhythm: Normal rate and regular rhythm. Heart sounds: Normal heart sounds. No murmur. No friction rub. No gallop.     Pulmonary: department. PROCEDURES:  None    CONSULTS:  IP CONSULT TO NEUROLOGY    CRITICAL CARE:  Please see attending note    FINAL IMPRESSION      1. Acute exacerbation of chronic low back pain          DISPOSITION / PLAN     DISPOSITION Eloped - Left Before Treatment Complete 11/06/2020 07:41:02 AM        PATIENTREFERRED TO:  No follow-up provider specified.     DISCHARGE MEDICATIONS:  Discharge Medication List as of 11/6/2020  7:44 AM          Laura Lee DO  EmergencyMedicine Resident    (Please note that portions of this note were completed with a voice recognition program.  Efforts were made to edit the dictations but occasionally words are mis-transcribed.)       Laura Lee DO  Resident  11/06/20 9708

## 2020-11-14 ENCOUNTER — HOSPITAL ENCOUNTER (EMERGENCY)
Age: 30
Discharge: PSYCHIATRIC OR SUBSTANCE USE FACILITY | End: 2020-11-15
Attending: EMERGENCY MEDICINE
Payer: MEDICAID

## 2020-11-14 ENCOUNTER — APPOINTMENT (OUTPATIENT)
Dept: GENERAL RADIOLOGY | Age: 30
End: 2020-11-14
Payer: MEDICAID

## 2020-11-14 ENCOUNTER — APPOINTMENT (OUTPATIENT)
Dept: CT IMAGING | Age: 30
End: 2020-11-14
Payer: MEDICAID

## 2020-11-14 ENCOUNTER — HOSPITAL ENCOUNTER (EMERGENCY)
Age: 30
Discharge: LEFT BEFORE DISCHARGE | End: 2020-11-14
Attending: EMERGENCY MEDICINE
Payer: MEDICAID

## 2020-11-14 VITALS
TEMPERATURE: 97.9 F | RESPIRATION RATE: 19 BRPM | DIASTOLIC BLOOD PRESSURE: 69 MMHG | OXYGEN SATURATION: 99 % | SYSTOLIC BLOOD PRESSURE: 107 MMHG | HEART RATE: 63 BPM

## 2020-11-14 VITALS
SYSTOLIC BLOOD PRESSURE: 108 MMHG | TEMPERATURE: 97.1 F | HEART RATE: 95 BPM | BODY MASS INDEX: 20.57 KG/M2 | HEIGHT: 58 IN | OXYGEN SATURATION: 97 % | WEIGHT: 98 LBS | DIASTOLIC BLOOD PRESSURE: 73 MMHG | RESPIRATION RATE: 16 BRPM
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
## 2020-11-14 PROBLEM — F32.9 MAJOR DEPRESSION, SINGLE EPISODE: Status: ACTIVE | Noted: 2020-11-14

## 2020-11-14 LAB
-: NORMAL
ALLEN TEST: ABNORMAL
AMORPHOUS: NORMAL
AMPHETAMINE SCREEN URINE: NEGATIVE
ANION GAP SERPL CALCULATED.3IONS-SCNC: 10 MMOL/L (ref 9–17)
BACTERIA: NORMAL
BARBITURATE SCREEN URINE: NEGATIVE
BENZODIAZEPINE SCREEN, URINE: NEGATIVE
BILIRUBIN URINE: NEGATIVE
BLOOD BANK SPECIMEN: ABNORMAL
BUN BLDV-MCNC: 15 MG/DL (ref 6–20)
BUPRENORPHINE URINE: ABNORMAL
CANNABINOID SCREEN URINE: NEGATIVE
CARBOXYHEMOGLOBIN: ABNORMAL %
CASTS UA: NORMAL /LPF (ref 0–8)
CHLORIDE BLD-SCNC: 102 MMOL/L (ref 98–107)
CO2: 24 MMOL/L (ref 20–31)
COCAINE METABOLITE, URINE: POSITIVE
COLOR: YELLOW
COMMENT UA: ABNORMAL
CREAT SERPL-MCNC: 0.91 MG/DL (ref 0.5–0.9)
CRYSTALS, UA: NORMAL /HPF
EPITHELIAL CELLS UA: NORMAL /HPF (ref 0–5)
ETHANOL PERCENT: <0.01 %
ETHANOL: <10 MG/DL
FIO2: ABNORMAL
GFR AFRICAN AMERICAN: >60 ML/MIN
GFR NON-AFRICAN AMERICAN: >60 ML/MIN
GFR SERPL CREATININE-BSD FRML MDRD: ABNORMAL ML/MIN/{1.73_M2}
GFR SERPL CREATININE-BSD FRML MDRD: ABNORMAL ML/MIN/{1.73_M2}
GLUCOSE BLD-MCNC: 65 MG/DL (ref 70–99)
GLUCOSE URINE: NEGATIVE
HCG QUALITATIVE: NEGATIVE
HCO3 VENOUS: ABNORMAL MMOL/L (ref 24–30)
HCT VFR BLD CALC: 45 % (ref 36.3–47.1)
HEMOGLOBIN: 15 G/DL (ref 11.9–15.1)
INR BLD: 1
KETONES, URINE: NEGATIVE
LEUKOCYTE ESTERASE, URINE: ABNORMAL
MCH RBC QN AUTO: 30.9 PG (ref 25.2–33.5)
MCHC RBC AUTO-ENTMCNC: 33.3 G/DL (ref 28.4–34.8)
MCV RBC AUTO: 92.6 FL (ref 82.6–102.9)
MDMA URINE: ABNORMAL
METHADONE SCREEN, URINE: NEGATIVE
METHAMPHETAMINE, URINE: ABNORMAL
METHEMOGLOBIN: ABNORMAL %
MODE: ABNORMAL
MUCUS: NORMAL
NEGATIVE BASE EXCESS, VEN: ABNORMAL MMOL/L (ref 0–2)
NITRITE, URINE: NEGATIVE
NOTIFICATION TIME: ABNORMAL
NOTIFICATION: ABNORMAL
NRBC AUTOMATED: 0 PER 100 WBC
O2 DEVICE/FLOW/%: ABNORMAL
O2 SAT, VEN: ABNORMAL %
OPIATES, URINE: NEGATIVE
OTHER OBSERVATIONS UA: NORMAL
OXYCODONE SCREEN URINE: NEGATIVE
OXYHEMOGLOBIN: ABNORMAL % (ref 95–98)
PARTIAL THROMBOPLASTIN TIME: 25.7 SEC (ref 20.5–30.5)
PATIENT TEMP: ABNORMAL
PCO2, VEN, TEMP ADJ: ABNORMAL MMHG (ref 39–55)
PCO2, VEN: ABNORMAL (ref 39–55)
PDW BLD-RTO: 13.2 % (ref 11.8–14.4)
PEEP/CPAP: ABNORMAL
PH UA: 5.5 (ref 5–8)
PH VENOUS: ABNORMAL (ref 7.32–7.42)
PH, VEN, TEMP ADJ: ABNORMAL (ref 7.32–7.42)
PHENCYCLIDINE, URINE: NEGATIVE
PLATELET # BLD: 230 K/UL (ref 138–453)
PMV BLD AUTO: 9.9 FL (ref 8.1–13.5)
PO2, VEN, TEMP ADJ: ABNORMAL MMHG (ref 30–50)
PO2, VEN: ABNORMAL (ref 30–50)
POSITIVE BASE EXCESS, VEN: ABNORMAL MMOL/L (ref 0–2)
POTASSIUM SERPL-SCNC: 4.4 MMOL/L (ref 3.7–5.3)
PROPOXYPHENE, URINE: ABNORMAL
PROTEIN UA: ABNORMAL
PROTHROMBIN TIME: 10 SEC (ref 9–12)
PSV: ABNORMAL
PT. POSITION: ABNORMAL
RBC # BLD: 4.86 M/UL (ref 3.95–5.11)
RBC UA: NORMAL /HPF (ref 0–4)
RENAL EPITHELIAL, UA: NORMAL /HPF
RESPIRATORY RATE: ABNORMAL
SAMPLE SITE: ABNORMAL
SET RATE: ABNORMAL
SODIUM BLD-SCNC: 136 MMOL/L (ref 135–144)
SPECIFIC GRAVITY UA: 1.01 (ref 1–1.03)
TEST INFORMATION: ABNORMAL
TEXT FOR RESPIRATORY: ABNORMAL
TOTAL HB: ABNORMAL G/DL (ref 12–16)
TOTAL RATE: ABNORMAL
TRICHOMONAS: NORMAL
TRICYCLIC ANTIDEPRESSANTS, UR: ABNORMAL
TURBIDITY: CLEAR
URINE HGB: NEGATIVE
UROBILINOGEN, URINE: NORMAL
VT: ABNORMAL
WBC # BLD: 14 K/UL (ref 3.5–11.3)
WBC UA: NORMAL /HPF (ref 0–5)
YEAST: NORMAL

## 2020-11-14 PROCEDURE — 74177 CT ABD & PELVIS W/CONTRAST: CPT

## 2020-11-14 PROCEDURE — 70450 CT HEAD/BRAIN W/O DYE: CPT

## 2020-11-14 PROCEDURE — 99284 EMERGENCY DEPT VISIT MOD MDM: CPT

## 2020-11-14 PROCEDURE — 3209999900 CT LUMBAR SPINE TRAUMA RECONSTRUCTION

## 2020-11-14 PROCEDURE — 85027 COMPLETE CBC AUTOMATED: CPT

## 2020-11-14 PROCEDURE — 6360000002 HC RX W HCPCS: Performed by: STUDENT IN AN ORGANIZED HEALTH CARE EDUCATION/TRAINING PROGRAM

## 2020-11-14 PROCEDURE — 99243 OFF/OP CNSLTJ NEW/EST LOW 30: CPT | Performed by: PSYCHIATRY & NEUROLOGY

## 2020-11-14 PROCEDURE — 6360000002 HC RX W HCPCS

## 2020-11-14 PROCEDURE — 96365 THER/PROPH/DIAG IV INF INIT: CPT

## 2020-11-14 PROCEDURE — 96375 TX/PRO/DX INJ NEW DRUG ADDON: CPT

## 2020-11-14 PROCEDURE — 99285 EMERGENCY DEPT VISIT HI MDM: CPT

## 2020-11-14 PROCEDURE — 80307 DRUG TEST PRSMV CHEM ANLYZR: CPT

## 2020-11-14 PROCEDURE — 80051 ELECTROLYTE PANEL: CPT

## 2020-11-14 PROCEDURE — 82565 ASSAY OF CREATININE: CPT

## 2020-11-14 PROCEDURE — 85610 PROTHROMBIN TIME: CPT

## 2020-11-14 PROCEDURE — 73130 X-RAY EXAM OF HAND: CPT

## 2020-11-14 PROCEDURE — U0002 COVID-19 LAB TEST NON-CDC: HCPCS

## 2020-11-14 PROCEDURE — G0480 DRUG TEST DEF 1-7 CLASSES: HCPCS

## 2020-11-14 PROCEDURE — 81001 URINALYSIS AUTO W/SCOPE: CPT

## 2020-11-14 PROCEDURE — 71045 X-RAY EXAM CHEST 1 VIEW: CPT

## 2020-11-14 PROCEDURE — 72125 CT NECK SPINE W/O DYE: CPT

## 2020-11-14 PROCEDURE — 6360000004 HC RX CONTRAST MEDICATION: Performed by: STUDENT IN AN ORGANIZED HEALTH CARE EDUCATION/TRAINING PROGRAM

## 2020-11-14 PROCEDURE — 96366 THER/PROPH/DIAG IV INF ADDON: CPT

## 2020-11-14 PROCEDURE — 85730 THROMBOPLASTIN TIME PARTIAL: CPT

## 2020-11-14 PROCEDURE — 82805 BLOOD GASES W/O2 SATURATION: CPT

## 2020-11-14 PROCEDURE — 3209999900 CT THORACIC SPINE TRAUMA RECONSTRUCTION

## 2020-11-14 PROCEDURE — 84703 CHORIONIC GONADOTROPIN ASSAY: CPT

## 2020-11-14 PROCEDURE — 93005 ELECTROCARDIOGRAM TRACING: CPT | Performed by: STUDENT IN AN ORGANIZED HEALTH CARE EDUCATION/TRAINING PROGRAM

## 2020-11-14 PROCEDURE — 82947 ASSAY GLUCOSE BLOOD QUANT: CPT

## 2020-11-14 PROCEDURE — 6370000000 HC RX 637 (ALT 250 FOR IP): Performed by: STUDENT IN AN ORGANIZED HEALTH CARE EDUCATION/TRAINING PROGRAM

## 2020-11-14 PROCEDURE — 84520 ASSAY OF UREA NITROGEN: CPT

## 2020-11-14 RX ORDER — ACETAMINOPHEN 500 MG
1000 TABLET ORAL ONCE
Status: COMPLETED | OUTPATIENT
Start: 2020-11-14 | End: 2020-11-14

## 2020-11-14 RX ORDER — ONDANSETRON 2 MG/ML
4 INJECTION INTRAMUSCULAR; INTRAVENOUS EVERY 6 HOURS PRN
Status: DISCONTINUED | OUTPATIENT
Start: 2020-11-14 | End: 2020-11-14 | Stop reason: HOSPADM

## 2020-11-14 RX ORDER — ORPHENADRINE CITRATE 30 MG/ML
60 INJECTION INTRAMUSCULAR; INTRAVENOUS ONCE
Status: DISCONTINUED | OUTPATIENT
Start: 2020-11-14 | End: 2020-11-14 | Stop reason: HOSPADM

## 2020-11-14 RX ORDER — KETOROLAC TROMETHAMINE 30 MG/ML
30 INJECTION, SOLUTION INTRAMUSCULAR; INTRAVENOUS ONCE
Status: COMPLETED | OUTPATIENT
Start: 2020-11-14 | End: 2020-11-14

## 2020-11-14 RX ORDER — ONDANSETRON 2 MG/ML
INJECTION INTRAMUSCULAR; INTRAVENOUS
Status: COMPLETED
Start: 2020-11-14 | End: 2020-11-14

## 2020-11-14 RX ORDER — LEVETIRACETAM 10 MG/ML
1000 INJECTION INTRAVASCULAR ONCE
Status: COMPLETED | OUTPATIENT
Start: 2020-11-14 | End: 2020-11-14

## 2020-11-14 RX ADMIN — KETOROLAC TROMETHAMINE 30 MG: 30 INJECTION, SOLUTION INTRAMUSCULAR at 14:49

## 2020-11-14 RX ADMIN — ONDANSETRON 4 MG: 2 INJECTION INTRAMUSCULAR; INTRAVENOUS at 15:04

## 2020-11-14 RX ADMIN — LEVETIRACETAM 1000 MG: 10 INJECTION INTRAVENOUS at 13:20

## 2020-11-14 RX ADMIN — IOPAMIDOL 75 ML: 755 INJECTION, SOLUTION INTRAVENOUS at 13:56

## 2020-11-14 RX ADMIN — ACETAMINOPHEN 1000 MG: 500 TABLET ORAL at 15:15

## 2020-11-14 ASSESSMENT — PAIN SCALES - GENERAL
PAINLEVEL_OUTOF10: 10

## 2020-11-14 ASSESSMENT — PAIN DESCRIPTION - LOCATION
LOCATION: HEAD
LOCATION: GENERALIZED

## 2020-11-14 ASSESSMENT — PAIN DESCRIPTION - PAIN TYPE
TYPE: ACUTE PAIN
TYPE: ACUTE PAIN

## 2020-11-14 ASSESSMENT — ENCOUNTER SYMPTOMS
SHORTNESS OF BREATH: 0
EYE PAIN: 1
SINUS PAIN: 0
ABDOMINAL PAIN: 1
COUGH: 0
DIARRHEA: 0
BACK PAIN: 1
NAUSEA: 0
VOMITING: 0
SORE THROAT: 0
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
## 2020-11-15 ENCOUNTER — HOSPITAL ENCOUNTER (INPATIENT)
Age: 30
LOS: 3 days | Discharge: HOME | DRG: 753 | End: 2020-11-18
Attending: PSYCHIATRY & NEUROLOGY | Admitting: PSYCHIATRY & NEUROLOGY
Payer: MEDICAID

## 2020-11-15 PROBLEM — G35 MULTIPLE SCLEROSIS (HCC): Status: ACTIVE | Noted: 2020-11-15

## 2020-11-15 LAB
SARS-COV-2, RAPID: NOT DETECTED
SARS-COV-2: NORMAL
SARS-COV-2: NORMAL
SOURCE: NORMAL

## 2020-11-15 PROCEDURE — 99253 IP/OBS CNSLTJ NEW/EST LOW 45: CPT | Performed by: INTERNAL MEDICINE

## 2020-11-15 PROCEDURE — G0379 DIRECT REFER HOSPITAL OBSERV: HCPCS

## 2020-11-15 PROCEDURE — APPSS30 APP SPLIT SHARED TIME 16-30 MINUTES: Performed by: PHYSICIAN ASSISTANT

## 2020-11-15 PROCEDURE — 6370000000 HC RX 637 (ALT 250 FOR IP): Performed by: PHYSICIAN ASSISTANT

## 2020-11-15 PROCEDURE — 1240000000 HC EMOTIONAL WELLNESS R&B

## 2020-11-15 PROCEDURE — G0378 HOSPITAL OBSERVATION PER HR: HCPCS

## 2020-11-15 PROCEDURE — 99223 1ST HOSP IP/OBS HIGH 75: CPT | Performed by: PSYCHIATRY & NEUROLOGY

## 2020-11-15 PROCEDURE — 6370000000 HC RX 637 (ALT 250 FOR IP): Performed by: PSYCHIATRY & NEUROLOGY

## 2020-11-15 RX ORDER — ACETAMINOPHEN 325 MG/1
650 TABLET ORAL EVERY 4 HOURS PRN
Status: DISCONTINUED | OUTPATIENT
Start: 2020-11-15 | End: 2020-11-18 | Stop reason: HOSPADM

## 2020-11-15 RX ORDER — NICOTINE 21 MG/24HR
1 PATCH, TRANSDERMAL 24 HOURS TRANSDERMAL DAILY
Status: DISCONTINUED | OUTPATIENT
Start: 2020-11-15 | End: 2020-11-15

## 2020-11-15 RX ORDER — HYDROXYZINE 50 MG/1
50 TABLET, FILM COATED ORAL 3 TIMES DAILY PRN
Status: DISCONTINUED | OUTPATIENT
Start: 2020-11-15 | End: 2020-11-18 | Stop reason: HOSPADM

## 2020-11-15 RX ORDER — POLYETHYLENE GLYCOL 3350 17 G/17G
17 POWDER, FOR SOLUTION ORAL DAILY PRN
Status: DISCONTINUED | OUTPATIENT
Start: 2020-11-15 | End: 2020-11-18 | Stop reason: HOSPADM

## 2020-11-15 RX ORDER — MAGNESIUM HYDROXIDE/ALUMINUM HYDROXICE/SIMETHICONE 120; 1200; 1200 MG/30ML; MG/30ML; MG/30ML
30 SUSPENSION ORAL EVERY 6 HOURS PRN
Status: DISCONTINUED | OUTPATIENT
Start: 2020-11-15 | End: 2020-11-18 | Stop reason: HOSPADM

## 2020-11-15 RX ORDER — TRAZODONE HYDROCHLORIDE 50 MG/1
50 TABLET ORAL NIGHTLY PRN
Status: DISCONTINUED | OUTPATIENT
Start: 2020-11-15 | End: 2020-11-18 | Stop reason: HOSPADM

## 2020-11-15 RX ORDER — DIVALPROEX SODIUM 250 MG/1
250 TABLET, EXTENDED RELEASE ORAL 2 TIMES DAILY
Status: DISCONTINUED | OUTPATIENT
Start: 2020-11-15 | End: 2020-11-16

## 2020-11-15 RX ADMIN — DIVALPROEX SODIUM 250 MG: 250 TABLET, EXTENDED RELEASE ORAL at 17:30

## 2020-11-15 RX ADMIN — DIVALPROEX SODIUM 250 MG: 250 TABLET, EXTENDED RELEASE ORAL at 12:19

## 2020-11-15 RX ADMIN — HYDROXYZINE HYDROCHLORIDE 50 MG: 50 TABLET, FILM COATED ORAL at 21:39

## 2020-11-15 RX ADMIN — TRAZODONE HYDROCHLORIDE 50 MG: 50 TABLET ORAL at 21:39

## 2020-11-15 ASSESSMENT — LIFESTYLE VARIABLES
HISTORY_ALCOHOL_USE: NO
HISTORY_ALCOHOL_USE: NO

## 2020-11-15 ASSESSMENT — SLEEP AND FATIGUE QUESTIONNAIRES
DO YOU USE A SLEEP AID: NO
SLEEP PATTERN: DIFFICULTY FALLING ASLEEP;RESTLESSNESS
DIFFICULTY STAYING ASLEEP: YES
AVERAGE NUMBER OF SLEEP HOURS: 4
RESTFUL SLEEP: NO
DIFFICULTY FALLING ASLEEP: YES
DO YOU HAVE DIFFICULTY SLEEPING: YES
DIFFICULTY ARISING: NO

## 2020-11-15 ASSESSMENT — PAIN - FUNCTIONAL ASSESSMENT
PAIN_FUNCTIONAL_ASSESSMENT: 0-10
PAIN_FUNCTIONAL_ASSESSMENT: 0-10

## 2020-11-15 ASSESSMENT — PATIENT HEALTH QUESTIONNAIRE - PHQ9: SUM OF ALL RESPONSES TO PHQ QUESTIONS 1-9: 16

## 2020-11-15 ASSESSMENT — PAIN SCALES - GENERAL: PAINLEVEL_OUTOF10: 0
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
## 2020-11-15 NOTE — PROGRESS NOTES
Patient given tobacco quitline number 65615125053 at this time, refusing to call at this time, states \" I just dont want to quit now\"- patient given information as to the dangers of long term tobacco use. Continue to reinforce the importance of tobacco cessation.
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
## 2020-11-15 NOTE — ED NOTES
Transport here for patient, [address] PD called to release belongings.      Bouchra Boles RN  11/15/20 4432

## 2020-11-15 NOTE — SUICIDE SAFETY PLAN
SAFETY PLAN    A suicide Safety Plan is a document that supports someone when they are having thoughts of suicide. Warning Signs that indicate a suicidal crisis may be developing: What (situations, thoughts, feelings, body sensations, behaviors, etc.) do you experience that lets you know you are beginning to think about suicide? 1. Go off medications  2. Mood is depressed and start to feel sad, hopeless, helpless, guilty, decline in self-esteem, excess worry, no interest in doing any pleasurable activities, unable to concentrate  3. Begin to cry over the smallest of things  4. Not eating or sleeping as normal  5. Relationship issues start happening  6. I become angry and start a fight  7. When I dont listen or respond to people in a good, positive way  8. Increase drug use   Internal Coping Strategies:  What things can I do (relaxation techniques, hobbies, physical activities, etc.) to take my mind off my problems without contacting another person? 1. Go to hospital discharge appointments and follow-up with community mental health counseling  2. Talk with other people  3. Learn to identify and control your emotions by new ways  4. Think before you speak or act; walk away from the situation  5. Join a support group in person or on Social Media  6. Take a time-out  7. Take deep breaths; use relaxation techniques  8. Get some exercise; go for a walk  9. Read; listen to music; watch a funny movie   People and social settings that provide distraction: Who can I call or where can I go to distract me? Konstantin [phone]     People whom I can ask for help: Who can I call when I need help - for example, friends, family, clergy, someone else? [address] [phone]    Professionals or [address] [facility] [address] I can contact during a crisis: Who can I call for help - for example, my doctor, my psychiatrist, my psychologist, a mental health provider, a suicide hotline? [address]  3.  Suicide Prevention Lifeline: 1-800-273-TALK (77)  Suicide Prevention Lifeline: 1-800-273-TALK (1490)  [facility] 2-1-13 (957) 646-4880 or (437) 230-1270  Rescue [address] [facility], 24-hour Crisis Services, Central Access: (366) 353-2555, [address] [facility], [address]  [place] (National Association of Mental Illness) groups and support, [address] (183) 640-3606   4. [address] [place] [address] Emergency Services -  for example, [place], [address] [facility] [address] [facility], Crisis line: [address] [facility] 85-EBYX Crisis Response Team (Crisis Intervention Team - [place]), 291.719.1964 or 9-1-1  [facility], [address] Association of Mental Illness, [phone]  [facility] Substance Abuse [address] [facility], 5-581-260-HELP (3695)   Crisis Text Line, Text 4HOPE to 846351 to connect with a crisis counselor  [address] [facility], 6-652.501.1147  Susan Bahena ([place], [address]), [phone]  [facility], [address] [facility] [address] [facility] [address] [facility] 124  [address], [address] [facility], [address] [phone]  Scar Diaz, [address] [place] [address]., [facility], [address] [facility] [address] [place] [address], [address] [place], [place] [address]ora Rivers, [address] [facility] [address], [phone]              Making the environment safe: How can I make my environment (house/apartment/living space) safer? For example, can I remove guns, medications, and other items? 1. Throw away all medications not being taken  2. Plan daily goals to help remember to stay on specific medications  3. Get rid of all knives in the house.

## 2020-11-15 NOTE — ED NOTES
[] German    [] [facility]    [x]  [facility] ASSESSMENT      Y  N     [x] [] In the past two weeks have you had thoughts of hurting yourself in any way? [x] [] In the past two weeks have you had thoughts that you would be better off dead? [] [x] Have you made a suicide attempt in the past two months? [x] [] Do you have a plan for hurting yourself or suicide? [] [x] Presence of hallucinations/voices related to hurting himself or herself or someone else. SUICIDE/SECURITY WATCH PRECAUTION CHECKLIST     Orders    [x]  Suicide/Security Watch Precautions initiated as checked below:   11/14/20 11:20 PM EST BH31/BH31B    [x] Notified physician:  Daxa Corral MD  11/14/20 22:10 PM EST    [x] Orders obtained as appropriate:     [x] 1:1 Observer     [] Psych Consult     [] Psych Consult    Name:  Date:  Time:    [x] 1:1 Observer, Notified by:  Johnnie Doss Nurse Supervisor    [x] Remove all personal clothes from room and place in snap/paper gown/pants. Slipper only    [x] Remove all personal belongings from room and secured away from patient. Documentation    [x] Initiate Suicide/Security Watch Precaution Flow Sheet    [x] Initiate individualized Care Plan/Problem    [x] Document why precautions initiated on flow sheet (Initiate Nursing Care Plan/Problem)    [x] 1:1 Observer in place; instructions provided. Suicide precautions require observer be within arms length. [x] Nurse-Observer Communication Hand-off initiated by RN, reviewed with Observer. Subsequently used as Hand Off between Observers. [x] Initiate every 15 minute observations per observer as delegated by the RN.     [x] Initiate RN assessment and documentation    Environmental Scan  Search Criteria and Process: OPTIONAL, see Search Policy    [] Reason for search:     [] Nursing in presence of second person to search patient    [] Patient notified of reason for body Expressed or implied suicidal ideation/behavior  [x] Depression  [] Suicide attempt      [] Low self-esteem  [] Hallucinations      [x] Feeling of Hopelessness  [] Substance abuse or withdrawal    [] Dysfunctional family  [] Major traumatic event, eg., divorce, etc   [x] Excessive stress/anxiety    11/14/20    Expected Outcomes    Patient will:   [x] Patient will remain safe for the duration of their stay   [x] Patient's environment will be safe, eg. Free of potential suicide weapons   [] Verbalize Recovery from suicidal episode and improvement in self-worth   [x] Discuss feeling that precipitated suicide attempt/thoughts/behavior   [] Will describe available resources for crisis prevention and management   [] Will verbalize positive coping skills     Nursing Intervention   [x] Assessment and Observations hourly   [x] Suicide Precautions implemented with patient, should be 1:1 observation   [x] Document observation [id number] and RN assessment hourly   [] Consult physician for:    [] Psychiatric consult    [] Pharmacological therapy    [] Other:    [x] Patient search completed by security   [x] Initiated appropriate safety protocols by removing from the patient's environment anything that could be used to inflict self injury, eg. Order safe tray, snap gown, etc   [x] Maintain open, warm, caring, non-judgmental attitude/manner towards patient   [] Discuss advantages and disadvantages of existing coping methods/skills   [x] Assist and educate patient with identifying present strengths and coping skills   [x] Keep patient informed regarding plan of care and provide clear concise explanations. Provide the patient/family education information as well as telephone numbers and other information about crisis centers, hot lines, and counselors.     Discharge Planning:   [] Referral  [] Groups [] Health agencies  [x] Other: Transfer for further psychiatric evaluation          Tracey Srinivasan RN  11/14/20 0489

## 2020-11-15 NOTE — PROGRESS NOTES
Patient informed of new line of sight order for patient having to remain in the sight of staff at all times. Patient also informed her room door would need to remain open. She is agreeable at this time.

## 2020-11-15 NOTE — PROGRESS NOTES
Nutrition Assessment     Type and Reason for Visit: Positive Nutrition Screen(Congregation-doesn't eat pork)    Nutrition Recommendations/Plan: Added No Pork to current diet order. Nutrition Assessment:  Patient is Congregation and doesn't eat pork, put no pork Choice notes and reviewed menu selections for accuracy. Follow as needed. Malnutrition Assessment:  Malnutrition Status: No malnutrition    Current Nutrition Therapies:    DIET GENERAL;    Nutrition Diagnosis:   No nutrition diagnosis at this time     Nutrition Interventions:   Food and/or Nutrient Delivery:  Modify Current Diet(added No Pork to diet order)    Some areas of assessment may be incomplete due to COVID-19 precautions. Ernesto Pitts R.D. L.QUIN.   Clinical Dietitian  Office: [phone]
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
## 2020-11-15 NOTE — GROUP NOTE
Group Therapy Note    Date: 11/15/2020    Group Start Time: 1000  Group End Time: 1030  Group Topic: Psychotherapy    [facility] D    Supriya Velarde        Group Therapy Note    Attendees: 6/18              Patient's Goal:  PT will demonstrate increased interpersonal interaction and a clear understanding on multiple types of coping skills relating to the here-and-now therapeutic practice. Notes:  Patient is making progress, AEB participating in group discussion, actively listening, and supporting other group members. PT participates in group and encourages others to participate     Status After Intervention:  Improved    Participation Level: Active Listener and Interactive    Participation Quality: Appropriate and Intrusive      Speech:  loud      Thought Process/Content: Logical      Affective Functioning: Exaggerated      Mood: euthymic      Level of consciousness:  Alert, Oriented x4 and Attentive      Response to Learning: Able to verbalize current knowledge/experience and Progressing to goal      Endings: None Reported    Modes of Intervention: Support, Socialization, Exploration, Clarifying and Problem-solving      Discipline Responsible: /Counselor      Signature:   Supriya Velarde

## 2020-11-15 NOTE — ED NOTES
Patient took herself to restroom before RN was able to make it to bedside with wheelchair. Patent scooted across the ground, states she is comfortable with this method. Patient back in stretcher when RN entered the room. Patient given blanket to place on ground to perform prayer and given boxed lunch per request. Patient denies any other needs at this time.  Will continue to monitor     Jw [facility]  11/14/20 9568
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
## 2020-11-15 NOTE — PROGRESS NOTES
One small circular bruise to top of right knee and one small circular bruise to side of left knee now noted. No other injuries or complaints from patient at this time.

## 2020-11-15 NOTE — BH NOTE
Group Therapy Note     Date: 11/15/2020     Group Start Time: 1100  Group End Time: 1130  Group Topic: Wellness    [facility] D       Patient refused to attend wellness meeting/ goals group at 1100 even after encouragement from staff.  1:1 talk time provided by staff.

## 2020-11-15 NOTE — FLOWSHEET NOTE
Writer spoke with medical staff for clarification of inpatient consult to [facility] for \"cultural consult - Mos[place]\"; staff will advise if patient wants to see a ;     11/15/20 1953   Encounter Summary   Services provided to: Patient not available  (patient with doctor)

## 2020-11-15 NOTE — ED NOTES
BOAZ spoke with on call  Psych Dr Sherri Barker will admit to in pt psych   BOAZ [facility]  11/14/20 5003

## 2020-11-15 NOTE — ED PROVIDER NOTES
Handoff taken on the following patient from prior Attending Physician:    Pt Name: Theo Ernandez    PCP:  No primary care provider on file. Attestation    I was available and discussed any additional care issues that arose and coordinated the management plans with the resident(s) caring for the patient during my duty period. Any areas of disagreement with residents documentation of care or procedures are noted on the chart. I was personally present for the key portions of any/all procedures during my duty period. I have documented in the chart those procedures where I was not present during the key portions.     The pt is awaiting bed for BHI and psych      Shayna Delaney DO  11/14/20 6389

## 2020-11-15 NOTE — PLAN OF CARE
Problem: Falls - Risk of:  Goal: Will remain free from falls  Description: Will remain free from falls  Outcome: Ongoing  Pt remains free of falls and verbalizes understanding of individual fall risks. Pt wearing non skid footwear and encouraged to seek out staff for any assistance needed. Problem: Depressive Behavior With or Without Suicide Precautions:  Goal: Ability to disclose and discuss suicidal ideas will improve  Description: Ability to disclose and discuss suicidal ideas will improve  Outcome: Ongoing   Patient denies thoughts of self harm and is agreeable to seeking out staff should thoughts of self harm arise. Safe environment maintained. 15 minute checks for safety continued per unit policy. Will continue to monitor for safety and provide support and reassurance as needed.       Problem: Skin Integrity:  Goal: Will show no infection signs and symptoms  Description: Will show no infection signs and symptoms  Outcome: Ongoing
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
## 2020-11-15 NOTE — PROGRESS NOTES
LOS    Patient is resting in bed at this time with her eyes closed. No distress noted. Line of sight maintained for patient safety.
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
## 2020-11-15 NOTE — PROGRESS NOTES
Patient had an unwitnessed fall in her bathroom. Patient pressed call light and when writer entered the room, writer then witnessed patient on the ground stating, \"I fell I'm sorry. \" Patient reports she was using the bathroom and stood up to [facility] herself walk again. \" Patient states once she stood up she lost her balance and used her right arm to help guide her down to the floor. She denies hitting her head. Physical assessment completed, no injuries or abnormalities noted. Vitals signs are stable. On call provider, Dr. Adin Dockery, notified of incident. NEW ORDER for patient to remain in the line of sight of staff at all times.

## 2020-11-15 NOTE — GROUP NOTE
Group Therapy Note    Date: 11/15/2020    Group Start Time: 1330  Group End Time: 4992  Group Topic: Psychoeducation    [facility]    Jeanette Hodge        Group Therapy Note    Attendees: 9/18         Patient's Goal: To increase interpersonal interation      Notes:      Status After Intervention:  Improved    Participation Level:  Active Listener and Interactive    Participation Quality: Appropriate, Attentive and Sharing      Speech:  loud      Thought Process/Content: Logical  Linear      Affective Functioning: Congruent      Mood: euthymic      Level of consciousness:  Alert, Oriented x4 and Attentive      Response to Learning: Able to verbalize current knowledge/experience, Able to verbalize/acknowledge new learning, Able to retain information and Progressing to goal      Endings: None Reported    Modes of Intervention: Education, Support, Socialization, Exploration, Clarifying, Problem-solving and Activity      Discipline Responsible: Psychoeducational Specialist      Signature:  Jeanette Hodge

## 2020-11-15 NOTE — ED NOTES
Patient laying quietly with eyes closed, appears to be resting comfortably. Pt has no s/s of distress at this time, will continue to monitor.       Liborio Taylor RN  11/15/20 3496

## 2020-11-15 NOTE — GROUP NOTE
Group Therapy Note    Date: 11/15/2020    Group Start Time: 0900  Group End Time: 0915  Group Topic: Community Meeting    LOREN Springer    Patient refused to attend community meeting/ goals group at 0900 after encouragement from staff. 1:1 talk time provided by staff.      Signature:  Jimmy Springer

## 2020-11-15 NOTE — ED PROVIDER NOTES
standpoint. Physical:   temperature is 97.9 °F (36.6 °C). Patient is awake alert oriented x3. She has no facial asymmetry. Heart regular rate and rhythm, lungs are clear auscultation bilaterally patient was extremities well. Impression: Syncope    Plan: EKG      EKG Interpretation    Interpreted by me    Rhythm: normal sinus   Rate: normal  Axis: normal  Ectopy: none  Conduction: normal  ST Segments: no acute change  T Waves: no acute change  Q Waves: none    Clinical Impression: no acute changes and normal EKG      (Please note that portions of this note were completed with a voice recognition program.  Efforts were made to edit the dictations but occasionally words are mis-transcribed.)    Berna Etienne.  Crystal Islas MD, [facility]  Attending Emergency Medicine Physician        Allegra Dangelo MD  11/14/20 2123

## 2020-11-16 ENCOUNTER — CARE COORDINATION (OUTPATIENT)
Dept: CARE COORDINATION | Age: 30
End: 2020-11-16

## 2020-11-16 LAB
EKG ATRIAL RATE: 63 BPM
EKG P AXIS: 89 DEGREES
EKG P-R INTERVAL: 122 MS
EKG Q-T INTERVAL: 450 MS
EKG QRS DURATION: 82 MS
EKG QTC CALCULATION (BAZETT): 460 MS
EKG R AXIS: 81 DEGREES
EKG T AXIS: 72 DEGREES
EKG VENTRICULAR RATE: 63 BPM
VALPROIC ACID LEVEL: 41 UG/ML (ref 50–125)
VALPROIC DATE LAST DOSE: ABNORMAL
VALPROIC DOSE AMOUNT: 250
VALPROIC TIME LAST DOSE: 1730

## 2020-11-16 PROCEDURE — 99232 SBSQ HOSP IP/OBS MODERATE 35: CPT | Performed by: PSYCHIATRY & NEUROLOGY

## 2020-11-16 PROCEDURE — G0378 HOSPITAL OBSERVATION PER HR: HCPCS

## 2020-11-16 PROCEDURE — 36415 COLL VENOUS BLD VENIPUNCTURE: CPT

## 2020-11-16 PROCEDURE — 80164 ASSAY DIPROPYLACETIC ACD TOT: CPT

## 2020-11-16 PROCEDURE — 1240000000 HC EMOTIONAL WELLNESS R&B

## 2020-11-16 PROCEDURE — 96372 THER/PROPH/DIAG INJ SC/IM: CPT

## 2020-11-16 PROCEDURE — 6370000000 HC RX 637 (ALT 250 FOR IP): Performed by: PSYCHIATRY & NEUROLOGY

## 2020-11-16 PROCEDURE — 93010 ELECTROCARDIOGRAM REPORT: CPT | Performed by: INTERNAL MEDICINE

## 2020-11-16 PROCEDURE — 6370000000 HC RX 637 (ALT 250 FOR IP): Performed by: NURSE PRACTITIONER

## 2020-11-16 PROCEDURE — 6370000000 HC RX 637 (ALT 250 FOR IP): Performed by: PHYSICIAN ASSISTANT

## 2020-11-16 PROCEDURE — 6360000002 HC RX W HCPCS

## 2020-11-16 RX ORDER — PALIPERIDONE 3 MG/1
3 TABLET, EXTENDED RELEASE ORAL DAILY
Status: DISCONTINUED | OUTPATIENT
Start: 2020-11-16 | End: 2020-11-17

## 2020-11-16 RX ORDER — LORAZEPAM 2 MG/ML
2 INJECTION INTRAMUSCULAR EVERY 6 HOURS PRN
Status: DISCONTINUED | OUTPATIENT
Start: 2020-11-16 | End: 2020-11-18 | Stop reason: HOSPADM

## 2020-11-16 RX ORDER — DIVALPROEX SODIUM 500 MG/1
500 TABLET, EXTENDED RELEASE ORAL 2 TIMES DAILY
Status: DISCONTINUED | OUTPATIENT
Start: 2020-11-16 | End: 2020-11-18 | Stop reason: HOSPADM

## 2020-11-16 RX ORDER — DIPHENHYDRAMINE HYDROCHLORIDE 50 MG/ML
INJECTION INTRAMUSCULAR; INTRAVENOUS
Status: DISCONTINUED
Start: 2020-11-16 | End: 2020-11-16 | Stop reason: WASHOUT

## 2020-11-16 RX ORDER — LORAZEPAM 2 MG/ML
INJECTION INTRAMUSCULAR
Status: COMPLETED
Start: 2020-11-16 | End: 2020-11-16

## 2020-11-16 RX ORDER — HALOPERIDOL 5 MG/ML
INJECTION INTRAMUSCULAR
Status: COMPLETED
Start: 2020-11-16 | End: 2020-11-16

## 2020-11-16 RX ORDER — HALOPERIDOL 5 MG/ML
5 INJECTION INTRAMUSCULAR EVERY 6 HOURS PRN
Status: DISCONTINUED | OUTPATIENT
Start: 2020-11-16 | End: 2020-11-18 | Stop reason: HOSPADM

## 2020-11-16 RX ADMIN — HYDROXYZINE HYDROCHLORIDE 50 MG: 50 TABLET, FILM COATED ORAL at 13:21

## 2020-11-16 RX ADMIN — HALOPERIDOL LACTATE 5 MG: 5 INJECTION, SOLUTION INTRAMUSCULAR at 14:00

## 2020-11-16 RX ADMIN — TRAZODONE HYDROCHLORIDE 50 MG: 50 TABLET ORAL at 21:29

## 2020-11-16 RX ADMIN — LORAZEPAM 2 MG: 2 INJECTION INTRAMUSCULAR; INTRAVENOUS at 14:00

## 2020-11-16 RX ADMIN — HYDROXYZINE HYDROCHLORIDE 50 MG: 50 TABLET, FILM COATED ORAL at 21:29

## 2020-11-16 RX ADMIN — POLYETHYLENE GLYCOL 3350 17 G: 17 POWDER, FOR SOLUTION ORAL at 06:48

## 2020-11-16 RX ADMIN — DIVALPROEX SODIUM 250 MG: 250 TABLET, EXTENDED RELEASE ORAL at 11:12

## 2020-11-16 RX ADMIN — PALIPERIDONE 3 MG: 3 TABLET, EXTENDED RELEASE ORAL at 21:29

## 2020-11-16 RX ADMIN — DIVALPROEX SODIUM 500 MG: 500 TABLET, EXTENDED RELEASE ORAL at 21:29

## 2020-11-16 ASSESSMENT — ENCOUNTER SYMPTOMS
SHORTNESS OF BREATH: 0
COLOR CHANGE: 0
EYE REDNESS: 0
EYE DISCHARGE: 0
ABDOMINAL PAIN: 0

## 2020-11-16 NOTE — GROUP NOTE
Group Therapy Note    Date: 11/16/2020    Group Start Time: 0900  Group End Time: 3863  Group Topic: Community Meeting    STCZ CHANDRAKANT Jerome , CTRS    Pt did not attend community meeting / goals group d/t resting in room despite staff invitation to attend. 1:1 talk time offered as alternative to group session, pt declined.             Signature:  Francine De Leon

## 2020-11-16 NOTE — PROGRESS NOTES
[address] [facility] FOLLOW-UP NOTE     2020     Patient was seen and examined in person, Chart reviewed   Patient's case discussed with staff/team    Chief Complaint: Bipolar with suicidal ideation    Interim History:   Patient was admitted yesterday. Per staff patient has been compliant with medications. Per staff patient has been irritable and demanding discharge throughout the day. Interviewed patient in her room. Patient reports that she delivered twins at home and that they are \" babies and my  told me they are not eating and are sick and will die without me\". Patient also reports that she has 14 children. Staff report the patient said she had a seizure at breakfast, this was unwitnessed and patient had no change in mental status. Asked patient about seizure this morning, she reports that \"I have them off and on but they only last a few seconds\". She denies any loss of bowel or bladder, loss of consciousness during episode. When asked about her mood she reports it is \"great\" rates a 10 out of 10 (10 best) denies suicidal ideation intent or plan. She reports that a another patient on the unit is her 's daughter and that her  is at home with her daughter that just gave birth to a baby boy a week ago. Patient has a flight of idea and is very difficult to redirect. 1 hour after interview, patient was seen by attending physician. Patient was notified by attending physician that she will be not discharged today and became very agitated and began yelling and accusing the physician of calling her a liar. Attempted to de-escalate the patient, but patient was in wheelchair moving about common area and at one point stood up and walked in her room. Patient continued yelling again attempted to de-escalate patient patient threatened to \"call my  and memo this place because it killed my daughter\".   Patient was offered medication to help with agitation and anxiety by mouth and refused. Attending was notified and ordered Haldol 5 mg IM with Ativan 2 mg IM x1 dose now. Attempted again to de-escalate patient, patient continued with agitation and yelling. Patient was given medications IM. There is a release of information on the chart for patient's spouse. Social work spoke with spouse and he reports there are no children at home, confirms that she does have children would not give ages but these children are with \"in-laws\". We will attempt to call spouse again to obtain collateral information.    Appetite:   [] Normal/Unchanged  [] Increased  [x] Decreased      Sleep:       [] Normal/Unchanged  [] Fair       [x] Poor              Energy:    [] Normal/Unchanged  [x] Increased  [] Decreased        Aggression:  [x] yes  [] no    Patient is [] able  [x] unable to CONTRACT FOR SAFETY ON THE UNIT    PAST MEDICAL/PSYCHIATRIC HISTORY:   Past Medical History:   Diagnosis Date    Asthma     Bipolar disorder ([facility] [address]     Cervical dysplasia     Migraine 11/11/2011    Movement disorder     Seizures ([facility]        FAMILY/SOCIAL HISTORY:  Family History   Problem Relation Age of Onset    Diabetes Mother         G.Parents    Hypertension Mother         G.Parents    Diabetes Father     Hypertension Father     Stroke Father         G.Parents    Cancer Other         G.Parents, Pancrease and ??    Coronary Art Dis Other         G.Parents     Social History     Socioeconomic History    Marital status:      Spouse name: Not on file    Number of children: Not on file    Years of education: Not on file    Highest education level: Not on file   Occupational History    Not on file   Social Needs    Financial resource strain: Not on file    Food insecurity     Worry: Not on file     Inability: Not on file    Transportation needs     Medical: Not on file     Non-medical: Not on file   Tobacco Use    Smoking status: Current Every Day Smoker     Packs/day: 1.00     Years: 11.00     Pack 24   BUN 15   CREATININE 0.91*   GLUCOSE 65*     No results for input(s): BILITOT, ALKPHOS, AST, ALT in the last 72 hours. Lab Results   Component Value Date    BARBSCNU NEGATIVE 11/14/2020    LABBENZ NEGATIVE 11/14/2020    LABBENZ NEGATIVE 05/18/2012    LABMETH NEGATIVE 11/14/2020    PPXUR NOT REPORTED 11/14/2020     Lab Results   Component Value Date    TSH 0.76 07/02/2016     No results found for: LITHIUM  Lab Results   Component Value Date    VALPROATE 41 (L) 11/16/2020       RISK ASSESSMENT:     Treatment Plan:  Give Haldol 5 mg IM x1 dose now  Give Ativan 2 mg IM x1 dose now  Start Invega 3 mg by mouth daily first dose tonight 2100  Increase Depakote 500 mg twice daily first dose today 1800  Obtain urine drug screen  Obtain urine hCG  Attempted to call spouse with release of information for collateral information no answer left message    Risks, benefits, side effects, drug-to-drug interactions and alternatives to treatment were discussed. t.     Encourage patient to attend group and other milieu activities. Discharge planning discussed with the patient and treatment team.  Follow-up daily while inpatient    PSYCHOTHERAPY/COUNSELING:  [] Therapeutic interview  [x] Supportive  [] CBT  [] Ongoing  [] Other    [x] Patient continues to need, on a daily basis, active treatment furnished directly by or requiring the supervision of inpatient psychiatric personnel      Anticipated Length of stay: Ge Dangelo is a 30 y.o. female being evaluated face to face. --Sushila Nova, NEIL - CNP on 11/16/2020 at 2:50 PM    An electronic signature was used to authenticate this note. **This report has been created using voice recognition software. It may contain minor errors which are inherent in voice recognition technology. **  I independently saw and evaluated the patient. I reviewed the midlevel provider's documentation above.   Any additional comments or

## 2020-11-16 NOTE — PLAN OF CARE
Problem: Depressive Behavior With or Without Suicide Precautions:  Goal: Ability to disclose and discuss suicidal ideas will improve  Description: Ability to disclose and discuss suicidal ideas will improve  11/16/2020 1756 by Jessica Mary RN  Outcome: Ongoing  Note: Ms. Clyde Sahni denies suicidal ideation and thoughts of harm to self and others. Problem: Falls - Risk of:  Goal: Will remain free from falls  Description: Will remain free from falls  11/16/2020 1756 by Jessica Mary RN  Outcome: Ongoing  Note: Ms. Clyde Sahni has remained free from falls during this shift.

## 2020-11-16 NOTE — GROUP NOTE
Group Therapy Note    Date: 11/16/2020    Group Start Time: 9244  Group End Time: 3337  Group Topic: Cognitive Skills    LOREN Frazier, CTRS        Group Therapy Note    Attendees: 7/20       Pt did not participate in Cognitive Skills Group at [address]  due to sleeping in room, pt had received PRN meds for agitation.      Discipline Responsible: Psychoeducational Specialist      Signature:  Yelena Reza

## 2020-11-16 NOTE — BH NOTE
Group note    Patient refused to attend 1600 wellness group, 1:1 talk time offered but patient refused.

## 2020-11-16 NOTE — BH NOTE
Ms. Nena Nash was exhibiting disruptive behaviors as evidence by yelling loudly at staff in the milieu. She was propelling her wheelchair with her hands so fast that the front wheels were leaving the ground. Ms. Nena Nash was intrusive to her peers conversation, going from peer to peer interrupting phone conversations and interjecting her thoughts loudly. Staff had attempted to redirect setting limits and asking her to respect peers privacy. PRN Atarax was administered for anxious behaviors and was unhelpful. Ms. Nena Nash was encouraged to go into her room for a decrease of stimulation. These were all unhelpful and she became defensive and agitated screaming at staff. She was offered a choice of oral or injectable medication and she refused both. She did eventually return to her room. Charge nurse, patient advocate and security were present. She required encouragement and support and was cooperative with IM PRN Haldol and Ativan. Limits were once again explained to Ms. Avila about behaviors.

## 2020-11-16 NOTE — CARE COORDINATION
KATHIE signed for  Lala Peterson. Contacted  regarding pt reporting twin infants at home and not eating.  denied children in the home and they were at her mom and dad's home.  refused to provide information regarding children's age stating Elda Dumont are under 13 y/o\".  expressed frustration and demanded pt be woken up as he wanted to talk with her. Notified  pt was sleeping and not able to come to the phone and the call was to check on the children. Pt had received a prn and was currently sleeping in her room.
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
## 2020-11-16 NOTE — GROUP NOTE
Group Therapy Note    Date: 11/16/2020    Group Start Time: 1100  Group End Time: 8558  Group Topic: Cognitive Skills    LAUREN Ray        Group Therapy Note    Attendees: 5/10         Patient's Goal:  To improve coping skills/ improve decision making skills     Notes:   Pt was pleasant and participated well    Status After Intervention:  Improved    Participation Level:  Active Listener and Interactive    Participation Quality: Appropriate, Attentive and Sharing      Speech:  normal      Thought Process/Content: flight of ideas    Affective Functioning: Congruent      Mood: euthymic      Level of consciousness:  Alert      Response to Learning: Able to verbalize current knowledge/experience and Progressing to goal      Endings: None Reported    Modes of Intervention: Education, Support and Problem-solving      Discipline Responsible: Psychoeducational Specialist      Signature:  Alec Amaro
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
## 2020-11-17 LAB
ALCOHOL URINE: NEGATIVE MG/DL
AMPHETAMINE SCREEN URINE: NEGATIVE NG/ML
BARBITURATE SCREEN URINE: NEGATIVE NG/ML
BENZODIAZEPINE SCREEN, URINE: NEGATIVE NG/ML
CANNABINOID SCREEN URINE: NEGATIVE NG/ML
COCAINE(METAB.)SCREEN, URINE: POSITIVE NG/ML
CREATININE URINE: 41.8 MG/DL (ref 20–400)
Lab: NORMAL
METHADONE SCREEN, URINE: NEGATIVE NG/ML
OPIATES, URINE: NEGATIVE NG/ML
PHENCYCLIDINE, URINE: NEGATIVE NG/ML
PROPOXYPHENE, URINE: NEGATIVE NG/ML

## 2020-11-17 PROCEDURE — 99232 SBSQ HOSP IP/OBS MODERATE 35: CPT | Performed by: PSYCHIATRY & NEUROLOGY

## 2020-11-17 PROCEDURE — 6360000002 HC RX W HCPCS: Performed by: PSYCHIATRY & NEUROLOGY

## 2020-11-17 PROCEDURE — 6370000000 HC RX 637 (ALT 250 FOR IP): Performed by: NURSE PRACTITIONER

## 2020-11-17 PROCEDURE — 6370000000 HC RX 637 (ALT 250 FOR IP): Performed by: PSYCHIATRY & NEUROLOGY

## 2020-11-17 PROCEDURE — 96372 THER/PROPH/DIAG INJ SC/IM: CPT

## 2020-11-17 PROCEDURE — 1240000000 HC EMOTIONAL WELLNESS R&B

## 2020-11-17 RX ORDER — PALIPERIDONE 6 MG/1
6 TABLET, EXTENDED RELEASE ORAL DAILY
Status: DISCONTINUED | OUTPATIENT
Start: 2020-11-18 | End: 2020-11-18 | Stop reason: HOSPADM

## 2020-11-17 RX ADMIN — HYDROXYZINE HYDROCHLORIDE 50 MG: 50 TABLET, FILM COATED ORAL at 08:51

## 2020-11-17 RX ADMIN — HALOPERIDOL LACTATE 5 MG: 5 INJECTION, SOLUTION INTRAMUSCULAR at 12:50

## 2020-11-17 RX ADMIN — DIVALPROEX SODIUM 500 MG: 500 TABLET, EXTENDED RELEASE ORAL at 08:51

## 2020-11-17 RX ADMIN — PALIPERIDONE 3 MG: 3 TABLET, EXTENDED RELEASE ORAL at 08:51

## 2020-11-17 RX ADMIN — DIVALPROEX SODIUM 500 MG: 500 TABLET, EXTENDED RELEASE ORAL at 17:21

## 2020-11-17 RX ADMIN — LORAZEPAM 2 MG: 2 INJECTION INTRAMUSCULAR; INTRAVENOUS at 12:50

## 2020-11-17 NOTE — GROUP NOTE
Group Therapy Note    Date: 11/17/2020    Group Start Time: 0900  Group End Time: 0930  Group Topic: Community Meeting    LOREN [address] [place], [place]        Group Therapy Note    Attendees: 10/21         Pt did not participate in Community Meeting/Goals Group at 900am when encouraged by RT due to resting in room. Pt was offered talk time as an alternative to group but did not answer.       Discipline Responsible: Psychoeducational Specialist      Signature:  Tasha Stanley

## 2020-11-17 NOTE — PROGRESS NOTES
[facility] FOLLOW-UP NOTE     11/17/2020     Patient was seen and examined in person, Chart reviewed   Patient's case discussed with staff/team    Chief Complaint: Bipolar with suicidal ideation    Interim History:   Patient has been compliant with her medications. Received a dose of Haldol and Ativan this afternoon due to agitation and was not redirectable by staff. Patient continues to report that she has twin babies at home with her  and that they need her. She reports that her mood is \"fine\" and denies suicidal ideation. Patient is hyperverbal and tangential and very difficult to redirect at times. She remains intrusive on unit interacting staff when caring for other patients. She reports that the other patients \"hate me\". She is denying any auditory or visual hallucinations. Social work did speak with her  today, he was very guarded in the conversation and abrupt, he would not provide any collateral details on patient's condition, children other than to say that they were living with his in-laws.   Appetite:   [] Normal/Unchanged  [] Increased  [x] Decreased      Sleep:       [] Normal/Unchanged  [] Fair       [x] Poor              Energy:    [] Normal/Unchanged  [x] Increased  [] Decreased        Aggression:  [x] yes  [] no    Patient is [] able  [x] unable to CONTRACT FOR SAFETY ON THE UNIT    PAST MEDICAL/PSYCHIATRIC HISTORY:   Past Medical History:   Diagnosis Date    Asthma     Bipolar disorder ([facility] [address]     Cervical dysplasia     Migraine 11/11/2011    Movement disorder     Seizures ([facility] [address]        FAMILY/SOCIAL HISTORY:  Family History   Problem Relation Age of Onset    Diabetes Mother         G.Parents    Hypertension Mother         G.Parents    Diabetes Father     Hypertension Father     Stroke Father         G.Parents    Cancer Other         G.Parents, Pancrease and ??    Coronary Art Dis Other         G.Parents     Social History     Socioeconomic History    Marital status:      Spouse name: Not on file    Number of children: Not on file    Years of education: Not on file    Highest education level: Not on file   Occupational History    Not on file   Social Needs    Financial resource strain: Not on file    Food insecurity     Worry: Not on file     Inability: Not on file    Transportation needs     Medical: Not on file     Non-medical: Not on file   Tobacco Use    Smoking status: Current Every Day Smoker     Packs/day: 1.00     Years: 11.00     Pack years: 11.00     Types: Cigarettes    Smokeless tobacco: Never Used   Substance and Sexual Activity    Alcohol use: Not Currently     Alcohol/week: 0.0 standard drinks     Comment: occassional, heavy 3 years ago    Drug use: Not Currently     Types: Opiates , Marijuana     Comment: Hx Percocet abuse sober for 2 years.  Sexual activity: Not on file   Lifestyle    Physical activity     Days per week: Not on file     Minutes per session: Not on file    Stress: Not on file   Relationships    Social connections     Talks on phone: Not on file     Gets together: Not on file     Attends Holiness service: Not on file     Active member of club or organization: Not on file     Attends meetings of clubs or organizations: Not on file     Relationship status: Not on file    Intimate partner violence     Fear of current or ex partner: Not on file     Emotionally abused: Not on file     Physically abused: Not on file     Forced sexual activity: Not on file   Other Topics Concern    Not on file   Social History Narrative    Not on file           ROS:  [x] All negative/unchanged except if checked.  Explain positive(checked items) below:  [] Constitutional  [] Eyes  [] Ear/Nose/Mouth/Throat  [] Respiratory  [] CV  [] GI  []   [] Musculoskeletal  [] Skin/Breast  [] Neurological  [] Endocrine  [] Heme/Lymph  [] Allergic/Immunologic    Explanation:     MEDICATIONS:    Current Facility-Administered Medications:    haloperidol lactate (HALDOL) injection 5 mg, 5 mg, Intramuscular, Q6H PRN, 5 mg at 11/17/20 1250 **AND** LORazepam (ATIVAN) injection 2 mg, 2 mg, Intramuscular, Q6H PRN, Manas Pineda MD, 2 mg at 11/17/20 1250    divalproex (DEPAKOTE ER) extended release tablet 500 mg, 500 mg, Oral, BID, NEIL Tello - CNP, 500 mg at 11/17/20 0851    paliperidone (INVEGA) extended release tablet 3 mg, 3 mg, Oral, Daily, NEIL Tello - CNP, 3 mg at 11/17/20 0851    acetaminophen (TYLENOL) tablet 650 mg, 650 mg, Oral, Q4H PRN, Dylan Webster MD    aluminum & magnesium hydroxide-simethicone (MAALOX) 200-200-20 MG/5ML suspension 30 mL, 30 mL, Oral, Q6H PRN, Dylan Webster MD    hydrOXYzine (ATARAX) tablet 50 mg, 50 mg, Oral, TID PRN, Dylan Webster MD, 50 mg at 11/17/20 0851    polyethylene glycol (GLYCOLAX) packet 17 g, 17 g, Oral, Daily PRN, Dylan Webster MD, 17 g at 11/16/20 0648    traZODone (DESYREL) tablet 50 mg, 50 mg, Oral, Nightly PRN, Dylan Webster MD, 50 mg at 11/16/20 2129      Examination:  BP (!) 95/56   Pulse 97   Temp 98.4 °F (36.9 °C) (Oral)   Resp 14   Ht 4' 10\" (1.473 m)   Wt 95 lb (43.1 kg)   BMI 19.86 kg/m²   Gait - steady uses wheelchair, seen ambulating without wheelchair.   Medication side effects(SE): Denies    Mental Status Examination:    Level of consciousness: Alert  Appearance: Casually dressed, fair hygiene and grooming  Behavior/Motor: Intrusive with psychomotor agitation  Attitude toward examiner: Uncooperative  Speech: Pressured, fast pace, racing voice intermittently  Mood: Irritable/anxious  Affect: \"Fine\"  Thought processes: Flight of ideas with confabulations  Thought content:  Homicidal ideation - none  Suicidal Ideation: Denies  Delusions: Bizarre and paranoid  Perceptual Disturbance: Denies  Cognition: Oriented to self location and general circumstances  Concentration poor  Insight poor  Judgement to face. --NEIL Tello - CNP on 11/17/2020 at 4:02 PM    An electronic signature was used to authenticate this note. **This report has been created using voice recognition software. It may contain minor errors which are inherent in voice recognition technology. **    I independently saw and evaluated the patient. I reviewed the midlevel provider's documentation above. Any additional comments or changes to the midlevel provider's documentation are stated below otherwise agree with assessment. The patient continues to be discharged focused. I have noted that the  have given no information about whether the patient recently gave birth. I have noted that the patient have visited emergency rooms several times in the last couple of months with no mention of the pregnancy. It is unlikely that she was caring twin pregnancy that would have gone unnoticed. The patient insists on getting a wheelchair. She seems to be able to ambulate without 1 when she does not get it  Patient s symptoms   are improving    PLAN  Continue medications as above  Attempt to develop insight  Psycho-education conducted. Supportive Therapy conducted.   Probable discharge is tomorrow  Follow-up daily while on inpatient unit    Electronically signed by Shelia Del Rosario MD on 11/17/20 at 5:58 PM EST

## 2020-11-17 NOTE — PLAN OF CARE
Problem: Falls - Risk of:  Goal: Will remain free from falls  Description: Will remain free from falls  11/16/2020 2201 by Diana Barrett LPN  Outcome: Ongoing     Problem: Falls - Risk of:  Goal: Absence of physical injury  Description: Absence of physical injury  11/16/2020 2201 by Diana Barrett LPN  Outcome: Ongoing     Problem: Depressive Behavior With or Without Suicide Precautions:  Goal: Ability to disclose and discuss suicidal ideas will improve  Description: Ability to disclose and discuss suicidal ideas will improve  11/16/2020 2201 by Diana Barrett LPN  Outcome: Ongoing     Problem: Skin Integrity:  Goal: Will show no infection signs and symptoms  Description: Will show no infection signs and symptoms  11/16/2020 2201 by Diana Barrett LPN  Outcome: Ongoing

## 2020-11-17 NOTE — GROUP NOTE
Group Therapy Note    Date: 11/17/2020    Group Start Time: 1100  Group End Time: 6566  Group Topic: Cognitive Skills    LOREN Morgan, CTRS    Pt did not attend 1100 cognitive skills group d/t resting in room despite staff invitation to attend. 1:1 talk time offered as alternative to group session, pt declined.             Signature:  Keaton Dean

## 2020-11-17 NOTE — GROUP NOTE
Group Therapy Note    Date: 11/17/2020    Group Start Time: 1600  Group End Time: 2607  Group Topic: Group Therapy    LOREN Mcnamara RN        Group Therapy Note    Attendees: 5    patient refused to attend wellness group at 1600 after encouragement from staff.   1:1 talk time provided as alternative to group session and refused        Signature:  Narayan Mcnamara RN

## 2020-11-17 NOTE — GROUP NOTE
Group Therapy Note    Date: 11/17/2020    Group Start Time: 1430  Group End Time: 0161  Group Topic: Recreational    [facility] QUIN Dupree, CTRS      Pt did not attend 1430 recreational group d/t resting in room despite staff invitation to attend. 1:1 talk time offered as alternative to group session, pt declined.          Signature:  Morena Ko

## 2020-11-17 NOTE — BH NOTE
Staff observed patient standing behind her w/c tearful and aggressively forcing/raming it into the wall. Patient was labile undirectable with pressured speech and agitation. Several choices and a warm shower offered but unsuccessful. CIT initiated by staff for a show of support, PRN education provided and administered post patient verbalizing understanding. MD updated and w/c use discontinued due to patient being ambulatory without distress or verbalization of pain and physically able to thrust the w/c into a wall repeatedly.

## 2020-11-17 NOTE — GROUP NOTE
Group Therapy Note    Date: 11/17/2020    Group Start Time: 5009  Group End Time: 1400  Group Topic: Cognitive Skills    [facility]    Oneta Section, CTRS        Group Therapy Note    Attendees: 7      Patient's Goal:  To improve cognitive skills         Status After Intervention:unchanged   Participation Level:  Active Listener and Interactive    Participation Quality: needs redirections      Speech:  Slurred/ mumbles      Thought Process/Content: disorganized       Affective Functioning:  flat      Mood:lethargic      Level of consciousness:  Alert       Response to Learning:  Progressing to goal      Endings: None Reported    Modes of Intervention: Education, Support and Problem-solving      Discipline Responsible: Psychoeducational Specialist      Signature:  [place]

## 2020-11-17 NOTE — BH NOTE
LOS     Patient is ambulatory with a steady gait/balnce without w/c, distress or signs/symptoms of discomfort. Non skid foot wear noted, adequate lighting and clear and clean pathways obseved on unit and patients room.

## 2020-11-17 NOTE — BH NOTE
LOS      Patient observed in day area standing and ambulating with w/c as she demanded unrealistic items from nursing. Attempts to reorient and offer other choices dismissed. PRN oral administered post educational and helpful.

## 2020-11-17 NOTE — PLAN OF CARE
Problem: Falls - Risk of:  Goal: Will remain free from falls  Description: Will remain free from falls  11/17/2020 1037 by Hali Kent LPN  Outcome: Ongoing     Problem: Falls - Risk of:  Goal: Absence of physical injury  Description: Absence of physical injury  11/17/2020 1037 by Hali Kent LPN  Outcome: Ongoing     Problem: Depressive Behavior With or Without Suicide Precautions:  Goal: Ability to disclose and discuss suicidal ideas will improve  Description: Ability to disclose and discuss suicidal ideas will improve  11/17/2020 1037 by Hali Kent LPN  Outcome: Ongoing  Note: Patient denies suicidal or homicidal ideations or any intent to inflict self harm. Patient is demanding, hyperverbal and easily irritated but can be redirected. Patient expresses depression and anxiety, lacks motivation or any interest and confabulates her current condition. Patient is thought blocking and interrupts communication which makes it challenging. PRN administered with education and programming encouraged. Problem: Skin Integrity:  Goal: Will show no infection signs and symptoms  Description: Will show no infection signs and symptoms  11/17/2020 1037 by Hali Kent LPN  Outcome: Ongoing  Note: Patient exhibits no signs or symptoms of infection regarding the skin. Problem: Skin Integrity:  Goal: Absence of new skin breakdown  Description: Absence of new skin breakdown  11/17/2020 1037 by Hali Kent LPN  Outcome: Ongoing     Problem: Tobacco Use:  Goal: Inpatient tobacco use cessation counseling participation  Description: Inpatient tobacco use cessation counseling participation  Outcome: Ongoing  Note: Patint declines any tobacco cessation literature at this time.

## 2020-11-18 VITALS
TEMPERATURE: 97.9 F | HEART RATE: 97 BPM | HEIGHT: 58 IN | RESPIRATION RATE: 14 BRPM | SYSTOLIC BLOOD PRESSURE: 90 MMHG | DIASTOLIC BLOOD PRESSURE: 62 MMHG | WEIGHT: 95 LBS | BODY MASS INDEX: 19.94 KG/M2

## 2020-11-18 PROCEDURE — 6370000000 HC RX 637 (ALT 250 FOR IP): Performed by: NURSE PRACTITIONER

## 2020-11-18 PROCEDURE — G0378 HOSPITAL OBSERVATION PER HR: HCPCS

## 2020-11-18 PROCEDURE — 99239 HOSP IP/OBS DSCHRG MGMT >30: CPT | Performed by: PSYCHIATRY & NEUROLOGY

## 2020-11-18 PROCEDURE — 6370000000 HC RX 637 (ALT 250 FOR IP): Performed by: PSYCHIATRY & NEUROLOGY

## 2020-11-18 RX ORDER — DIVALPROEX SODIUM 500 MG/1
500 TABLET, EXTENDED RELEASE ORAL 2 TIMES DAILY
Qty: 30 TABLET | Refills: 3 | Status: ON HOLD | OUTPATIENT
Start: 2020-11-18 | End: 2021-04-04 | Stop reason: HOSPADM

## 2020-11-18 RX ORDER — PALIPERIDONE 6 MG/1
6 TABLET, EXTENDED RELEASE ORAL DAILY
Qty: 30 TABLET | Refills: 3 | Status: ON HOLD | OUTPATIENT
Start: 2020-11-19 | End: 2021-04-04 | Stop reason: HOSPADM

## 2020-11-18 RX ADMIN — ACETAMINOPHEN 650 MG: 325 TABLET, FILM COATED ORAL at 05:55

## 2020-11-18 RX ADMIN — PALIPERIDONE 6 MG: 6 TABLET, EXTENDED RELEASE ORAL at 09:16

## 2020-11-18 RX ADMIN — ACETAMINOPHEN 650 MG: 325 TABLET, FILM COATED ORAL at 10:04

## 2020-11-18 RX ADMIN — DIVALPROEX SODIUM 500 MG: 500 TABLET, EXTENDED RELEASE ORAL at 09:16

## 2020-11-18 RX ADMIN — HYDROXYZINE HYDROCHLORIDE 50 MG: 50 TABLET, FILM COATED ORAL at 05:55

## 2020-11-18 ASSESSMENT — PAIN SCALES - GENERAL
PAINLEVEL_OUTOF10: 10
PAINLEVEL_OUTOF10: 4
PAINLEVEL_OUTOF10: 0
PAINLEVEL_OUTOF10: 7

## 2020-11-18 NOTE — GROUP NOTE
Group Therapy Note    Date: 11/18/2020    Group Start Time: 0900  Group End Time: 0920  Group Topic: Community Meeting    LOREN Mei, LESLIES    Pt did not attend 0900 goal setting group d/t resting in room despite staff invitation to attend. 1:1 talk time offered as alternative to group session, pt declined.             Signature:  Siobhan Capellan

## 2020-11-18 NOTE — PROGRESS NOTES
CLINICAL PHARMACY NOTE: MEDS TO [address] Select Patient?: No  Total # of Prescriptions Filled: 2   The following medications were delivered to the patient:  · Paliperidone  · Divalproex  ·   Total # of Interventions Completed: 0  Time Spent (min): 30    Additional Documentation:

## 2020-11-18 NOTE — PLAN OF CARE
Problem: Falls - Risk of:  Goal: Will remain free from falls  Description: Will remain free from falls  Outcome: Ongoing     Problem: Depressive Behavior With or Without Suicide Precautions:  Goal: Ability to disclose and discuss suicidal ideas will improve  Description: Ability to disclose and discuss suicidal ideas will improve  Outcome: Ongoing     Problem: Skin Integrity:  Goal: Will show no infection signs and symptoms  Description: Will show no infection signs and symptoms  Outcome: Ongoing
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
## 2020-11-18 NOTE — GROUP NOTE
Group Therapy Note    Date: 11/18/2020    Group Start Time: 1100  Group End Time: 5994  Group Topic:  cognitive skills    LOREN BHCATIE Borrero Ohs, CTRS        Group Therapy Note    Attendees: 5/10         Patient's Goal:  To improve coping skills / improve concentration    Notes:  Pt was manic et unable to tolerate duration of group    Status After Intervention:  unchanged    Participation Level: minimal    Participation Quality: needs redirections      Speech:  hyperverbal       Thought Process/Content: disorganized      Affective Functioning: Congruent      Mood: manic      Level of consciousness:  Alert       Response to Learning:  Progressing to goal      Endings: None Reported    Modes of Intervention: Education, Support, Socialization and Problem-solving      Discipline Responsible: Psychoeducational Specialist      Signature:  Trinity Parker,

## 2020-11-18 NOTE — CARE COORDINATION
SOCIAL SERVICE NOTE:    SW telephoned [facility] on this date to file a report. SW reported concern by staff that PT may have children at home that are at risk. SW explained that PT came in with suicidal ideation and reported that she had been non-compliant with treatment and with medications. SW also reported a concern that PT told us that she had 14 children and then changed the story several times. Pt reported to staff that she has twins that are infants, then reported that she did not. Staff also attempted to reach out to PT  to inquire about the safety of the children, but he would not disclose any information. [place] [facility] staff took down SW contact information and report and will investigate and monitor the situation.

## 2020-11-18 NOTE — GROUP NOTE
Group Therapy Note    Date: 11/18/2020    Group Start Time: 1000  Group End Time: 9055  Group Topic: Psychotherapy    SHASHI Woodard        Group Therapy Note    Attendees: 5/11       Pt did not participate in group therapy and 1:1 offered and refused        Signature:  SHASHI Renee

## 2020-11-18 NOTE — DISCHARGE INSTR - DIET

## 2020-11-18 NOTE — FLOWSHEET NOTE
*Patient participated in the [address] [place] [address]       11/18/20 1407   Encounter Summary   Services provided to: Patient   Referral/Consult From: Rounding   Continue Visiting   (11/18/20)   Complexity of Encounter Moderate   Length of Encounter 30 minutes   Spiritual Assessment Completed Yes   Spiritual/Catholic   Type Spiritual support   Assessment Calm; Approachable   Intervention Active listening;Prayer   Outcome Receptive;Engaged in conversation

## 2020-11-19 LAB — COCAINE, CONFIRM, URINE: 628 NG/ML

## 2020-11-19 NOTE — BH NOTE
[facility]  Discharge Note    Pt discharged with followings belongings:   Dentures: None  Vision - Corrective Lenses: None  Hearing Aid: None  Jewelry: None  Body Piercings Removed: N/A  Clothing: Footwear, Shirt, Socks, Undergarments (Comment), Pants  Were All Patient Medications Collected?: Not Applicable  Other Valuables: None   Valuables sent home with patient. Valuables retrieved from safe, Security envelope number:n/a patient had no belongings in security. Patient education on aftercare instructions: completed  Information faxed to [facility] by RN Patient verbalize understanding of AVS.    Status EXAM upon discharge:  Status and Exam  Normal: (zeina)  Facial Expression: (zeina)  Affect: (zeina)  Level of Consciousness: Naomi Bain  Mood:Normal: Naomi Bain  Mood: Naomi Bain  Motor Activity:Normal: No  Motor Activity: Decreased  Interview Behavior: Naomi Bain  Preception: Naomi Bain  Attention:Normal: No  Attention: Naomi Bain  Thought Processes: Naomi Bain  Thought Content:Normal: No  Thought Content: (zeina)  Hallucinations: Unable to assess  Delusions: Naomi Bain  Memory:Normal: Naomi Bain  Memory: Naomi Bain  Insight and Judgment: Naomi Bain  Insight and Judgment: Poor Judgment, Poor Insight  Present Suicidal Ideation: (zeina)  Present Homicidal Ideation: (zeina)      Metabolic Screening:    No results found for: LABA1C    Lab Results   Component Value Date    CHOL 87 07/02/2016    CHOL 109 09/27/2011     Lab Results   Component Value Date    TRIG 80 07/02/2016    TRIG 70 09/27/2011     Lab Results   Component Value Date    HDL 44 07/02/2016    HDL 47 09/27/2011     No components found for: LDLCAL  No results found for: LABVLDL    Patient escorted to [facility] by [facility] staff. She was picked up by her father and taken home.     Carmen Rosales RN

## 2020-11-19 NOTE — BH NOTE
Patient given quit line phone number [phone] at this time, refusing to call at this time, states \" I just don't want to quit now\"-  dangers of longterm tobacco use discussed. staff will continue to reinforce importance of smoking cessation.

## 2020-11-22 ENCOUNTER — HOSPITAL ENCOUNTER (EMERGENCY)
Age: 30
Discharge: HOME | End: 2020-11-23
Attending: EMERGENCY MEDICINE
Payer: MEDICAID

## 2020-11-22 VITALS
DIASTOLIC BLOOD PRESSURE: 62 MMHG | OXYGEN SATURATION: 97 % | SYSTOLIC BLOOD PRESSURE: 110 MMHG | RESPIRATION RATE: 16 BRPM | TEMPERATURE: 97.3 F | HEART RATE: 95 BPM

## 2020-11-22 LAB
CHP ED QC CHECK: NORMAL
GLUCOSE BLD-MCNC: 93 MG/DL
GLUCOSE BLD-MCNC: 93 MG/DL (ref 65–105)

## 2020-11-22 PROCEDURE — 99284 EMERGENCY DEPT VISIT MOD MDM: CPT

## 2020-11-22 PROCEDURE — 82947 ASSAY GLUCOSE BLOOD QUANT: CPT

## 2020-11-22 PROCEDURE — 80164 ASSAY DIPROPYLACETIC ACD TOT: CPT

## 2020-11-23 LAB
VALPROIC ACID LEVEL: 55 UG/ML (ref 50–125)
VALPROIC DATE LAST DOSE: NORMAL
VALPROIC DOSE AMOUNT: NORMAL
VALPROIC TIME LAST DOSE: NORMAL

## 2020-11-23 ASSESSMENT — ENCOUNTER SYMPTOMS
SHORTNESS OF BREATH: 0
EYE PAIN: 0
ABDOMINAL PAIN: 0
BACK PAIN: 0
EYE REDNESS: 0

## 2020-11-23 NOTE — ED TRIAGE NOTES
Pt co seizure around 1200. Pt states that she did LOC and hit her head during [address]. Pt also co right shoulder pain resulting from fall during seizure, 9/10, aching quality. Pt has hx pf seizures and is on depakote. Pt respirations are even and unlabored, pt is oriented X 4, speaking in complete sentences, bed is in the lowest position, call light is within reach. Will continue to monitor.

## 2020-11-23 NOTE — ED PROVIDER NOTES
[facility] [facility]     Emergency Department     Faculty Attestation    I performed a history and physical examination of the patient and discussed management with the resident. I reviewed the residents note and agree with the documented findings and plan of care. Any areas of disagreement are noted on the chart. I was personally present for the key portions of any procedures. I have documented in the chart those procedures where I was not present during the key portions. I have reviewed the emergency nurses triage note. I agree with the chief complaint, past medical history, past surgical history, allergies, medications, social and family history as documented unless otherwise noted below. For Physician Assistant/ Nurse Practitioner cases/documentation I have personally evaluated this patient and have completed at least one if not all key elements of the E/M (history, physical exam, and MDM). Additional findings are as noted. This patient was evaluated in the Emergency Department for symptoms described in the history of present illness. He/she was evaluated in the context of the global COVID-19 pandemic, which necessitated consideration that the patient might be at risk for infection with the SARS-CoV-2 virus that causes COVID-19. Institutional protocols and algorithms that pertain to the evaluation of patients at risk for COVID-19 are in a state of rapid change based on information released by regulatory bodies including the CDC and federal and state organizations. These policies and algorithms were followed during the patient's care in the ED. Seizure earlier today, known history, also complains of right shoulder pain. On exam well appearing, laughing joking with her significant other. No deformity right shoulder, full ROM active and passive, no focal bony tenderness. Do not feel needs imaging. Will check level, plan to discharge for breakthrough seizure.       Critical Care none    Sharla Agarwal MD, Chava Moss  Attending Emergency  Physician             Sharla Agarwal MD  11/23/20 5251

## 2020-11-23 NOTE — ED PROVIDER NOTES
[facility] ED  Emergency Department Encounter  EmergencyMedicine Resident     Pt Name:Janet Rivera  MRN: [id number]  [place] 1990  Date of evaluation: 11/22/20  PCP:  No primary care provider on file. CHIEF COMPLAINT       Chief Complaint   Patient presents with    Seizures    Shoulder Pain       HISTORY OF PRESENT ILLNESS  (Location/Symptom, Timing/Onset, Context/Setting, Quality, Duration, Modifying Factors, Severity.)      Mery Whitfield is a 30 y.o. female who presents with complaint of seizure-like activity which occurred approximate 12 hours ago patient is that she was at home on the phone and had an apparent seizure which she recalls. She states that she is having discomfort in her right shoulder. Has not take anything prior to arrival.  Patient states that she has been compliant with her Depakote. PAST MEDICAL / SURGICAL / SOCIAL / FAMILY HISTORY      has a past medical history of Asthma, Bipolar disorder ([facility] [address] Cervical dysplasia, Migraine, Movement disorder, and Seizures ([facility] [address] has a past surgical history that includes Cholecystectomy (2007); Tubal ligation; and Appendectomy.       Social History     Socioeconomic History    Marital status:      Spouse name: Not on file    Number of children: Not on file    Years of education: Not on file    Highest education level: Not on file   Occupational History    Not on file   Social Needs    Financial resource strain: Not on file    Food insecurity     Worry: Not on file     Inability: Not on file    Transportation needs     Medical: Not on file     Non-medical: Not on file   Tobacco Use    Smoking status: Current Every Day Smoker     Packs/day: 1.00     Years: 11.00     Pack years: 11.00     Types: Cigarettes    Smokeless tobacco: Never Used   Substance and Sexual Activity    Alcohol use: Not Currently     Alcohol/week: 0.0 standard drinks     Comment: occassional, heavy 3 years ago    Drug use: Not Currently     Types: Opiates , Marijuana     Comment: Hx Percocet abuse sober for 2 years.  Sexual activity: Not on file   Lifestyle    Physical activity     Days per week: Not on file     Minutes per session: Not on file    Stress: Not on file   Relationships    Social connections     Talks on phone: Not on file     Gets together: Not on file     Attends Anabaptist service: Not on file     Active member of club or organization: Not on file     Attends meetings of clubs or organizations: Not on file     Relationship status: Not on file    Intimate partner violence     Fear of current or ex partner: Not on file     Emotionally abused: Not on file     Physically abused: Not on file     Forced sexual activity: Not on file   Other Topics Concern    Not on file   Social History Narrative    Not on file       Family History   Problem Relation Age of Onset    Diabetes Mother         G.Parents    Hypertension Mother         G.Parents    Diabetes Father     Hypertension Father     Stroke Father         G.Parents    Cancer Other         G.Parents, Pancrease and ??    Coronary Art Dis Other         G.Parents       Allergies:  Claritin [loratadine]; Diclofenac; Morphine; Nicotine; Pcn [penicillins]; and Peanut-containing drug products    Home Medications:  Prior to Admission medications    Medication Sig Start Date End Date Taking? Authorizing Provider   divalproex (DEPAKOTE ER) 500 MG extended release tablet Take 1 tablet by mouth 2 times daily 11/18/20   Emeli Woods MD   paliperidone (INVEGA) 6 MG extended release tablet Take 1 tablet by mouth daily 11/19/20   Emeli Woods MD       REVIEW OF SYSTEMS    (2-9 systems for level 4, 10 or more for level 5)      Review of Systems   Constitutional: Negative for fever. HENT: Negative for congestion. Eyes: Negative for pain and redness. Respiratory: Negative for shortness of breath. Cardiovascular: Negative for chest pain.    Gastrointestinal: Negative of injury no abrasions no ecchymosis no swelling no deformity. Skin:     Capillary Refill: Capillary refill takes less than 2 seconds. Neurological:      General: No focal deficit present. Mental Status: She is alert and oriented to person, place, and time. Mental status is at baseline. GCS: GCS eye subscore is 4. GCS verbal subscore is 5. GCS motor subscore is 6. Cranial Nerves: Cranial nerves are intact. No cranial nerve deficit, dysarthria or facial asymmetry. Sensory: Sensation is intact. Motor: Motor function is intact. No weakness, tremor, abnormal muscle tone or pronator drift. Coordination: Coordination is intact. DIFFERENTIAL  DIAGNOSIS     PLAN (LABS / IMAGING / EKG):  Orders Placed This Encounter   Procedures    Valproic acid level, total    POCT Glucose    POC Glucose Fingerstick       MEDICATIONS ORDERED:  No orders of the defined types were placed in this encounter. DDX: seizure like activity, shoulder strain    DIAGNOSTIC RESULTS / EMERGENCY DEPARTMENT COURSE / MDM   LAB RESULTS:  Results for orders placed or performed during the hospital encounter of 11/22/20   Valproic acid level, total   Result Value Ref Range    Valproic Acid Lvl 55 50 - 125 ug/mL    Valproic Dose amount NOT REPORTED     Valproic Date last dose NOT REPORTED     Valproic Time last dose NOT REPORTED    POCT Glucose   Result Value Ref Range    Glucose 93 mg/dL    QC OK? ok    POC Glucose Fingerstick   Result Value Ref Range    POC Glucose 93 65 - 105 mg/dL       IMPRESSION: well appearing nontoxic not postictal female in nad with complaint of shoulder pain and report of seizure like activity which pt recalls. Shoulder has full range of motion no indication for imaging at this time no evidence of any injury. Plan will be point-of-care glucose Depakote levels anticipate discharge.     RADIOLOGY:  none    EKG  none    All EKG's are interpreted by the Emergency Department Physician who either signs or Co-signs this chart in the absence of a cardiologist.    EMERGENCY DEPARTMENT COURSE:  ED Course as of Nov 23 0039   Mon Nov 23, 2020   0016 Seen and evaluated no indication for imaging as full rom and PMS intact    [BG]      ED Course User Index  [BG] Kennedy Rico DO         PROCEDURES:  none    CONSULTS:  None    CRITICAL CARE:  Please see attending note    FINAL IMPRESSION      1.  Acute pain of right shoulder          DISPOSITION / PLAN     DISPOSITION  dc home with pcp and neuro followup      PATIENT REFERRED TO:  [facility]  [address] [place] [id number] [phone]  Call today  for followup in 1-2 days    [facility] ED  [address] [facility] [address]  [phone]  Go to   As needed, If symptoms worsen    [address] [id number] [phone]  Call today  As needed      DISCHARGE MEDICATIONS:  New Prescriptions    No medications on file       Kennedy Rico DO  Emergency Medicine Resident    (Please note that portions of thisnote were completed with a voice recognition program.  Efforts were made to edit the dictations but occasionally words are mis-transcribed.)       Kennedy Rico DO  Resident  11/23/20 [address] [facility], DO  Resident  11/23/20 [address] [facility], DO  Resident  11/23/20 [id number]

## 2020-11-30 ENCOUNTER — HOSPITAL ENCOUNTER (EMERGENCY)
Age: 30
Discharge: LEFT BEFORE DISCHARGE | End: 2020-11-30
Attending: EMERGENCY MEDICINE
Payer: MEDICAID

## 2020-11-30 VITALS
TEMPERATURE: 97.1 F | OXYGEN SATURATION: 96 % | HEART RATE: 88 BPM | WEIGHT: 95 LBS | HEIGHT: 58 IN | SYSTOLIC BLOOD PRESSURE: 89 MMHG | RESPIRATION RATE: 20 BRPM | BODY MASS INDEX: 19.94 KG/M2 | DIASTOLIC BLOOD PRESSURE: 60 MMHG

## 2020-11-30 PROCEDURE — 99283 EMERGENCY DEPT VISIT LOW MDM: CPT

## 2020-11-30 ASSESSMENT — ENCOUNTER SYMPTOMS
DIARRHEA: 0
CONSTIPATION: 0
NAUSEA: 1
CHEST TIGHTNESS: 0
PHOTOPHOBIA: 0
WHEEZING: 0
ABDOMINAL PAIN: 0
SORE THROAT: 0
TROUBLE SWALLOWING: 0
ABDOMINAL DISTENTION: 0
RHINORRHEA: 0
SHORTNESS OF BREATH: 0
COLOR CHANGE: 1

## 2020-11-30 NOTE — ED NOTES
Pt was very agitated in room in wheelchair   Pt states \"can I have discharge papers\" and \"I have to leave because my  is very upset\"   Writer attempted to inform pt to stay and why she needed to stay   Pt insisted she had to leave   Pt left in NAD in wheelchair      Judson Byers RN  11/30/20 [address]

## 2020-11-30 NOTE — ED PROVIDER NOTES
[address] [facility]  ED  Emergency Department Encounter  Emergency Medicine Resident     Pt Name: Gina Lundberg  MRN: [id number]  [place] 1990  Date of evaluation: 11/30/20  PCP:  No primary care provider on file. CHIEF COMPLAINT       Chief Complaint   Patient presents with    Abscess     LEFT BREAST       HISTORY OFPRESENT ILLNESS  (Location/Symptom, Timing/Onset, Context/Setting, Quality, Duration, Modifying Jodie Contreras)      Gina Lundberg is a 26 y.o. female who presents with concerns for a growing mass of her left breast located at the 11:00 region of the areola. Patient states that she has had a previous abscess in the left breast in the past, states that she first noticed symptoms 4 weeks ago, states that she is having that they would go away but they have slowly increased in size over the past 4 weeks. Patient denies trauma to the area, does endorse erythema, tenderness to touch, however denies any pain at this time. Patient denies fever, chills, lightheadedness, dizziness, abdominal pain, chest pain, change in bowel or bladder function. PAST MEDICAL / SURGICAL / SOCIAL / FAMILY HISTORY      has a past medical history of Asthma, Bipolar disorder ([facility] [address] Cervical dysplasia, Migraine, Movement disorder, and Seizures ([facility] [address] has a past surgical history that includes Cholecystectomy (2007); Tubal ligation; and Appendectomy.      Social History     Socioeconomic History    Marital status:      Spouse name: Not on file    Number of children: Not on file    Years of education: Not on file    Highest education level: Not on file   Occupational History    Not on file   Social Needs    Financial resource strain: Not on file    Food insecurity     Worry: Not on file     Inability: Not on file    Transportation needs     Medical: Not on file     Non-medical: Not on file   Tobacco Use    Smoking status: Current Every Day Smoker     Packs/day: 1.00     Years: 11.00 Pack years: 11.00     Types: Cigarettes    Smokeless tobacco: Never Used   Substance and Sexual Activity    Alcohol use: Not Currently     Alcohol/week: 0.0 standard drinks     Comment: occassional, heavy 3 years ago    Drug use: Not Currently     Types: Opiates , Marijuana     Comment: Hx Percocet abuse sober for 2 years.  Sexual activity: Not on file   Lifestyle    Physical activity     Days per week: Not on file     Minutes per session: Not on file    Stress: Not on file   Relationships    Social connections     Talks on phone: Not on file     Gets together: Not on file     Attends Christianity service: Not on file     Active member of club or organization: Not on file     Attends meetings of clubs or organizations: Not on file     Relationship status: Not on file    Intimate partner violence     Fear of current or ex partner: Not on file     Emotionally abused: Not on file     Physically abused: Not on file     Forced sexual activity: Not on file   Other Topics Concern    Not on file   Social History Narrative    Not on file       Family History   Problem Relation Age of Onset    Diabetes Mother         G.Parents    Hypertension Mother         G.Parents    Diabetes Father     Hypertension Father     Stroke Father         G.Parents    Cancer Other         G.Parents, Pancrease and ??    Coronary Art Dis Other         G.Parents       Allergies:  Claritin [loratadine]; Diclofenac; Morphine; Nicotine; Pcn [penicillins]; and Peanut-containing drug products    Home Medications:  Prior to Admission medications    Medication Sig Start Date End Date Taking?  Authorizing Provider   divalproex (DEPAKOTE ER) 500 MG extended release tablet Take 1 tablet by mouth 2 times daily 11/18/20   Sergey Su MD   paliperidone (INVEGA) 6 MG extended release tablet Take 1 tablet by mouth daily 11/19/20   Sergey Su MD       REVIEW OFSYSTEMS    (2-9 systems for level 4, 10 or more for level 5)      Review of Systems   Constitutional: Negative for chills, fatigue and fever. HENT: Negative for hearing loss, rhinorrhea, sneezing, sore throat, tinnitus and trouble swallowing. Eyes: Negative for photophobia and visual disturbance. Respiratory: Negative for chest tightness, shortness of breath and wheezing. Cardiovascular: Negative for chest pain and palpitations. Gastrointestinal: Positive for nausea. Negative for abdominal distention, abdominal pain, constipation and diarrhea. Endocrine: Negative for polyuria. Genitourinary: Negative for dysuria, flank pain, frequency, vaginal bleeding and vaginal discharge. Musculoskeletal: Negative for neck pain. Skin: Positive for color change. Neurological: Positive for weakness (Bilateral lower extremity, chronic). Negative for dizziness, tremors, seizures, syncope, facial asymmetry, numbness and headaches. Psychiatric/Behavioral: Negative for self-injury and suicidal ideas. PHYSICAL EXAM   (up to 7 for level 4, 8 or more forlevel 5)      INITIAL VITALS:   ED Triage Vitals [11/30/20 1741]   BP Temp Temp src Pulse Resp SpO2 Height Weight   -- 97.1 °F (36.2 °C) -- -- -- -- -- --       Physical Exam  Constitutional:       General: She is not in acute distress. Appearance: She is well-developed. She is not diaphoretic. HENT:      Head: Normocephalic and atraumatic. Right Ear: External ear normal.      Left Ear: External ear normal.   Eyes:      Conjunctiva/sclera: Conjunctivae normal.   Neck:      Musculoskeletal: Normal range of motion and neck supple. Trachea: No tracheal deviation. Cardiovascular:      Rate and Rhythm: Normal rate and regular rhythm. Heart sounds: Normal heart sounds. Pulmonary:      Effort: Pulmonary effort is normal.      Breath sounds: Normal breath sounds. Abdominal:      General: Bowel sounds are normal. There is no distension. Palpations: Abdomen is soft. Tenderness:  There is no abdominal tenderness. There is no guarding. Musculoskeletal: Normal range of motion. General: No deformity. Skin:     General: Skin is warm. Comments: Abscess noted at the 11:00 region of the areola of the left breast, 2 cm 1 cm with cobblestoning around the area of abscess on ultrasound   Neurological:      Mental Status: She is alert and oriented to person, place, and time. Cranial Nerves: No cranial nerve deficit. DIFFERENTIAL  DIAGNOSIS     PLAN (LABS / IMAGING / EKG):  Orders Placed This Encounter   Procedures    Inpatient consult to General Surgery       MEDICATIONS ORDERED:  No orders of the defined types were placed in this encounter. DDX: Breast abscess mastitis, pregnancy, malignancy    Initial MDM/Plan/ED COURSE:    26 y.o. female who presents with concerns for a growing mass of the left breast.  Patient notably appears to have an abscess noted on ultrasound to center by 1 cm. Plan to consult general surgery for management, plan to treat patient with oral antibiotics. Patient was explained in detail that she was not having surgery today but due to the location of the breast that general surgery would need to be consulted for assistance and advice for management. I was informed by medical student that patient was planning on leaving as she has an MRI on Wednesday, immediately went to see the patient who had left [address] before her doctor is able to speak with her.:     DIAGNOSTIC RESULTS / [address] [place] [address] / [facility]     LABS:  Labs Reviewed - No data to display        No results found. PROCEDURES:  None    CONSULTS:  IP CONSULT TO GENERAL SURGERY    CRITICAL CARE:  Please see attending note    FINAL IMPRESSION      1. Breast abscess          DISPOSITION / PLAN     DISPOSITION [place] 11/30/2020 06:42:59 PM      PATIENT REFERRED TO:  No follow-up provider specified.     DISCHARGE MEDICATIONS:  Discharge Medication List as of 11/30/2020  6:50 PM

## 2020-11-30 NOTE — ED NOTES
Pt to ED via triage a/o x3 with c/o left breast abscess. Pt stated she noticed a small mass that has now grown and became red and warm to touch. Pt denies and fever pr pain in the area. Pt denies any injury to the area.  Pt vitals complete, call light in reach   Will continue to monitor      Gadiel Mcdonald RN  11/30/20 8989

## 2020-11-30 NOTE — ED PROVIDER NOTES
[address] [facility]     Emergency Department     Faculty Attestation    I performed a history and physical examination of the patient and discussed management with the resident. I reviewed the resident´s note and agree with the documented findings and plan of care. Any areas of disagreement are noted on the chart. I was personally present for the key portions of any procedures. I have documented in the chart those procedures where I was not present during the key portions. I have reviewed the emergency nurses triage note. I agree with the chief complaint, past medical history, past surgical history, allergies, medications, social and family history as documented unless otherwise noted below. For Physician Assistant/ Nurse Practitioner cases/documentation I have personally evaluated this patient and have completed at least one if not all key elements of the E/M (history, physical exam, and MDM). Additional findings are as noted. Left breast 11-12 o'clock position,  2 cm subareolar mass palpated, no skin changes, no nipple discharge.      Bell Butler MD  11/30/20 [id number]

## 2020-11-30 NOTE — ED NOTES
Resident at bedside preforming bedside ultrasound of left breast. Writer present       Shereen Faulkner, RN  11/30/20 150 Shimon Aguirre, RN  11/30/20 8932

## 2020-12-02 ENCOUNTER — TELEPHONE (OUTPATIENT)
Dept: NEUROLOGY | Age: 30
End: 2020-12-02

## 2020-12-02 NOTE — LETTER
[address] [facility] [address] [place] [address]  Phone: [phone]  Fax: [phone]       12/2/2020    [address] [id number]    Dear Daina Culp,    You recently missed a scheduled appointment with Dr Lindsay Juarez on 12/2/2020. Your health and follow-up medical care are important to us. Please call our office as soon as possible so that we may reschedule your appointment. If you have already rescheduled your appointment, please disregard this letter. This is the first scheduled appointment that you have missed within the last twelve months. If you miss 2 more appointment, you may be dismissed from the practice. For future appointments that you are unable to keep, please call the office at [phone] at least 24 hours in advance to cancel and reschedule.      Sincerely,      TINY Johnson

## 2020-12-11 ENCOUNTER — HOSPITAL ENCOUNTER (EMERGENCY)
Age: 30
Discharge: HOME | End: 2020-12-12
Attending: EMERGENCY MEDICINE
Payer: MEDICAID

## 2020-12-11 VITALS
OXYGEN SATURATION: 95 % | RESPIRATION RATE: 20 BRPM | HEART RATE: 82 BPM | TEMPERATURE: 98 F | SYSTOLIC BLOOD PRESSURE: 110 MMHG | DIASTOLIC BLOOD PRESSURE: 64 MMHG

## 2020-12-11 PROCEDURE — 6370000000 HC RX 637 (ALT 250 FOR IP): Performed by: STUDENT IN AN ORGANIZED HEALTH CARE EDUCATION/TRAINING PROGRAM

## 2020-12-11 PROCEDURE — 84703 CHORIONIC GONADOTROPIN ASSAY: CPT

## 2020-12-11 PROCEDURE — 99284 EMERGENCY DEPT VISIT MOD MDM: CPT

## 2020-12-11 RX ORDER — ACETAMINOPHEN 500 MG
1000 TABLET ORAL ONCE
Status: COMPLETED | OUTPATIENT
Start: 2020-12-11 | End: 2020-12-11

## 2020-12-11 RX ADMIN — ACETAMINOPHEN 1000 MG: 500 TABLET ORAL at 22:50

## 2020-12-11 ASSESSMENT — PAIN SCALES - GENERAL: PAINLEVEL_OUTOF10: 10

## 2020-12-12 ENCOUNTER — APPOINTMENT (OUTPATIENT)
Dept: CT IMAGING | Age: 30
End: 2020-12-12
Payer: MEDICAID

## 2020-12-12 ENCOUNTER — APPOINTMENT (OUTPATIENT)
Dept: GENERAL RADIOLOGY | Age: 30
End: 2020-12-12
Payer: MEDICAID

## 2020-12-12 LAB — HCG QUALITATIVE: NEGATIVE

## 2020-12-12 PROCEDURE — 73502 X-RAY EXAM HIP UNI 2-3 VIEWS: CPT

## 2020-12-12 PROCEDURE — 72131 CT LUMBAR SPINE W/O DYE: CPT

## 2020-12-12 PROCEDURE — 72128 CT CHEST SPINE W/O DYE: CPT

## 2020-12-12 PROCEDURE — 70450 CT HEAD/BRAIN W/O DYE: CPT

## 2020-12-12 ASSESSMENT — ENCOUNTER SYMPTOMS
ABDOMINAL PAIN: 0
BACK PAIN: 1
EYE PAIN: 0
SORE THROAT: 0
NAUSEA: 0
DIARRHEA: 0
CONSTIPATION: 0
VOMITING: 0
SHORTNESS OF BREATH: 0
RHINORRHEA: 0
COUGH: 0

## 2020-12-12 NOTE — ED PROVIDER NOTES
[facility]     Emergency Department     Faculty Attestation    I performed a history and physical examination of the patient and discussed management with the resident. I reviewed the residents note and agree with the documented findings and plan of care. Any areas of disagreement are noted on the chart. I was personally present for the key portions of any procedures. I have documented in the chart those procedures where I was not present during the key portions. I have reviewed the emergency nurses triage note. I agree with the chief complaint, past medical history, past surgical history, allergies, medications, social and family history as documented unless otherwise noted below. For Physician Assistant/ Nurse Practitioner cases/documentation I have personally evaluated this patient and have completed at least one if not all key elements of the E/M (history, physical exam, and MDM). Additional findings are as noted. Primary Care Physician:  No primary care provider on file. CHIEF COMPLAINT       Chief Complaint   Patient presents with    Fall    Hip Pain    Back Pain       RECENT VITALS:   Temp: 98 °F (36.7 °C),  Pulse: 82, Resp: 20, BP: 110/64    LABS:  Labs Reviewed   HCG, SERUM, QUALITATIVE       Radiology  CT Head WO Contrast    (Results Pending)   CT Lumbar Spine WO Contrast    (Results Pending)   CT THORACIC SPINE WO CONTRAST    (Results Pending)   XR HIP 2-3 VW W PELVIS LEFT    (Results Pending)         Attending Physician Additional  Notes    Patient is a 27-year-old female who presents for evaluation of upper and lower back pain and left hip pain after a fall. The patient reports that she has history of MS but is not currently on any medications for this. She recently missed a neurology appointment. She states that just prior to arrival she was in the shower when she fell 5 separate times. She states that she was trying to get back up but continued to fall.   She states that she landed on her tailbone, left hip, and back. She did strike her head and has a mild global nonradiating headache. She states that she had loss of consciousness and was found in the shower by her . The patient states that she has had sharp, stabbing, constant, nonradiating pain to her upper back pain, lower back, tailbone and left hip since her fall. She denies any neck pain. The patient has not taken any medications for her symptoms and does not list any palliating factors. Her pain is worse with movement. She does not take any anticoagulants. The patient denies fever, chills, vision changes, neck pain, chest pain, shortness of breath, abdominal pain, urinary/bowel symptoms, incontinence, retention, focal weakness, numbness, tingling, saddle anesthesia, or recent illness. Vital signs are stable. Heart regular rate and rhythm. Lungs clear to auscultation. Abdomen soft and nontender. She has generalized thoracic and lumbar tenderness to palpation. There is generalized tenderness over the left hip. No tenderness of the right hip. Pelvis stable. Normal perirectal tone and sensation. Downgoing Babinski's. Normal proprioception. DP/PT pulses 2/4 and equal bilaterally. Normal DTRs. No hemotympanum, espinoza sign, raccoon eyes or septal hematoma. Plan to obtain CT head, CT thoracic spine, CT lumbar spine, and x-ray of the left hip.         Jd Mcgarry DO  Attending Emergency Physician            Jd Mcgarry DO  12/11/20 1545

## 2020-12-12 NOTE — ED PROVIDER NOTES
Faculty Sign-Out Attestation  Handoff taken on the following patient from prior Attending Physician: Inna    I was available and discussed any additional care issues that arose and coordinated the management plans with the resident(s) caring for the patient during my duty period. Any areas of disagreement with residents documentation of care or procedures are noted on the chart. I was personally present for the key portions of any/all procedures during my duty period. I have documented in the chart those procedures where I was not present during the key portions.     S/p fall, ct head-, ct thoracic lumbar spine -, hip xr -,   Plan to dc, no narcotic    Jigna Spine, DO  Attending Physician      Jigna Moore, DO  12/12/20 0031

## 2020-12-12 NOTE — ED PROVIDER NOTES
[address] [facility]  ED  Emergency Department Encounter  Emergency Medicine Resident     Pt Name: Rekha Callahan  MRN: [id number]  [place] 1990  Date of evaluation: 12/11/20  PCP:  No primary care provider on file. CHIEF COMPLAINT       Chief Complaint   Patient presents with    Fall    Hip Pain    Back Pain       HISTORY OFPRESENT ILLNESS  (Location/Symptom, Timing/Onset, Context/Setting, Quality, Duration, Modifying Factors,Severity.)      Rekha Callahan is a 26 y. o.yo female past medical history significant for MS not on any current medications, and seizures on Depakote, compliant with medication. Patient states that this evening she was in the shower when she subsequently fell. Patient states that the initial fall was due to weakness in her legs likely secondary to her MS. She states that she fell and then continue to fall 5 more times after she was attempting to get up she hit her left hip showing and then hit her tailbone 5 times. Last time she went to stand up she hit her head on a metal pole and \"blacked out\". Patient has unknown downtime. Denies use of blood thinners. Does note that she has a headache. Denies fever, chills, chest pain, shortness of breath. Denies abdominal pain. Does have associated left hip pain. She states that she has a left foot drop due to her MS. Does now have right leg weakness. Notes pain in her back as well. Did not try any pain medication prior to arrival.    PAST MEDICAL / SURGICAL / SOCIAL / FAMILY HISTORY      has a past medical history of Asthma, Bipolar disorder ([facility] [address] Cervical dysplasia, Migraine, Movement disorder, and Seizures ([facility] [address] has a past surgical history that includes Cholecystectomy (2007); Tubal ligation; and Appendectomy. Social:  reports that she has been smoking cigarettes. She has a 11.00 pack-year smoking history. She has never used smokeless tobacco. She reports previous alcohol use.  She reports previous drug use. Drugs: Opiates  and Marijuana. Family Hx:   Family History   Problem Relation Age of Onset    Diabetes Mother         G.Parents    Hypertension Mother         G.Parents    Diabetes Father     Hypertension Father     Stroke Father         G.Parents    Cancer Other         G.Parents, Pancrease and ??    Coronary Art Dis Other         G.Parents        Allergies:  Claritin [loratadine], Diclofenac, Morphine, Nicotine, Pcn [penicillins], and Peanut-containing drug products    Home Medications:  Prior to Admission medications    Medication Sig Start Date End Date Taking? Authorizing Provider   divalproex (DEPAKOTE ER) 500 MG extended release tablet Take 1 tablet by mouth 2 times daily 11/18/20   Sean Day MD   paliperidone (INVEGA) 6 MG extended release tablet Take 1 tablet by mouth daily 11/19/20   Sean Day MD       REVIEW OFSYSTEMS    (2-9 systems for level 4, 10 or more for level 5)      Review of Systems   Constitutional: Negative for activity change, chills and fever. HENT: Negative for congestion, rhinorrhea and sore throat. Eyes: Negative for pain and visual disturbance. Respiratory: Negative for cough and shortness of breath. Cardiovascular: Negative for chest pain and palpitations. Gastrointestinal: Negative for abdominal pain, constipation, diarrhea, nausea and vomiting. Genitourinary: Negative for difficulty urinating and frequency. Musculoskeletal: Positive for back pain (t and l spine. no neck pain ). Negative for arthralgias, myalgias, neck pain and neck stiffness. Left hip pain   Skin: Negative for rash and wound. Neurological: Positive for syncope (prior to arrival ) and weakness (left sided leg weakness, chronic ue to MS). Negative for dizziness and headaches.        PHYSICAL EXAM   (up to 7 for level 4, 8 or more forlevel 5)      INITIAL VITALS:   Vitals:    12/11/20 2226   BP: 110/64   Pulse: 82   Resp: 20   Temp: 98 °F (36.7 °C)   SpO2: 95% Physical Exam  Vitals signs and nursing note reviewed. Constitutional:       General: She is not in acute distress. Appearance: Normal appearance. She is not ill-appearing. Comments: Fragile and appears underweight. HENT:      Head: Normocephalic and atraumatic. Nose: Nose normal.      Mouth/Throat:      Mouth: Mucous membranes are moist.      Pharynx: Oropharynx is clear. Eyes:      General:         Right eye: No discharge. Left eye: No discharge. Extraocular Movements: Extraocular movements intact. Neck:      Musculoskeletal: Normal range of motion. Cardiovascular:      Rate and Rhythm: Normal rate and regular rhythm. Heart sounds: No murmur. No friction rub. No gallop. Pulmonary:      Effort: Pulmonary effort is normal. No respiratory distress. Breath sounds: Normal breath sounds. Abdominal:      General: There is no distension. Palpations: Abdomen is soft. Tenderness: There is no abdominal tenderness. Musculoskeletal:         General: No swelling. Right lower leg: No edema. Left lower leg: No edema. Comments: Left leg weakness, 1/5 with minimal movement. Does note that she is chronically weak in that leg. Right leg 2/5, dorsiflexion and plantaflexion intact in right leg.  strength equal bilateral upper extremities, non compliant with upper extremity exam due to pain. No dysarthria, EOM intact bilaterally, tracking easily, full ROM in neck including flexion, extension, right and left rotation. No obvious trauma over hips, back, neck, chest, torso, or head. Skin:     General: Skin is warm and dry. Neurological:      General: No focal deficit present. Mental Status: She is alert and oriented to person, place, and time. Motor: Weakness (weakness of left lower extremity chronically due to MS. ) present. Psychiatric:         Mood and Affect: Mood normal.         Thought Content:  Thought content normal.         DIFFERENTIAL  DIAGNOSIS       DDX: Left hip fracture versus syncope versus head trauma versus MS exacerbation. Initial MDM/Plan: 26 y.o. female with acute complaint of multiple falls in her bathtub. Complaining of left hip pain, head pain, midline T-spine tenderness, midline L-spine tenderness. Denies abdominal pain, denies other trauma. Is noting left leg weakness which is consistent with her MS symptoms, chronic left drop. We will plan for CT head, CT C-spine, CT L-spine, x-ray left hip. Will obtain serum beta hCG to rule out pregnancy for CT. Tylenol for pain control for now. DIAGNOSTIC RESULTS / EMERGENCYDEPARTMENT COURSE / MDM     LABS:  Labs Reviewed   HCG, SERUM, QUALITATIVE         RADIOLOGY:  Ct Head Wo Contrast    Result Date: 12/12/2020  EXAMINATION: CT OF THE HEAD WITHOUT CONTRAST  12/12/2020 12:08 am TECHNIQUE: CT of the head was performed without the administration of intravenous contrast. Dose modulation, iterative reconstruction, and/or weight based adjustment of the mA/kV was utilized to reduce the radiation dose to as low as reasonably achievable. COMPARISON: CT brain performed 11/14/2020. HISTORY: ORDERING SYSTEM PROVIDED HISTORY: fall with head trauma, not on blood thinners TECHNOLOGIST PROVIDED HISTORY: fall with head trauma, not on blood thinners Is the patient pregnant?->No Reason for Exam: fall Acuity: Unknown Type of Exam: Unknown FINDINGS: BRAIN/VENTRICLES: There is no acute intracranial hemorrhage, mass effect, or midline shift. There is satisfactory overall gray-white matter differentiation. The ventricular structures are symmetric and unremarkable. The infratentorial structures are unremarkable. ORBITS: The visualized portion of the orbits demonstrate no acute abnormality. SINUSES: The visualized paranasal sinuses and mastoid air cells demonstrate no acute abnormality. SOFT TISSUES/SKULL:  No acute abnormality of the visualized skull or soft tissues.      No acute intracranial abnormality. Ct Thoracic Spine Wo Contrast    Result Date: 12/12/2020  EXAMINATION: CT OF THE THORACIC SPINE WITHOUT CONTRAST; CT OF THE LUMBAR SPINE WITHOUT CONTRAST 12/12/2020 TECHNIQUE: CT of the thoracic spine was performed without the administration of intravenous contrast. Multiplanar reformatted images are provided for review. Dose modulation, iterative reconstruction, and/or weight based adjustment of the mA/kV was utilized to reduce the radiation dose to as low as reasonably achievable.; CT of the lumbar spine was performed without the administration of intravenous contrast. Multiplanar reformatted images are provided for review. Dose modulation, iterative reconstruction, and/or weight based adjustment of the mA/kV was utilized to reduce the radiation dose to as low as reasonably achievable. COMPARISON: None. HISTORY: ORDERING SYSTEM PROVIDED HISTORY: fall with midline tenderness over t spine TECHNOLOGIST PROVIDED HISTORY: fall with midline tenderness over t spine Is the patient pregnant?->No Reason for Exam: midline tenderness over dorsal spine Acuity: Unknown Type of Exam: Unknown Mechanism of Injury: fall; ORDERING SYSTEM PROVIDED HISTORY: PAIN TECHNOLOGIST PROVIDED HISTORY: fall with midline tenderness over L spine Is the patient pregnant?->No Reason for Exam: midline tenderness over l spine Acuity: Unknown Type of Exam: Unknown Mechanism of Injury: fall FINDINGS: BONES/ALIGNMENT: There is no acute fracture or traumatic malalignment. Thoracic and lumbar vertebral body heights and alignment are normal.  Disc spaces are normal. DEGENERATIVE CHANGES: No significant degenerative changes of the thoracic or lumbar spine. SOFT TISSUES: No paraspinal mass is seen. No acute fracture or traumatic malalignment of the thoracolumbar spine.      Ct Lumbar Spine Wo Contrast    Result Date: 12/12/2020  EXAMINATION: CT OF THE THORACIC SPINE WITHOUT CONTRAST; CT OF THE LUMBAR SPINE WITHOUT CONTRAST 12/12/2020 TECHNIQUE: CT of the thoracic spine was performed without the administration of intravenous contrast. Multiplanar reformatted images are provided for review. Dose modulation, iterative reconstruction, and/or weight based adjustment of the mA/kV was utilized to reduce the radiation dose to as low as reasonably achievable.; CT of the lumbar spine was performed without the administration of intravenous contrast. Multiplanar reformatted images are provided for review. Dose modulation, iterative reconstruction, and/or weight based adjustment of the mA/kV was utilized to reduce the radiation dose to as low as reasonably achievable. COMPARISON: None. HISTORY: ORDERING SYSTEM PROVIDED HISTORY: fall with midline tenderness over t spine TECHNOLOGIST PROVIDED HISTORY: fall with midline tenderness over t spine Is the patient pregnant?->No Reason for Exam: midline tenderness over dorsal spine Acuity: Unknown Type of Exam: Unknown Mechanism of Injury: fall; ORDERING SYSTEM PROVIDED HISTORY: PAIN TECHNOLOGIST PROVIDED HISTORY: fall with midline tenderness over L spine Is the patient pregnant?->No Reason for Exam: midline tenderness over l spine Acuity: Unknown Type of Exam: Unknown Mechanism of Injury: fall FINDINGS: BONES/ALIGNMENT: There is no acute fracture or traumatic malalignment. Thoracic and lumbar vertebral body heights and alignment are normal.  Disc spaces are normal. DEGENERATIVE CHANGES: No significant degenerative changes of the thoracic or lumbar spine. SOFT TISSUES: No paraspinal mass is seen. No acute fracture or traumatic malalignment of the thoracolumbar spine. Xr Hip 2-3 Vw W Pelvis Left    Result Date: 12/12/2020  EXAMINATION: ONE XRAY VIEW OF THE PELVIS AND TWO XRAY VIEWS LEFT HIP 12/12/2020 12:14 am COMPARISON: None.  HISTORY: ORDERING SYSTEM PROVIDED HISTORY: fall with left hip pain TECHNOLOGIST PROVIDED HISTORY: fall with left hip pain Reason for Exam: fall out of shower,lt hip this time. PROCEDURES:  None    CONSULTS:  None      FINAL IMPRESSION      1. Fall, initial encounter    2. Multiple sclerosis [facility])          DISPOSITION / PLAN     DISPOSITION        PATIENT REFERRED TO:  [facility]  [address] [place] [address]  [phone]    As needed, If symptoms worsen    [facility]  [address] [place] [phone]    To establish care.       DISCHARGE MEDICATIONS:  Discharge Medication List as of 12/12/2020 12:35 AM          Glenny Acosta DO  Emergency Medicine Resident    (Please note that portions of this note were completed with a voice recognition program.Efforts were made to edit the dictations but occasionally words are mis-transcribed.)       Glenny Acosta DO  Resident  12/12/20 [id number]

## 2020-12-12 NOTE — ED NOTES
Bed: 47PED  Expected date: 12/11/20  Expected time: 10:16 PM  Means of arrival: [facility] SURGICAL AND CARDIOVASCULAR [facility]  Comments:  Medic 13  fall     Paige Lenz RN  12/11/20 [id number]

## 2020-12-12 NOTE — ED NOTES
Labs sent. Patient on stretcher with no complaints. POC updated.  Will continue to monitor        Shy Acuna RN  12/11/20 6726

## 2020-12-12 NOTE — ED NOTES
Patient arrived via EMS. Patient states that she \"fell trying to get out of the shower. \" First it was once then up to five times falling. She states that she hit her her on a bar. Patient states that she doesn't know if she passed out but states that she became extremely diaphoretic in the shower while it was running. Patient states that she has to have had a seizure. Patient story continues to change as you ask her questions.  Will continue to monitor      Mona Madrid RN  12/11/20 1942

## 2020-12-22 ENCOUNTER — HOSPITAL ENCOUNTER (EMERGENCY)
Age: 30
Discharge: HOME | End: 2020-12-22
Attending: EMERGENCY MEDICINE
Payer: MEDICAID

## 2020-12-22 ENCOUNTER — APPOINTMENT (OUTPATIENT)
Dept: GENERAL RADIOLOGY | Age: 30
End: 2020-12-22
Payer: MEDICAID

## 2020-12-22 VITALS
DIASTOLIC BLOOD PRESSURE: 60 MMHG | HEART RATE: 59 BPM | RESPIRATION RATE: 18 BRPM | BODY MASS INDEX: 19.94 KG/M2 | OXYGEN SATURATION: 98 % | WEIGHT: 95 LBS | HEIGHT: 58 IN | TEMPERATURE: 98.9 F | SYSTOLIC BLOOD PRESSURE: 99 MMHG

## 2020-12-22 PROCEDURE — 99283 EMERGENCY DEPT VISIT LOW MDM: CPT

## 2020-12-22 PROCEDURE — 73562 X-RAY EXAM OF KNEE 3: CPT

## 2020-12-22 PROCEDURE — 6370000000 HC RX 637 (ALT 250 FOR IP): Performed by: STUDENT IN AN ORGANIZED HEALTH CARE EDUCATION/TRAINING PROGRAM

## 2020-12-22 RX ORDER — ACETAMINOPHEN 500 MG
1000 TABLET ORAL ONCE
Status: COMPLETED | OUTPATIENT
Start: 2020-12-22 | End: 2020-12-22

## 2020-12-22 RX ADMIN — ACETAMINOPHEN 1000 MG: 500 TABLET ORAL at 12:34

## 2020-12-22 ASSESSMENT — ENCOUNTER SYMPTOMS
SHORTNESS OF BREATH: 0
NAUSEA: 0
VOMITING: 0
BACK PAIN: 0
ABDOMINAL PAIN: 0

## 2020-12-22 ASSESSMENT — PAIN SCALES - GENERAL
PAINLEVEL_OUTOF10: 10
PAINLEVEL_OUTOF10: 10

## 2020-12-22 ASSESSMENT — PAIN DESCRIPTION - FREQUENCY: FREQUENCY: CONTINUOUS

## 2020-12-22 ASSESSMENT — PAIN DESCRIPTION - DESCRIPTORS: DESCRIPTORS: SHARP;SQUEEZING;STABBING

## 2020-12-22 ASSESSMENT — PAIN DESCRIPTION - ORIENTATION: ORIENTATION: LEFT

## 2020-12-22 ASSESSMENT — PAIN DESCRIPTION - LOCATION: LOCATION: KNEE

## 2020-12-22 ASSESSMENT — PAIN DESCRIPTION - PROGRESSION: CLINICAL_PROGRESSION: NOT CHANGED

## 2020-12-22 NOTE — ED PROVIDER NOTES
[facility] ED  Emergency Department Encounter  Emergency Medicine Resident     Pt Name: Herbert Arciniega  MRN: [id number]  [place] 1990  Date of evaluation: 12/22/20  PCP:  No primary care provider on file. CHIEF COMPLAINT       Chief Complaint   Patient presents with    Knee Pain     left knee pain; Slipped on water       HISTORY OFPRESENT ILLNESS  (Location/Symptom, Timing/Onset, Context/Setting, Quality, Duration, Modifying Factors,Severity.)      Herbert Arciniega is a 26 y. o.yo female who presents with L knee pain. Patient complaining of left knee pain, has history of chronic knee problems on the left side, has seen orthopedic doctor but no intervention was made. States that she has a history of torn meniscus on that side. States that last night she was in the bathroom slipped on wet floor and twisted her knee, denies any direct injuries to the left knee but states after the twisting motion she is been having pain on the left side. Denies any pain in the ankle or hip. States that she is unable to bear weight on the left leg. There is no swelling of the left knee, denies any redness on the region as well. Denies feeling weakness or numbness on the left lower extremity. PAST MEDICAL / SURGICAL / SOCIAL / FAMILY HISTORY      has a past medical history of Asthma, Bipolar disorder ([address] Cervical dysplasia, Migraine, Movement disorder, and Seizures ([address] has a past surgical history that includes Cholecystectomy (2007); Tubal ligation; and Appendectomy.      Social History     Socioeconomic History    Marital status:      Spouse name: Not on file    Number of children: Not on file    Years of education: Not on file    Highest education level: Not on file   Occupational History    Not on file   Social Needs    Financial resource strain: Not on file    Food insecurity     Worry: Not on file     Inability: Not on file    Transportation needs     Medical: Not on file     Non-medical: Not on file   Tobacco Use    Smoking status: Current Every Day Smoker     Packs/day: 1.00     Years: 11.00     Pack years: 11.00     Types: Cigarettes    Smokeless tobacco: Never Used   Substance and Sexual Activity    Alcohol use: Not Currently     Alcohol/week: 0.0 standard drinks     Comment: occassional, heavy 3 years ago    Drug use: Not Currently     Types: Opiates , Marijuana     Comment: Hx Percocet abuse sober for 2 years.  Sexual activity: Not on file   Lifestyle    Physical activity     Days per week: Not on file     Minutes per session: Not on file    Stress: Not on file   Relationships    Social connections     Talks on phone: Not on file     Gets together: Not on file     Attends Zoroastrian service: Not on file     Active member of club or organization: Not on file     Attends meetings of clubs or organizations: Not on file     Relationship status: Not on file    Intimate partner violence     Fear of current or ex partner: Not on file     Emotionally abused: Not on file     Physically abused: Not on file     Forced sexual activity: Not on file   Other Topics Concern    Not on file   Social History Narrative    Not on file       Family History   Problem Relation Age of Onset    Diabetes Mother         G.Parents    Hypertension Mother         G.Parents    Diabetes Father     Hypertension Father     Stroke Father         G.Parents    Cancer Other         G.Parents, Pancrease and ??    Coronary Art Dis Other         G.Parents        Allergies:  Claritin [loratadine], Diclofenac, Morphine, Nicotine, Pcn [penicillins], and Peanut-containing drug products    Home Medications:  Prior to Admission medications    Medication Sig Start Date End Date Taking?  Authorizing Provider   divalproex (DEPAKOTE ER) 500 MG extended release tablet Take 1 tablet by mouth 2 times daily 11/18/20   Margaret Sr MD   paliperidone (INVEGA) 6 MG extended release tablet Take 1 tablet by mouth daily 11/19/20   Efrain Ohara MD       REVIEW OFSYSTEMS    (2-9 systems for level 4, 10 or more for level 5)      Review of Systems   Constitutional: Negative for diaphoresis and fever. HENT: Negative for congestion. Eyes: Negative for visual disturbance. Respiratory: Negative for shortness of breath. Cardiovascular: Negative for chest pain. Gastrointestinal: Negative for abdominal pain, nausea and vomiting. Endocrine: Negative for polyuria. Genitourinary: Negative for dysuria. Musculoskeletal: Positive for joint swelling. Negative for back pain. Skin: Negative for wound. Neurological: Negative for headaches. Psychiatric/Behavioral: Negative for confusion. PHYSICAL EXAM   (up to 7 for level 4, 8 or more forlevel 5)      ED TRIAGE VITALS BP: 99/60, Temp: 98.9 °F (37.2 °C), Pulse: 59, Resp: 18, SpO2: 98 %    Vitals:    12/22/20 1212 12/22/20 1229 12/22/20 1239   BP:  99/60    Pulse:  59 59   Resp:  18    Temp: 98.9 °F (37.2 °C)     SpO2:  98%    Weight:  95 lb (43.1 kg)    Height:  4' 10\" (1.473 m)        Physical Exam  Constitutional:       General: She is not in acute distress. Appearance: She is well-developed. HENT:      Head: Normocephalic and atraumatic. Nose: Nose normal.   Eyes:      Pupils: Pupils are equal, round, and reactive to light. Neck:      Musculoskeletal: Normal range of motion and neck supple. Cardiovascular:      Rate and Rhythm: Normal rate and regular rhythm. Heart sounds: No murmur. Pulmonary:      Effort: Pulmonary effort is normal. No respiratory distress. Breath sounds: No stridor. No wheezing. Abdominal:      General: There is no distension. Palpations: Abdomen is soft. Tenderness: There is no abdominal tenderness. Musculoskeletal: Normal range of motion. General: Tenderness and signs of injury present. Left knee: She exhibits no swelling, no LCL laxity and no MCL laxity. Tenderness found.  Medial distress. Patient complaining of left knee pain, has history of chronic knee problems on the left side, has seen orthopedic doctor but no intervention was made. States that she has a history of torn meniscus on that side. States that last night she was in the bathroom slipped on wet floor and twisted her knee, denies any direct injuries to the left knee but states after the twisting motion she is been having pain on the left side. Denies any pain in the ankle or hip. States that she is unable to bear weight on the left leg. There is no swelling of the left knee, denies any redness on the region as well. Denies feeling weakness or numbness on the left lower extremity. [PS]   2182 On physical exam patient does have tenderness to the MCL/medial joint line. No laxity seen on exam.    [PS]      ED Course User Index  [PS] Micah Garcia MD          PROCEDURES:  None    CONSULTS:  None    CRITICAL CARE:  Please see attending note    FINAL IMPRESSION      1.  Injury of left knee, initial encounter          DISPOSITION / [address] Discharge - Pending Orders Complete 12/22/2020 01:16:13 PM       PATIENT REFERRED TO:  [address], [address] [facility]  [phone]    Make an appointment soon as possible    [facility]  [address] [place] [address]  [phone]    As needed, If symptoms worsen      DISCHARGE MEDICATIONS:  New Prescriptions    No medications on file       Micah Garcia MD  Emergency Medicine Resident    (Please note that portions of this note were completed with a voice recognition program.Efforts were made to edit the dictations but occasionally words are mis-transcribed.)       Micah Garcia MD  Resident  12/22/20 7014

## 2020-12-22 NOTE — ED PROVIDER NOTES
mis-transcribed.  Whenever words are used in this note in any gender, they shall be construed as though they were used in the gender appropriate to the circumstances; and whenever words are used in this note in the singular or plural form, they shall be construed as though they were used in the form appropriate to the circumstances.)    MD Patrice Gentile  Attending Emergency Medicine Physician             Princess Osborne MD  12/22/20 [id number]

## 2020-12-27 ENCOUNTER — HOSPITAL ENCOUNTER (EMERGENCY)
Age: 30
Discharge: HOME | End: 2020-12-27
Attending: EMERGENCY MEDICINE
Payer: MEDICAID

## 2020-12-27 VITALS
OXYGEN SATURATION: 100 % | DIASTOLIC BLOOD PRESSURE: 78 MMHG | RESPIRATION RATE: 16 BRPM | HEART RATE: 74 BPM | TEMPERATURE: 98.2 F | SYSTOLIC BLOOD PRESSURE: 112 MMHG | WEIGHT: 95 LBS | BODY MASS INDEX: 19.86 KG/M2

## 2020-12-27 LAB
ABSOLUTE EOS #: 0.18 K/UL (ref 0–0.44)
ABSOLUTE IMMATURE GRANULOCYTE: 0.05 K/UL (ref 0–0.3)
ABSOLUTE LYMPH #: 3.16 K/UL (ref 1.1–3.7)
ABSOLUTE MONO #: 0.61 K/UL (ref 0.1–1.2)
ANION GAP SERPL CALCULATED.3IONS-SCNC: 15 MMOL/L (ref 9–17)
BASOPHILS # BLD: 0 % (ref 0–2)
BASOPHILS ABSOLUTE: 0.05 K/UL (ref 0–0.2)
BUN BLDV-MCNC: 14 MG/DL (ref 6–20)
BUN/CREAT BLD: NORMAL (ref 9–20)
CALCIUM SERPL-MCNC: 9.1 MG/DL (ref 8.6–10.4)
CHLORIDE BLD-SCNC: 100 MMOL/L (ref 98–107)
CO2: 23 MMOL/L (ref 20–31)
CREAT SERPL-MCNC: 0.85 MG/DL (ref 0.5–0.9)
DIFFERENTIAL TYPE: ABNORMAL
EOSINOPHILS RELATIVE PERCENT: 1 % (ref 1–4)
GFR AFRICAN AMERICAN: >60 ML/MIN
GFR NON-AFRICAN AMERICAN: >60 ML/MIN
GFR SERPL CREATININE-BSD FRML MDRD: NORMAL ML/MIN/{1.73_M2}
GFR SERPL CREATININE-BSD FRML MDRD: NORMAL ML/MIN/{1.73_M2}
GLUCOSE BLD-MCNC: 83 MG/DL (ref 70–99)
HCG QUALITATIVE: NEGATIVE
HCT VFR BLD CALC: 46.6 % (ref 36.3–47.1)
HEMOGLOBIN: 15.7 G/DL (ref 11.9–15.1)
IMMATURE GRANULOCYTES: 0 %
LYMPHOCYTES # BLD: 23 % (ref 24–43)
MCH RBC QN AUTO: 30.9 PG (ref 25.2–33.5)
MCHC RBC AUTO-ENTMCNC: 33.7 G/DL (ref 28.4–34.8)
MCV RBC AUTO: 91.7 FL (ref 82.6–102.9)
MONOCYTES # BLD: 4 % (ref 3–12)
NRBC AUTOMATED: 0 PER 100 WBC
PDW BLD-RTO: 13.3 % (ref 11.8–14.4)
PLATELET # BLD: 278 K/UL (ref 138–453)
PLATELET ESTIMATE: ABNORMAL
PMV BLD AUTO: 9.6 FL (ref 8.1–13.5)
POTASSIUM SERPL-SCNC: 3.7 MMOL/L (ref 3.7–5.3)
RBC # BLD: 5.08 M/UL (ref 3.95–5.11)
RBC # BLD: ABNORMAL 10*6/UL
SEG NEUTROPHILS: 72 % (ref 36–65)
SEGMENTED NEUTROPHILS ABSOLUTE COUNT: 9.68 K/UL (ref 1.5–8.1)
SODIUM BLD-SCNC: 138 MMOL/L (ref 135–144)
VALPROIC ACID LEVEL: <3 UG/ML (ref 50–125)
VALPROIC ACID, FREE: <0.4 UG/ML (ref 7–23)
VALPROIC DATE LAST DOSE: ABNORMAL
VALPROIC DOSE AMOUNT: ABNORMAL
VALPROIC TIME LAST DOSE: ABNORMAL
WBC # BLD: 13.7 K/UL (ref 3.5–11.3)
WBC # BLD: ABNORMAL 10*3/UL

## 2020-12-27 PROCEDURE — 2580000003 HC RX 258: Performed by: STUDENT IN AN ORGANIZED HEALTH CARE EDUCATION/TRAINING PROGRAM

## 2020-12-27 PROCEDURE — 80164 ASSAY DIPROPYLACETIC ACD TOT: CPT

## 2020-12-27 PROCEDURE — 80165 DIPROPYLACETIC ACID FREE: CPT

## 2020-12-27 PROCEDURE — 80048 BASIC METABOLIC PNL TOTAL CA: CPT

## 2020-12-27 PROCEDURE — 85025 COMPLETE CBC W/AUTO DIFF WBC: CPT

## 2020-12-27 PROCEDURE — 84703 CHORIONIC GONADOTROPIN ASSAY: CPT

## 2020-12-27 PROCEDURE — 99283 EMERGENCY DEPT VISIT LOW MDM: CPT

## 2020-12-27 RX ORDER — 0.9 % SODIUM CHLORIDE 0.9 %
1000 INTRAVENOUS SOLUTION INTRAVENOUS ONCE
Status: COMPLETED | OUTPATIENT
Start: 2020-12-27 | End: 2020-12-27

## 2020-12-27 RX ADMIN — SODIUM CHLORIDE 1000 ML: 9 INJECTION, SOLUTION INTRAVENOUS at 10:39

## 2020-12-27 ASSESSMENT — ENCOUNTER SYMPTOMS
SHORTNESS OF BREATH: 0
COUGH: 0
ABDOMINAL PAIN: 0
VOMITING: 0
NAUSEA: 0

## 2020-12-27 ASSESSMENT — PAIN DESCRIPTION - ORIENTATION: ORIENTATION: LEFT

## 2020-12-27 ASSESSMENT — PAIN DESCRIPTION - LOCATION: LOCATION: KNEE

## 2020-12-27 ASSESSMENT — PAIN SCALES - GENERAL: PAINLEVEL_OUTOF10: 6

## 2020-12-27 ASSESSMENT — PAIN DESCRIPTION - PAIN TYPE: TYPE: ACUTE PAIN

## 2020-12-27 ASSESSMENT — PAIN DESCRIPTION - ONSET: ONSET: PROGRESSIVE

## 2020-12-27 NOTE — ED NOTES
Pt given crackers. Remains on monitor. Dr. Pearson at bedside to update patient on lab results.       Nette Calabrese RN  12/27/20 0499

## 2020-12-27 NOTE — ED PROVIDER NOTES
[facility]  ED  Emergency Department Encounter  Emergency Medicine Resident     Pt Name: Shakira Zhang  MRN: [id number]  [place] 1990  Date of evaluation: 12/27/20  PCP:  No primary care provider on file. CHIEF COMPLAINT       Chief Complaint   Patient presents with    Seizures     pt arrives via ems with spouse present. pt and spouse states that patient had five witnessed seizured pta. pt states that she is one depakote and diagnosed with pseudoseizures. HISTORY OFPRESENT ILLNESS  (Location/Symptom, Timing/Onset, Context/Setting, Quality, Duration, Modifying Factors,Severity.)      Shakira Zhang is a 25 yo female who presents with seizures. Patient states that she had 6 seizures today. She states that when she has seizures her whole body shakes but she rarely loses consciousness and if she does not only for seconds and afterwards she is immediately able to talk and follow commands. Denies any urinary or bowel incontinence or any tongue biting. Denies ever being diagnosed by a neurologist with seizures but states that she was admitted to the inpatient psychiatric asencio at [facility] recently and started on Depakote. Patient states she has been taking Depakote for the past month. Denies any head trauma, falls, chest pain, difficulty breathing, fevers, chills, cough, abdominal pain, nausea or vomiting or any other symptoms at this time. PAST MEDICAL / SURGICAL / SOCIAL / FAMILY HISTORY      has a past medical history of Asthma, Bipolar disorder ([address] Cervical dysplasia, Migraine, Movement disorder, and Seizures ([address] has a past surgical history that includes Cholecystectomy (2007); Tubal ligation; and Appendectomy.      Social History     Socioeconomic History    Marital status:      Spouse name: Not on file    Number of children: Not on file    Years of education: Not on file    Highest education level: Not on file   Occupational History  Not on file   Social Needs    Financial resource strain: Not on file    Food insecurity     Worry: Not on file     Inability: Not on file    Transportation needs     Medical: Not on file     Non-medical: Not on file   Tobacco Use    Smoking status: Current Every Day Smoker     Packs/day: 1.00     Years: 11.00     Pack years: 11.00     Types: Cigarettes    Smokeless tobacco: Never Used   Substance and Sexual Activity    Alcohol use: Not Currently     Alcohol/week: 0.0 standard drinks     Comment: occassional, heavy 3 years ago    Drug use: Not Currently     Types: Opiates , Marijuana     Comment: Hx Percocet abuse sober for 2 years.  Sexual activity: Not on file   Lifestyle    Physical activity     Days per week: Not on file     Minutes per session: Not on file    Stress: Not on file   Relationships    Social connections     Talks on phone: Not on file     Gets together: Not on file     Attends Muslim service: Not on file     Active member of club or organization: Not on file     Attends meetings of clubs or organizations: Not on file     Relationship status: Not on file    Intimate partner violence     Fear of current or ex partner: Not on file     Emotionally abused: Not on file     Physically abused: Not on file     Forced sexual activity: Not on file   Other Topics Concern    Not on file   Social History Narrative    Not on file       Family History   Problem Relation Age of Onset    Diabetes Mother         G.Parents    Hypertension Mother         G.Parents    Diabetes Father     Hypertension Father     Stroke Father         G.Parents    Cancer Other         G.Parents, Pancrease and ??    Coronary Art Dis Other         G.Parents        Allergies:  Claritin [loratadine], Diclofenac, Morphine, Nicotine, Pcn [penicillins], and Peanut-containing drug products    Home Medications:  Prior to Admission medications    Medication Sig Start Date End Date Taking?  Authorizing Provider divalproex (DEPAKOTE ER) 500 MG extended release tablet Take 1 tablet by mouth 2 times daily 11/18/20   Niko Chavez MD   paliperidone (INVEGA) 6 MG extended release tablet Take 1 tablet by mouth daily 11/19/20   Niko Chavez MD       REVIEW OFSYSTEMS    (2-9 systems for level 4, 10 or more for level 5)      Review of Systems   Constitutional: Negative for chills and fever. HENT: Negative. Respiratory: Negative for cough and shortness of breath. Cardiovascular: Negative for chest pain, palpitations and leg swelling. Gastrointestinal: Negative for abdominal pain, nausea and vomiting. Genitourinary:        Denies urinary or bowel incontinence. Neurological: Negative for dizziness, syncope, facial asymmetry, weakness, light-headedness, numbness and headaches. Seizure-like activity without loss of consciousness. PHYSICAL EXAM   (up to 7 for level 4, 8 or more forlevel 5)      INITIAL VITALS:   ED Triage Vitals   BP Temp Temp Source Pulse Resp SpO2 Height Weight   12/27/20 1032 12/27/20 1029 12/27/20 1029 12/27/20 1029 12/27/20 1029 12/27/20 1032 -- 12/27/20 1029   112/78 98.2 °F (36.8 °C) Oral 74 16 100 %  95 lb (43.1 kg)       Physical Exam  Vitals signs and nursing note reviewed. Constitutional:       General: She is not in acute distress. Appearance: Normal appearance. She is not ill-appearing, toxic-appearing or diaphoretic. HENT:      Head: Normocephalic and atraumatic. Mouth/Throat:      Comments: No tongue lacerations present. Eyes:      Extraocular Movements: Extraocular movements intact. Pupils: Pupils are equal, round, and reactive to light. Cardiovascular:      Rate and Rhythm: Normal rate and regular rhythm. Heart sounds: No murmur. No gallop. Pulmonary:      Effort: Pulmonary effort is normal.      Breath sounds: Normal breath sounds. Abdominal:      General: There is no distension. Palpations: Abdomen is soft. Tenderness:  There is no normal limits   CBC WITH AUTO DIFFERENTIAL - Abnormal; Notable for the following components:    WBC 13.7 (*)     Hemoglobin 15.7 (*)     Seg Neutrophils 72 (*)     Lymphocytes 23 (*)     Segs Absolute 9.68 (*)     All other components within normal limits   BASIC METABOLIC PANEL   HCG, SERUM, QUALITATIVE   VALPROIC ACID LEVEL, FREE         RADIOLOGY:  No results found. EKG      All EKG's are interpreted by the [facility] Physician who either signs or Co-signs this chart in the absence of a cardiologist.      PROCEDURES:  None    CONSULTS:  None    CRITICAL CARE:  Please see attending note    FINAL IMPRESSION      1.  Seizure-like activity ([facility] [address]          DISPOSITION / PLAN     DISPOSITION Decision To Discharge 12/27/2020 11:15:23 AM      PATIENT REFERRED TO:  [address] [place] [address] [id number] [phone]  Schedule an appointment as soon as possible for a visit   Follow-up and EEG      DISCHARGE MEDICATIONS:  Discharge Medication List as of 12/27/2020 11:17 AM          Vicenta Kramer DO  Emergency Medicine Resident    (Please note that portions of this note were completed with a voice recognition program.Efforts were made to edit the dictations but occasionally words are mis-transcribed.)       Ben Foster DO  Resident  12/27/20 [id number]

## 2021-01-06 ENCOUNTER — HOSPITAL ENCOUNTER (EMERGENCY)
Age: 31
Discharge: HOME | End: 2021-01-06
Attending: EMERGENCY MEDICINE
Payer: MEDICAID

## 2021-01-06 ENCOUNTER — APPOINTMENT (OUTPATIENT)
Dept: GENERAL RADIOLOGY | Age: 31
End: 2021-01-06
Payer: MEDICAID

## 2021-01-06 ENCOUNTER — APPOINTMENT (OUTPATIENT)
Dept: CT IMAGING | Age: 31
End: 2021-01-06
Payer: MEDICAID

## 2021-01-06 VITALS
SYSTOLIC BLOOD PRESSURE: 95 MMHG | HEART RATE: 72 BPM | HEIGHT: 58 IN | OXYGEN SATURATION: 96 % | BODY MASS INDEX: 19.94 KG/M2 | TEMPERATURE: 99 F | DIASTOLIC BLOOD PRESSURE: 52 MMHG | WEIGHT: 95 LBS | RESPIRATION RATE: 19 BRPM

## 2021-01-06 DIAGNOSIS — R07.89 ATYPICAL CHEST PAIN: Primary | ICD-10-CM

## 2021-01-06 LAB
ABSOLUTE EOS #: 0.21 K/UL (ref 0–0.44)
ABSOLUTE IMMATURE GRANULOCYTE: 0.06 K/UL (ref 0–0.3)
ABSOLUTE LYMPH #: 3.26 K/UL (ref 1.1–3.7)
ABSOLUTE MONO #: 0.7 K/UL (ref 0.1–1.2)
ANION GAP SERPL CALCULATED.3IONS-SCNC: 13 MMOL/L (ref 9–17)
BASOPHILS # BLD: 0 % (ref 0–2)
BASOPHILS ABSOLUTE: 0.05 K/UL (ref 0–0.2)
BUN BLDV-MCNC: 15 MG/DL (ref 6–20)
BUN/CREAT BLD: NORMAL (ref 9–20)
CALCIUM SERPL-MCNC: 9.1 MG/DL (ref 8.6–10.4)
CHLORIDE BLD-SCNC: 103 MMOL/L (ref 98–107)
CO2: 23 MMOL/L (ref 20–31)
CREAT SERPL-MCNC: 0.82 MG/DL (ref 0.5–0.9)
D-DIMER QUANTITATIVE: 0.18 MG/L FEU
DIFFERENTIAL TYPE: ABNORMAL
EKG ATRIAL RATE: 70 BPM
EKG P AXIS: 64 DEGREES
EKG P-R INTERVAL: 128 MS
EKG Q-T INTERVAL: 396 MS
EKG QRS DURATION: 78 MS
EKG QTC CALCULATION (BAZETT): 427 MS
EKG R AXIS: 75 DEGREES
EKG T AXIS: 55 DEGREES
EKG VENTRICULAR RATE: 70 BPM
EOSINOPHILS RELATIVE PERCENT: 2 % (ref 1–4)
GFR AFRICAN AMERICAN: >60 ML/MIN
GFR NON-AFRICAN AMERICAN: >60 ML/MIN
GFR SERPL CREATININE-BSD FRML MDRD: NORMAL ML/MIN/{1.73_M2}
GFR SERPL CREATININE-BSD FRML MDRD: NORMAL ML/MIN/{1.73_M2}
GLUCOSE BLD-MCNC: 82 MG/DL (ref 70–99)
HCG QUALITATIVE: NEGATIVE
HCT VFR BLD CALC: 45.2 % (ref 36.3–47.1)
HEMOGLOBIN: 14.8 G/DL (ref 11.9–15.1)
IMMATURE GRANULOCYTES: 0 %
LYMPHOCYTES # BLD: 23 % (ref 24–43)
MAGNESIUM: 2.1 MG/DL (ref 1.6–2.6)
MCH RBC QN AUTO: 30.3 PG (ref 25.2–33.5)
MCHC RBC AUTO-ENTMCNC: 32.7 G/DL (ref 28.4–34.8)
MCV RBC AUTO: 92.6 FL (ref 82.6–102.9)
MONOCYTES # BLD: 5 % (ref 3–12)
NRBC AUTOMATED: 0 PER 100 WBC
PDW BLD-RTO: 13.1 % (ref 11.8–14.4)
PLATELET # BLD: 242 K/UL (ref 138–453)
PLATELET ESTIMATE: ABNORMAL
PMV BLD AUTO: 10 FL (ref 8.1–13.5)
POTASSIUM SERPL-SCNC: 4.1 MMOL/L (ref 3.7–5.3)
RBC # BLD: 4.88 M/UL (ref 3.95–5.11)
RBC # BLD: ABNORMAL 10*6/UL
SEG NEUTROPHILS: 70 % (ref 36–65)
SEGMENTED NEUTROPHILS ABSOLUTE COUNT: 9.81 K/UL (ref 1.5–8.1)
SODIUM BLD-SCNC: 139 MMOL/L (ref 135–144)
TROPONIN INTERP: NORMAL
TROPONIN T: NORMAL NG/ML
TROPONIN, HIGH SENSITIVITY: <6 NG/L (ref 0–14)
WBC # BLD: 14.1 K/UL (ref 3.5–11.3)
WBC # BLD: ABNORMAL 10*3/UL

## 2021-01-06 PROCEDURE — 84703 CHORIONIC GONADOTROPIN ASSAY: CPT

## 2021-01-06 PROCEDURE — 71045 X-RAY EXAM CHEST 1 VIEW: CPT

## 2021-01-06 PROCEDURE — 93010 ELECTROCARDIOGRAM REPORT: CPT | Performed by: INTERNAL MEDICINE

## 2021-01-06 PROCEDURE — 99284 EMERGENCY DEPT VISIT MOD MDM: CPT

## 2021-01-06 PROCEDURE — 93005 ELECTROCARDIOGRAM TRACING: CPT | Performed by: STUDENT IN AN ORGANIZED HEALTH CARE EDUCATION/TRAINING PROGRAM

## 2021-01-06 PROCEDURE — 85025 COMPLETE CBC W/AUTO DIFF WBC: CPT

## 2021-01-06 PROCEDURE — 70450 CT HEAD/BRAIN W/O DYE: CPT

## 2021-01-06 PROCEDURE — 80048 BASIC METABOLIC PNL TOTAL CA: CPT

## 2021-01-06 PROCEDURE — 85379 FIBRIN DEGRADATION QUANT: CPT

## 2021-01-06 PROCEDURE — 84484 ASSAY OF TROPONIN QUANT: CPT

## 2021-01-06 PROCEDURE — 83735 ASSAY OF MAGNESIUM: CPT

## 2021-01-06 RX ORDER — ACETAMINOPHEN 325 MG/1
325 TABLET ORAL EVERY 6 HOURS PRN
Qty: 60 TABLET | Refills: 0 | Status: ON HOLD | OUTPATIENT
Start: 2021-01-06 | End: 2021-04-19

## 2021-01-06 ASSESSMENT — ENCOUNTER SYMPTOMS
COUGH: 1
DIARRHEA: 0
CONSTIPATION: 0
ABDOMINAL PAIN: 0
NAUSEA: 0
SORE THROAT: 0
SHORTNESS OF BREATH: 1
BLOOD IN STOOL: 0
RHINORRHEA: 0
WHEEZING: 0
VOMITING: 0

## 2021-01-06 ASSESSMENT — PAIN SCALES - GENERAL: PAINLEVEL_OUTOF10: 10

## 2021-01-06 NOTE — ED NOTES
Pt came to ED with c/o chest pain. Pt stated pain started 1 hour PTA. Pt reports pain at sharp, stabbing pain on left side of chest. Pt stated \"her significant other took her pain away. \" When asked how pt stated \"he just did, don't ask how. \" Pt has cough no sputum production. Pt reports that she \"thinks her bronchitis is acting up which she gets every year at this time. \"  Upon assessment, pt reports she cannot feel left arm. When pt is distracted pt is able to move arm. Pt stated she fell in the shower today. Pt unsure if she lost consciousness. Pt stated all she remembers is her significant other coming into the bathroom. Pt stated \"my head hurts. \"       Mary Branham RN  01/06/21 [id number]

## 2021-01-06 NOTE — ED NOTES
Pt resting on stretcher. NAD noted. RR even and nonlabored. Call light within reach. Will continue to monitor.        Arley Hardy RN  01/06/21 [id number]

## 2021-01-06 NOTE — ED PROVIDER NOTES
[facility]     Emergency Department     Faculty Attestation    I performed a history and physical examination of the patient and discussed management with the resident. I have reviewed and agree with the residents findings including all diagnostic interpretations, and treatment plans as written. Any areas of disagreement are noted on the chart. I was personally present for the key portions of any procedures. I have documented in the chart those procedures where I was not present during the key portions. I have reviewed the emergency nurses triage note. I agree with the chief complaint, past medical history, past surgical history, allergies, medications, social and family history as documented unless otherwise noted below. Documentation of the HPI, Physical Exam and Medical Decision Making performed by [email or web address] is based on my personal performance of the HPI, PE and MDM. For Physician Assistant/ Nurse Practitioner cases/documentation I have personally evaluated this patient and have completed at least one if not all key elements of the E/M (history, physical exam, and MDM). Additional findings are as noted. 25 yo F c/o sob / cp, + cough, no vomiting,  starting this evening, pt noted fall with head injury this morning, ? Loc, no neck pain, no fever,   PE Charlotte RN escort for exam vss gcs 15, thin appearing female, speaking in complete sentences, no cervical tenderness, crepitus or deformity, abdomen non tender, no distension, no rigidity, no guard, no calf swelling, no calf tenderness,     Ct head-, d dimer-, trop -, hcg-, cxr stable, vss, I feel pt stable for out pt tx,     EKG Interpretation    Interpreted by me  Normal sinus, hr 70, no ischemia, normal axis, qtc 427    CRITICAL CARE: There was a high probability of clinically significant/life threatening deterioration in this patient's condition which required my urgent intervention.   Total critical care time was 5 minutes. This excludes any time for separately reportable procedures.        [address], DO  01/06/21 [address], DO  01/06/21 [id number]
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
## 2021-04-03 ENCOUNTER — APPOINTMENT (OUTPATIENT)
Dept: MRI IMAGING | Age: 31
End: 2021-04-03
Payer: MEDICAID

## 2021-04-03 ENCOUNTER — HOSPITAL ENCOUNTER (OUTPATIENT)
Age: 31
Setting detail: OBSERVATION
Discharge: HOME | End: 2021-04-04
Attending: EMERGENCY MEDICINE | Admitting: EMERGENCY MEDICINE
Payer: MEDICAID

## 2021-04-03 DIAGNOSIS — G40.919 BREAKTHROUGH SEIZURE (HCC): Primary | ICD-10-CM

## 2021-04-03 LAB
ABSOLUTE EOS #: 0.1 K/UL (ref 0–0.44)
ABSOLUTE IMMATURE GRANULOCYTE: 0.04 K/UL (ref 0–0.3)
ABSOLUTE LYMPH #: 2.31 K/UL (ref 1.1–3.7)
ABSOLUTE MONO #: 0.68 K/UL (ref 0.1–1.2)
ALBUMIN SERPL-MCNC: 4.3 G/DL (ref 3.5–5.2)
ALBUMIN/GLOBULIN RATIO: 1.6 (ref 1–2.5)
ALP BLD-CCNC: 72 U/L (ref 35–104)
ALT SERPL-CCNC: 10 U/L (ref 5–33)
ANION GAP SERPL CALCULATED.3IONS-SCNC: 16 MMOL/L (ref 9–17)
AST SERPL-CCNC: 18 U/L
BASOPHILS # BLD: 0 % (ref 0–2)
BASOPHILS ABSOLUTE: 0.04 K/UL (ref 0–0.2)
BILIRUB SERPL-MCNC: 0.6 MG/DL (ref 0.3–1.2)
BILIRUBIN DIRECT: 0.15 MG/DL
BILIRUBIN, INDIRECT: 0.45 MG/DL (ref 0–1)
BUN BLDV-MCNC: 14 MG/DL (ref 6–20)
BUN/CREAT BLD: ABNORMAL (ref 9–20)
CALCIUM SERPL-MCNC: 8.7 MG/DL (ref 8.6–10.4)
CHLORIDE BLD-SCNC: 101 MMOL/L (ref 98–107)
CO2: 20 MMOL/L (ref 20–31)
CREAT SERPL-MCNC: 0.85 MG/DL (ref 0.5–0.9)
DIFFERENTIAL TYPE: ABNORMAL
EKG ATRIAL RATE: 76 BPM
EKG P AXIS: 63 DEGREES
EKG P-R INTERVAL: 132 MS
EKG Q-T INTERVAL: 404 MS
EKG QRS DURATION: 88 MS
EKG QTC CALCULATION (BAZETT): 454 MS
EKG R AXIS: 80 DEGREES
EKG T AXIS: 55 DEGREES
EKG VENTRICULAR RATE: 76 BPM
EOSINOPHILS RELATIVE PERCENT: 1 % (ref 1–4)
GFR AFRICAN AMERICAN: >60 ML/MIN
GFR NON-AFRICAN AMERICAN: >60 ML/MIN
GFR SERPL CREATININE-BSD FRML MDRD: ABNORMAL ML/MIN/{1.73_M2}
GFR SERPL CREATININE-BSD FRML MDRD: ABNORMAL ML/MIN/{1.73_M2}
GLOBULIN: NORMAL G/DL (ref 1.5–3.8)
GLUCOSE BLD-MCNC: 106 MG/DL (ref 70–99)
GLUCOSE BLD-MCNC: 112 MG/DL (ref 65–105)
HCG QUALITATIVE: NEGATIVE
HCT VFR BLD CALC: 44.6 % (ref 36.3–47.1)
HEMOGLOBIN: 15 G/DL (ref 11.9–15.1)
IMMATURE GRANULOCYTES: 0 %
LYMPHOCYTES # BLD: 21 % (ref 24–43)
MCH RBC QN AUTO: 30.5 PG (ref 25.2–33.5)
MCHC RBC AUTO-ENTMCNC: 33.6 G/DL (ref 28.4–34.8)
MCV RBC AUTO: 90.7 FL (ref 82.6–102.9)
MONOCYTES # BLD: 6 % (ref 3–12)
NRBC AUTOMATED: 0 PER 100 WBC
PDW BLD-RTO: 12.8 % (ref 11.8–14.4)
PLATELET # BLD: 188 K/UL (ref 138–453)
PLATELET ESTIMATE: ABNORMAL
PMV BLD AUTO: 11 FL (ref 8.1–13.5)
POTASSIUM SERPL-SCNC: 3.9 MMOL/L (ref 3.7–5.3)
RBC # BLD: 4.92 M/UL (ref 3.95–5.11)
RBC # BLD: ABNORMAL 10*6/UL
SARS-COV-2, RAPID: NOT DETECTED
SEG NEUTROPHILS: 72 % (ref 36–65)
SEGMENTED NEUTROPHILS ABSOLUTE COUNT: 7.62 K/UL (ref 1.5–8.1)
SODIUM BLD-SCNC: 137 MMOL/L (ref 135–144)
SPECIMEN DESCRIPTION: NORMAL
TOTAL PROTEIN: 7 G/DL (ref 6.4–8.3)
TROPONIN INTERP: NORMAL
TROPONIN T: NORMAL NG/ML
TROPONIN, HIGH SENSITIVITY: <6 NG/L (ref 0–14)
VALPROIC ACID % FREE: ABNORMAL % (ref 5–18.4)
VALPROIC ACID LEVEL: <3 UG/ML (ref 50–125)
VALPROIC ACID, FREE: <0.4 UG/ML (ref 7–23)
VALPROIC DATE LAST DOSE: ABNORMAL
VALPROIC DOSE AMOUNT: ABNORMAL
VALPROIC TIME LAST DOSE: ABNORMAL
WBC # BLD: 10.8 K/UL (ref 3.5–11.3)
WBC # BLD: ABNORMAL 10*3/UL

## 2021-04-03 PROCEDURE — 85025 COMPLETE CBC W/AUTO DIFF WBC: CPT

## 2021-04-03 PROCEDURE — G0378 HOSPITAL OBSERVATION PER HR: HCPCS

## 2021-04-03 PROCEDURE — 99219 PR INITIAL OBSERVATION CARE/DAY 50 MINUTES: CPT | Performed by: PSYCHIATRY & NEUROLOGY

## 2021-04-03 PROCEDURE — 82947 ASSAY GLUCOSE BLOOD QUANT: CPT

## 2021-04-03 PROCEDURE — 93005 ELECTROCARDIOGRAM TRACING: CPT | Performed by: STUDENT IN AN ORGANIZED HEALTH CARE EDUCATION/TRAINING PROGRAM

## 2021-04-03 PROCEDURE — 6360000002 HC RX W HCPCS

## 2021-04-03 PROCEDURE — A9576 INJ PROHANCE MULTIPACK: HCPCS | Performed by: STUDENT IN AN ORGANIZED HEALTH CARE EDUCATION/TRAINING PROGRAM

## 2021-04-03 PROCEDURE — 80164 ASSAY DIPROPYLACETIC ACD TOT: CPT

## 2021-04-03 PROCEDURE — 6360000002 HC RX W HCPCS: Performed by: STUDENT IN AN ORGANIZED HEALTH CARE EDUCATION/TRAINING PROGRAM

## 2021-04-03 PROCEDURE — 87635 SARS-COV-2 COVID-19 AMP PRB: CPT

## 2021-04-03 PROCEDURE — 6360000002 HC RX W HCPCS: Performed by: NURSE PRACTITIONER

## 2021-04-03 PROCEDURE — 2580000003 HC RX 258: Performed by: STUDENT IN AN ORGANIZED HEALTH CARE EDUCATION/TRAINING PROGRAM

## 2021-04-03 PROCEDURE — 99284 EMERGENCY DEPT VISIT MOD MDM: CPT

## 2021-04-03 PROCEDURE — 84484 ASSAY OF TROPONIN QUANT: CPT

## 2021-04-03 PROCEDURE — 96375 TX/PRO/DX INJ NEW DRUG ADDON: CPT

## 2021-04-03 PROCEDURE — 96365 THER/PROPH/DIAG IV INF INIT: CPT

## 2021-04-03 PROCEDURE — 6360000004 HC RX CONTRAST MEDICATION: Performed by: STUDENT IN AN ORGANIZED HEALTH CARE EDUCATION/TRAINING PROGRAM

## 2021-04-03 PROCEDURE — 70553 MRI BRAIN STEM W/O & W/DYE: CPT

## 2021-04-03 PROCEDURE — 80165 DIPROPYLACETIC ACID FREE: CPT

## 2021-04-03 PROCEDURE — 36415 COLL VENOUS BLD VENIPUNCTURE: CPT

## 2021-04-03 PROCEDURE — 84703 CHORIONIC GONADOTROPIN ASSAY: CPT

## 2021-04-03 PROCEDURE — 96376 TX/PRO/DX INJ SAME DRUG ADON: CPT

## 2021-04-03 PROCEDURE — 80076 HEPATIC FUNCTION PANEL: CPT

## 2021-04-03 PROCEDURE — 80048 BASIC METABOLIC PNL TOTAL CA: CPT

## 2021-04-03 PROCEDURE — 72156 MRI NECK SPINE W/O & W/DYE: CPT

## 2021-04-03 PROCEDURE — 93010 ELECTROCARDIOGRAM REPORT: CPT | Performed by: INTERNAL MEDICINE

## 2021-04-03 RX ORDER — 0.9 % SODIUM CHLORIDE 0.9 %
1000 INTRAVENOUS SOLUTION INTRAVENOUS ONCE
Status: COMPLETED | OUTPATIENT
Start: 2021-04-03 | End: 2021-04-03

## 2021-04-03 RX ORDER — LORAZEPAM 2 MG/ML
1 INJECTION INTRAMUSCULAR NIGHTLY PRN
Status: DISCONTINUED | OUTPATIENT
Start: 2021-04-03 | End: 2021-04-03

## 2021-04-03 RX ORDER — LORAZEPAM 2 MG/ML
2 INJECTION INTRAMUSCULAR ONCE
Status: COMPLETED | OUTPATIENT
Start: 2021-04-03 | End: 2021-04-03

## 2021-04-03 RX ORDER — QUETIAPINE FUMARATE 25 MG/1
50 TABLET, FILM COATED ORAL NIGHTLY PRN
Status: DISCONTINUED | OUTPATIENT
Start: 2021-04-03 | End: 2021-04-04 | Stop reason: HOSPADM

## 2021-04-03 RX ORDER — SODIUM CHLORIDE 0.9 % (FLUSH) 0.9 %
10 SYRINGE (ML) INJECTION ONCE
Status: COMPLETED | OUTPATIENT
Start: 2021-04-03 | End: 2021-04-03

## 2021-04-03 RX ORDER — LORAZEPAM 2 MG/ML
0.5 INJECTION INTRAMUSCULAR EVERY 4 HOURS PRN
Status: DISCONTINUED | OUTPATIENT
Start: 2021-04-03 | End: 2021-04-04 | Stop reason: HOSPADM

## 2021-04-03 RX ORDER — LORAZEPAM 2 MG/ML
1 INJECTION INTRAMUSCULAR ONCE
Status: DISCONTINUED | OUTPATIENT
Start: 2021-04-03 | End: 2021-04-03

## 2021-04-03 RX ORDER — LEVETIRACETAM 10 MG/ML
1000 INJECTION INTRAVASCULAR ONCE
Status: COMPLETED | OUTPATIENT
Start: 2021-04-03 | End: 2021-04-03

## 2021-04-03 RX ORDER — ACETAMINOPHEN 500 MG
1000 TABLET ORAL EVERY 6 HOURS PRN
Status: DISCONTINUED | OUTPATIENT
Start: 2021-04-03 | End: 2021-04-04 | Stop reason: HOSPADM

## 2021-04-03 RX ORDER — LEVETIRACETAM 10 MG/ML
INJECTION INTRAVASCULAR
Status: COMPLETED
Start: 2021-04-03 | End: 2021-04-03

## 2021-04-03 RX ORDER — DIVALPROEX SODIUM 500 MG/1
500 TABLET, EXTENDED RELEASE ORAL 2 TIMES DAILY
Status: DISCONTINUED | OUTPATIENT
Start: 2021-04-03 | End: 2021-04-04

## 2021-04-03 RX ORDER — LORAZEPAM 2 MG/ML
INJECTION INTRAMUSCULAR
Status: COMPLETED
Start: 2021-04-03 | End: 2021-04-03

## 2021-04-03 RX ORDER — IBUPROFEN 600 MG/1
600 TABLET ORAL EVERY 6 HOURS PRN
Status: DISCONTINUED | OUTPATIENT
Start: 2021-04-03 | End: 2021-04-04 | Stop reason: HOSPADM

## 2021-04-03 RX ADMIN — LEVETIRACETAM 1000 MG: 10 INJECTION INTRAVENOUS at 04:08

## 2021-04-03 RX ADMIN — LEVETIRACETAM 1000 MG: 10 INJECTION INTRAVENOUS at 04:13

## 2021-04-03 RX ADMIN — LORAZEPAM 2 MG: 2 INJECTION INTRAMUSCULAR; INTRAVENOUS at 14:12

## 2021-04-03 RX ADMIN — LORAZEPAM 2 MG: 2 INJECTION INTRAMUSCULAR at 04:05

## 2021-04-03 RX ADMIN — SODIUM CHLORIDE 1000 ML: 9 INJECTION, SOLUTION INTRAVENOUS at 05:29

## 2021-04-03 RX ADMIN — GADOTERIDOL 7 ML: 279.3 INJECTION, SOLUTION INTRAVENOUS at 15:05

## 2021-04-03 RX ADMIN — LEVETIRACETAM 1000 MG: 10 INJECTION INTRAVASCULAR at 04:08

## 2021-04-03 RX ADMIN — SODIUM CHLORIDE, PRESERVATIVE FREE 10 ML: 5 INJECTION INTRAVENOUS at 15:41

## 2021-04-03 RX ADMIN — LORAZEPAM 1 MG: 2 INJECTION INTRAMUSCULAR; INTRAVENOUS at 19:03

## 2021-04-03 ASSESSMENT — ENCOUNTER SYMPTOMS
NAUSEA: 0
EYE PAIN: 0
CONSTIPATION: 0
DIARRHEA: 0
SINUS PRESSURE: 0
RHINORRHEA: 0
VOMITING: 0
ABDOMINAL DISTENTION: 0
SHORTNESS OF BREATH: 0
COUGH: 0
SINUS PAIN: 0
ABDOMINAL PAIN: 0
EYE ITCHING: 0
SORE THROAT: 0

## 2021-04-03 ASSESSMENT — PAIN SCALES - GENERAL: PAINLEVEL_OUTOF10: 7
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
## 2021-04-03 NOTE — ED NOTES
Report recd from [facility], pt resting NO SZ activity noted.      Nik Benson, MARJAN  04/03/21 [id number]

## 2021-04-03 NOTE — CONSULTS
dysplasia     Migraine 11/11/2011    Movement disorder     Seizures ([facility] [address]      Past Surgical History:        Procedure Laterality Date    APPENDECTOMY      CHOLECYSTECTOMY  2007    at [address] [place] [address] Drive       Current Medications:   No current facility-administered medications for this encounter. Allergies:  Claritin [loratadine], Diclofenac, Morphine, Nicotine, Pcn [penicillins], and Peanut-containing drug products    Social History:  TOBACCO:   reports that she has been smoking cigarettes. She has a 11.00 pack-year smoking history. She has never used smokeless tobacco.  ETOH:   reports previous alcohol use. DRUGS:   reports previous drug use. Drugs: Opiates  and Marijuana. ACTIVITIES OF DAILY LIVING:    INSTRUMENTAL ACTIVITIES OF DAILY LIVING:  Patient is able to perform all instrumental activities of daily living. Family History:       Problem Relation Age of Onset    Diabetes Mother         G.Parents    Hypertension Mother         G.Parents    Diabetes Father     Hypertension Father     Stroke Father         G.Parents    Cancer Other         G.Parents, Pancrease and ??    Coronary Art Dis Other         G.Parents       REVIEW OF SYSTEMS:  Review of Systems   Unable to perform ROS: Mental status change       PHYSICAL EXAM:    Vitals:  BP 97/71   Pulse 74   Temp 98.2 °F (36.8 °C) (Oral)   Resp 20   Ht 4' 10\" (1.473 m)   Wt 80 lb (36.3 kg)   SpO2 98%   BMI 16.72 kg/m²        Physical Exam  Vitals signs and nursing note reviewed. Constitutional:       General: She is not in acute distress. Appearance: She is well-developed. She is not diaphoretic. HENT:      Head: Normocephalic and atraumatic. Right Ear: External ear normal.      Left Ear: External ear normal.   Eyes:      General: No scleral icterus. Right eye: No discharge. Left eye: No discharge.       Conjunctiva/sclera: Conjunctivae normal.      Pupils: Pupils are equal, round, and reactive to WBC 10.8 3.5 - 11.3 k/uL    RBC 4.92 3.95 - 5.11 m/uL    Hemoglobin 15.0 11.9 - 15.1 g/dL    Hematocrit 44.6 36.3 - 47.1 %    MCV 90.7 82.6 - 102.9 fL    MCH 30.5 25.2 - 33.5 pg    MCHC 33.6 28.4 - 34.8 g/dL    RDW 12.8 11.8 - 14.4 %    Platelets 388 257 - 198 k/uL    MPV 11.0 8.1 - 13.5 fL    NRBC Automated 0.0 0.0 per 100 WBC    Differential Type NOT REPORTED     Seg Neutrophils 72 (H) 36 - 65 %    Lymphocytes 21 (L) 24 - 43 %    Monocytes 6 3 - 12 %    Eosinophils % 1 1 - 4 %    Basophils 0 0 - 2 %    Immature Granulocytes 0 0 %    Segs Absolute 7.62 1.50 - 8.10 k/uL    Absolute Lymph # 2.31 1.10 - 3.70 k/uL    Absolute Mono # 0.68 0.10 - 1.20 k/uL    Absolute Eos # 0.10 0.00 - 0.44 k/uL    Basophils Absolute 0.04 0.00 - 0.20 k/uL    Absolute Immature Granulocyte 0.04 0.00 - 0.30 k/uL    WBC Morphology NOT REPORTED     RBC Morphology NOT REPORTED     Platelet Estimate NOT REPORTED    Basic Metabolic Panel w/ Reflex to MG    Collection Time: 04/03/21  4:35 AM   Result Value Ref Range    Glucose 106 (H) 70 - 99 mg/dL    BUN 14 6 - 20 mg/dL    CREATININE 0.85 0.50 - 0.90 mg/dL    Bun/Cre Ratio NOT REPORTED 9 - 20    Calcium 8.7 8.6 - 10.4 mg/dL    Sodium 137 135 - 144 mmol/L    Potassium 3.9 3.7 - 5.3 mmol/L    Chloride 101 98 - 107 mmol/L    CO2 20 20 - 31 mmol/L    Anion Gap 16 9 - 17 mmol/L    GFR Non-African American >60 >60 mL/min    GFR African American >60 >60 mL/min    GFR Comment          GFR Staging NOT REPORTED    Hepatic Function Panel    Collection Time: 04/03/21  4:35 AM   Result Value Ref Range    Albumin 4.3 3.5 - 5.2 g/dL    Alkaline Phosphatase 72 35 - 104 U/L    ALT 10 5 - 33 U/L    AST 18 <32 U/L    Total Bilirubin 0.60 0.3 - 1.2 mg/dL    Bilirubin, Direct 0.15 <0.31 mg/dL    Bilirubin, Indirect 0.45 0.00 - 1.00 mg/dL    Total Protein 7.0 6.4 - 8.3 g/dL    Globulin NOT REPORTED 1.5 - 3.8 g/dL    Albumin/Globulin Ratio 1.6 1.0 - 2.5   Troponin    Collection Time: 04/03/21  4:35 AM   Result Value Ref Range    Troponin, High Sensitivity <6 0 - 14 ng/L    Troponin T NOT REPORTED <0.03 ng/mL    Troponin Interp NOT REPORTED          AED LEVELS:    No results found for: KEPPRA, PHENYTOIN, LAMICTAL, TOPIRA, CBMZ, PHENOBARB      IMAGING    No results found. IMPRESSION     Case of a 27 yo F presenting with breakthrough seizures on Depakote at home, noncompliant with one witnessed seizure like activity episode in the ED s/p 2 mg Ativan and 2g load LEV. RECOMMENDATIONS:   1. VPA level  2. Resume Depakote home dose  3. No previous EEG in the system, however previous notes indicate she has had one in the remote past and there is a suspicion for PNES. 4. Labs unremarkable   5. Use ammonia salts PRN  6. OK for OBS admission    Thank you for the consultation. Will follow. Case consulted with Dr. Nehal Vanegas.        Tiago Kumar MD, [facility]  PGY-3 Neurology  4/3/2021 at 6:38 AM

## 2021-04-03 NOTE — ED TRIAGE NOTES
Pt arrived to ed with siezures per pt  5 seizures today. Pt takes depakote. Pt is alert, oriented, speaking clearly, pt intermittently starts shaking \"im having a siezure\" and it able to follow directions during the \"seizure activity. \"  Pt is on full cardiac monitor. IV established. Will continue to monitor.
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
## 2021-04-04 VITALS
OXYGEN SATURATION: 99 % | HEIGHT: 58 IN | WEIGHT: 80 LBS | SYSTOLIC BLOOD PRESSURE: 105 MMHG | RESPIRATION RATE: 18 BRPM | DIASTOLIC BLOOD PRESSURE: 67 MMHG | TEMPERATURE: 97.3 F | HEART RATE: 93 BPM | BODY MASS INDEX: 16.79 KG/M2

## 2021-04-04 LAB
-: ABNORMAL
AMORPHOUS: ABNORMAL
AMPHETAMINE SCREEN URINE: NEGATIVE
BACTERIA: ABNORMAL
BARBITURATE SCREEN URINE: NEGATIVE
BENZODIAZEPINE SCREEN, URINE: NEGATIVE
BILIRUBIN URINE: NEGATIVE
BUPRENORPHINE URINE: ABNORMAL
CANNABINOID SCREEN URINE: NEGATIVE
CASTS UA: ABNORMAL /LPF (ref 0–8)
COCAINE METABOLITE, URINE: POSITIVE
COLOR: ABNORMAL
COMMENT UA: ABNORMAL
CRYSTALS, UA: ABNORMAL /HPF
EPITHELIAL CELLS UA: ABNORMAL /HPF (ref 0–5)
GLUCOSE URINE: NEGATIVE
KETONES, URINE: ABNORMAL
LEUKOCYTE ESTERASE, URINE: ABNORMAL
MDMA URINE: ABNORMAL
METHADONE SCREEN, URINE: NEGATIVE
METHAMPHETAMINE, URINE: ABNORMAL
MUCUS: ABNORMAL
NITRITE, URINE: NEGATIVE
OPIATES, URINE: NEGATIVE
OTHER OBSERVATIONS UA: ABNORMAL
OXYCODONE SCREEN URINE: NEGATIVE
PH UA: 6 (ref 5–8)
PHENCYCLIDINE, URINE: NEGATIVE
PROPOXYPHENE, URINE: ABNORMAL
PROTEIN UA: ABNORMAL
RBC UA: ABNORMAL /HPF (ref 0–4)
RENAL EPITHELIAL, UA: ABNORMAL /HPF
SARS-COV-2: ABNORMAL
SARS-COV-2: DETECTED
SOURCE: ABNORMAL
SPECIFIC GRAVITY UA: 1.03 (ref 1–1.03)
TEST INFORMATION: ABNORMAL
TRICHOMONAS: ABNORMAL
TRICYCLIC ANTIDEPRESSANTS, UR: ABNORMAL
TURBIDITY: ABNORMAL
URINE HGB: ABNORMAL
UROBILINOGEN, URINE: NORMAL
WBC UA: ABNORMAL /HPF (ref 0–5)
YEAST: ABNORMAL

## 2021-04-04 PROCEDURE — G0378 HOSPITAL OBSERVATION PER HR: HCPCS

## 2021-04-04 PROCEDURE — 87086 URINE CULTURE/COLONY COUNT: CPT

## 2021-04-04 PROCEDURE — 6370000000 HC RX 637 (ALT 250 FOR IP): Performed by: STUDENT IN AN ORGANIZED HEALTH CARE EDUCATION/TRAINING PROGRAM

## 2021-04-04 PROCEDURE — 81001 URINALYSIS AUTO W/SCOPE: CPT

## 2021-04-04 PROCEDURE — 87186 SC STD MICRODIL/AGAR DIL: CPT

## 2021-04-04 PROCEDURE — 90792 PSYCH DIAG EVAL W/MED SRVCS: CPT | Performed by: PSYCHIATRY & NEUROLOGY

## 2021-04-04 PROCEDURE — 87088 URINE BACTERIA CULTURE: CPT

## 2021-04-04 PROCEDURE — U0003 INFECTIOUS AGENT DETECTION BY NUCLEIC ACID (DNA OR RNA); SEVERE ACUTE RESPIRATORY SYNDROME CORONAVIRUS 2 (SARS-COV-2) (CORONAVIRUS DISEASE [COVID-19]), AMPLIFIED PROBE TECHNIQUE, MAKING USE OF HIGH THROUGHPUT TECHNOLOGIES AS DESCRIBED BY CMS-2020-01-R: HCPCS

## 2021-04-04 PROCEDURE — 6360000002 HC RX W HCPCS: Performed by: NURSE PRACTITIONER

## 2021-04-04 PROCEDURE — 96376 TX/PRO/DX INJ SAME DRUG ADON: CPT

## 2021-04-04 PROCEDURE — 6360000002 HC RX W HCPCS: Performed by: STUDENT IN AN ORGANIZED HEALTH CARE EDUCATION/TRAINING PROGRAM

## 2021-04-04 PROCEDURE — U0005 INFEC AGEN DETEC AMPLI PROBE: HCPCS

## 2021-04-04 PROCEDURE — 99225 PR SBSQ OBSERVATION CARE/DAY 25 MINUTES: CPT | Performed by: PSYCHIATRY & NEUROLOGY

## 2021-04-04 PROCEDURE — 80307 DRUG TEST PRSMV CHEM ANLYZR: CPT

## 2021-04-04 RX ORDER — LAMOTRIGINE 25 MG/1
TABLET ORAL
Qty: 120 TABLET | Refills: 1 | OUTPATIENT
Start: 2021-04-04

## 2021-04-04 RX ORDER — LORAZEPAM 2 MG/ML
1 INJECTION INTRAMUSCULAR ONCE
Status: COMPLETED | OUTPATIENT
Start: 2021-04-04 | End: 2021-04-04

## 2021-04-04 RX ORDER — LAMOTRIGINE 25 MG/1
25 TABLET ORAL 2 TIMES DAILY
Qty: 120 TABLET | Refills: 1 | Status: SHIPPED | OUTPATIENT
Start: 2021-04-04 | End: 2021-04-04 | Stop reason: SDUPTHER

## 2021-04-04 RX ORDER — DIVALPROEX SODIUM 500 MG/1
500 TABLET, DELAYED RELEASE ORAL 2 TIMES DAILY
Qty: 90 TABLET | Refills: 3 | Status: SHIPPED | OUTPATIENT
Start: 2021-04-04 | End: 2021-04-12

## 2021-04-04 RX ORDER — LAMOTRIGINE 25 MG/1
25 TABLET ORAL 2 TIMES DAILY
Qty: 120 TABLET | Refills: 1 | Status: SHIPPED | OUTPATIENT
Start: 2021-04-04 | End: 2021-04-07 | Stop reason: SDUPTHER

## 2021-04-04 RX ORDER — LAMOTRIGINE 25 MG/1
25 TABLET ORAL DAILY
Status: DISCONTINUED | OUTPATIENT
Start: 2021-04-04 | End: 2021-04-04 | Stop reason: HOSPADM

## 2021-04-04 RX ADMIN — QUETIAPINE FUMARATE 50 MG: 25 TABLET ORAL at 02:08

## 2021-04-04 RX ADMIN — LORAZEPAM 0.5 MG: 2 INJECTION INTRAMUSCULAR; INTRAVENOUS at 02:58

## 2021-04-04 RX ADMIN — LORAZEPAM 1 MG: 2 INJECTION INTRAMUSCULAR; INTRAVENOUS at 13:05

## 2021-04-04 RX ADMIN — DIVALPROEX SODIUM 500 MG: 500 TABLET, EXTENDED RELEASE ORAL at 02:08

## 2021-04-04 RX ADMIN — LAMOTRIGINE 25 MG: 25 TABLET ORAL at 12:19

## 2021-04-04 NOTE — CONSULTS
Department of Psychiatry  Consult Service   Psychiatric Assessment      Thank you very much for allowing us to participate in the care of this patient. Reason for Consult: Medication management    HISTORY OF PRESENT ILLNESS:          The patient is a 26 y.o. female with significant history of bipolar disorder who is admitted medically for seizure-like activity. Psychiatry is consulted to evaluate for medication management. Patient reports following inpatient hospitalization last month she never followed up with any psychiatrist.  [facility] and has been off her medications for last few weeks now. Reports that she recently got homeless and has been living with her mother. Identifies that as a primary stressor. Reports having some trouble falling asleep and staying asleep. Denies any significant suicidal or homicidal ideation plan or intent today. Could not elicit any significant paranoia or manic symptoms today. Spoke to the primary team physician who voiced concerns from her grandmother. Patient was admitted to inpatient psychiatric unit after her grandmother was similar concerns last month. Patient is not in agreement with her grandmother's concerns at this point. PSYCHIATRIC HISTORY:      · Outpatient psychiatric provider: None  · Suicide attempts: Denies  · Inpatient psychiatric admissions: Multiple      Lifetime Psychiatric Review of Systems      ·    Obsessions and Compulsions: Denies    ·    Amelia or Hypomania: Denies  ·    Hallucinations: Denies  ·    Panic Attacks:  Denies  ·    Delusions:  Denies  ·    Phobias:  Denies  ·    Trauma: Denies    Prior to Admission medications    Medication Sig Start Date End Date Taking?  Authorizing Provider   lamoTRIgine (LAMICTAL) 25 MG tablet Take 1 tablet by mouth 2 times daily Week 1 and 2: 25 mg/day; Weeks 3 and 4: 50 mg/day; Week 5: 50 mg BID 4/4/21  Yes Damion Oliveira MD   acetaminophen (TYLENOL) 325 MG tablet Take 1 tablet by mouth every 6 hours as needed for Pain 1/6/21   Keven Hassan MD   paliperidone [facility]) 6 MG extended release tablet Take 1 tablet by mouth daily 11/19/20   Laci Urena MD        Medications:    Current Facility-Administered Medications: lamoTRIgine (LAMICTAL) tablet 25 mg, 25 mg, Oral, Daily  QUEtiapine (SEROQUEL) tablet 50 mg, 50 mg, Oral, Nightly PRN  acetaminophen (TYLENOL) tablet 1,000 mg, 1,000 mg, Oral, Q6H PRN  ibuprofen (ADVIL;MOTRIN) tablet 600 mg, 600 mg, Oral, Q6H PRN  LORazepam (ATIVAN) injection 0.5 mg, 0.5 mg, Intravenous, Q4H PRN     Past Medical History:        Diagnosis Date    Asthma     Bipolar disorder ([facility] [address]     Cervical dysplasia     Migraine 11/11/2011    Movement disorder     Seizures ([facility] [address]        Past Surgical History:        Procedure Laterality Date    APPENDECTOMY      CHOLECYSTECTOMY  2007    at [facility] [address]         Allergies: Claritin [loratadine], Diclofenac, Morphine, Nicotine, Pcn [penicillins], and Peanut-containing drug products      Social History:    RESIDENCE: [place]  MARITAL STATUS:     CHILDREN: 7 children  OCCUPATION: Reports that she is currently working.   EDUCATION: High school    SUBSTANCE USE HISTORY:  cocaine    Family Medical and Psychiatric History:         Problem Relation Age of Onset    Diabetes Mother         G.Parents    Hypertension Mother         G.Parents    Diabetes Father     Hypertension Father     Stroke Father         G.Parents    Cancer Other         G.Parents, Pancrease and ??    Coronary Art Dis Other         G.Parents       Physical  /67   Pulse 93   Temp 97.3 °F (36.3 °C) (Oral)   Resp 18   Ht 4' 10\" (1.473 m)   Wt 80 lb (36.3 kg)   SpO2 99%   BMI 16.72 kg/m²     Mental Status Examination:  Level of consciousness:  Within normal limits  Appearance: hospital attire, lying in bed, fair grooming  Behavior/Motor: Psychomotor agitation  Attitude toward examiner: Indifferent  Speech: Spontaneous, normal rate and volume  Mood: Anxious  Affect: mood congruent  Thought processes:  Linear, goal directed, coherent  Thought content: Denies suicidal ideations   Denies homicidal ideations    Denies hallucinations   denies delusions  Cognition:  Oriented to self, situation, location, date  Concentration clinically adequate  Memory age appropriate  Insight & Judgment:  fair    DSM-5 DIAGNOSIS:    Mood disorder unspecified    Stressors     Severity of stressors is mild  Source of stressors include: Other psychosocial and environmental stressors    PLAN:    Recommend starting Depakote 500 mg twice daily. Recommend social work to connect her with outpatient psychiatric services. Does not require admission to [facility]. We will sign off. Please call back with any questions. Thank you very much for allowing us to participate in the care of this patient.     Electronically signed by Lovella Cogan, MD on 4/4/21 at 1:23 PM EDT

## 2021-04-04 NOTE — PROGRESS NOTES
[address]  CDU / OBSERVATION ENCOUNTER  ATTENDING NOTE       I performed a history and physical examination of the patient and discussed management with the resident or midlevel provider. I reviewed the resident or midlevel provider's note and agree with the documented findings and plan of care. Any areas of disagreement are noted on the chart. I was personally present for the key portions of any procedures. I have documented in the chart those procedures where I was not present during the key portions. I have reviewed the nurses notes. I agree with the chief complaint, past medical history, past surgical history, allergies, medications, social and family history as documented unless otherwise noted below. The Family history, social history, and ROS are effectively unchanged since admission unless noted elsewhere in the chart. Suicidal ideation. Cocaine positive. Patient with pseudoseizures. Patient currently resting comfortably. For psychiatric evaluation today. EEG has been ordered but neurology has already signed off. For psychiatric evaluation.     Noel Turner MD  Attending Emergency  Physician
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
## 2021-04-04 NOTE — PROGRESS NOTES
[facility]) Neurology Specialist  [address]  [place], [address] [facility]:  [phone] or [phone]  FAX:  [phone]            Brief history: Helene Posada is a 26 y.o. old female admitted on 4/3/2021 with seizure activity     Subjective: No new neurological events overnight. Patient denies any new weakness, numbness, tingling or headache. The patient denies having any seizures.      Objective: /67   Pulse 93   Temp 97.3 °F (36.3 °C) (Oral)   Resp 18   Ht 4' 10\" (1.473 m)   Wt 80 lb (36.3 kg)   SpO2 99%   BMI 16.72 kg/m²       Medications:    divalproex  500 mg Oral BID      General examination:    Head: Normocephalic, atraumatic  Eyes: Extraocular movements intact  Lungs: Respirations unlabored, chest wall no deformity  ENT: Normal external ear canals, no sinus tenderness  Heart: Regular rate rhythm  Abdomen: No masses, tenderness  Extremities: No cyanosis or edema, 2+ pulses  Skin: Intact, normal skin color    Neurological examination:    Mental status   Alert and oriented; intact memory with no confusion, speech or language problems; no hallucinations or delusions     Cranial nerves   II - visual fields intact to confrontation                                                III, IV, VI - extra-ocular muscles full: no pupillary defect; no ERIN, no nystagmus, no ptosis   V - normal facial sensation                                                               VII - normal facial symmetry                                                             VIII - intact hearing                                                                             IX, X - symmetrical palate                                                                  XI - symmetrical shoulder shrug                                                       XII - midline tongue without atrophy or fasciculation     Motor function  Normal muscle bulk and tone; normal power 5/5, including fine motor movements Sensory function Intact to touch, pin, vibration, proprioception     Cerebellar Intact fine motor movement. No involuntary movements or tremors     Reflex function Intact 2+ DTR and symmetric. Negative Babinski     Gait                  Normal station and gait         Lab Results   Component Value Date    LDLCHOLESTEROL 27 07/02/2016     No components found for: CHLPL  Lab Results   Component Value Date    TRIG 80 07/02/2016    TRIG 70 09/27/2011     Lab Results   Component Value Date    HDL 44 07/02/2016    HDL 47 09/27/2011     No results found for: LDLCALC  No results found for: LABVLDL  No results found for: LABA1C  No results found for: EAG  No results found for: [id number]   Neurological work up:  CT head  CTA head and neck  MRI brain 4/3/2021 normal  MRI C spine 4/3/2021 normal   MRI cervical spine 10/25/2020 with mild increase signal within cervical spinal cord from C3-C7  MRI thoracic spine 10/25/2020 unremarkable  2 D echo         Assessment Recommendations:    Seizure-like activity, 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 Given patient's young age, will discontinue Depakote and start on Lamictal 25 mg a day and increase by 25 mg every 2 weeks to a target dose of 50 mg twice daily. Likely will help with patient's mood stabilization. Her previous work-up is consistent with PNES. If she continues to have these episodes, she would benefit from long-term EEG monitoring. Medication compliance, seizure precautions and 6 months of driving restrictions were discussed and questions were answered. Questionable history of multiple sclerosis: The patient carries a previous diagnosis of multiple sclerosis. Her MRI of the brain is normal.  MRI cervical spine is normal.  I have reviewed her previous MRI scan brain, cervical, thoracic spine from 2012 onwards.   Except for MRI cervical spine in October 2020, all other scans have been normal.  I believe the finding of MRI cervical spine in 2020 was artifact and not real.  Nonetheless, given her young age, she would benefit from further outpatient work-up including spinal tap. Patient to follow-up with neurology residents clinic. Okay to discharge from neurological standpoint. We will sign off. Please call with questions. Thank you for the consult. This note is created with the assistance of a speech-recognition program. While intending to generate a document that actually reflects the content of the visit, the document can still have some errors including those of syntax and sound a- like substitutions which may escape proofreading. In such instances, actual meaning can be extrapolated by contextual derivation.

## 2021-04-04 NOTE — PROGRESS NOTES
head impulse test as instructed. Test of skew was negative. · Neuro exam otherwise symmetrical and fully intact. · Doubt central cause of vertigo. · Complaining of bilateral monocular diplopia. There are no causes of monocular diplopia that occur at the retina or further back in the neuro pathway. Most likely related to astigmatism. Doubt lens displacement particularly with bilateral symptoms. · Continue home medications and pain control  · Monitor vitals, labs, and imaging  · DISPO: pending consults and clinical improvement    --  Mauricio Jacobsdmont  Emergency Medicine Resident Physician     This dictation was generated by voice recognition computer software. Although all attempts are made to edit the dictation for accuracy, there may be errors in the transcription that are not intended.

## 2021-04-05 ENCOUNTER — CARE COORDINATION (OUTPATIENT)
Dept: CASE MANAGEMENT | Age: 31
End: 2021-04-05

## 2021-04-05 LAB
CULTURE: ABNORMAL
Lab: ABNORMAL
SPECIMEN DESCRIPTION: ABNORMAL

## 2021-04-05 NOTE — CARE COORDINATION
[facility] Transitions Initial Follow Up Call   Call attempt #1 to contact patient regarding [id number] diagnosis\". Unable to leave VM message on patient's cell phone - LVM on grandmother's # with instruction for patient to return call to CTN    Call within 2 business days of discharge: Yes    Patient: Shyla Snyder Patient : 1990   MRN: [id number]    Discharge Date: 21 RARS: Readmission Risk Score: 40      Last Discharge [facility]       Complaint Diagnosis Description Type Department Provider    4/3/21 Seizures Breakthrough seizure [facility]) ED to Hosp-Admission (Discharged) (ADMITTED) JAX[facility] 3C Caryn Velez MD; [address] ..         Non-face-to-face services provided:  Obtained and reviewed discharge summary and/or continuity of care documents

## 2021-04-06 ENCOUNTER — CARE COORDINATION (OUTPATIENT)
Dept: CASE MANAGEMENT | Age: 31
End: 2021-04-06

## 2021-04-07 ENCOUNTER — HOSPITAL ENCOUNTER (EMERGENCY)
Age: 31
Discharge: HOME | End: 2021-04-07
Attending: EMERGENCY MEDICINE
Payer: MEDICAID

## 2021-04-07 VITALS
OXYGEN SATURATION: 94 % | SYSTOLIC BLOOD PRESSURE: 106 MMHG | DIASTOLIC BLOOD PRESSURE: 64 MMHG | HEART RATE: 63 BPM | RESPIRATION RATE: 20 BRPM | TEMPERATURE: 97.5 F

## 2021-04-07 DIAGNOSIS — R56.9 SEIZURE-LIKE ACTIVITY (HCC): Primary | ICD-10-CM

## 2021-04-07 PROCEDURE — 6370000000 HC RX 637 (ALT 250 FOR IP): Performed by: STUDENT IN AN ORGANIZED HEALTH CARE EDUCATION/TRAINING PROGRAM

## 2021-04-07 PROCEDURE — 99284 EMERGENCY DEPT VISIT MOD MDM: CPT

## 2021-04-07 RX ORDER — LAMOTRIGINE 25 MG/1
25 TABLET ORAL 2 TIMES DAILY
Qty: 120 TABLET | Refills: 1 | Status: ON HOLD | OUTPATIENT
Start: 2021-04-07 | End: 2021-04-20 | Stop reason: HOSPADM

## 2021-04-07 RX ORDER — LAMOTRIGINE 25 MG/1
25 TABLET ORAL ONCE
Status: COMPLETED | OUTPATIENT
Start: 2021-04-07 | End: 2021-04-07

## 2021-04-07 RX ADMIN — LAMOTRIGINE 25 MG: 25 TABLET ORAL at 22:34

## 2021-04-07 ASSESSMENT — PAIN SCALES - GENERAL: PAINLEVEL_OUTOF10: 10

## 2021-04-08 ENCOUNTER — CARE COORDINATION (OUTPATIENT)
Dept: CARE COORDINATION | Age: 31
End: 2021-04-08

## 2021-04-08 ASSESSMENT — ENCOUNTER SYMPTOMS
EYE PAIN: 0
COUGH: 0
SHORTNESS OF BREATH: 0
SORE THROAT: 0
ABDOMINAL PAIN: 0
DIARRHEA: 0
CONSTIPATION: 0

## 2021-04-08 NOTE — ED TRIAGE NOTES
Pt was here several days ago for seizures. Pt was prescribed new seizure medications but was unable to get them filled. Pt is slurred speech. Family states that pt has had 5 seizures today over an unknown amount of hours. Pt is able to nod to answer questions. Pt is drowsy.

## 2021-04-08 NOTE — ED PROVIDER NOTES
[address] [facility]     Emergency Department     Faculty Attestation    I performed a history and physical examination of the patient and discussed management with the resident. I reviewed the resident´s note and agree with the documented findings and plan of care. Any areas of disagreement are noted on the chart. I was personally present for the key portions of any procedures. I have documented in the chart those procedures where I was not present during the key portions. I have reviewed the emergency nurses triage note. I agree with the chief complaint, past medical history, past surgical history, allergies, medications, social and family history as documented unless otherwise noted below. For Physician Assistant/ Nurse Practitioner cases/documentation I have personally evaluated this patient and have completed at least one if not all key elements of the E/M (history, physical exam, and MDM). Additional findings are as noted. History of seizures or pseudoseizures, recently switched to Lamictal by neurology, patient states she cannot find her medication and states she had several seizures today. When I saw the patient she was neuro intact.      Camron Aquino MD  04/07/21 3559

## 2021-04-08 NOTE — ED PROVIDER NOTES
[facility] ED  Emergency Department Encounter  Emergency Medicine Resident     Pt Name: Micki Bryant  MRN: [id number]  [place] 1990  Date of evaluation: 4/7/21  PCP:  No primary care provider on file. CHIEF COMPLAINT       Chief Complaint   Patient presents with    Seizures       HISTORY OFPRESENT ILLNESS  (Location/Symptom, Timing/Onset, Context/Setting, Quality, Duration, Modifying Factors,Severity.)      Micki Bryant is a 26 y. o.yo female who presents with reported events of multiple seizures today, unknown duration, and unknown postictal stage.  is at bedside with patient. Patient stating she \"just had a another seizure\" although is conversing, was not disoriented, no loss of bowel or bladder continence per patient or  at bedside. Upon further questioning patient does states she has generalized weakness and pain that is chronic. No acute trauma, no recent illnesses. Patient does admit to being noncompliant with medication as she states she \"lost my prescription for my seizure medications\". Patient has not taken her seizure medications since discharge from the hospital.  Patient did not follow-up with primary care provider or neurology team.  Patient denies fevers, chest pain, shortness of breath, abdominal pain. No other acute complaints at this time. PAST MEDICAL / SURGICAL / SOCIAL / FAMILY HISTORY      has a past medical history of Asthma, Bipolar disorder ([address] Cervical dysplasia, Migraine, Movement disorder, and Seizures ([address] has a past surgical history that includes Cholecystectomy (2007); Tubal ligation; and Appendectomy. Social:  reports that she has been smoking cigarettes. She has a 11.00 pack-year smoking history. She has never used smokeless tobacco. She reports previous alcohol use. She reports previous drug use. Drugs: Opiates  and Marijuana.     Family Hx:   Family History   Problem Relation Age of Onset    Diabetes Mother         G.Parents    Hypertension Mother         G.Parents    Diabetes Father     Hypertension Father     Stroke Father         G.Parents    Cancer Other         G.Parents, Pancrease and ??    Coronary Art Dis Other         G.Parents        Allergies:  Claritin [loratadine], Diclofenac, Morphine, Nicotine, Pcn [penicillins], and Peanut-containing drug products    Home Medications:  Prior to Admission medications    Medication Sig Start Date End Date Taking? Authorizing Provider   lamoTRIgine (LAMICTAL) 25 MG tablet Take 1 tablet by mouth 2 times daily Week 1 and 2: 25 mg/day; Weeks 3 and 4: 50 mg/day; Week 5: 50 mg BID 4/7/21  Yes Julianna Love, DO   divalproex (DEPAKOTE) 500 MG DR tablet Take 1 tablet by mouth 2 times daily 4/4/21   Carole Avila, DO   acetaminophen (TYLENOL) 325 MG tablet Take 1 tablet by mouth every 6 hours as needed for Pain 1/6/21   Martha Myers MD       REVIEW OFSYSTEMS    (2-9 systems for level 4, 10 or more for level 5)      Review of Systems   Constitutional: Negative for activity change, chills and fever. HENT: Negative for congestion and sore throat. Eyes: Negative for pain and visual disturbance. Respiratory: Negative for cough and shortness of breath. Cardiovascular: Negative for chest pain and palpitations. Gastrointestinal: Negative for abdominal pain, constipation and diarrhea. Musculoskeletal: Negative for arthralgias and myalgias. Skin: Negative for rash and wound. Neurological: Positive for seizures (seizure like activity ) and weakness (generalized). Negative for dizziness and headaches. Generalized pain and body aches        PHYSICAL EXAM   (up to 7 for level 4, 8 or more forlevel 5)      INITIAL VITALS:   Vitals:    04/07/21 2153   BP:    Pulse: 63   Resp: 20   Temp:    SpO2: 94%        Physical Exam  Vitals signs and nursing note reviewed. Constitutional:       General: She is not in acute distress.      Appearance: Normal noncompliance     Initial MDM/Plan: 26 y.o. female who presents with acute complaint of \"multiple seizures today\". Patient presents with vital signs stable, alert, oriented, answering all questions appropriately, no evidence of postictal state, conversing, patient is laying in bed squeezing eyes upon my examination with no acute distress. Patient does squeeze eyes and rolls head multiple times although remains alert and conversing, does open eyes upon supraorbital pressure. Patient intermittently noncompliant with physical exam although upon hand drop test does avoid contact of hand face, does respond appropriately when asked questions. After further questioning patient denies taking her home medications due to losing her prescription. Patient presents with vital signs stable, afebrile, previous labs reviewed from 4/3/2021 unremarkable, indication for repeat CBC or BMP. Discussed with patient and  at bedside that we will provide her with a one-time dose of her new antiseizure medication here in the emergency department and provide her with a written prescription for her new antiseizure medication. Reviewed previous neurology note from 4/4/2021 while patient was in observation unit. Provided patient with written prescription that was recommended per neurology team.  Discussed importance of follow-up and medication's and compliance. Patient discharged in stable condition after remaining vitally stable throughout emergency department stay. DIAGNOSTIC RESULTS / EMERGENCYDEPARTMENT COURSE / MDM     LABS:  Labs Reviewed - No data to display      RADIOLOGY:  No results found. EKG      All EKG's are interpreted by the Emergency Department Physicianwho either signs or Co-signs this chart in the absence of a cardiologist.    EMERGENCY DEPARTMENT COURSE:          PROCEDURES:  None    CONSULTS:  None      FINAL IMPRESSION      1.  Seizure-like activity (HCC)          DISPOSITION / PLAN     DISPOSITION Decision To Discharge 04/07/2021 10:00:25 PM      PATIENT REFERRED TO:  [facility] ED  [address] [address]  [phone]    As needed, If symptoms worsen    [address] [place] [address]  [phone]  Call   [facility] conditions and medication management      DISCHARGE MEDICATIONS:  Discharge Medication List as of 4/7/2021 10:00 PM          Leticia Arceo DO  Emergency Medicine Resident    (Please note that portions of this note were completed with a voice recognition program.Efforts were made to edit the dictations but occasionally words are mis-transcribed.)       Leticia Arceo DO  Resident  04/08/21 [id number]

## 2021-04-08 NOTE — CARE COORDINATION
Called phone # listed as patient's left Julian Cadena called writer back- she wanted to let writer know this was a wrong . [phone] was removed as patient's phone number. Writer attempted to call [phone], message stated call cannot be completed as dialed. Writer will close encounter.

## 2021-04-12 ENCOUNTER — HOSPITAL ENCOUNTER (OUTPATIENT)
Age: 31
Setting detail: OBSERVATION
Discharge: HOME HEALTH | End: 2021-04-13
Attending: EMERGENCY MEDICINE | Admitting: EMERGENCY MEDICINE
Payer: MEDICAID

## 2021-04-12 ENCOUNTER — APPOINTMENT (OUTPATIENT)
Dept: GENERAL RADIOLOGY | Age: 31
End: 2021-04-12
Payer: MEDICAID

## 2021-04-12 VITALS
BODY MASS INDEX: 17.77 KG/M2 | RESPIRATION RATE: 28 BRPM | OXYGEN SATURATION: 98 % | TEMPERATURE: 97.9 F | HEART RATE: 73 BPM | WEIGHT: 85 LBS | SYSTOLIC BLOOD PRESSURE: 109 MMHG | DIASTOLIC BLOOD PRESSURE: 67 MMHG

## 2021-04-12 DIAGNOSIS — R56.9 SEIZURES (HCC): Primary | ICD-10-CM

## 2021-04-12 PROCEDURE — 84484 ASSAY OF TROPONIN QUANT: CPT

## 2021-04-12 PROCEDURE — 85379 FIBRIN DEGRADATION QUANT: CPT

## 2021-04-12 PROCEDURE — 96375 TX/PRO/DX INJ NEW DRUG ADDON: CPT

## 2021-04-12 PROCEDURE — 96365 THER/PROPH/DIAG IV INF INIT: CPT

## 2021-04-12 PROCEDURE — 6360000002 HC RX W HCPCS

## 2021-04-12 PROCEDURE — 84703 CHORIONIC GONADOTROPIN ASSAY: CPT

## 2021-04-12 PROCEDURE — 85025 COMPLETE CBC W/AUTO DIFF WBC: CPT

## 2021-04-12 PROCEDURE — 80053 COMPREHEN METABOLIC PANEL: CPT

## 2021-04-12 PROCEDURE — 80164 ASSAY DIPROPYLACETIC ACD TOT: CPT

## 2021-04-12 PROCEDURE — 2580000003 HC RX 258: Performed by: STUDENT IN AN ORGANIZED HEALTH CARE EDUCATION/TRAINING PROGRAM

## 2021-04-12 PROCEDURE — 93005 ELECTROCARDIOGRAM TRACING: CPT | Performed by: STUDENT IN AN ORGANIZED HEALTH CARE EDUCATION/TRAINING PROGRAM

## 2021-04-12 PROCEDURE — 71045 X-RAY EXAM CHEST 1 VIEW: CPT

## 2021-04-12 PROCEDURE — 99283 EMERGENCY DEPT VISIT LOW MDM: CPT

## 2021-04-12 PROCEDURE — 80165 DIPROPYLACETIC ACID FREE: CPT

## 2021-04-12 RX ORDER — LORAZEPAM 2 MG/ML
2 INJECTION INTRAMUSCULAR ONCE
Status: COMPLETED | OUTPATIENT
Start: 2021-04-13 | End: 2021-04-13

## 2021-04-12 RX ORDER — DIPHENHYDRAMINE HYDROCHLORIDE 50 MG/ML
25 INJECTION INTRAMUSCULAR; INTRAVENOUS ONCE
Status: DISCONTINUED | OUTPATIENT
Start: 2021-04-12 | End: 2021-04-12

## 2021-04-12 RX ORDER — 0.9 % SODIUM CHLORIDE 0.9 %
1000 INTRAVENOUS SOLUTION INTRAVENOUS ONCE
Status: COMPLETED | OUTPATIENT
Start: 2021-04-12 | End: 2021-04-13

## 2021-04-12 RX ORDER — LORAZEPAM 2 MG/ML
2 INJECTION INTRAMUSCULAR ONCE
Status: COMPLETED | OUTPATIENT
Start: 2021-04-12 | End: 2021-04-12

## 2021-04-12 RX ORDER — PROCHLORPERAZINE EDISYLATE 5 MG/ML
10 INJECTION INTRAMUSCULAR; INTRAVENOUS ONCE
Status: DISCONTINUED | OUTPATIENT
Start: 2021-04-12 | End: 2021-04-12

## 2021-04-12 RX ORDER — LEVETIRACETAM 10 MG/ML
1000 INJECTION INTRAVASCULAR ONCE
Status: COMPLETED | OUTPATIENT
Start: 2021-04-13 | End: 2021-04-13

## 2021-04-12 RX ORDER — LEVETIRACETAM 10 MG/ML
INJECTION INTRAVASCULAR
Status: COMPLETED
Start: 2021-04-12 | End: 2021-04-13

## 2021-04-12 RX ORDER — LORAZEPAM 2 MG/ML
INJECTION INTRAMUSCULAR
Status: COMPLETED
Start: 2021-04-12 | End: 2021-04-12

## 2021-04-12 RX ADMIN — SODIUM CHLORIDE 1000 ML: 9 INJECTION, SOLUTION INTRAVENOUS at 23:45

## 2021-04-12 RX ADMIN — LORAZEPAM 2 MG: 2 INJECTION INTRAMUSCULAR at 23:41

## 2021-04-12 RX ADMIN — LORAZEPAM 2 MG: 2 INJECTION INTRAMUSCULAR; INTRAVENOUS at 23:41

## 2021-04-12 ASSESSMENT — PAIN DESCRIPTION - ONSET: ONSET: SUDDEN

## 2021-04-12 ASSESSMENT — PAIN DESCRIPTION - DESCRIPTORS: DESCRIPTORS: SHARP;SHOOTING

## 2021-04-12 ASSESSMENT — PAIN DESCRIPTION - PROGRESSION: CLINICAL_PROGRESSION: NOT CHANGED

## 2021-04-12 ASSESSMENT — PAIN DESCRIPTION - FREQUENCY: FREQUENCY: INTERMITTENT

## 2021-04-12 NOTE — Clinical Note
Patient Class: Observation [104]   REQUIRED: Diagnosis: Seizure ([facility] [[id number]]   Estimated Length of Stay: Estimated stay of less than 2 midnights   Admitting Provider: Arley Melo [[id number]]

## 2021-04-13 ENCOUNTER — APPOINTMENT (OUTPATIENT)
Dept: CT IMAGING | Age: 31
End: 2021-04-13
Payer: MEDICAID

## 2021-04-13 PROBLEM — R56.9 SEIZURE (HCC): Status: ACTIVE | Noted: 2021-04-13

## 2021-04-13 LAB
ABSOLUTE EOS #: 0.09 K/UL (ref 0–0.44)
ABSOLUTE IMMATURE GRANULOCYTE: 0.07 K/UL (ref 0–0.3)
ABSOLUTE LYMPH #: 2.41 K/UL (ref 1.1–3.7)
ABSOLUTE MONO #: 0.79 K/UL (ref 0.1–1.2)
ALBUMIN SERPL-MCNC: 4.1 G/DL (ref 3.5–5.2)
ALBUMIN/GLOBULIN RATIO: 1.6 (ref 1–2.5)
ALP BLD-CCNC: 59 U/L (ref 35–104)
ALT SERPL-CCNC: 12 U/L (ref 5–33)
ANION GAP SERPL CALCULATED.3IONS-SCNC: 11 MMOL/L (ref 9–17)
AST SERPL-CCNC: 17 U/L
BASOPHILS # BLD: 0 % (ref 0–2)
BASOPHILS ABSOLUTE: 0.06 K/UL (ref 0–0.2)
BILIRUB SERPL-MCNC: 0.4 MG/DL (ref 0.3–1.2)
BUN BLDV-MCNC: 13 MG/DL (ref 6–20)
BUN/CREAT BLD: ABNORMAL (ref 9–20)
CALCIUM SERPL-MCNC: 9 MG/DL (ref 8.6–10.4)
CHLORIDE BLD-SCNC: 101 MMOL/L (ref 98–107)
CO2: 24 MMOL/L (ref 20–31)
CREAT SERPL-MCNC: 0.88 MG/DL (ref 0.5–0.9)
D-DIMER QUANTITATIVE: <0.17 MG/L FEU
DIFFERENTIAL TYPE: ABNORMAL
EKG ATRIAL RATE: 81 BPM
EKG P AXIS: 58 DEGREES
EKG P-R INTERVAL: 134 MS
EKG Q-T INTERVAL: 382 MS
EKG QRS DURATION: 88 MS
EKG QTC CALCULATION (BAZETT): 443 MS
EKG R AXIS: 76 DEGREES
EKG T AXIS: 43 DEGREES
EKG VENTRICULAR RATE: 81 BPM
EOSINOPHILS RELATIVE PERCENT: 1 % (ref 1–4)
GFR AFRICAN AMERICAN: >60 ML/MIN
GFR NON-AFRICAN AMERICAN: >60 ML/MIN
GFR SERPL CREATININE-BSD FRML MDRD: ABNORMAL ML/MIN/{1.73_M2}
GFR SERPL CREATININE-BSD FRML MDRD: ABNORMAL ML/MIN/{1.73_M2}
GLUCOSE BLD-MCNC: 117 MG/DL (ref 70–99)
HCG QUALITATIVE: NEGATIVE
HCT VFR BLD CALC: 43.5 % (ref 36.3–47.1)
HEMOGLOBIN: 14.3 G/DL (ref 11.9–15.1)
IMMATURE GRANULOCYTES: 0 %
LYMPHOCYTES # BLD: 13 % (ref 24–43)
MCH RBC QN AUTO: 30.6 PG (ref 25.2–33.5)
MCHC RBC AUTO-ENTMCNC: 32.9 G/DL (ref 28.4–34.8)
MCV RBC AUTO: 92.9 FL (ref 82.6–102.9)
MONOCYTES # BLD: 4 % (ref 3–12)
NRBC AUTOMATED: 0 PER 100 WBC
PDW BLD-RTO: 13.8 % (ref 11.8–14.4)
PLATELET # BLD: 218 K/UL (ref 138–453)
PLATELET ESTIMATE: ABNORMAL
PMV BLD AUTO: 10.3 FL (ref 8.1–13.5)
POTASSIUM SERPL-SCNC: 3.7 MMOL/L (ref 3.7–5.3)
RBC # BLD: 4.68 M/UL (ref 3.95–5.11)
RBC # BLD: ABNORMAL 10*6/UL
SEG NEUTROPHILS: 82 % (ref 36–65)
SEGMENTED NEUTROPHILS ABSOLUTE COUNT: 15.51 K/UL (ref 1.5–8.1)
SODIUM BLD-SCNC: 136 MMOL/L (ref 135–144)
TOTAL PROTEIN: 6.7 G/DL (ref 6.4–8.3)
TROPONIN INTERP: NORMAL
TROPONIN T: NORMAL NG/ML
TROPONIN, HIGH SENSITIVITY: <6 NG/L (ref 0–14)
VALPROIC ACID % FREE: ABNORMAL % (ref 5–18.4)
VALPROIC ACID LEVEL: <3 UG/ML (ref 50–125)
VALPROIC ACID, FREE: <0.4 UG/ML (ref 7–23)
VALPROIC DATE LAST DOSE: ABNORMAL
VALPROIC DOSE AMOUNT: ABNORMAL
VALPROIC TIME LAST DOSE: ABNORMAL
WBC # BLD: 18.9 K/UL (ref 3.5–11.3)
WBC # BLD: ABNORMAL 10*3/UL

## 2021-04-13 PROCEDURE — 6360000002 HC RX W HCPCS: Performed by: STUDENT IN AN ORGANIZED HEALTH CARE EDUCATION/TRAINING PROGRAM

## 2021-04-13 PROCEDURE — 96376 TX/PRO/DX INJ SAME DRUG ADON: CPT

## 2021-04-13 PROCEDURE — G0378 HOSPITAL OBSERVATION PER HR: HCPCS

## 2021-04-13 PROCEDURE — 70450 CT HEAD/BRAIN W/O DYE: CPT

## 2021-04-13 PROCEDURE — 6360000002 HC RX W HCPCS

## 2021-04-13 RX ORDER — SODIUM CHLORIDE 9 MG/ML
25 INJECTION, SOLUTION INTRAVENOUS PRN
Status: DISCONTINUED | OUTPATIENT
Start: 2021-04-13 | End: 2021-04-13 | Stop reason: HOSPADM

## 2021-04-13 RX ORDER — SODIUM CHLORIDE 0.9 % (FLUSH) 0.9 %
5-40 SYRINGE (ML) INJECTION EVERY 12 HOURS SCHEDULED
Status: DISCONTINUED | OUTPATIENT
Start: 2021-04-13 | End: 2021-04-13 | Stop reason: HOSPADM

## 2021-04-13 RX ORDER — ACETAMINOPHEN 325 MG/1
650 TABLET ORAL EVERY 4 HOURS PRN
Status: DISCONTINUED | OUTPATIENT
Start: 2021-04-13 | End: 2021-04-13 | Stop reason: HOSPADM

## 2021-04-13 RX ORDER — SODIUM CHLORIDE 0.9 % (FLUSH) 0.9 %
5-40 SYRINGE (ML) INJECTION PRN
Status: DISCONTINUED | OUTPATIENT
Start: 2021-04-13 | End: 2021-04-13 | Stop reason: HOSPADM

## 2021-04-13 RX ORDER — LAMOTRIGINE 25 MG/1
25 TABLET ORAL 2 TIMES DAILY
Status: DISCONTINUED | OUTPATIENT
Start: 2021-04-13 | End: 2021-04-13 | Stop reason: HOSPADM

## 2021-04-13 RX ADMIN — LEVETIRACETAM 1000 MG: 10 INJECTION INTRAVENOUS at 00:11

## 2021-04-13 RX ADMIN — LORAZEPAM 2 MG: 2 INJECTION INTRAMUSCULAR at 00:12

## 2021-04-13 RX ADMIN — LEVETIRACETAM 1000 MG: 10 INJECTION INTRAVASCULAR at 00:11

## 2021-04-13 ASSESSMENT — ENCOUNTER SYMPTOMS
DIARRHEA: 0
CONSTIPATION: 0
RHINORRHEA: 0
BLOOD IN STOOL: 0
WHEEZING: 0
SHORTNESS OF BREATH: 0
COUGH: 0
ABDOMINAL PAIN: 0
NAUSEA: 0
SORE THROAT: 0
VOMITING: 0

## 2021-04-13 NOTE — PROGRESS NOTES
This patient was assigned to me by error. This patient was never interviewed nor examined by me. I did not participate in the care of this patient.     Ry Kelsey, PGY-3

## 2021-04-13 NOTE — ED NOTES
Pt resting on cot with eyes closed, even rise and fall of chest, NAD noted. Will continue to monitor.          Josee Tomlinson, MARJAN  04/13/21 7255

## 2021-04-13 NOTE — CONSULTS
Neurology Consult Note      Reason for Consult:  Seizures  Requesting Physician:  Dr. Lugo :  Bean    History Obtained From:  electronic medical record       HISTORY OF PRESENT ILLNESS:    The patient is a 26 y.o. 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 During the episodes she has whole body shaking without impaired consciousness.      She takes Seroquel for psychiatric issues, wheelchair bound at baseline 2/2 MS however her MS diagnosis is suspected, not confirmed as she is not on any DMT due to a previous note mentioning she \"just deals with it\".    In previous notes, her diagnosis of MS and seizures were questionable. Reportedly she has had an EEG in the past, and there was a working diagnosis of PNES, however there are no EEGs in our records that I can see that would support this diagnosis at this time. There are no recent neurology notes except for the last time she was inpatient back in 11/14/2020, and has no showed with her outpatient appointments with Dr. Amparo Bruner.      Excessive use of emergency services and hospital resources based on the number of encounters in our system alone. She was discharged on 4/4/2021 and was instructed to taper her Depakote and start Lamictal with a upward ramp in the typical fashion. Reported the patient just stopped taking Depakote and did not fill her Lamictal.  She received her first dose of Lamictal today in the ED. She had 2 witnessed seizures in the emergency room and received a total of 4 mg Ativan and loaded with Keppra. Patient is COVID-19 positive. Patient was not seen as she was somnolent still from benzodiazepine effect.      VPA total and free levels subtherapeutic. Past Medical History:        Diagnosis Date    Asthma     Bipolar disorder ([facility]     Cervical dysplasia     COPD (chronic obstructive pulmonary disease) ([facility]     Migraine 11/11/2011    Movement disorder     Seizures ([facility]      Past Surgical History:        Procedure Laterality Date    APPENDECTOMY      CHOLECYSTECTOMY  2007    at [address] [place] [address] Drive       Current Medications:   No current facility-administered medications for this encounter. Allergies:  Claritin [loratadine], Diclofenac, Morphine, Nicotine, Pcn [penicillins], and Peanut-containing drug products    Social History:  TOBACCO:   reports that she has been smoking cigarettes. She has a 11.00 pack-year smoking history. She has never used smokeless tobacco.  ETOH:   reports no history of alcohol use. DRUGS:   reports previous drug use. Drugs: Opiates  and Marijuana. ACTIVITIES OF DAILY LIVING:    INSTRUMENTAL ACTIVITIES OF DAILY LIVING:  Patient is able to perform all instrumental activities of daily living.     Family History:       Problem Relation Age of Onset    Diabetes Mother         G.Parents    Hypertension Mother         G.Parents    Diabetes Father     Hypertension Father     Stroke Father         G.Parents    Cancer Other         G.Parents, Pancrease and ??    Coronary Art Dis Other         G.Parents       REVIEW OF SYSTEMS:  Review of Systems   Unable to perform ROS: Mental status change       PHYSICAL EXAM:    Vitals:  /67   Pulse 73   Temp 97.9 °F (36.6 °C) (Tympanic)   Resp 28   Wt 85 lb (38.6 kg)   SpO2 98%   BMI 17.77 kg/m²        Physical Exam  Neurologic Exam         DATA    AED LEVELS:    Valproic Acid Lvl   Date Value Ref Range Status   04/12/2021 <3 (L) 50 - 125 ug/mL Final     Valproic Acid, Free   Date Value Ref Range Status   04/12/2021 <0.4 (L) 7.0 - 23.0 ug/mL Final         IMAGING    Ct Head Wo Contrast    Result Date: 4/13/2021  No acute intracranial

## 2021-04-13 NOTE — ED PROVIDER NOTES
[facility]  ED  Emergency Department Encounter  EmergencyMedicine Resident     Pt Name:Janet Yanez  MRN: [id number]  [place] 1990  Date of evaluation: 4/12/21  PCP:  No primary care provider on file. CHIEF COMPLAINT       Chief Complaint   Patient presents with    Migraine    Chest Pain       HISTORY OF PRESENT ILLNESS  (Location/Symptom, Timing/Onset, Context/Setting, Quality, Duration, Modifying Factors, Severity.)      Gabriel Mills is a 26 y.o. female who presents with headache and chest pain. Patient presents with headache described as front and back, worsening throughout the day, no associated symptoms. Patient also, is presenting with chest pain, described as anterior, sharp, nonradiating, no other associated symptoms. Patient denies fevers, chills, cough, congestion, rhinorrhea, loss of taste or smell, shortness of breath, abdominal pain, nausea, vomiting, dysuria, hematuria, diarrhea, constipation, lower extremity swelling or pain. Patient has history of seizures, was recently stopped Depakote, started on Lamictal, however patient has not actually taken any of her Lamictal yet. Patient was recently diagnosed with Covid on 4/4/2021, but is asymptomatic at this time. Patient was recently discharged from the hospital after neurologic evaluation, switching her to Lamictal and taking her off Depakote. PAST MEDICAL / SURGICAL / SOCIAL / FAMILY HISTORY      has a past medical history of Asthma, Bipolar disorder ([address] Cervical dysplasia, COPD (chronic obstructive pulmonary disease) ([address] Migraine, Movement disorder, and Seizures ([facility] has a past surgical history that includes Cholecystectomy (2007); Tubal ligation; and Appendectomy.       Social History     Socioeconomic History    Marital status:      Spouse name: Not on file    Number of children: Not on file    Years of education: Not on file    Highest education level: Not on file (LAMICTAL) 25 MG tablet Take 1 tablet by mouth 2 times daily Week 1 and 2: 25 mg/day; Weeks 3 and 4: 50 mg/day; Week 5: 50 mg BID 4/7/21   Dora Aguillon DO   acetaminophen (TYLENOL) 325 MG tablet Take 1 tablet by mouth every 6 hours as needed for Pain 1/6/21   Cristiana Paris MD       REVIEW OF SYSTEMS    (2-9 systems for level 4, 10 or more for level 5)      Review of Systems   Constitutional: Negative for chills, diaphoresis, fatigue and fever. HENT: Negative for congestion, rhinorrhea and sore throat. Eyes: Negative for visual disturbance. Respiratory: Negative for cough, shortness of breath and wheezing. Cardiovascular: Positive for chest pain. Negative for palpitations and leg swelling. Gastrointestinal: Negative for abdominal pain, blood in stool, constipation, diarrhea, nausea and vomiting. Genitourinary: Negative for dysuria, frequency, hematuria and urgency. Musculoskeletal: Negative for neck pain and neck stiffness. Skin: Negative for pallor and rash. Neurological: Positive for seizures and headaches. Negative for dizziness, syncope, facial asymmetry, speech difficulty, weakness, light-headedness and numbness. Psychiatric/Behavioral: Negative for confusion. PHYSICAL EXAM   (up to 7 for level 4, 8 or more for level 5)      INITIAL VITALS:   /67   Pulse 73   Temp 97.9 °F (36.6 °C) (Tympanic)   Resp 28   Wt 85 lb (38.6 kg)   SpO2 98%   BMI 17.77 kg/m²     Physical Exam  Constitutional:       Appearance: She is well-developed. She is not diaphoretic. Comments: Patient is initially alert and oriented, openly communicative, develops tonic-clonic seizure-like activity with eye deviation during HPI. HENT:      Head: Normocephalic and atraumatic. Right Ear: Tympanic membrane, ear canal and external ear normal.      Left Ear: Tympanic membrane, ear canal and external ear normal.      Nose: Nose normal. No congestion or rhinorrhea.       Mouth/Throat: Mouth: Mucous membranes are moist.      Pharynx: Oropharynx is clear. No oropharyngeal exudate or posterior oropharyngeal erythema. Eyes:      General:         Right eye: No discharge. Left eye: No discharge. Extraocular Movements: Extraocular movements intact. Neck:      Musculoskeletal: Normal range of motion and neck supple. No neck rigidity or muscular tenderness. Vascular: No JVD. Trachea: No tracheal deviation. Cardiovascular:      Rate and Rhythm: Normal rate and regular rhythm. Pulses: Normal pulses. Heart sounds: Normal heart sounds. No murmur. No friction rub. No gallop. Pulmonary:      Effort: Pulmonary effort is normal. No respiratory distress. Breath sounds: Normal breath sounds. No stridor. No wheezing, rhonchi or rales. Chest:      Chest wall: No tenderness. Abdominal:      General: There is no distension. Palpations: Abdomen is soft. There is no mass. Tenderness: There is no abdominal tenderness. There is no right CVA tenderness, left CVA tenderness or guarding. Musculoskeletal: Normal range of motion. Right lower leg: No edema. Left lower leg: No edema. Skin:     General: Skin is warm. Capillary Refill: Capillary refill takes less than 2 seconds. Neurological:      General: No focal deficit present. Mental Status: She is alert and oriented to person, place, and time. Cranial Nerves: No cranial nerve deficit. Sensory: No sensory deficit. Motor: No weakness.    Psychiatric:         Behavior: Behavior normal.         DIFFERENTIAL  DIAGNOSIS     PLAN (LABS / IMAGING / EKG):  Orders Placed This Encounter   Procedures    Culture, Urine    CT Head WO Contrast    XR CHEST PORTABLE    CBC Auto Differential    Comprehensive Metabolic Panel w/ Reflex to MG    Urinalysis, reflex to microscopic    Valproic acid level, total and free    Troponin    D-Dimer, Quantitative    HCG Qualitative, Serum    117 (H) 70 - 99 mg/dL    BUN 13 6 - 20 mg/dL    CREATININE 0.88 0.50 - 0.90 mg/dL    Bun/Cre Ratio NOT REPORTED 9 - 20    Calcium 9.0 8.6 - 10.4 mg/dL    Sodium 136 135 - 144 mmol/L    Potassium 3.7 3.7 - 5.3 mmol/L    Chloride 101 98 - 107 mmol/L    CO2 24 20 - 31 mmol/L    Anion Gap 11 9 - 17 mmol/L    Alkaline Phosphatase 59 35 - 104 U/L    ALT 12 5 - 33 U/L    AST 17 <32 U/L    Total Bilirubin 0.40 0.3 - 1.2 mg/dL    Total Protein 6.7 6.4 - 8.3 g/dL    Albumin 4.1 3.5 - 5.2 g/dL    Albumin/Globulin Ratio 1.6 1.0 - 2.5    GFR Non-African American >60 >60 mL/min    GFR African American >60 >60 mL/min    GFR Comment          GFR Staging NOT REPORTED    Valproic acid level, total and free   Result Value Ref Range    Valproic Acid Lvl <3 (L) 50 - 125 ug/mL    Valproic Dose amount NOT REPORTED     Valproic Date last dose NOT REPORTED     Valproic Time last dose NOT REPORTED     Valproic Acid, Free <0.4 (L) 7.0 - 23.0 ug/mL    Valproic Acid % Free CANNOT BE CALCULATED 5.0 - 18.4 %   Troponin   Result Value Ref Range    Troponin, High Sensitivity <6 0 - 14 ng/L    Troponin T NOT REPORTED <0.03 ng/mL    Troponin Interp NOT REPORTED    D-Dimer, Quantitative   Result Value Ref Range    D-Dimer, Quant <0.17 mg/L FEU   HCG Qualitative, Serum   Result Value Ref Range    hCG Qual NEGATIVE NEGATIVE       IMPRESSION: 39-year-old female with known Covid, presenting with chest pain and headache, developing seizures while conducting HPI, administered 2 mg Ativan, patient is postictal, starting to answering some questions, has repeat seizure activity, 2 mg Ativan administered, administered 20 mg/kg Keppra loading dose. Patient's seizures resolved, no longer postictal, answering questions appropriately, will obtain cardiac work-up, to include D-dimer to rule out PE, CT head as patient is complaining about headache and then develops seizures. Will consult neurology.     RADIOLOGY:  Ct Head Wo Contrast    Result Date: 4/13/2021  EXAMINATION: CT OF THE HEAD WITHOUT CONTRAST  4/13/2021 1:32 am TECHNIQUE: CT of the head was performed without the administration of intravenous contrast. Dose modulation, iterative reconstruction, and/or weight based adjustment of the mA/kV was utilized to reduce the radiation dose to as low as reasonably achievable. COMPARISON: 01/06/2021 HISTORY: ORDERING SYSTEM PROVIDED HISTORY: Worst headache with seizures TECHNOLOGIST PROVIDED HISTORY: Worst headache with seizures Decision Support Exception->Emergency Medical Condition ([place]) Is the patient pregnant?->No Reason for Exam: headache Acuity: Unknown Type of Exam: Unknown FINDINGS: BRAIN/VENTRICLES: There is no acute intracranial hemorrhage, mass effect or midline shift. No abnormal extra-axial fluid collection. The gray-white differentiation is maintained without evidence of an acute infarct. There is no evidence of hydrocephalus. ORBITS: The visualized portion of the orbits demonstrate no acute abnormality. SINUSES: The visualized paranasal sinuses and mastoid air cells demonstrate no acute abnormality. SOFT TISSUES/SKULL:  No acute abnormality of the visualized skull or soft tissues. No acute intracranial abnormality. Left maxillary sinus disease has resolved. Xr Chest Portable    Result Date: 4/13/2021  EXAMINATION: ONE XRAY VIEW OF THE CHEST 4/13/2021 12:08 am COMPARISON: 01/06/2021 HISTORY: ORDERING SYSTEM PROVIDED HISTORY: Chest pain TECHNOLOGIST PROVIDED HISTORY: Chest pain FINDINGS: Patient is rotated to the right on this study. Heart size and configuration are normal and the lungs are clear. No pneumothorax or pleural fluid. No acute bone finding. No acute cardiopulmonary disease.        EKG  EKG Interpretation    Interpreted by me    Rhythm: normal sinus   Rate: normal  Axis: normal  Ectopy: none  Conduction: normal  ST Segments: no acute change  T Waves: no acute change  Q Waves: none    Clinical Impression: no acute changes

## 2021-04-13 NOTE — ED PROVIDER NOTES
[facility]   Emergency Department  Emergency Medicine Attending Sign-out     Care of Danni Bhatt was assumed from previous attending Dr. Abby Almeida and is being seen for Migraine and Chest Pain  . The patient's initial evaluation and plan have been discussed with the prior provider who initially evaluated the patient. Attestation  I was available and discussed any additional care issues that arose and coordinated the management plans with the resident(s) caring for the patient during my duty period. Any areas of disagreement with resident's documentation of care or procedures are noted on the chart. I was personally present for the key portions of any/all procedures, during my duty period. I have documented in the chart those procedures where I was not present during the key portions. BRIEF PATIENT SUMMARY/MDM COURSE PER INITIAL PROVIDER:   RECENT VITALS:     Temp: 97.9 °F (36.6 °C),  Pulse: 73, Resp: 28, BP: 109/67, SpO2: 98 %    This patient is a 26 y.o. Female with migraine with known history of migraines. Also has a history of seizure just switched off of depakote and switched to lamictal however not started as of yet. Had 2 witnessed seizures in the ED. Stopped with Ativan. Loaded with keppra.   Plan for admit     DIAGNOSTICS/MEDICATIONS:     MEDICATIONS GIVEN:  ED Medication Orders (From admission, onward)    Start Ordered     Status Ordering Provider    04/13/21 0000 04/12/21 2349  levETIRAcetam (KEPPRA) 1000 mg/100 mL IVPB  ONCE      Last MAR action: New Bag - by Blayne Hollins on 04/13/21 at [address]    04/13/21 0000 04/12/21 2349  LORazepam (ATIVAN) injection 2 mg  ONCE      Last MAR action: Given - by Blayne Hollins on 04/13/21 at [address], [place] SMITH    04/12/21 2345 04/12/21 2332  LORazepam (ATIVAN) injection 2 mg  ONCE      Last MAR action: Given - by Blayne Hollins on 04/12/21 at [address] [place]    04/12/21 2345 04/12/21 2332  0.9 % sodium chloride bolus  ONCE      Last MAR action: New Bag - by Latasha Cadena on 04/12/21 at [address] [facility] [address], [place]          LABS    Labs Reviewed   CULTURE, URINE   CBC WITH AUTO DIFFERENTIAL   COMPREHENSIVE METABOLIC PANEL W/ REFLEX TO MG FOR LOW K   URINALYSIS   VALPROIC ACID LEVEL, TOTAL AND FREE   TROPONIN   D-DIMER, QUANTITATIVE   HCG, SERUM, QUALITATIVE       RADIOLOGY  Xr Chest Portable    Result Date: 4/13/2021  EXAMINATION: ONE XRAY VIEW OF THE CHEST 4/13/2021 12:08 am COMPARISON: 01/06/2021 HISTORY: ORDERING SYSTEM PROVIDED HISTORY: Chest pain TECHNOLOGIST PROVIDED HISTORY: Chest pain FINDINGS: Patient is rotated to the right on this study. Heart size and configuration are normal and the lungs are clear. No pneumothorax or pleural fluid. No acute bone finding. No acute cardiopulmonary disease. OUTSTANDING TASKS / ADDITIONAL COMMENTS   1.  Neena Everett MD  Emergency Medicine Attending  [facility]       Katarzyna Landers MD  04/13/21 6719

## 2021-04-13 NOTE — ED PROVIDER NOTES
[facility] ED     Emergency Department     Faculty Attestation        I performed a history and physical examination of the patient and discussed management with the resident. I reviewed the residents note and agree with the documented findings and plan of care. Any areas of disagreement are noted on the chart. I was personally present for the key portions of any procedures. I have documented in the chart those procedures where I was not present during the key portions. I have reviewed the emergency nurses triage note. I agree with the chief complaint, past medical history, past surgical history, allergies, medications, social and family history as documented unless otherwise noted below. For Physician Assistant/ Nurse Practitioner cases/documentation I have personally evaluated this patient and have completed at least one if not all key elements of the E/M (history, physical exam, and MDM). Additional findings are as noted. Vital Signs: BP: 109/67  Pulse: 73  Resp: 28  Temp: 97.9 °F (36.6 °C) SpO2: 98 %  PCP:  No primary care provider on file. Pertinent Comments:     Patient is a 68-year-old female with history of migraine as well as bipolar disease and seizures. Patient had been taking Depakote until recently and switched to Lamictal but has not started the prescription yet. Patient 8 days ago tested positive for Covid and is here with complaint of her typical migraine as some chest pain per previous well nursing interview. Upon resident seeing the patient she began to have tonic-clonic seizure that lasted approximately 1 minute but was also given Ativan 2 mg IV. Patient was moving all 4 extremities with symmetric face as well and good strength in all 4 extremities and no obvious signs of CVA. Patient was postictal for approximately 10 minutes and back to baseline however did subsequently have another seizure.    Patient is being loaded with Keppra 20 mg/kg IV now. Assessment/plan: Patient with intermittent seizures and given recent Covid diagnosis must consider potential encephalitis as well as other causes. We will begin with broad work-up including CT head given chest pain D-dimer screening as well as obviously troponins and possible CT chest.   If work-up yields no significant results and patient is still having symptoms or not return to baseline must consider lumbar puncture as well. Patient is signed out to incoming attending awaiting results and testing as described. CRITICAL CARE: There was a high probability of clinically significant/life threatening deterioration in this patient's condition which required my urgent intervention. Total critical care time was 30 minutes. This excludes any time for separately reportable procedures. This patient was evaluated in the Emergency Department for symptoms described in the history of present illness. He/she was evaluated in the context of the global COVID-19 pandemic, which necessitated consideration that the patient might be at risk for infection with the SARS-CoV-2 virus that causes COVID-19. Institutional protocols and algorithms that pertain to the evaluation of patients at risk for COVID-19 are in a state of rapid change based on information released by regulatory bodies including the CDC and federal and state organizations. These policies and algorithms were followed during the patient's care in the ED. (Please note that portions of this note were completed with a voice recognition program. Efforts were made to edit the dictations but occasionally words are mis-transcribed.  Whenever words are used in this note in any gender, they shall be construed as though they were used in the gender appropriate to the circumstances; and whenever words are used in this note in the singular or plural form, they shall be construed as though they were used in the form appropriate to the

## 2021-04-13 NOTE — PROGRESS NOTES
CDU Daily Progress Note  Attending Physician       Pt Name: Deandre Garcia  MRN: [id number]  [place] 1990  Date of evaluation: 4/13/21    I performed a history and physical examination of the patient and discussed management with the resident. I reviewed the residents note and agree with the documented findings and plan of care. Any areas of disagreement are noted on the chart. I was personally present for the key portions of any procedures. I have documented in the chart those procedures where I was not present during the key portions. I have reviewed the emergency nurses triage note. I agree with the chief complaint, past medical history, past surgical history, allergies, medications, social and family history as documented unless otherwise noted below. Documentation of the HPI, Physical Exam and Medical Decision Making performed by medical students or scribes is based on my personal performance of the HPI, PE and MDM. For Physician Assistant/ Nurse Practitioner cases/documentation I have personally evaluated this patient and have completed at least one if not all key elements of the E/M (history, physical exam, and MDM). Additional findings are as noted. The Family History, Social History and Review of Systems are unchanged from the previous day. No significant events overnight. Breakthrough seizure at home. Was here for same one week ago. Told to downtrend Depakote and start Lamictal. She stopped depakote and did not start lamictal. PMHx; psychongenic non epileptic seizures. In a wheelchair for MS diagnosis not certain. IVDA, bipolar on seroquel.  Had 2 seizures in ED and loaded with Mich Mcleod MD  Attending Physician  Critical Decision Unit

## 2021-04-13 NOTE — ED NOTES
Bed: 01  Expected date:   Expected time:   Means of arrival:   Comments:  Room [address] [place] [address]  04/12/21 0960

## 2021-04-13 NOTE — ED NOTES
Upon pt placement into room, pt begins to seize.  Writer informs  and Prashant Hernandez who give verbal order for writer to override 2MG of Ativan which is later given to pt by Sabas Keith RN  04/12/21 8042
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
## 2021-04-13 NOTE — PROGRESS NOTES
Patient left before I could assess her. She notified RN that she had to go to work.       Electronically signed by Othel Closs, MD on 4/13/2021 at 2:12 PM

## 2021-04-13 NOTE — ED NOTES
Pt resting on cot with eyes closed, even rise and fall of chest, NAD noted. Will continue to monitor.        Saw Rios RN  04/13/21 8548

## 2021-04-13 NOTE — ED NOTES
Pt removing herself from monitor. Pt is placed back on full monitor, and is educated that she needs to remain on monitor @ all times. Pt yelling in room when writer leaves, loud enough that registration staff can hear her on the other side of the dept. Dr. Tiffany Danielle notified. Will continue to monitor.            Shyla Gutierrez RN  04/13/21 0812

## 2021-04-13 NOTE — ED NOTES
Pt has witnessed seizure by RN, lasting approximately 4 minutes. Pt given 2 mg more of Ativan per Dr. Alex Liu, Resident.  Pt also given 1 gram Keppra via [id number] [address]  04/12/21 4273

## 2021-04-13 NOTE — ED NOTES
[facility] Patient up to restroom, stating she has to leave and go to work shortly. Observation resident notified.      Alisa Ziegler RN  04/13/21 1207

## 2021-04-14 ENCOUNTER — HOSPITAL ENCOUNTER (EMERGENCY)
Age: 31
Discharge: LEFT BEFORE DISCHARGE | End: 2021-04-14
Attending: EMERGENCY MEDICINE
Payer: MEDICAID

## 2021-04-14 VITALS
SYSTOLIC BLOOD PRESSURE: 111 MMHG | OXYGEN SATURATION: 97 % | HEART RATE: 91 BPM | BODY MASS INDEX: 17.84 KG/M2 | WEIGHT: 85 LBS | DIASTOLIC BLOOD PRESSURE: 67 MMHG | RESPIRATION RATE: 16 BRPM | TEMPERATURE: 98.5 F | HEIGHT: 58 IN

## 2021-04-14 DIAGNOSIS — R56.9 SEIZURE-LIKE ACTIVITY (HCC): Primary | ICD-10-CM

## 2021-04-14 PROCEDURE — 99284 EMERGENCY DEPT VISIT MOD MDM: CPT

## 2021-04-14 ASSESSMENT — ENCOUNTER SYMPTOMS
VOMITING: 0
CHEST TIGHTNESS: 0
NAUSEA: 0
SORE THROAT: 0
WHEEZING: 0
BLOOD IN STOOL: 0
SHORTNESS OF BREATH: 0
DIARRHEA: 0
ABDOMINAL PAIN: 0
CONSTIPATION: 0

## 2021-04-14 ASSESSMENT — PAIN DESCRIPTION - PAIN TYPE: TYPE: ACUTE PAIN

## 2021-04-14 ASSESSMENT — PAIN SCALES - GENERAL: PAINLEVEL_OUTOF10: 8

## 2021-04-14 ASSESSMENT — PAIN DESCRIPTION - LOCATION: LOCATION: HEAD

## 2021-04-14 NOTE — ED PROVIDER NOTES
[facility]  ED  Emergency Department Encounter  EmergencyMedicine Resident     Pt Name:Janet Wilson Parent  MRN: [id number]  [place] 1990  Date of evaluation: 4/14/21  PCP:  No primary care provider on file. CHIEF COMPLAINT       Chief Complaint   Patient presents with    Seizures     4 SIZURES PTA       HISTORY OF PRESENT ILLNESS  (Location/Symptom, Timing/Onset, Context/Setting, Quality, Duration, Modifying Factors, Severity.)      Deandre Garcia is a 26 y.o. female who presents with seizure-like activity. Patient reports that prior to arrival she had seizure-like activity, was found down by her brother-in-law, came to the emergency department for evaluation. Patient reports headache, denies any other symptoms. Patient denies fevers, chills, cough, congestion, chest pain, shortness of breath, abdominal pain, nausea, vomiting, diarrhea. Patient has long history of seizure-like activity, was just seen in the emergency department day before, admitted to the observation unit for neurologic evaluation. Patient left AMA. Patient is on Lamictal, just started this medication yesterday. PAST MEDICAL / SURGICAL / SOCIAL / FAMILY HISTORY      has a past medical history of Asthma, Bipolar disorder ([facility] [address] [facility] Cervical dysplasia, COPD (chronic obstructive pulmonary disease) ([facility] [address] [facility] Migraine, Movement disorder, and Seizures ([facility] [address] has a past surgical history that includes Cholecystectomy (2007); Tubal ligation; and Appendectomy.       Social History     Socioeconomic History    Marital status:      Spouse name: Not on file    Number of children: Not on file    Years of education: Not on file    Highest education level: Not on file   Occupational History    Not on file   Social Needs    Financial resource strain: Not on file    Food insecurity     Worry: Not on file     Inability: Not on file    Transportation needs     Medical: Not on file     Non-medical: Not on file Tobacco Use    Smoking status: Current Every Day Smoker     Packs/day: 1.00     Years: 11.00     Pack years: 11.00     Types: Cigarettes    Smokeless tobacco: Never Used   Substance and Sexual Activity    Alcohol use: Never     Alcohol/week: 0.0 standard drinks     Frequency: Never    Drug use: Not Currently     Types: Opiates , Marijuana     Comment: Chandni Herculeset abuse sober for 2 years.  Sexual activity: Not on file   Lifestyle    Physical activity     Days per week: Not on file     Minutes per session: Not on file    Stress: Not on file   Relationships    Social connections     Talks on phone: Not on file     Gets together: Not on file     Attends [employer] service: Not on file     Active member of club or organization: Not on file     Attends meetings of clubs or organizations: Not on file     Relationship status: Not on file    Intimate partner violence     Fear of current or ex partner: Not on file     Emotionally abused: Not on file     Physically abused: Not on file     Forced sexual activity: Not on file   Other Topics Concern    Not on file   Social History Narrative    Not on file       Family History   Problem Relation Age of Onset    Diabetes Mother         G.Parents    Hypertension Mother         G.Parents    Diabetes Father     Hypertension Father     Stroke Father         G.Parents    Cancer Other         G.Parents, Pancrease and ??    Coronary Art Dis Other         G.Parents       Allergies:  Claritin [loratadine], Diclofenac, Morphine, Nicotine, Pcn [penicillins], and Peanut-containing drug products    Home Medications:  Prior to Admission medications    Medication Sig Start Date End Date Taking?  Authorizing Provider   lamoTRIgine (LAMICTAL) 25 MG tablet Take 1 tablet by mouth 2 times daily Week 1 and 2: 25 mg/day; Weeks 3 and 4: 50 mg/day; Week 5: 50 mg BID 4/7/21   Dora Aguillon DO   acetaminophen (TYLENOL) 325 MG tablet Take 1 tablet by mouth every 6 hours as needed for Pain 1/6/21   Jackson Limon MD       REVIEW OF SYSTEMS    (2-9 systems for level 4, 10 or more for level 5)      Review of Systems   Constitutional: Negative for chills, diaphoresis, fatigue and fever. HENT: Negative for congestion and sore throat. Eyes: Negative for visual disturbance. Respiratory: Negative for chest tightness, shortness of breath and wheezing. Cardiovascular: Negative for chest pain, palpitations and leg swelling. Gastrointestinal: Negative for abdominal pain, blood in stool, constipation, diarrhea, nausea and vomiting. Genitourinary: Negative for dysuria and hematuria. Musculoskeletal: Negative for neck pain and neck stiffness. Skin: Negative for pallor and rash. Neurological: Positive for seizures and headaches. Negative for dizziness, facial asymmetry, weakness, light-headedness and numbness. Psychiatric/Behavioral: Negative for confusion. PHYSICAL EXAM   (up to 7 for level 4, 8 or more for level 5)      INITIAL VITALS:   /67   Pulse 91   Temp 98.5 °F (36.9 °C)   Resp 16   Ht 4' 10\" (1.473 m)   Wt 85 lb (38.6 kg)   SpO2 97%   BMI 17.77 kg/m²     Physical Exam  Constitutional:       General: She is not in acute distress. Appearance: Normal appearance. She is well-developed. She is not ill-appearing, toxic-appearing or diaphoretic. HENT:      Head: Normocephalic and atraumatic. Right Ear: External ear normal.      Left Ear: External ear normal.      Nose: Nose normal. No congestion or rhinorrhea. Eyes:      General:         Right eye: No discharge. Left eye: No discharge. Extraocular Movements: Extraocular movements intact. Pupils: Pupils are equal, round, and reactive to light. Neck:      Musculoskeletal: Normal range of motion and neck supple. No neck rigidity or muscular tenderness. Vascular: No JVD. Trachea: No tracheal deviation. Cardiovascular:      Rate and Rhythm: Normal rate and regular rhythm. Pulses: Normal pulses. Heart sounds: Normal heart sounds. No murmur. No friction rub. No gallop. Pulmonary:      Effort: Pulmonary effort is normal. No respiratory distress. Breath sounds: Normal breath sounds. No stridor. No wheezing, rhonchi or rales. Chest:      Chest wall: No tenderness. Abdominal:      General: There is no distension. Palpations: Abdomen is soft. There is no mass. Tenderness: There is no abdominal tenderness. There is no right CVA tenderness, left CVA tenderness or guarding. Musculoskeletal: Normal range of motion. General: No tenderness. Right lower leg: No edema. Left lower leg: No edema. Skin:     General: Skin is warm. Capillary Refill: Capillary refill takes less than 2 seconds. Neurological:      Mental Status: She is alert and oriented to person, place, and time. Cranial Nerves: No cranial nerve deficit. Sensory: No sensory deficit. Motor: No weakness. DIFFERENTIAL  DIAGNOSIS     PLAN (LABS / IMAGING / EKG):  No orders of the defined types were placed in this encounter. MEDICATIONS ORDERED:  No orders of the defined types were placed in this encounter. DDX: Seizure-like activity, pseudoseizure, seizure, malingering    DIAGNOSTIC RESULTS /  [facility]   LAB RESULTS:  No results found for this visit on 04/14/21. IMPRESSION: 33-year-old female who has seizure-like activity, never diagnosed with seizure disorder by neurology, is taking her Lamictal which she started yesterday, presents with seizure-like activity. Will obtain basic labs, CT head, consult neurology. RADIOLOGY:      EKG      All EKG's are interpreted by the Emergency Department Physician who either signs or Co-signs this chart in the absence of a cardiologist.    EMERGENCY DEPARTMENT COURSE:  Patient came to emergency department, HPI and physical exam were conducted. All nursing notes were reviewed.   After HPI and FAUZIA rothman, went back into room to talk with patient, patient was having seizure-like activity, I opened her eyes and she was following commands, answering questions, talking to me. Expressed concern that this may not be true seizures, patient became aggressive and started verbally abusing staff. Patient reported that she will go to a different hospital, explained to patient that she is leaving [address] and that this could result in death. Patient reports an understanding, has capacity, will allow patient to leave [address] Reiterated to patient that should she continue to have seizure-like activity that she return to the emergency department for evaluation. PROCEDURES:      CONSULTS:  None    CRITICAL CARE:  Please see attending note    FINAL IMPRESSION      1.  Seizure-like activity ([address]          DISPOSITION / PLAN     DISPOSITION Annabella 04/14/2021 02:18:51 AM      PATIENT REFERRED TO:  [facility]  [address] [facility] [address]  [phone]  Go to   As needed, If symptoms worsen    Jeramy Cervantes, [address] [place] [address]  [phone]    Schedule an appointment as soon as possible for a visit in 2 days  For neurologic care      DISCHARGE MEDICATIONS:  Discharge Medication List as of 4/14/2021  2:20 AM          Andrey Gayle MD  Emergency Medicine Resident    (Please note that portions of thisnote were completed with a voice recognition program.  Efforts were made to edit the dictations but occasionally words are mis-transcribed.)        Andrey Gayle MD  Resident  04/14/21 1407

## 2021-04-14 NOTE — ED NOTES
Patient had seizure that lasted approximately 1 minute  Patient having rapid eye movement and body was shaking  Writer was talking with another nurse in the room-   Writer saw patient stop shaking and look directly at nurses  Patient saw Rita Gupta look at her and patient started shaking again  Dr. Beni Fontana aware      Patience Evans RN  04/14/21 7532

## 2021-04-14 NOTE — ED NOTES
Patient upset and cursing at [facility] because nothing has been done regarding the seizures she has had  Writer updated patient that we just collected blood work and established an IV  Patient getting up out of bed and starting to get dressed  Patient cursing at [facility] stating she would go to another hospital that would treat her seizures  Writer educated patient that she has a right to leave [facility] aware of patient wanting to leave  Patient understands the risks with leaving [address] [place] [address]  04/14/21 3086

## 2021-04-14 NOTE — ED PROVIDER NOTES
Total critical care time was 0 minutes. This excludes any time for separately reportable procedures.        [address]  04/14/21 8210

## 2021-04-17 ENCOUNTER — HOSPITAL ENCOUNTER (EMERGENCY)
Age: 31
Discharge: HOME | End: 2021-04-17
Attending: EMERGENCY MEDICINE
Payer: MEDICAID

## 2021-04-17 VITALS
DIASTOLIC BLOOD PRESSURE: 54 MMHG | TEMPERATURE: 98 F | OXYGEN SATURATION: 96 % | RESPIRATION RATE: 16 BRPM | SYSTOLIC BLOOD PRESSURE: 97 MMHG | HEART RATE: 63 BPM

## 2021-04-17 DIAGNOSIS — K08.89 PAIN, DENTAL: Primary | ICD-10-CM

## 2021-04-17 PROCEDURE — 6370000000 HC RX 637 (ALT 250 FOR IP): Performed by: STUDENT IN AN ORGANIZED HEALTH CARE EDUCATION/TRAINING PROGRAM

## 2021-04-17 PROCEDURE — 99284 EMERGENCY DEPT VISIT MOD MDM: CPT

## 2021-04-17 RX ORDER — ACETAMINOPHEN 500 MG
1000 TABLET ORAL ONCE
Status: COMPLETED | OUTPATIENT
Start: 2021-04-17 | End: 2021-04-17

## 2021-04-17 RX ORDER — CLINDAMYCIN HYDROCHLORIDE 150 MG/1
300 CAPSULE ORAL ONCE
Status: COMPLETED | OUTPATIENT
Start: 2021-04-17 | End: 2021-04-17

## 2021-04-17 RX ORDER — GREEN TEA/HOODIA GORDONII 315-12.5MG
2 CAPSULE ORAL 2 TIMES DAILY
Qty: 60 TABLET | Refills: 0 | Status: SHIPPED | OUTPATIENT
Start: 2021-04-17 | End: 2021-05-02

## 2021-04-17 RX ORDER — CLINDAMYCIN HYDROCHLORIDE 300 MG/1
300 CAPSULE ORAL 3 TIMES DAILY
Qty: 21 CAPSULE | Refills: 0 | Status: ON HOLD | OUTPATIENT
Start: 2021-04-17 | End: 2021-04-20 | Stop reason: SDUPTHER

## 2021-04-17 RX ADMIN — ACETAMINOPHEN 1000 MG: 500 TABLET ORAL at 00:50

## 2021-04-17 RX ADMIN — CLINDAMYCIN HYDROCHLORIDE 300 MG: 150 CAPSULE ORAL at 00:50

## 2021-04-17 ASSESSMENT — ENCOUNTER SYMPTOMS
PHOTOPHOBIA: 0
NAUSEA: 0
ABDOMINAL PAIN: 0
VOICE CHANGE: 0
TROUBLE SWALLOWING: 0
SORE THROAT: 0
SHORTNESS OF BREATH: 0
VOMITING: 0

## 2021-04-17 ASSESSMENT — PAIN SCALES - GENERAL: PAINLEVEL_OUTOF10: 8

## 2021-04-17 ASSESSMENT — PAIN DESCRIPTION - FREQUENCY: FREQUENCY: INTERMITTENT

## 2021-04-17 ASSESSMENT — PAIN DESCRIPTION - LOCATION: LOCATION: TEETH

## 2021-04-17 ASSESSMENT — PAIN DESCRIPTION - PAIN TYPE: TYPE: ACUTE PAIN

## 2021-04-17 ASSESSMENT — PAIN DESCRIPTION - ORIENTATION: ORIENTATION: LEFT;LOWER

## 2021-04-17 NOTE — ED NOTES
----- Message from KENDRA Beauchamp sent at 9/26/2017  8:00 AM CDT -----  IBD serology reveals a pattern consistent with Crohn's disease, which indicates her colon inflammation could be due to early IBD.  How is she doing since starting Apriso?  I would like her to follow-up with me in 3-4 weeks for re-check as previously advised (please schedule).   Pt presents to ED w/ c/o left lower dental pain. Pt has poor dentition, denies fever/chills. Teeth intact, denies swelling. VSS, pt in NAD, A&Ox4. Will continue to monitor.      Torsten Norris RN  04/17/21 1013

## 2021-04-17 NOTE — ED PROVIDER NOTES
[facility] ED     Emergency Department     Faculty Attestation    I performed a history and physical examination of the patient and discussed management with the resident. I reviewed the residents note and agree with the documented findings and plan of care. Any areas of disagreement are noted on the chart. I was personally present for the key portions of any procedures. I have documented in the chart those procedures where I was not present during the key portions. I have reviewed the emergency nurses triage note. I agree with the chief complaint, past medical history, past surgical history, allergies, medications, social and family history as documented unless otherwise noted below. For Physician Assistant/ Nurse Practitioner cases/documentation I have personally evaluated this patient and have completed at least one if not all key elements of the E/M (history, physical exam, and MDM). Additional findings are as noted. Patient presents with toothache. She says it just started tonight. She denies fever, chills, nausea or vomiting. On exam, patient is resting comfortably in the bed. She does have poor dentition but no dental abscess is seen. Will treat with antibiotics and pain medication.       Ana Ybarra MD  Attending Emergency  Physician              Bharati Soriano MD  04/17/21 8860

## 2021-04-17 NOTE — ED NOTES
met with patient in lobby to discuss housing options and transportation.        Shena Amaro MSW, LSW     Alyssia Rizo Chatters  04/17/21 1414

## 2021-04-18 ENCOUNTER — HOSPITAL ENCOUNTER (EMERGENCY)
Age: 31
Discharge: PSYCHIATRIC OR SUBSTANCE USE FACILITY | End: 2021-04-19
Attending: EMERGENCY MEDICINE
Payer: MEDICAID

## 2021-04-18 ENCOUNTER — HOSPITAL ENCOUNTER (EMERGENCY)
Age: 31
Discharge: LEFT BEFORE DISCHARGE | End: 2021-04-18
Payer: MEDICAID

## 2021-04-18 ENCOUNTER — HOSPITAL ENCOUNTER (EMERGENCY)
Age: 31
Discharge: HOME | DRG: 753 | End: 2021-04-18
Attending: EMERGENCY MEDICINE
Payer: MEDICAID

## 2021-04-18 VITALS
SYSTOLIC BLOOD PRESSURE: 138 MMHG | TEMPERATURE: 98.2 F | OXYGEN SATURATION: 98 % | RESPIRATION RATE: 16 BRPM | HEART RATE: 89 BPM | DIASTOLIC BLOOD PRESSURE: 89 MMHG

## 2021-04-18 VITALS
RESPIRATION RATE: 16 BRPM | DIASTOLIC BLOOD PRESSURE: 59 MMHG | HEART RATE: 72 BPM | BODY MASS INDEX: 18.47 KG/M2 | TEMPERATURE: 97.9 F | WEIGHT: 88 LBS | HEIGHT: 58 IN | SYSTOLIC BLOOD PRESSURE: 106 MMHG | OXYGEN SATURATION: 95 %

## 2021-04-18 VITALS
TEMPERATURE: 97.5 F | SYSTOLIC BLOOD PRESSURE: 111 MMHG | HEART RATE: 72 BPM | DIASTOLIC BLOOD PRESSURE: 72 MMHG | RESPIRATION RATE: 16 BRPM | OXYGEN SATURATION: 97 %

## 2021-04-18 DIAGNOSIS — R56.9 SEIZURE-LIKE ACTIVITY (HCC): Primary | ICD-10-CM

## 2021-04-18 DIAGNOSIS — R45.851 SUICIDAL IDEATION: Primary | ICD-10-CM

## 2021-04-18 LAB
ABSOLUTE EOS #: 0.1 K/UL (ref 0–0.4)
ABSOLUTE EOS #: 0.12 K/UL (ref 0–0.44)
ABSOLUTE IMMATURE GRANULOCYTE: 0.07 K/UL (ref 0–0.3)
ABSOLUTE IMMATURE GRANULOCYTE: ABNORMAL K/UL (ref 0–0.3)
ABSOLUTE LYMPH #: 1.6 K/UL (ref 1–4.8)
ABSOLUTE LYMPH #: 2.71 K/UL (ref 1.1–3.7)
ABSOLUTE MONO #: 0.4 K/UL (ref 0.1–1.3)
ABSOLUTE MONO #: 0.57 K/UL (ref 0.1–1.2)
ACETAMINOPHEN LEVEL: <5 UG/ML (ref 10–30)
ACETAMINOPHEN LEVEL: <5 UG/ML (ref 10–30)
ALBUMIN SERPL-MCNC: 4 G/DL (ref 3.5–5.2)
ALBUMIN SERPL-MCNC: 4.3 G/DL (ref 3.5–5.2)
ALBUMIN/GLOBULIN RATIO: 1.7 (ref 1–2.5)
ALBUMIN/GLOBULIN RATIO: ABNORMAL (ref 1–2.5)
ALP BLD-CCNC: 60 U/L (ref 35–104)
ALP BLD-CCNC: 64 U/L (ref 35–104)
ALT SERPL-CCNC: 11 U/L (ref 5–33)
ALT SERPL-CCNC: 12 U/L (ref 5–33)
ANION GAP SERPL CALCULATED.3IONS-SCNC: 10 MMOL/L (ref 9–17)
ANION GAP SERPL CALCULATED.3IONS-SCNC: 10 MMOL/L (ref 9–17)
AST SERPL-CCNC: 13 U/L
AST SERPL-CCNC: 17 U/L
BASOPHILS # BLD: 0 % (ref 0–2)
BASOPHILS # BLD: 1 % (ref 0–2)
BASOPHILS ABSOLUTE: 0.05 K/UL (ref 0–0.2)
BASOPHILS ABSOLUTE: 0.1 K/UL (ref 0–0.2)
BILIRUB SERPL-MCNC: 0.2 MG/DL (ref 0.3–1.2)
BILIRUB SERPL-MCNC: 0.22 MG/DL (ref 0.3–1.2)
BUN BLDV-MCNC: 14 MG/DL (ref 6–20)
BUN BLDV-MCNC: 18 MG/DL (ref 6–20)
BUN/CREAT BLD: ABNORMAL (ref 9–20)
BUN/CREAT BLD: ABNORMAL (ref 9–20)
CALCIUM SERPL-MCNC: 8.7 MG/DL (ref 8.6–10.4)
CALCIUM SERPL-MCNC: 8.7 MG/DL (ref 8.6–10.4)
CHLORIDE BLD-SCNC: 101 MMOL/L (ref 98–107)
CHLORIDE BLD-SCNC: 107 MMOL/L (ref 98–107)
CO2: 23 MMOL/L (ref 20–31)
CO2: 23 MMOL/L (ref 20–31)
CREAT SERPL-MCNC: 0.76 MG/DL (ref 0.5–0.9)
CREAT SERPL-MCNC: 0.83 MG/DL (ref 0.5–0.9)
DIFFERENTIAL TYPE: ABNORMAL
DIFFERENTIAL TYPE: ABNORMAL
EOSINOPHILS RELATIVE PERCENT: 1 % (ref 0–4)
EOSINOPHILS RELATIVE PERCENT: 1 % (ref 1–4)
ETHANOL PERCENT: <0.01 %
ETHANOL PERCENT: <0.01 %
ETHANOL: <10 MG/DL
ETHANOL: <10 MG/DL
GFR AFRICAN AMERICAN: >60 ML/MIN
GFR AFRICAN AMERICAN: >60 ML/MIN
GFR NON-AFRICAN AMERICAN: >60 ML/MIN
GFR NON-AFRICAN AMERICAN: >60 ML/MIN
GFR SERPL CREATININE-BSD FRML MDRD: ABNORMAL ML/MIN/{1.73_M2}
GLUCOSE BLD-MCNC: 109 MG/DL (ref 70–99)
GLUCOSE BLD-MCNC: 130 MG/DL (ref 70–99)
HCG QUALITATIVE: NEGATIVE
HCG QUALITATIVE: NEGATIVE
HCT VFR BLD CALC: 40.5 % (ref 36.3–47.1)
HCT VFR BLD CALC: 42.5 % (ref 36–46)
HEMOGLOBIN: 13.4 G/DL (ref 11.9–15.1)
HEMOGLOBIN: 14.1 G/DL (ref 12–16)
IMMATURE GRANULOCYTES: 1 %
IMMATURE GRANULOCYTES: ABNORMAL %
LAMOTRIGINE LEVEL: <1 UG/ML (ref 3–15)
LYMPHOCYTES # BLD: 11 % (ref 24–44)
LYMPHOCYTES # BLD: 18 % (ref 24–43)
MCH RBC QN AUTO: 30.3 PG (ref 26–34)
MCH RBC QN AUTO: 30.4 PG (ref 25.2–33.5)
MCHC RBC AUTO-ENTMCNC: 33.1 G/DL (ref 28.4–34.8)
MCHC RBC AUTO-ENTMCNC: 33.1 G/DL (ref 31–37)
MCV RBC AUTO: 91.7 FL (ref 80–100)
MCV RBC AUTO: 91.8 FL (ref 82.6–102.9)
MONOCYTES # BLD: 3 % (ref 1–7)
MONOCYTES # BLD: 4 % (ref 3–12)
NRBC AUTOMATED: 0 PER 100 WBC
NRBC AUTOMATED: ABNORMAL PER 100 WBC
PDW BLD-RTO: 13.4 % (ref 11.8–14.4)
PDW BLD-RTO: 13.6 % (ref 11.5–14.9)
PLATELET # BLD: 214 K/UL (ref 138–453)
PLATELET # BLD: 216 K/UL (ref 150–450)
PLATELET ESTIMATE: ABNORMAL
PLATELET ESTIMATE: ABNORMAL
PMV BLD AUTO: 8 FL (ref 6–12)
PMV BLD AUTO: 9.8 FL (ref 8.1–13.5)
POTASSIUM SERPL-SCNC: 3.4 MMOL/L (ref 3.7–5.3)
POTASSIUM SERPL-SCNC: 4.2 MMOL/L (ref 3.7–5.3)
RBC # BLD: 4.41 M/UL (ref 3.95–5.11)
RBC # BLD: 4.64 M/UL (ref 4–5.2)
RBC # BLD: ABNORMAL 10*6/UL
RBC # BLD: ABNORMAL 10*6/UL
SALICYLATE LEVEL: <1 MG/DL (ref 3–10)
SALICYLATE LEVEL: <1 MG/DL (ref 3–10)
SEG NEUTROPHILS: 76 % (ref 36–65)
SEG NEUTROPHILS: 84 % (ref 36–66)
SEGMENTED NEUTROPHILS ABSOLUTE COUNT: 11.41 K/UL (ref 1.5–8.1)
SEGMENTED NEUTROPHILS ABSOLUTE COUNT: 11.9 K/UL (ref 1.3–9.1)
SODIUM BLD-SCNC: 134 MMOL/L (ref 135–144)
SODIUM BLD-SCNC: 140 MMOL/L (ref 135–144)
TOTAL PROTEIN: 6.4 G/DL (ref 6.4–8.3)
TOTAL PROTEIN: 6.8 G/DL (ref 6.4–8.3)
TOXIC TRICYCLIC SC,BLOOD: ABNORMAL
TOXIC TRICYCLIC SC,BLOOD: NEGATIVE
WBC # BLD: 14.1 K/UL (ref 3.5–11)
WBC # BLD: 14.9 K/UL (ref 3.5–11.3)
WBC # BLD: ABNORMAL 10*3/UL
WBC # BLD: ABNORMAL 10*3/UL

## 2021-04-18 PROCEDURE — 80307 DRUG TEST PRSMV CHEM ANLYZR: CPT

## 2021-04-18 PROCEDURE — 80053 COMPREHEN METABOLIC PANEL: CPT

## 2021-04-18 PROCEDURE — 84703 CHORIONIC GONADOTROPIN ASSAY: CPT

## 2021-04-18 PROCEDURE — 36415 COLL VENOUS BLD VENIPUNCTURE: CPT

## 2021-04-18 PROCEDURE — 85025 COMPLETE CBC W/AUTO DIFF WBC: CPT

## 2021-04-18 PROCEDURE — 80143 DRUG ASSAY ACETAMINOPHEN: CPT

## 2021-04-18 PROCEDURE — G0480 DRUG TEST DEF 1-7 CLASSES: HCPCS

## 2021-04-18 PROCEDURE — 80175 DRUG SCREEN QUAN LAMOTRIGINE: CPT

## 2021-04-18 PROCEDURE — 99284 EMERGENCY DEPT VISIT MOD MDM: CPT

## 2021-04-18 PROCEDURE — 80179 DRUG ASSAY SALICYLATE: CPT

## 2021-04-18 PROCEDURE — 87635 SARS-COV-2 COVID-19 AMP PRB: CPT

## 2021-04-18 ASSESSMENT — ENCOUNTER SYMPTOMS
ABDOMINAL PAIN: 0
DIARRHEA: 0
NAUSEA: 0
BACK PAIN: 0
SHORTNESS OF BREATH: 0
COLOR CHANGE: 0
COUGH: 0
VOMITING: 0

## 2021-04-18 ASSESSMENT — PAIN DESCRIPTION - LOCATION: LOCATION: HEAD
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
## 2021-04-18 NOTE — ED PROVIDER NOTES
21 capsule, Refills: 0      Probiotic Acidophilus (FLORANEX) TABS Take 2 tablets by mouth 2 times daily for 15 days  Qty: 60 tablet, Refills: 0      lamoTRIgine (LAMICTAL) 25 MG tablet Take 1 tablet by mouth 2 times daily Week 1 and 2: 25 mg/day; Weeks 3 and 4: 50 mg/day; Week 5: 50 mg BID  Qty: 120 tablet, Refills: 1      acetaminophen (TYLENOL) 325 MG tablet Take 1 tablet by mouth every 6 hours as needed for Pain  Qty: 60 tablet, Refills: 0             ALLERGIES     is allergic to claritin [loratadine]; diclofenac; morphine; nicotine; pcn [penicillins]; and peanut-containing drug products. FAMILY HISTORY     She indicated that the status of her mother is unknown. She indicated that the status of her father is unknown.     family history includes Cancer in an other family member; Coronary Art Dis in an other family member; Diabetes in her father and mother; Hypertension in her father and mother; Stroke in her father. SOCIAL HISTORY      reports that she has been smoking cigarettes. She has a 11.00 pack-year smoking history. She has never used smokeless tobacco. She reports previous drug use. Drugs: Opiates  and Marijuana. She reports that she does not drink alcohol. PHYSICAL EXAM     INITIAL VITALS:  height is 4' 10\" (1.473 m) and weight is 88 lb (39.9 kg). Her oral temperature is 97.9 °F (36.6 °C). Her blood pressure is 106/59 (abnormal) and her pulse is 72. Her respiration is 16 and oxygen saturation is 95%. Physical Exam  Vitals signs and nursing note reviewed. Constitutional:       General: She is not in acute distress. HENT:      Head: Normocephalic and atraumatic. Eyes:      Conjunctiva/sclera: Conjunctivae normal.      Pupils: Pupils are equal, round, and reactive to light. Neck:      Musculoskeletal: Neck supple. Cardiovascular:      Rate and Rhythm: Normal rate and regular rhythm. Heart sounds: Normal heart sounds. No murmur.    Pulmonary:      Effort: Pulmonary effort is normal. Neutrophils 84 (*)     Lymphocytes 11 (*)     Segs Absolute 11.90 (*)     All other components within normal limits   COMPREHENSIVE METABOLIC PANEL - Abnormal; Notable for the following components:    Glucose 109 (*)     Total Bilirubin 0.20 (*)     All other components within normal limits   TOX SCR, BLD, ED - Abnormal; Notable for the following components:    Acetaminophen Level <5 (*)     Salicylate Lvl <1 (*)     All other components within normal limits   HCG, SERUM, QUALITATIVE   LAMOTRIGINE LEVEL   URINE RT REFLEX TO CULTURE   URINE DRUG SCREEN         EMERGENCY DEPARTMENT COURSE:   Vitals:    Vitals:    04/18/21 0515   BP: (!) 106/59   Pulse: 72   Resp: 16   Temp: 97.9 °F (36.6 °C)   TempSrc: Oral   SpO2: 95%   Weight: 88 lb (39.9 kg)   Height: 4' 10\" (1.473 m)     6:31 AM EDT  Reexamination patient is resting comfortably in bed sleeping with her boyfriend. She is not in any distress. Has not had any seizure activity since she has been here. Her electrolytes are normal.  Unable to get her antiepileptic levels back due to them being send out labs. Patient is at her baseline. Think she is appropriate to be discharged home. Advised to follow-up with her PCP and neurologist.  Gela Mouse to be compliant with her medications. She is agreeable with plan will be discharged home today. CRITICALCARE:      CONSULTS:  None      PROCEDURES:      FINAL IMPRESSION      1.  Seizure-like activity [facility])            DISPOSITION/PLAN   DISPOSITION Decision To Discharge 04/18/2021 06:27:58 AM          PATIENT REFERRED TO:  [facility] ED  [facility]  [phone]    As needed, If symptoms worsen      DISCHARGE MEDICATIONS:  Current Discharge Medication List          (Please note that portions ofthis note were completed with a voice recognition program.  Efforts were made to edit the dictations but occasionally words are mis-transcribed.)    Michael Shankar DO  Attending Emergency Physician          Hang Kellogg,   04/18/21 0215

## 2021-04-18 NOTE — ED TRIAGE NOTES
Pt states that she had episode of bowel & bladder incontinence 1 hr PTA, states hx of MS, pt has hx of seizures, no seizure activity noted at this time

## 2021-04-18 NOTE — ED NOTES
Mode of arrival (squad #, walk in, police, etc) : Medic #3        Chief complaint(s): Seizures        Arrival Note (brief scenario, treatment PTA, etc). : Reportedly patient has been having seizures. Hx of seizures since 2007. Sates she has been taking her medications. A/O X 3        C= \"Have you ever felt that you should Cut down on your drinking? \"  No  A= \"Have people Annoyed you by criticizing your drinking? \"  No  G= \"Have you ever felt bad or Guilty about your drinking? \"  No  E= \"Have you ever had a drink as an Eye-opener first thing in the morning to steady your nerves or to help a hangover? \"  No      Deferred []      Reason for deferring: N/A    *If yes to two or more: probable alcohol abuse. Violette Frankel, MARJAN  04/18/21 [address] [place] [address],[address], RN  04/18/21 5394

## 2021-04-19 ENCOUNTER — HOSPITAL ENCOUNTER (INPATIENT)
Age: 31
LOS: 1 days | Discharge: HOME | DRG: 753 | End: 2021-04-20
Attending: PSYCHIATRY & NEUROLOGY | Admitting: PSYCHIATRY & NEUROLOGY
Payer: MEDICAID

## 2021-04-19 PROBLEM — F32.A DEPRESSION WITH SUICIDAL IDEATION: Status: ACTIVE | Noted: 2021-04-19

## 2021-04-19 PROBLEM — R45.851 DEPRESSION WITH SUICIDAL IDEATION: Status: ACTIVE | Noted: 2021-04-19

## 2021-04-19 LAB
-: ABNORMAL
AMORPHOUS: ABNORMAL
AMPHETAMINE SCREEN URINE: NEGATIVE
BACTERIA: ABNORMAL
BARBITURATE SCREEN URINE: NEGATIVE
BENZODIAZEPINE SCREEN, URINE: NEGATIVE
BILIRUBIN URINE: NEGATIVE
BUPRENORPHINE URINE: ABNORMAL
CANNABINOID SCREEN URINE: NEGATIVE
CASTS UA: ABNORMAL /LPF
COCAINE METABOLITE, URINE: POSITIVE
COLOR: YELLOW
COMMENT UA: ABNORMAL
CRYSTALS, UA: ABNORMAL /HPF
EPITHELIAL CELLS UA: ABNORMAL /HPF
GLUCOSE URINE: NEGATIVE
KETONES, URINE: NEGATIVE
LEUKOCYTE ESTERASE, URINE: ABNORMAL
MDMA URINE: ABNORMAL
METHADONE SCREEN, URINE: NEGATIVE
METHAMPHETAMINE, URINE: ABNORMAL
MUCUS: ABNORMAL
NITRITE, URINE: NEGATIVE
OPIATES, URINE: NEGATIVE
OTHER OBSERVATIONS UA: ABNORMAL
OXYCODONE SCREEN URINE: NEGATIVE
PH UA: 6.5 (ref 5–8)
PHENCYCLIDINE, URINE: NEGATIVE
PROPOXYPHENE, URINE: ABNORMAL
PROTEIN UA: NEGATIVE
RBC UA: ABNORMAL /HPF
RENAL EPITHELIAL, UA: ABNORMAL /HPF
SARS-COV-2, RAPID: NOT DETECTED
SPECIFIC GRAVITY UA: 1.02 (ref 1–1.03)
SPECIMEN DESCRIPTION: NORMAL
TEST INFORMATION: ABNORMAL
TRICHOMONAS: ABNORMAL
TRICYCLIC ANTIDEP,URINE: NEGATIVE
TRICYCLIC ANTIDEPRESSANTS, UR: ABNORMAL
TURBIDITY: ABNORMAL
URINE HGB: ABNORMAL
UROBILINOGEN, URINE: NORMAL
WBC UA: ABNORMAL /HPF
YEAST: ABNORMAL

## 2021-04-19 PROCEDURE — 6370000000 HC RX 637 (ALT 250 FOR IP): Performed by: PSYCHIATRY & NEUROLOGY

## 2021-04-19 PROCEDURE — 99223 1ST HOSP IP/OBS HIGH 75: CPT | Performed by: PSYCHIATRY & NEUROLOGY

## 2021-04-19 PROCEDURE — 1240000000 HC EMOTIONAL WELLNESS R&B

## 2021-04-19 PROCEDURE — 87086 URINE CULTURE/COLONY COUNT: CPT

## 2021-04-19 PROCEDURE — 6370000000 HC RX 637 (ALT 250 FOR IP): Performed by: NURSE PRACTITIONER

## 2021-04-19 PROCEDURE — 81001 URINALYSIS AUTO W/SCOPE: CPT

## 2021-04-19 PROCEDURE — 80307 DRUG TEST PRSMV CHEM ANLYZR: CPT

## 2021-04-19 RX ORDER — HYDROXYZINE 50 MG/1
50 TABLET, FILM COATED ORAL 3 TIMES DAILY PRN
Status: DISCONTINUED | OUTPATIENT
Start: 2021-04-19 | End: 2021-04-20 | Stop reason: HOSPADM

## 2021-04-19 RX ORDER — LAMOTRIGINE 25 MG/1
50 TABLET ORAL DAILY
Status: DISCONTINUED | OUTPATIENT
Start: 2021-04-19 | End: 2021-04-20 | Stop reason: HOSPADM

## 2021-04-19 RX ORDER — HALOPERIDOL 5 MG/ML
5 INJECTION INTRAMUSCULAR EVERY 4 HOURS PRN
Status: DISCONTINUED | OUTPATIENT
Start: 2021-04-19 | End: 2021-04-20 | Stop reason: HOSPADM

## 2021-04-19 RX ORDER — POLYETHYLENE GLYCOL 3350 17 G/17G
17 POWDER, FOR SOLUTION ORAL DAILY PRN
Status: DISCONTINUED | OUTPATIENT
Start: 2021-04-19 | End: 2021-04-20 | Stop reason: HOSPADM

## 2021-04-19 RX ORDER — ACETAMINOPHEN 325 MG/1
650 TABLET ORAL EVERY 4 HOURS PRN
Status: DISCONTINUED | OUTPATIENT
Start: 2021-04-19 | End: 2021-04-20 | Stop reason: HOSPADM

## 2021-04-19 RX ORDER — TRAZODONE HYDROCHLORIDE 50 MG/1
50 TABLET ORAL NIGHTLY PRN
Status: DISCONTINUED | OUTPATIENT
Start: 2021-04-19 | End: 2021-04-20 | Stop reason: HOSPADM

## 2021-04-19 RX ORDER — MAGNESIUM HYDROXIDE/ALUMINUM HYDROXICE/SIMETHICONE 120; 1200; 1200 MG/30ML; MG/30ML; MG/30ML
30 SUSPENSION ORAL EVERY 6 HOURS PRN
Status: DISCONTINUED | OUTPATIENT
Start: 2021-04-19 | End: 2021-04-20 | Stop reason: HOSPADM

## 2021-04-19 RX ORDER — HALOPERIDOL 10 MG/1
5 TABLET ORAL EVERY 4 HOURS PRN
Status: DISCONTINUED | OUTPATIENT
Start: 2021-04-19 | End: 2021-04-20 | Stop reason: HOSPADM

## 2021-04-19 RX ORDER — LORAZEPAM 2 MG/ML
2 INJECTION INTRAMUSCULAR EVERY 4 HOURS PRN
Status: DISCONTINUED | OUTPATIENT
Start: 2021-04-19 | End: 2021-04-20 | Stop reason: HOSPADM

## 2021-04-19 RX ORDER — DIPHENHYDRAMINE HYDROCHLORIDE 50 MG/ML
50 INJECTION INTRAMUSCULAR; INTRAVENOUS EVERY 4 HOURS PRN
Status: DISCONTINUED | OUTPATIENT
Start: 2021-04-19 | End: 2021-04-20 | Stop reason: HOSPADM

## 2021-04-19 RX ORDER — LORAZEPAM 1 MG/1
2 TABLET ORAL EVERY 4 HOURS PRN
Status: DISCONTINUED | OUTPATIENT
Start: 2021-04-19 | End: 2021-04-20 | Stop reason: HOSPADM

## 2021-04-19 RX ADMIN — HYDROXYZINE HYDROCHLORIDE 50 MG: 50 TABLET, FILM COATED ORAL at 17:58

## 2021-04-19 RX ADMIN — LAMOTRIGINE 50 MG: 25 TABLET ORAL at 13:10

## 2021-04-19 RX ADMIN — ACETAMINOPHEN 650 MG: 325 TABLET, FILM COATED ORAL at 17:50

## 2021-04-19 ASSESSMENT — SLEEP AND FATIGUE QUESTIONNAIRES
DIFFICULTY STAYING ASLEEP: YES
DO YOU USE A SLEEP AID: NO
DO YOU USE A SLEEP AID: NO
AVERAGE NUMBER OF SLEEP HOURS: 1
DO YOU HAVE DIFFICULTY SLEEPING: NO
AVERAGE NUMBER OF SLEEP HOURS: 6
SLEEP PATTERN: DIFFICULTY FALLING ASLEEP;RESTLESSNESS;INSOMNIA;DISTURBED/INTERRUPTED SLEEP
SLEEP PATTERN: DIFFICULTY FALLING ASLEEP

## 2021-04-19 ASSESSMENT — PAIN SCALES - GENERAL: PAINLEVEL_OUTOF10: 2

## 2021-04-19 ASSESSMENT — LIFESTYLE VARIABLES: HISTORY_ALCOHOL_USE: NO

## 2021-04-19 NOTE — ED NOTES
Pt resting in bed. NAD at this time. Even non labored RR. Safety guard at bedside. No additional needs at this time.         Lobito Rodriguez RN  04/19/21 0432

## 2021-04-19 NOTE — GROUP NOTE
Group Therapy Note    Date: 4/19/2021    Group Start Time: 1430  Group End Time: 9732  Group Topic: Cognitive Skills    LOREN Mantilla, CTRS        Group Therapy Note    Attendees: 10/14         Pt did not participate in Cognitive Skills Group at 1430 when encouraged by RT due to resting in room. Pt was offered talk time as an alternative to group but declined.        Discipline Responsible: Psychoeducational Specialist      Signature:  Abbe José

## 2021-04-19 NOTE — PROGRESS NOTES
Pharmacy Medication History Note      List of current medications patient is taking is complete. Source of information:  [place] [phone]    Changes made to medication list:  Medications removed (include reason, ex. therapy complete or physician discontinued, noncompliance):  Acetaminophen (Tylenol) 325 mg      Medications added/doses adjusted:  None    Please let me know if you have any questions about this encounter. Thank you!     Electronically signed by Justin Fair on 4/19/2021 at 9:53 AM

## 2021-04-19 NOTE — BH NOTE
`[facility]  Admission Note     Admission Type:   Admission Type: Voluntary    Reason for admission:  Reason for Admission: suicidal thoughts    PATIENT STRENGTHS:  Strengths: Communication    Patient Strengths and Limitations:  Limitations: Difficulty problem solving/relies on others to help solve problems, Hopeless about future    Addictive Behavior:   Addictive Behavior  In the past 3 months, have you felt or has someone told you that you have a problem with:  : None  Do you have a history of Chemical Use?: No  Do you have a history of Alcohol Use?: No  Do you have a history of Street Drug Abuse?: No  Histroy of Prescripton Drug Abuse?: No    Medical Problems:   Past Medical History:   Diagnosis Date    Asthma     Bipolar disorder ([facility]     Cervical dysplasia     [facility]     Migraine 11/11/2011    Movement disorder     Seizures (HCC)        Status EXAM:  Status and Exam  Normal: No  Facial Expression: Flat, Sad, Worried  Affect: Appropriate  Level of Consciousness: Alert  Mood:Normal: No  Mood: Depressed, Anxious  Motor Activity:Normal: No  Motor Activity: Unusual Posture/Gait  Interview Behavior: Cooperative  Preception: [place] to Person, Ashley Fl[place] to Time, [place] to Place, [place] to Situation  Attention:Normal: No  Attention: Distractible  Thought Processes: Circumstantial  Thought Content:Normal: No  Thought Content: Preoccupations  Hallucinations: None  Delusions: No  Memory:Normal: Yes  Insight and Judgment: No  Insight and Judgment: Poor Insight, Poor Judgment  Present Suicidal Ideation: No  Present Homicidal Ideation: No    Tobacco Screening:  Practical Counseling, on admission, shahla X, if applicable and completed (first 3 are required if patient doesn't refuse):            ( )  Recognizing danger situations (included triggers and roadblocks)                    ( )  Coping skills (new ways to manage stress, exercise, relaxation techniques, changing routine, distraction)                                                           ( )  Basic information about quitting (benefits of quitting, techniques in how to quit, available resources  ( ) Referral for counseling faxed to Ted                                           (x ) Patient refused counseling  ( ) Patient has not smoked in the last 30 days    Metabolic Screening:    No results found for: LABA1C    Lab Results   Component Value Date    CHOL 87 07/02/2016    CHOL 109 09/27/2011     Lab Results   Component Value Date    TRIG 80 07/02/2016    TRIG 70 09/27/2011     Lab Results   Component Value Date    HDL 44 07/02/2016    HDL 47 09/27/2011     No components found for: LDLCAL  No results found for: LABVLDL      Body mass index is 16.72 kg/m². BP Readings from Last 2 Encounters:   04/19/21 102/63   04/18/21 111/72           Pt admitted with followings belongings:  Dentures: None  Vision - Corrective Lenses: None  Hearing Aid: None  Jewelry: None  Clothing: Footwear, Jacket / coat, Pants, Shirt, Undergarments (Comment)  Were All Patient Medications Collected?: Not Applicable  Other Valuables: Money (Comment)     Valuables sent home with N/A. Valuables placed in safe in security envelope, number:  \[id number]. Patient's home medications were reviewed, need verified. Patient oriented to surroundings and program expectations and copy of patient rights given. Received admission packet:  Yes. Consents reviewed, signed Yes. Patient verbalize understanding:  Yes. Patient education on precautions: Yes    Patient [facility] due to active suicidal ideations to overdose on old prescription of Depakote. The patient's boyfriend found the patient and took her to the ER for an evaluation. Upon admission the patient denied all, but stated she has an increase in depression and anxiety the past few weeks.  The patient has a history of multiple sclerosis and uses a

## 2021-04-19 NOTE — ED PROVIDER NOTES
[address] [facility]     Emergency Department     Faculty Attestation        I performed a history and physical examination of the patient and discussed management with the resident. I reviewed the residents note and agree with the documented findings and plan of care. Any areas of disagreement are noted on the chart. I was personally present for the key portions of any procedures. I have documented in the chart those procedures where I was not present during the key portions. I have reviewed the emergency nurses triage note. I agree with the chief complaint, past medical history, past surgical history, allergies, medications, social and family history as documented unless otherwise noted below. For Physician Assistant/ Nurse Practitioner cases/documentation I have personally evaluated this patient and have completed at least one if not all key elements of the E/M (history, physical exam, and MDM). Additional findings are as noted. Vital Signs: BP: 111/72  Pulse: 72  Resp: 16  Temp: 97.5 °F (36.4 °C) SpO2: 97 %  PCP:  No primary care provider on file. Pertinent Comments:     Patient is a 27-year-old female with history of bipolar disorder as well as seizure disorder and COPD currently here today with suicidal ideation. Plan was to take all of her medications and was about to when her significant other stopped her. Denies any other complaints such as chest pain/shortness of breath/abdominal pain. Assessment/plan: We will medically stabilize for psychiatric assessment    Critical Care  None    This patient was evaluated in the Emergency Department for symptoms described in the history of present illness. He/she was evaluated in the context of the global COVID-19 pandemic, which necessitated consideration that the patient might be at risk for infection with the SARS-CoV-2 virus that causes COVID-19.  Institutional protocols and algorithms that pertain

## 2021-04-19 NOTE — PLAN OF CARE
[facility]  Initial Interdisciplinary Treatment Plan NO      Original treatment plan Date & Time: 4/19/2021             730AM    Admission Type:  Admission Type: Involuntary    Reason for admission:   Reason for Admission: SI no plan    Estimated Length of Stay:  5-7days  Estimated Discharge Date: to be determined by physician    PATIENT STRENGTHS:  Patient Strengths:Strengths: Positive Support, No significant Physical Illness  Patient Strengths and Limitations:Limitations: Tendency to isolate self, Lacks leisure interests, Difficulty problem solving/relies on others to help solve problems, Hopeless about future, Multiple barriers to leisure interests, Inappropriate/potentially harmful leisure interests (depression substance abuse anxiety poor coping skills)  Addictive Behavior: Addictive Behavior  In the past 3 months, have you felt or has someone told you that you have a problem with:  : None  Do you have a history of Chemical Use?: No  Do you have a history of Alcohol Use?: No  Do you have a history of Street Drug Abuse?: Yes  Histroy of Prescripton Drug Abuse?: No  Medical Problems:No past medical history on file.   Status EXAM:Status and Exam  Normal: No  Facial Expression: Elevated  Affect: Inappropriate  Level of Consciousness: Alert  Mood:Normal: No  Mood: Depressed, Anxious  Motor Activity:Normal: No  Motor Activity: Decreased  Interview Behavior: Cooperative  Preception: [place] to Person, Abena Putty to Time, [place] to Place, [place] to Situation  Attention:Normal: Yes  Attention: Distractible  Thought Processes: Circumstantial  Thought Content:Normal: Yes  Thought Content: Preoccupations  Hallucinations: None  Delusions: No  Memory:Normal: Yes  Memory: Poor Recent, Confabulation  Insight and Judgment: No  Insight and Judgment: Unmotivated  Present Suicidal Ideation: No  Present Homicidal Ideation: No    EDUCATION:   Learner Progress Toward Treatment Goals: reviewed group plans and strategies for

## 2021-04-19 NOTE — ED NOTES
body assessment and belongings search:     Persons present during search:   Results of search and disposition:       Searchers Name: [facility]    These items or items similar should be removed from the room:   [] Chairs   [x] Telephone   [x] Trash cans and liners   [x] Plastic utensils (order Patient Safety tray)   [x] Empty or remove Sharps containers   [x] All personal clothing/belongings removed   [x] All unnecessary lead wires, electrical cords, draw cords, etc.   [x] Flowers and plants   [x] Double check for lighters, matches, razors, any glass items etc that can be used as weapons. Person completing Checklist: Duglas GALLEGO     Y  N     [x] [] Has the patient been informed that they are on a watch and what that means? [x] [] Can the patient get out of Bed without nursing assistance? [x] [] Can the patient use the restroom without nursing assistance? [] [x] Can the patient walk the halls to [place] their legs? \"   [] [x] Does the patient have metal utensils? [x] [] Have the patient's belongings been placed out of control of the patient? [x] [] Have the patient and his/her belongings been checked for contraband? [x] [] Is the patient under any visitor restrictions? If Yes, explain:   [] [x] Is the patient under an alias? [facility] Name:   Authorized visitors (no more than two are to be on the list)   Name/Relationship:   Name/Relationship:    Name of Staff member that you  Received this information from?: Raul 45 Description:    Octavia Mejía BH30/BH26B female 26 y.o. Admission weight:      Race: []  [] Black  []   []   [] [place] [] Other  Facial Hair:  [] Yes  [] No  If yes, please describe: Identifying Marks (i.e. Visible tattoos, scars, etc... ):     NURSING CARE PLAN    Nursing Diagnosis: Risk of Self Directed Harm  [] Actual  [] Potential  Date Started: 4/19/21      Etiological Factors: (related to)  [] Expressed or implied suicidal ideation/behavior  [] Depression  [] Suicide attempt      [] Low self-esteem  [] Hallucinations      [] Feeling of Hopelessness  [] Substance abuse or withdrawal    [] Dysfunctional family  [] Major traumatic event, eg., divorce, etc   [] Excessive stress/anxiety    4/19/21    Expected Outcomes    Patient will:   [x] Patient will remain safe for the duration of their stay   [x] Patient's environment will be safe, eg. Free of potential suicide weapons   [] Verbalize Recovery from suicidal episode and improvement in self-worth   [x] Discuss feeling that precipitated suicide attempt/thoughts/behavior   [] Will describe available resources for crisis prevention and management   [] Will verbalize positive coping skills     Nursing Intervention   [x] Assessment and Observations hourly   [x] Suicide Precautions implemented with patient, should be 1:1 observation   [x] Document observation [id number] and RN assessment hourly   [] Consult physician for:    [] Psychiatric consult    [] Pharmacological therapy    [] Other:    [x] Patient search completed by security   [x] Initiated appropriate safety protocols by removing from the patient's environment anything that could be used to inflict self injury, eg. Order safe tray, snap gown, etc   [x] Maintain open, warm, caring, non-judgmental attitude/manner towards patient   [] Discuss advantages and disadvantages of existing coping methods/skills   [x] Assist and educate patient with identifying present strengths and coping skills   [x] Keep patient informed regarding plan of care and provide clear concise explanations. Provide the patient/family education information as well as telephone numbers and other information about crisis centers, hot lines, and counselors.     Discharge Planning:   [] Referral  [] Groups [] Health agencies  [] Other:            Suad Spring RN  04/19/21 6265

## 2021-04-19 NOTE — PROGRESS NOTES
I independently saw and evaluated the patient. I reviewed the midlevel provider's documentation above. Any additional comments or changes to the midlevel provider's documentation are stated below otherwise agree with assessment.      -Known to me from previous admission. She is depressed because of grandfather's illness. He is suspected to have had a stroke. -Reports some suicidal ideation without a specific plan at this time.   -Has a history of depression and anxiety.   -Takes Lamictal for seizures that also helps with her mood. Reports a history of seizures since childhood.   -Says she has MS. Reviewed Neurology noted. Doubtful that she has MS. Says she would not \"put chemicals in my body\". Says God gave her MS and she would not have treatment for it. - She is . Lives in an abandoned house. Has a job. -        PLAN  Medications as noted above  Attempt to develop insight  Psycho-education conducted. Supportive Therapy conducted.   Probable discharge is as noted above  Follow-up daily while on inpatient unit    Electronically signed by Sharon Cota MD on 4/19/21 at 1:13 PM EDT

## 2021-04-19 NOTE — ED PROVIDER NOTES
[facility]   Emergency Department  Emergency Medicine Attending Sign-out     Care of Marlen Cantrell was assumed from previous attending Dr. Kim Cordova and is being seen for Suicidal  .  The patient's initial evaluation and plan have been discussed with the prior provider who initially evaluated the patient. Attestation  I was available and discussed any additional care issues that arose and coordinated the management plans with the resident(s) caring for the patient during my duty period. Any areas of disagreement with resident's documentation of care or procedures are noted on the chart. I was personally present for the key portions of any/all procedures, during my duty period. I have documented in the chart those procedures where I was not present during the key portions. BRIEF PATIENT SUMMARY/MDM COURSE PER INITIAL PROVIDER:   RECENT VITALS:     Temp: 97.5 °F (36.4 °C),  Pulse: 72, Resp: 16, BP: 111/72, SpO2: 97 %    This patient is a 26 y.o. Female with suicidal ideations. Significant other stopped her from takiing too many pills. Plan for psych transfer       OUTSTANDING TASKS / ADDITIONAL COMMENTS   1.  Awaiting [facility] protocol labs  2. SW and psych    Juan Carlos Etienne MD  Emergency Medicine Attending  [address] Geri Crews MD  04/19/21 7961

## 2021-04-19 NOTE — GROUP NOTE
Group Therapy Note    Date: 4/19/2021    Group Start Time: 0900  Group End Time: 8172  Group Topic: Community Meeting    [address] [facility] CHANDRAKANT Mantilla [place]        Group Therapy Note    Attendees: 8/18         Pt did not participate in Community Meeting/Goals Group at 900am when encouraged by RT due to resting in room. Pt was offered talk time as an alternative to group but declined.         Discipline Responsible: Psychoeducational Specialist        Signature:  Abbe José

## 2021-04-19 NOTE — GROUP NOTE
Group Therapy Note    Date: 4/19/2021    Group Start Time: 1100  Group End Time: 3576  Group Topic: Cognitive Skills    LOREN Callahan Hint, CTRS        Group Therapy Note    Attendees: 4/9       Pt did not participate in Cognitive Skills Group at 1100am when encouraged by RT due to resting in room. Pt was offered talk time as an alternative to group but declined.          Discipline Responsible: Psychoeducational Specialist        Signature:  Rae Hinojosa

## 2021-04-19 NOTE — PROGRESS NOTES
Behavioral Services  Medicare Certification Upon Admission    I certify that this patient's inpatient psychiatric hospital admission is medically necessary for:    [x] (1) Treatment which could reasonably be expected to improve this patient's condition,       [x] (2) Or for diagnostic study;     AND     [x](2) The inpatient psychiatric services are provided while the individual is under the care of a physician and are included in the individualized plan of care.     Estimated length of stay/service 3-9 days    Plan for post-hospital care outpatient care    Electronically signed by Karina Varma MD on 4/19/2021 at 9:04 AM
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
## 2021-04-19 NOTE — ED NOTES
Pt resting in bed, eyes closed. Even non labored RR. Safety guard at bedside.  Will continue to monitor       Yvette Medel RN  04/19/21 9636

## 2021-04-19 NOTE — PROGRESS NOTES
RAPID Covid 19 swab taken from left nare, labeled, placed in red dot bag, and handed off to second healthcare worker outside of room for transport to laboratory per hospital policy and procedure. Patient tolerated procedure poorly.

## 2021-04-19 NOTE — ED NOTES
Patient has her own wheelchair and Abdoulaye Aiken unable to transport the wheelchair with patient. SW called [facility] and they will send an ambulette to  and transport the wheelchair to the [address] [place] [address] at 8:30am this morning. SW will update on-coming SW who will be here at that time. The wheelchair is clearly marked with patient's name on it and parked in the [facility].  made aware of plan and will alert her replacement as well. Patient told plan and is agreeable. Hayden Deal.  [address], [address] [place] [address]  04/19/21 8455

## 2021-04-19 NOTE — GROUP NOTE
Group Therapy Note    Date: 4/19/2021    Group Start Time: 1000  Group End Time: 1030  Group Topic: Psychoeducation    LOREN Fairbanks     Group Psychotherapy Note     [facility] BHI D    Rooms [phone]    Client declined group participation but did participate in 1:1 individual brief therapy to engage in conversation regarding BHI programming, the importance of groups, treatment team as well as working with social work on discharge and safety plan. Patient's Goal:  To actively participate in psychotherapy group and discuss ways to address issues regarding interpersonal relationships and social support systems.

## 2021-04-19 NOTE — ED NOTES
Patient will be going to the [facility], Room #235. Gibran Handy will arrive to transport patient at 3:15am.  Transfer packet with RN. Staff and patient updated. Kath Matta.  Viky Crain, [place]  04/19/21 0246
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
## 2021-04-19 NOTE — FLOWSHEET NOTE
*Patient participated in the Spirituality Group       04/19/21 0569   Encounter Summary   Services provided to: Patient   Referral/Consult From: Rounding   Continue Visiting   (4/19/21)   Complexity of Encounter Moderate   Length of Encounter 30 minutes   Spiritual/Taoist   Type Spiritual support   Assessment Calm; Approachable   Intervention Active listening   Outcome Receptive

## 2021-04-19 NOTE — BH NOTE
Patient given tobacco quitline number 31202043137 at this time, refusing to call at this time, states \" I just dont want to quit now\"- patient given information as to the dangers of long term tobacco use. Continue to reinforce the importance of tobacco cessation.

## 2021-04-19 NOTE — ED PROVIDER NOTES
[address]  ED  Emergency Department Encounter  Emergency Medicine Resident     Pt Name: Damaris Ramirez  MRN: [id number]  [place] 1990  Date of evaluation: 4/18/21  PCP:  No primary care provider on file. CHIEF COMPLAINT       Chief Complaint   Patient presents with    Suicidal       HISTORY OFPRESENT ILLNESS  (Location/Symptom, Timing/Onset, Context/Setting, Quality, Duration, Modifying Factors,Severity.)      Damaris Ramirez is a 26 y.o. female presents to the emergency department night after suicide attempt. Patient reports her grandfather was recently diagnosed with a brain bleed and is due for surgery and since that time she has been upset. Today her significant other came home and found her attempted to call her medications and attend to take her life, she states he stopped her before she was able to take any of the medications. He then brought her to the emergency department to be evaluated. Patient does admit to suicidal ideations and states she has a plan to hurt herself. She is agreeable to hospitalization and psychiatric treatment. Patient does admit to using crack tonight, but denies other substances or alcohol use     PAST MEDICAL / SURGICAL / SOCIAL / FAMILY HISTORY      has a past medical history of Asthma, Bipolar disorder ([address] Cervical dysplasia, COPD (chronic obstructive pulmonary disease) ([address] Migraine, Movement disorder, and Seizures ([address] has a past surgical history that includes Cholecystectomy (2007); Tubal ligation; and Appendectomy.     Social History     Socioeconomic History    Marital status:      Spouse name: Not on file    Number of children: Not on file    Years of education: Not on file    Highest education level: Not on file   Occupational History    Not on file   Social Needs    Financial resource strain: Not on file    Food insecurity     Worry: Not on file     Inability: Not on file    Transportation needs     Medical: Not on file     Non-medical: Not on file   Tobacco Use    Smoking status: Current Every Day Smoker     Packs/day: 1.00     Years: 11.00     Pack years: 11.00     Types: Cigarettes    Smokeless tobacco: Never Used   Substance and Sexual Activity    Alcohol use: Never     Alcohol/week: 0.0 standard drinks     Frequency: Never    Drug use: Not Currently     Types: Opiates , Marijuana     Comment: Hx Percocet abuse sober for 2 years.  Sexual activity: Not on file   Lifestyle    Physical activity     Days per week: Not on file     Minutes per session: Not on file    Stress: Not on file   Relationships    Social connections     Talks on phone: Not on file     Gets together: Not on file     Attends Zoroastrianism service: Not on file     Active member of club or organization: Not on file     Attends meetings of clubs or organizations: Not on file     Relationship status: Not on file    Intimate partner violence     Fear of current or ex partner: Not on file     Emotionally abused: Not on file     Physically abused: Not on file     Forced sexual activity: Not on file   Other Topics Concern    Not on file   Social History Narrative    Not on file       Family History   Problem Relation Age of Onset    Diabetes Mother         G.Parents    Hypertension Mother         G.Parents    Diabetes Father     Hypertension Father     Stroke Father         G.Parents    Cancer Other         G.Parents, Pancrease and ??    Coronary Art Dis Other         G.Parents       Allergies:  Claritin [loratadine], Diclofenac, Morphine, Nicotine, Pcn [penicillins], and Peanut-containing drug products    Home Medications:  Prior to Admission medications    Medication Sig Start Date End Date Taking?  Authorizing Provider   clindamycin (CLEOCIN) 300 MG capsule Take 1 capsule by mouth 3 times daily for 7 days 4/17/21 4/24/21  Goran Tamayo, DO   Probiotic Acidophilus [facility]) TABS Take 2 tablets by mouth 2 times daily for 15 days 4/17/21 No murmur. Pulmonary:      Effort: Pulmonary effort is normal. No respiratory distress. Breath sounds: Normal breath sounds. No wheezing. Abdominal:      General: Abdomen is flat. There is no distension. Palpations: Abdomen is soft. Tenderness: There is no abdominal tenderness. There is no guarding. Musculoskeletal:         General: No tenderness. Right lower leg: No edema. Left lower leg: No edema. Skin:     General: Skin is warm and dry. Capillary Refill: Capillary refill takes less than 2 seconds. Findings: No rash. Neurological:      General: No focal deficit present. Mental Status: She is alert and oriented to person, place, and time. Gait: Gait normal.   Psychiatric:         Mood and Affect: Mood normal.         Behavior: Behavior normal.         DIFFERENTIAL  DIAGNOSIS     PLAN (LABS / IMAGING / EKG):  Orders Placed This Encounter   Procedures    COVID-19, Rapid    COMPREHENSIVE METABOLIC PANEL    CBC WITH AUTO DIFFERENTIAL    Urine Drug Screen    TOX SCR, BLD, ED    HCG Qualitative, Serum    Inpatient consult to Social Work    Suicide precautions       MEDICATIONS ORDERED:  No orders of the defined types were placed in this encounter. DDX: Suicidal ideations    Initial MDM/Plan: 26 y.o. female who presents with complaint of suicidal ideations, patient reported suicide attempt by trying to overdose on pills however her significant other found her before she was able to and stopped of pain medication. She does also report crack use tonight but denies other substances. Patient seen and examined. Vital signs are normal.  She does admit to suicidal ideations with a plan. She is also requesting inpatient psychiatric hospitalization and treatment. Patient denies other concerns or complaints at this time.   Will consult social work, obtain laboratory work for [address] / [address] [facility] LABS:  Labs Reviewed   COMPREHENSIVE METABOLIC PANEL - Abnormal; Notable for the following components:       Result Value    Glucose 130 (*)     Sodium 134 (*)     Potassium 3.4 (*)     Total Bilirubin 0.22 (*)     All other components within normal limits   CBC WITH AUTO DIFFERENTIAL - Abnormal; Notable for the following components:    WBC 14.9 (*)     Seg Neutrophils 76 (*)     Lymphocytes 18 (*)     Immature Granulocytes 1 (*)     Segs Absolute 11.41 (*)     All other components within normal limits   TOX SCR, BLD, ED - Abnormal; Notable for the following components:    Acetaminophen Level <5 (*)     Salicylate Lvl <1 (*)     All other components within normal limits   COVID-19, RAPID   HCG, SERUM, QUALITATIVE   URINE DRUG SCREEN         RADIOLOGY:  No results found. EMERGENCY DEPARTMENT COURSE:  ED Course as of Apr 18 2305   Sun Apr 18, 2021   252 69-year-old female presents emergency department night after suicide attempt. Patient reports her grandfather was recently diagnosed with a brain bleed and is due for surgery and since that time she has been upset. Today her significant other came home and found her attempted to call her medications and attend to take her life, she states he stopped her before she was able to take any of the medications. He then brought her to the emergency department to be evaluated. Patient does admit to suicidal ideations and states she has a plan to hurt herself. She is agreeable to hospitalization and psychiatric treatment. Patient does admit to using crack tonight, but denies other substances or alcohol use    [DS]   2253 Alert oriented person complex, time. GCS 15.  Lungs clear to auscultation bilaterally, abdomen is soft nontender.   Patient has no complaints of fever, chills, shortness of breath, chest pain, arm pain, nausea, vomiting, dysuria, lightheadedness, syncope    [DS]      ED Course User Index  [DS] Everet Fothergill, DO     Patient has been seen and examined by myself, laboratory work is largely unremarkable. Patient has slight leukocytosis but no signs or symptoms of infection on clinical examination and as such I doubt infection at this time likely stress reaction versus benign cause. Patient is medically cleared at this point for transfer to psychiatric facility for hospitalization and treatment. PROCEDURES:  None    CONSULTS:  IP CONSULT TO SOCIAL WORK    CRITICAL CARE:  Please see attending note    FINAL IMPRESSION      1. Suicidal ideation          DISPOSITION / PLAN     DISPOSITION Decision To Transfer 04/19/2021 02:22:46 AM        PATIENTREFERRED TO:  No follow-up provider specified.     DISCHARGE MEDICATIONS:  New Prescriptions    No medications on file       Dilcia Nam DO  EmergencyMedicine Resident    (Please note that portions of this note were completed with a voice recognition program.  Efforts were made to edit the dictations but occasionally words are mis-transcribed.)        Dilcia Nam DO  Resident  04/19/21 5483

## 2021-04-19 NOTE — ED NOTES
[] [place]    [] [facility]    [x]  [facility] ASSESSMENT      Y  N     [x] [] In the past two weeks have you had thoughts of hurting yourself in any way? [x] [] In the past two weeks have you had thoughts that you would be better off dead? [x] [] Have you made a suicide attempt in the past two months? [x] [] Do you have a plan for hurting yourself or suicide? [] [x] Presence of hallucinations/voices related to hurting himself or herself or someone else. SUICIDE/SECURITY WATCH PRECAUTION CHECKLIST     Orders    [x]  Suicide/Security Watch Precautions initiated as checked below:   4/18/21 10:46 PM EDT BH31/BH31B    [x] Notified physician:  No att. providers found  4/18/21 10:46 PM EDT    [x] Orders obtained as appropriate:     [x] 1:1 Observer     [] Psych Consult     [] Psych Consult    Name:  Date:  Time:    [x] 1:1 Observer, Notified by:  Cleo Cifuentes    Contact Nurse Supervisor    [x] Remove all personal clothes from room and place in snap/paper gown/pants. Slipper only    [x] Remove all personal belongings from room and secured away from patient. Documentation    [x] Initiate Suicide/Security Watch Precaution Flow Sheet    [x] Initiate individualized Care Plan/Problem    [x] Document why precautions initiated on flow sheet (Initiate Nursing Care Plan/Problem)    [x] 1:1 Observer in place; instructions provided. Suicide precautions require observer be within arms length. [x] Nurse-Observer Communication Hand-off initiated by RN, reviewed with Observer. Subsequently used as Hand Off between Observers. [x] Initiate every 15 minute observations per observer as delegated by the RN.     [x] Initiate RN assessment and documentation    Environmental Scan  Search Criteria and Process: OPTIONAL, see Search Policy    [] Reason for search:    [] Nursing in presence of second person to search patient    [] Patient notified of reason for body assessment and belongings search:     Persons present during search:   Results of search and disposition:       Searchers Name: mercirina finnegan     These items or items similar should be removed from the room:   [x] Chairs   [x] Telephone   [x] Trash cans and liners   [x] Plastic utensils (order Patient Safety tray)   [x] Empty or remove Sharps containers   [x] All personal clothing/belongings removed   [x] All unnecessary lead wires, electrical cords, draw cords, etc.   [x] Flowers and plants   [x] Double check for lighters, matches, razors, any glass items etc that can be used as weapons. Person completing Checklist: Sary Pacheco       GENERAL INFORMATION     Y  N     [x] [] Has the patient been informed that they are on a watch and what that means? [x] [] Can the patient get out of Bed without nursing assistance? [x] [] Can the patient use the restroom without nursing assistance? [x] [] Can the patient walk the halls to [place] their legs? \"   [] [x] Does the patient have metal utensils? [x] [] Have the patient's belongings been placed out of control of the patient? [x] [] Have the patient and his/her belongings been checked for contraband? [x] [] Is the patient under any visitor restrictions? If Yes, explain: none   [] [x] Is the patient under an alias? [facility] Name:   Authorized visitors (no more than two are to be on the list)   Name/Relationship:   Name/Relationship:    Name of Staff member that you  Received this information from?: [employer]    General Description:    Kartik Rodriguez BH30/BH26B female 26 y.o. Admission weight:      Race: [x]  [] Black  []   []   [] [place] [] Other  Facial Hair:  [] Yes  [] No  If yes, please describe: Identifying Marks (i.e. Visible tattoos, scars, etc... ):     NURSING CARE PLAN    Nursing Diagnosis: Risk of Self Directed Harm  [] Actual  [x] Potential  Date Started: 4/18/21      Etiological Factors: (related to)  [x] Expressed or implied suicidal ideation/behavior  [x] Depression  [] Suicide attempt      [] Low self-esteem  [] Hallucinations      [x] Feeling of Hopelessness  [] Substance abuse or withdrawal    [] Dysfunctional family  [] Major traumatic event, eg., divorce, etc   [x] Excessive stress/anxiety    4/18/21    Expected Outcomes    Patient will:   [x] Patient will remain safe for the duration of their stay   [x] Patient's environment will be safe, eg. Free of potential suicide weapons   [] Verbalize Recovery from suicidal episode and improvement in self-worth   [x] Discuss feeling that precipitated suicide attempt/thoughts/behavior   [] Will describe available resources for crisis prevention and management   [] Will verbalize positive coping skills     Nursing Intervention   [x] Assessment and Observations hourly   [x] Suicide Precautions implemented with patient, should be 1:1 observation   [x] Document observation [id number] and RN assessment hourly   [] Consult physician for:    [] Psychiatric consult    [] Pharmacological therapy    [] Other:    [x] Patient search completed by security   [x] Initiated appropriate safety protocols by removing from the patient's environment anything that could be used to inflict self injury, eg. Order safe tray, snap gown, etc   [x] Maintain open, warm, caring, non-judgmental attitude/manner towards patient   [] Discuss advantages and disadvantages of existing coping methods/skills   [x] Assist and educate patient with identifying present strengths and coping skills   [x] Keep patient informed regarding plan of care and provide clear concise explanations. Provide the patient/family education information as well as telephone numbers and other information about crisis centers, hot lines, and counselors.     Discharge Planning:   [] Referral  [] Groups [] Health agencies  [] Other:          Darcie Camargo RN  04/18/21 0674

## 2021-04-19 NOTE — ED NOTES
Pt arrives to ED via self. Pt states her  walked in on her right before she was about to OD on her pills. Pt states she recently found out her grandpa needs brain surgery. Pt denies HI. Pt has had SI and attempts in the past. Pt A&Ox4. VSS. Pt changed into paper scrubs by [employer].      Vidya Sloan RN  04/18/21 0411

## 2021-04-19 NOTE — BH NOTE
1600 GROUP    Patient refused to attend 1600 wellness group. Patient offered 1:1 talk time, and refused.

## 2021-04-19 NOTE — ED NOTES
Pt transferred to [facility]. Wheel chair not able to be restrained properly in ambulance and will require separate transport to [facility] in the morning. Chair has 8 x 10 paper taped to seat with patient label. Chair is parked infront of sink in [place]. Social work aware and has arranged transportation in the morning via [facility].         Paola Bruno RN  04/19/21 1928

## 2021-04-19 NOTE — ED NOTES
Patient admitted to the [facility] by Dr. Paula for depression with suicidal ideation. Voluntary signed and faxed. Await bed placement. Kayley Byrd     [place], [place]  04/19/21 2061

## 2021-04-19 NOTE — ED NOTES
SW met with patient and this is her third ER visit in 24 hours. Patient tells SW that her  caught her attempting to overdose on an old script of Depakote. She states she did not take any of the pills. Patient denies being linked to any community mental health and states she is not currently taking any medication for her bi-polar disorder. Patient states her grandfather is sick and this has been very upsetting to her. At first, patient denied any drug use, but changed her report when SW told patient she had admitted to crack use to other staff. Patient then confirms she did use some crack cocaine last night. Patient denies any other drug use, alcohol consumption, or legal issues. Patient tells SW that she wants to be admitted to the [facility] [address] and SW stated that SW would consult on-call psych and they would determine if she meets inpatient criteria. Patient became very agitated stating \"you better admit me because I don't have anywhere else to go! \". She goes on to say she has gotten evicted from her home and \"I ain't sleeping out in the cold lady! \". SW explained the [facility] does not admit on the basis of homelessness but issues of mental health. As SW left the [facility] patient continued to yell that SW had better get her admitted. SW to consult psych when labs completed. La Alanis.  David Crain, [place]  04/19/21 1296

## 2021-04-20 VITALS
DIASTOLIC BLOOD PRESSURE: 78 MMHG | HEIGHT: 58 IN | RESPIRATION RATE: 18 BRPM | BODY MASS INDEX: 16.79 KG/M2 | SYSTOLIC BLOOD PRESSURE: 130 MMHG | HEART RATE: 104 BPM | TEMPERATURE: 98.2 F | WEIGHT: 80 LBS

## 2021-04-20 PROCEDURE — 6370000000 HC RX 637 (ALT 250 FOR IP): Performed by: PSYCHIATRY & NEUROLOGY

## 2021-04-20 PROCEDURE — 99253 IP/OBS CNSLTJ NEW/EST LOW 45: CPT | Performed by: INTERNAL MEDICINE

## 2021-04-20 PROCEDURE — 99239 HOSP IP/OBS DSCHRG MGMT >30: CPT | Performed by: PSYCHIATRY & NEUROLOGY

## 2021-04-20 PROCEDURE — 6370000000 HC RX 637 (ALT 250 FOR IP): Performed by: NURSE PRACTITIONER

## 2021-04-20 RX ORDER — DOXYCYCLINE HYCLATE 100 MG
100 TABLET ORAL 2 TIMES DAILY
Qty: 28 TABLET | Refills: 0 | Status: SHIPPED | OUTPATIENT
Start: 2021-04-20 | End: 2021-05-04

## 2021-04-20 RX ORDER — METRONIDAZOLE 500 MG/1
500 TABLET ORAL 2 TIMES DAILY
Qty: 28 TABLET | Refills: 0 | Status: SHIPPED | OUTPATIENT
Start: 2021-04-20 | End: 2021-05-04

## 2021-04-20 RX ORDER — CLINDAMYCIN HYDROCHLORIDE 300 MG/1
300 CAPSULE ORAL 3 TIMES DAILY
Qty: 21 CAPSULE | Refills: 0 | Status: SHIPPED | OUTPATIENT
Start: 2021-04-20 | End: 2021-04-20 | Stop reason: HOSPADM

## 2021-04-20 RX ORDER — LAMOTRIGINE 25 MG/1
50 TABLET ORAL DAILY
Qty: 60 TABLET | Refills: 3 | Status: SHIPPED | OUTPATIENT
Start: 2021-04-21 | End: 2021-07-19

## 2021-04-20 RX ADMIN — HYDROXYZINE HYDROCHLORIDE 50 MG: 50 TABLET, FILM COATED ORAL at 07:38

## 2021-04-20 RX ADMIN — ACETAMINOPHEN 650 MG: 325 TABLET, FILM COATED ORAL at 08:28

## 2021-04-20 RX ADMIN — LAMOTRIGINE 50 MG: 25 TABLET ORAL at 08:28

## 2021-04-20 RX ADMIN — ACETAMINOPHEN 650 MG: 325 TABLET, FILM COATED ORAL at 12:16

## 2021-04-20 RX ADMIN — ACETAMINOPHEN 650 MG: 325 TABLET, FILM COATED ORAL at 03:48

## 2021-04-20 ASSESSMENT — PAIN - FUNCTIONAL ASSESSMENT: PAIN_FUNCTIONAL_ASSESSMENT: 0-10

## 2021-04-20 ASSESSMENT — PAIN SCALES - GENERAL
PAINLEVEL_OUTOF10: 2
PAINLEVEL_OUTOF10: 7

## 2021-04-20 NOTE — SUICIDE SAFETY PLAN
SAFETY PLAN    A suicide Safety Plan is a document that supports someone when they are having thoughts of suicide. Warning Signs that indicate a suicidal crisis may be developing: What (situations, thoughts, feelings, body sensations, behaviors, etc.) do you experience that lets you know you are beginning to think about suicide? 1. Go off medications  2. Mood is depressed and start to feel sad, hopeless, helpless, guilty, decline in self-esteem, excess worry, no interest in doing any pleasurable activities, unable to concentrate  3. Begin to cry over the smallest of things  4. Not eating or sleeping as normal  5. Relationship issues start happening  6. I become angry and start a fight  7. When I dont listen or respond to people in a good, positive way  8. Increase drug use      Internal Coping Strategies:  What things can I do (relaxation techniques, hobbies, physical activities, etc.) to take my mind off my problems without contacting another person? 1. Go to hospital discharge appointments and follow-up with community mental health counseling  2. Talk with other people  3. Learn to identify and control your emotions by new ways  4. Think before you speak or act; walk away from the situation  5. Join a support group in person or on Social Media  6. Take a time-out  7. Take deep breaths; use relaxation techniques  8. Get some exercise; go for a walk  9. Read; listen to music; watch a funny movie    10. Coping skills/ strategies  journal/ listen to music/ go for a walk/ read a book/ watch a funny movie/show / crafts / video game   11.  Grounding techniques- eat a sour candy or hot cinnamon candy / focus on colors, sounds, smells, textures on things around you / drink some herbal tea / eat a piece of dark chocolate / take a hot bath or shower / essential oils for smelling / meditate / color / arts and crafts    People whom I can ask for help: Who can I call when I need help - for example, friends, family, clergy, someone else? 3. , friends, and family    Professionals or [facility] agencies I can contact during a crisis: Who can I call for help - for example, my doctor, my psychiatrist, my psychologist, a mental health provider, a suicide hotline? 1. [place]  2. Suicide Prevention Lifeline: 6-352-498-TALK (7178)  3. [facility]: Emergency Services Address: [address] [facility], [facility],  [address], Phone: [phone]  4. [employer]: 2-1-1, 959.763.6725 or [facility] Emergency Services - for example, [address], [facility] suicide hotline,   1. [place] [address], 2-161.837.7656  2. Mental Health & Recovery Services Board [facility], Crisis line: 784.272.7651  3. [employer] (Crisis Intervention Team - CIT), 696.716.7253 or 9-1-1  4. [address] [place] [address], 1-278.691.9696  5. National Association of Mental Illness, 6-753.975.7431  6. [facility] Substance Abuse National Helpline, 3-119-582-HELP (4545)  7. Crisis Text Line, Text 4HOPE to 989209 to connect with a crisis counselor  8. [facility], 1-776.125.5556  9. CHAR ([place], [address]), 8-149.698.5109  10. [employer] (Alcohol / Drug help)  11. Call the Recovery Helpline at 03 732 701 (24 hours a day - 7 days a week)  12. COVID-19 Emotional Support Line: 158.564.6744    Making the environment safe: How can I make my environment (house/apartment/living space) safer? For example, can I remove guns, medications, and other items? 1. Remove unsafe objects  2. Keep Medications in safe and secure location  3.  Plan daily goals to help remember to stay on specific medications

## 2021-04-20 NOTE — PLAN OF CARE
Problem: Falls - Risk of:  Goal: Will remain free from falls  Description: Will remain free from falls  4/19/2021 2050 by Eleni Sanon RN  Outcome: Ongoing  Note: Patient remains free from falls and physical injury. Problem: Falls - Risk of:  Goal: Absence of physical injury  Description: Absence of physical injury  4/19/2021 2050 by Eleni Sanon RN  Outcome: Ongoing     Problem: Tobacco Use:  Goal: Inpatient tobacco use cessation counseling participation  Description: Inpatient tobacco use cessation counseling participation  4/19/2021 2050 by Eleni Sanon RN  Outcome: Ongoing     Problem: Anger Management/Homicidal Ideation:  Goal: Ability to verbalize frustrations and anger appropriately will improve  Description: Ability to verbalize frustrations and anger appropriately will improve  4/19/2021 2050 by Eleni Sanon RN  Outcome: Ongoing  Note: Patient is flat and isolative to room during this shift. She is irritable at times but redirectable. She remains in behavior control during this shift. She denies suicidal ideation and thoughts of self harm. Staff maintains Q 15 minute safety checks. Problem: Anger Management/Homicidal Ideation:  Goal: Absence of angry outbursts  Description: Absence of angry outbursts  4/19/2021 2050 by Eleni Sanon RN  Outcome: Ongoing     Problem: Suicide risk  Goal: Provide patient with safe environment  Description: Provide patient with safe environment  4/19/2021 2050 by Eleni Sanon RN  Outcome: Ongoing  Note: Staff continues to provide a safe and therapeutic environment.

## 2021-04-20 NOTE — BH NOTE
Patient reported to staff irregular bleeding from vaginal/urinary area. Patient reports \" I don't have periods anymore. \" Dr. Sagastume Del notified by morgan tucker.

## 2021-04-20 NOTE — GROUP NOTE
Group Therapy Note    Date: 4/20/2021    Group Start Time: 1000  Group End Time: 8329  Group Topic: Psychotherapy    LOREN TSAI    Taty Juan    Group Psychotherapy Note     [facility] D    Rooms [phone]    Client declined group participation but did participate in 1:1 individual brief therapy to engage in conversation regarding BHI programming, the importance of groups, treatment team as well as working with social work on discharge and safety plan. Patient's Goal:  To actively participate in psychotherapy group and discuss ways to address issues regarding interpersonal relationships and social support systems.

## 2021-04-20 NOTE — CONSULTS
[facility] Internal Medicine    CONSULTATION / HISTORY AND PHYSICAL EXAMINATION            Date:   4/20/2021  Patient name:  Gamal Sneed  Date of admission:  4/19/2021  3:37 AM  MRN:   [id number]  Account:  [de-identified]  YOB: 1990  PCP:    No primary care provider on file. Room:   [address] [place] [address]  Code Status:    Full Code    Physician Requesting Consult: Merlin Donald, MD    Reason for Consult:  Medical management    Chief Complaint:     No chief complaint on file. Blood in the urine    History Obtained From:     Patient, EMR, nursing staff    History of Present Illness:     Patient reports she has been noticing blood in her urine over the last few days. Patient has MS and is incontinent of urine. She also has reduced sensation around the vulva hence is not able to tell whether she has any dysuria or not. She reports she has had a hysterectomy in the past.  Denies any fevers chills or abdominal pain. Past Medical History:     Past Medical History:   Diagnosis Date    Asthma     [facility]     Cervical dysplasia     COPD (chronic obstructive pulmonary disease) ([facility]     Migraine 11/11/2011    Movement disorder     Seizures (HCC)         Past Surgical History:     Past Surgical History:   Procedure Laterality Date    APPENDECTOMY      CHOLECYSTECTOMY  2007    at [address] [place] [address] Drive          Medications Prior to Admission:     Prior to Admission medications    Medication Sig Start Date End Date Taking?  Authorizing Provider   lamoTRIgine (LAMICTAL) 25 MG tablet Take 1 tablet by mouth 2 times daily Week 1 and 2: 25 mg/day; Weeks 3 and 4: 50 mg/day; Week 5: 50 mg BID  Patient taking differently: Take 25 mg by mouth 2 times daily Week 1 and 2: 25 mg/day; Weeks 3 and 4: 50 mg/day; Week 5: 50 mg BID started on 04/8/21 4/7/21  Yes Dora Aguillon DO   clindamycin (CLEOCIN) 300 MG capsule Take 1 capsule by mouth 3 times daily for 7 days  Patient taking differently: Take 300 mg by mouth 3 times daily Prescribed 21 for 7 day course not filled 21  Hussein James, DO   Probiotic Acidophilus [facility]) TABS Take 2 tablets by mouth 2 times daily for 15 days  Patient taking differently: Take 2 tablets by mouth 2 times daily Prescribed 21 for 15 day course not filled 21  Hussein Bledsoe DO        Allergies:     Claritin [loratadine], Diclofenac, Morphine, Nicotine, Pcn [penicillins], and Peanut-containing drug products    Social History:     Tobacco:    reports that she has been smoking cigarettes. She has a 11.00 pack-year smoking history. She has never used smokeless tobacco.  Alcohol:      reports no history of alcohol use. Drug Use:  reports previous drug use. Drugs: Opiates  and Marijuana. Family History:     Family History   Problem Relation Age of Onset    Diabetes Mother         G.Parents    Hypertension Mother         G.Parents    Diabetes Father     Hypertension Father     Stroke Father         G.Parents    Cancer Other         G.Parents, Pancrease and ??    Coronary Art Dis Other         G.Parents       Review of Systems:     Positive and Negative as described in HPI. Denies any shortness of breath or cough  Denies chest pain or palpitations  Denies abdominal pain, diarrhea vomiting  Denies any new numbness tremors or weakness. Physical Exam:     /78   Pulse 104   Temp 98.2 °F (36.8 °C) (Oral)   Resp 18   Ht 4' 10\" (1.473 m)   Wt 80 lb (36.3 kg)   LMP 2021 (Approximate)   BMI 16.72 kg/m²   Temp (24hrs), Av.3 °F (36.8 °C), Min:98.2 °F (36.8 °C), Max:98.3 °F (36.8 °C)    No results for input(s): POCGLU in the last 72 hours. No intake or output data in the 24 hours ending 21 1430    General Appearance:  alert, well appearing, and in no acute distress  Head:  normocephalic, atraumatic.   Eye: no icterus, redness, pupils equal and reactive, extraocular eye movements intact, conjunctiva clear  Ear: normal external ear, no discharge, hearing intact  Nose:  no drainage noted  Mouth: mucous membranes moist  Neck: supple, no carotid bruits, thyroid not palpable  Lungs: Bilateral equal air entry, clear to ausculation, no wheezing, rales or rhonchi, normal effort  Cardiovascular: normal rate, regular rhythm, no murmur, gallop, rub. Abdomen: Soft, nontender, nondistended, normal bowel sounds, no hepatomegaly or splenomegaly  Neurologic: There are no new focal motor or sensory deficits,   Skin: No gross lesions, rashes, bruising or bleeding on exposed skin area  Extremities:  peripheral pulses palpable, no pedal edema or calf pain with palpation      Bimanual pelvic examination was performed with verbal consent from patient and nurse as chaperone.   External genitalia appear normal.  Patient does have significant cervical motion tenderness and blood in the vaginal canal.      Investigations:      Laboratory Testing:  Recent Results (from the past 24 hour(s))   Urinalysis Reflex to Culture    Collection Time: 04/19/21  2:35 PM    Specimen: Urine, clean catch   Result Value Ref Range    Color, UA YELLOW YELLOW    Turbidity UA CLOUDY (A) CLEAR    Glucose, Ur NEGATIVE NEGATIVE    Bilirubin Urine NEGATIVE NEGATIVE    Ketones, Urine NEGATIVE NEGATIVE    Specific Gravity, UA 1.022 1.000 - 1.030    Urine Hgb LARGE (A) NEGATIVE    pH, UA 6.5 5.0 - 8.0    Protein, UA NEGATIVE NEGATIVE    Urobilinogen, Urine Normal Normal    Nitrite, Urine NEGATIVE NEGATIVE    Leukocyte Esterase, Urine SMALL (A) NEGATIVE    Urinalysis Comments NOT REPORTED    Drug screen, tricyclic    Collection Time: 04/19/21  2:35 PM   Result Value Ref Range    Tricyclic Antidep,Urine NEGATIVE NEGATIVE   Microscopic Urinalysis    Collection Time: 04/19/21  2:35 PM   Result Value Ref Range    -          WBC, UA 2 TO 5 /HPF    RBC, UA 50  /HPF    Casts UA NOT REPORTED /LPF    Crystals, UA NOT REPORTED None /HPF    Epithelial Cells UA 2 TO 5 /HPF    Renal Epithelial, UA NOT REPORTED 0 /HPF    Bacteria, UA FEW (A) None    Mucus, UA NOT REPORTED None    Trichomonas, UA NOT REPORTED None    Amorphous, UA NOT REPORTED None    Other Observations UA NOT REPORTED NOT REQ. Yeast, UA NOT REPORTED None       Imaging/Diagonstics:  Recent data reviewed    Assessment :      Primary Problem  Bipolar I disorder, most recent episode depressed [facility])    Active Hospital Problems    Diagnosis Date Noted    [facility] [F14.10]     Bipolar I disorder, most recent episode depressed ([facility] [F31.30] 07/03/2016       Plan:     Reason for consult: General medical management , and UTI    1. Vaginal bleedinglikely PID, cannot rule out cervical cancer/masses. Pregnancy test -4/18. Patient was advised to stay back for gynecological examination however insisted that she needs to go home. She is allergic to penicillins hence IM Rocephin could not be given. She is being discharged on 2-week course of doxycycline and metronidazole. We try to set up an outpatient appointment with her gynecologist for however no appointment available before may 13. Patient insists on going home and wants to follow-up outpatient. Discussed with her psychiatrist Carla Prado and she has capacity to make this decision. 2. Seizure disorder. Continue lamotrigine  3. MS-not currently in flareup    Consultations:   Gabino Jean Baptiste MD  4/20/2021  2:30 PM    Copy sent to Dr. Strauss primary care provider on file. Please note that this chart was generated using voice recognition Dragon dictation software. Although every effort was made to ensure the accuracy of this automated transcription, some errors in transcription may have occurred.

## 2021-04-20 NOTE — BH NOTE
Patient given tobacco quitline number 71832093591 at this time, refusing to call at this time, states \" I just dont want to quit now\"- patient given information as to the dangers of long term tobacco use. Continue to reinforce the importance of tobacco cessation.

## 2021-04-20 NOTE — BH NOTE
Pt did not attend wellness group d/t resting in room despite staff invitation to attend. 1:1 talk time offered as alternative to group session, pt declined and remained isolative to self.

## 2021-04-20 NOTE — PROGRESS NOTES
CLINICAL PHARMACY NOTE: MEDS TO [address] Select Patient?: No  Total # of Prescriptions Filled: 2   The following medications were delivered to the patient:  · Metronidazole  · Doxycycline  ·   Total # of Interventions Completed: 0  Time Spent (min): 30    Additional Documentation:

## 2021-04-20 NOTE — BH NOTE
[facility]  Discharge Note    Pt discharged with followings belongings:   Dentures: None  Vision - Corrective Lenses: None  Hearing Aid: None  Jewelry: None  Clothing: Footwear, Jacket / coat, Pants, Shirt, Undergarments (Comment)  Were All Patient Medications Collected?: Not Applicable  Other Valuables: Money (Comment)   Valuables sent home with patient. Valuables retrieved from safe, Security envelope number:  [id number] and returned to patient. Patient education on aftercare instructions: yes  Information faxed to [facility] by staff Patient verbalize understanding of AVS:  Yes. Status EXAM upon discharge:Patient alert and orient x 4. Speech clear. Patient denies thoughts of self harm. Patient discharged to home and transported by an uncle in private vehicle.    Status and Exam  Normal: Yes  Facial Expression: Brightened  Affect: Appropriate  Level of Consciousness: Alert  Mood:Normal: No  Mood: Depressed, Anxious, Labile, Irritable  Motor Activity:Normal: Yes  Motor Activity: Increased  Interview Behavior: Cooperative  Preception: [place] to Person, Antoine Pare to Time, [place] to Place, [place] to Situation  Attention:Normal: No  Attention: Unable to Concentrate  Thought Processes: Circumstantial  Thought Content:Normal: Yes  Thought Content: Preoccupations  Hallucinations: None  Delusions: No  Memory:Normal: Yes  Insight and Judgment: No  Insight and Judgment: Poor Judgment, Poor Insight  Present Suicidal Ideation: No  Present Homicidal Ideation: No      Metabolic Screening:    No results found for: LABA1C    Lab Results   Component Value Date    CHOL 87 07/02/2016    CHOL 109 09/27/2011     Lab Results   Component Value Date    TRIG 80 07/02/2016    TRIG 70 09/27/2011     Lab Results   Component Value Date    HDL 44 07/02/2016    HDL 47 09/27/2011     No components found for: LDLCAL  No results found for: LABVLDL    Mariah Morris RN
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
## 2021-04-20 NOTE — GROUP NOTE
Group Therapy Note    Date: 4/20/2021    Group Start Time: 0900  Group End Time: 0930  Group Topic: Community Meeting    LAUREN Ramey    Pt did not attend 0900 community meeting/ goal seting skills group d/t resting in room despite staff invitation to attend. 1:1 talk time offered as alternative to group session, pt declined.             Signature:  Shannan Rubin

## 2021-04-20 NOTE — GROUP NOTE
Group Therapy Note   ? Date: 4/20/2021   Group Start Time: 0400   Group End Time: 0430   Group Topic: Healthy Living/Wellness   [facility]     Jeannie Tobar RN   ?   ? Group Therapy Note   Attendees:   ?   Patient's Goal: Discuss Wellness Goals   ? Notes: Full participation, explaining, and exploring   ? Status After Intervention: Improved   ? Participation Level: Active Listener   ? Participation Quality: Appropriate, Attentive, Sharing and Supportive   ? ? Speech: normal   ?   ? Thought Process/Content: Logical   ?   ? Affective Functioning: Congruent   ? ? Mood: calm   ? ? Level of consciousness: Oriented x4   ?   ? Response to Learning: Able to verbalize current knowledge/experience   ? ? Endings: None Reported   ? Modes of Intervention: Education, Support, Socialization and Exploration   ? ? Discipline Responsible: Registered Nurse   ? ?    Signature: Jeannie Tobar RN

## 2021-04-20 NOTE — PLAN OF CARE
[address] [place] [address]  Day 3 Interdisciplinary Treatment Plan NOTE    Review Date & Time: 4/20/2021                       1300    Admission Type:   Admission Type: Involuntary    Reason for admission:  Reason for Admission: SI no plan  Estimated Length of Stay: 5-7 days  Estimated Discharge Date Update: to be determined by physician    PATIENT STRENGTHS:  Patient Strengths Strengths: Positive Support, No significant Physical Illness  Patient Strengths and Limitations:Limitations: Tendency to isolate self, Lacks leisure interests, Difficulty problem solving/relies on others to help solve problems, Hopeless about future, Multiple barriers to leisure interests, Inappropriate/potentially harmful leisure interests (depression substance abuse anxiety poor coping skills)  Addictive Behavior:Addictive Behavior  In the past 3 months, have you felt or has someone told you that you have a problem with:  : None  Do you have a history of Chemical Use?: No  Do you have a history of Alcohol Use?: No  Do you have a history of Street Drug Abuse?: Yes  Histroy of Prescripton Drug Abuse?: No  Medical Problems:No past medical history on file.     Risk:  Fall RiskTotal: 53  Beka Scale Beka Scale Score: 22  BVC Total: 0  Change in scores no Changes to plan of Care no    Status EXAM:   Status and Exam  Normal: No  Facial Expression: Elevated  Affect: Inappropriate  Level of Consciousness: Alert  Mood:Normal: No  Mood: Depressed, Anxious  Motor Activity:Normal: No  Motor Activity: Decreased  Interview Behavior: Cooperative  Preception: [place] to Person, [place] Karen to Time, [place] to Place, [place] to Situation  Attention:Normal: Yes  Attention: Distractible  Thought Processes: Circumstantial  Thought Content:Normal: Yes  Thought Content: Preoccupations  Hallucinations: None  Delusions: No  Memory:Normal: Yes  Memory: Poor Recent, Confabulation  Insight and Judgment: No  Insight and Judgment: Unmotivated  Present Suicidal Ideation: No Present Homicidal Ideation: No    Daily Assessment Last Entry:   Daily Sleep (WDL): Within Defined Limits         Patient Currently in Pain: No  Daily Nutrition (WDL): Within Defined Limits    Patient Monitoring:  Frequency of Checks: 4 times per hour, close    Psychiatric Symptoms:   Depression Symptoms  Depression Symptoms: Isolative, Loss of interest  Anxiety Symptoms  Anxiety Symptoms: Generalized  Amelia Symptoms  Amelia Symptoms: No problems reported or observed. Psychosis Symptoms  Delusion Type: No problems reported or observed.     Suicide Risk CSSR-S:  Have you wished you were dead or wished you could go to sleep and not wake up? : NO  Have you actually had any thoughts of killing yourself? : NO  Have you ever done anything, started to do anything, or prepared to do anything to end your life?: NO  Change in Result              no                    Change in Plan of care                no      EDUCATION:   EDUCATION:   Learner Progress Toward Treatment Goals: Reviewed results and recommendations of this team, Reviewed group plan and strategies, Reviewed signs, symptoms and risk of self harm and violent behavior, Reviewed goals and plan of care    Method:small group, individual verbal education    Outcome:verbalized by patient, but needs reinforcement to obtain goals    PATIENT GOALS:  Short term: \"discharge planning,talk to Doctor today\"  Long term: \"get settled in new apartment\"    PLAN/TREATMENT RECOMMENDATIONS UPDATE: continue with group therapies, increased socialization, continue planning for after discharge goals, continue with medication compliance    SHORT-TERM GOALS UPDATE:   Time frame for Short-Term Goals: 5-7 days    LONG-TERM GOALS UPDATE:   Time frame for Long-Term Goals: 6 months  Members Present in Team Meeting: See Signature Sheet

## 2021-04-20 NOTE — GROUP NOTE
Group Therapy Note    Date: 4/20/2021    Group Start Time: 1330  Group End Time: 8809  Group Topic: Psychoeducation    [facility] CHANDRAKANT Lorenzo, CTRS    Pt did not attend 1330 psychoeducation group d/t resting in room despite staff invitation to attend. 1:1 talk time offered as alternative to group session, pt declined.               Signature:  Terry Montes

## 2021-04-20 NOTE — GROUP NOTE
Group Therapy Note    Date: 4/20/2021    Group Start Time: 1100  Group End Time: 0215  Group Topic:  cognitive skills     [facility]    LAUREN Gray    Attendees 5/8    Patient's Goal:   To improve communication skills     Notes:   Pt hyperverbal and anxiuos    Status After Intervention:  Improved    Participation Level:  Active Listener and Interactive    Participation Quality: Appropriate,      Speech:  hyperverbal       Thought Process/Content: loose / disorganized      Affective Functioning: Congruent      Mood: manic      Level of consciousness:  Alert       Response to Learning:Progressing to goal      Endings: None Reported    Modes of Intervention: Education, Support and Socialization      Discipline Responsible: Psychoeducational Specialist      Signature:  Agustin De La Cruz

## 2021-04-21 ENCOUNTER — HOSPITAL ENCOUNTER (EMERGENCY)
Age: 31
Discharge: LEFT BEFORE DISCHARGE | End: 2021-04-21
Attending: EMERGENCY MEDICINE
Payer: MEDICAID

## 2021-04-21 VITALS
SYSTOLIC BLOOD PRESSURE: 128 MMHG | TEMPERATURE: 97.6 F | BODY MASS INDEX: 16.79 KG/M2 | HEART RATE: 91 BPM | OXYGEN SATURATION: 99 % | DIASTOLIC BLOOD PRESSURE: 83 MMHG | HEIGHT: 58 IN | WEIGHT: 80 LBS | RESPIRATION RATE: 18 BRPM

## 2021-04-21 DIAGNOSIS — Z53.21 ELOPED FROM EMERGENCY DEPARTMENT: Primary | ICD-10-CM

## 2021-04-21 LAB
-: NORMAL
AMORPHOUS: NORMAL
BACTERIA: NORMAL
BILIRUBIN URINE: NEGATIVE
CASTS UA: NORMAL /LPF (ref 0–8)
COLOR: YELLOW
COMMENT UA: ABNORMAL
CRYSTALS, UA: NORMAL /HPF
CULTURE: NO GROWTH
EPITHELIAL CELLS UA: NORMAL /HPF (ref 0–5)
GLUCOSE URINE: NEGATIVE
HCG(URINE) PREGNANCY TEST: NEGATIVE
KETONES, URINE: NEGATIVE
LEUKOCYTE ESTERASE, URINE: ABNORMAL
Lab: NORMAL
MUCUS: NORMAL
NITRITE, URINE: NEGATIVE
OTHER OBSERVATIONS UA: NORMAL
PH UA: 5.5 (ref 5–8)
PROTEIN UA: NEGATIVE
RBC UA: NORMAL /HPF (ref 0–4)
RENAL EPITHELIAL, UA: NORMAL /HPF
SPECIFIC GRAVITY UA: 1.02 (ref 1–1.03)
SPECIMEN DESCRIPTION: NORMAL
TRICHOMONAS: NORMAL
TURBIDITY: CLEAR
URINE HGB: ABNORMAL
UROBILINOGEN, URINE: NORMAL
WBC UA: NORMAL /HPF (ref 0–5)
YEAST: NORMAL

## 2021-04-21 PROCEDURE — 81025 URINE PREGNANCY TEST: CPT

## 2021-04-21 PROCEDURE — 87086 URINE CULTURE/COLONY COUNT: CPT

## 2021-04-21 PROCEDURE — 99283 EMERGENCY DEPT VISIT LOW MDM: CPT

## 2021-04-21 PROCEDURE — 81001 URINALYSIS AUTO W/SCOPE: CPT

## 2021-04-21 ASSESSMENT — ENCOUNTER SYMPTOMS
NAUSEA: 0
DIARRHEA: 0
VOMITING: 0
BACK PAIN: 0
SHORTNESS OF BREATH: 0
COLOR CHANGE: 0
ABDOMINAL PAIN: 0
COUGH: 0

## 2021-04-21 NOTE — ED NOTES
Pt in triage and states hematuria and that she needs her brief changed, pt states that she had similar complaints at [facility] and requesting that RN access those records during triage process, writer attempts to access records at [facility] during triage process     [facility], [address] [place] [address]  04/21/21 4475

## 2021-04-21 NOTE — ED PROVIDER NOTES
[facility]     Emergency Department     Faculty Attestation    I performed a history and physical examination of the patient and discussed management with the resident. I have reviewed and agree with the residents findings including all diagnostic interpretations, and treatment plans as written. Any areas of disagreement are noted on the chart. I was personally present for the key portions of any procedures. I have documented in the chart those procedures where I was not present during the key portions. I have reviewed the emergency nurses triage note. I agree with the chief complaint, past medical history, past surgical history, allergies, medications, social and family history as documented unless otherwise noted below. Documentation of the HPI, Physical Exam and Medical Decision Making performed by misa is based on my personal performance of the HPI, PE and MDM. For Physician Assistant/ Nurse Practitioner cases/documentation I have personally evaluated this patient and have completed at least one if not all key elements of the E/M (history, physical exam, and MDM). Additional findings are as noted. 25 yo F c/o hematuria, no fever, no vomiting, pt denies injury, pt denies suicidal or homicidal ideation,   pe vss Dr Junior Ponce escort for exam, abdomen non tender, no distension, no rigidity, no guarding, benign exam  Back non tender, no deformity, no midline thoracic or lumbar tenderness,     ucg-, pt left prior to ua results being completed, pt left prior to completing treatment, pt appears to have decision making capacity, vss    EKG Interpretation    Interpreted by me      CRITICAL CARE: There was a high probability of clinically significant/life threatening deterioration in this patient's condition which required my urgent intervention. Total critical care time was 0 minutes. This excludes any time for separately reportable procedures.

## 2021-04-21 NOTE — ED PROVIDER NOTES
[facility] ED  Emergency Department Encounter  Emergency Medicine Resident     Pt Name: Bishop Wright  MRN: [id number]  [place] 1990  Date of evaluation: 4/21/21  PCP:  No primary care provider on file. CHIEF COMPLAINT       Chief Complaint   Patient presents with    Hematuria       HISTORY OFPRESENT ILLNESS  (Location/Symptom, Timing/Onset, Context/Setting, Quality, Duration, Modifying Factors,Severity.)      Bishop Wright is a 26 y.o. female who presents with complaint of hematuria. Per patient hematuria began yesterday. Patient was recently seen at [facility] for psychiatric hospitalization inpatient treatment, she reports during the stay she did not have any hematuria. Patient states last menstrual period was 6 months prior and that this is not vaginal bleeding, she knows \"this is peeing blood\". Patient denies fevers, chills, chest pain, shortness of breath, abdominal pain, dysuria. PAST MEDICAL / SURGICAL / SOCIAL / FAMILY HISTORY      has a past medical history of Asthma, Bipolar disorder ([facility] [address] Cervical dysplasia, COPD (chronic obstructive pulmonary disease) ([facility] [address] Migraine, Movement disorder, and Seizures ([facility] [address] has a past surgical history that includes Cholecystectomy (2007); Tubal ligation; and Appendectomy.     Social History     Socioeconomic History    Marital status:      Spouse name: Not on file    Number of children: Not on file    Years of education: Not on file    Highest education level: Not on file   Occupational History    Not on file   Social Needs    Financial resource strain: Not on file    Food insecurity     Worry: Not on file     Inability: Not on file    Transportation needs     Medical: Not on file     Non-medical: Not on file   Tobacco Use    Smoking status: Current Every Day Smoker     Packs/day: 1.00     Years: 11.00     Pack years: 11.00     Types: Cigarettes    Smokeless tobacco: Never Used   Substance and Sexual Activity    Alcohol use: Never     Alcohol/week: 0.0 standard drinks     Frequency: Never    Drug use: Not Currently     Types: Opiates , Marijuana     Comment: Hx Percocet abuse sober for 2 years.  Sexual activity: Not on file   Lifestyle    Physical activity     Days per week: Not on file     Minutes per session: Not on file    Stress: Not on file   Relationships    Social connections     Talks on phone: Not on file     Gets together: Not on file     Attends Zoroastrian service: Not on file     Active member of club or organization: Not on file     Attends meetings of clubs or organizations: Not on file     Relationship status: Not on file    Intimate partner violence     Fear of current or ex partner: Not on file     Emotionally abused: Not on file     Physically abused: Not on file     Forced sexual activity: Not on file   Other Topics Concern    Not on file   Social History Narrative    Not on file       Family History   Problem Relation Age of Onset    Diabetes Mother         G.Parents    Hypertension Mother         G.Parents    Diabetes Father     Hypertension Father     Stroke Father         G.Parents    Cancer Other         G.Parents, Pancrease and ??    Coronary Art Dis Other         G.Parents       Allergies:  Claritin [loratadine], Diclofenac, Morphine, Nicotine, Pcn [penicillins], and Peanut-containing drug products    Home Medications:  Prior to Admission medications    Medication Sig Start Date End Date Taking?  Authorizing Provider   lamoTRIgine (LAMICTAL) 25 MG tablet Take 2 tablets by mouth daily 4/21/21   Arben Glass MD   metroNIDAZOLE (FLAGYL) 500 MG tablet Take 1 tablet by mouth 2 times daily for 14 days 4/20/21 5/4/21  Nazario Huntley MD   doxycycline hyclate (VIBRA-TABS) 100 MG tablet Take 1 tablet by mouth 2 times daily for 14 days 4/20/21 5/4/21  Nazario Huntley MD   Probiotic Acidophilus [facility]) TABS Take 2 tablets by mouth 2 times daily for 15 days  Patient taking differently: Take 2 tablets by mouth 2 times daily Prescribed 04/17/21 for 15 day course not filled 4/17/21 5/2/21  Bruce Sharen Osgood, DO       REVIEW OF SYSTEMS    (2-9 systems for level 4, 10 or more for level 5)      Review of Systems   Constitutional: Negative for chills, fatigue and fever. HENT: Negative for congestion. Eyes: Negative for visual disturbance. Respiratory: Negative for cough and shortness of breath. Cardiovascular: Negative for chest pain. Gastrointestinal: Negative for abdominal pain, diarrhea, nausea and vomiting. Genitourinary: Positive for hematuria. Negative for dysuria. Musculoskeletal: Negative for back pain, myalgias and neck pain. Skin: Negative for color change and rash. Neurological: Negative for weakness, numbness and headaches. Psychiatric/Behavioral: Negative for confusion. PHYSICAL EXAM   (up to 7 for level 4, 8 or more for level 5)     INITIAL VITALS:    height is 4' 10\" (1.473 m) and weight is 80 lb (36.3 kg). Her temporal temperature is 97.6 °F (36.4 °C). Her blood pressure is 128/83 and her pulse is 91. Her respiration is 18 and oxygen saturation is 99%. Physical Exam  Constitutional:       General: She is not in acute distress. Appearance: Normal appearance. She is not ill-appearing or toxic-appearing. HENT:      Nose: Nose normal. No congestion. Cardiovascular:      Rate and Rhythm: Normal rate and regular rhythm. Pulses: Normal pulses. Heart sounds: No murmur. Pulmonary:      Effort: Pulmonary effort is normal.      Breath sounds: Normal breath sounds. No wheezing. Abdominal:      General: Abdomen is flat. Palpations: Abdomen is soft. Tenderness: There is no abdominal tenderness. Skin:     General: Skin is warm and dry. Capillary Refill: Capillary refill takes less than 2 seconds. Neurological:      General: No focal deficit present.       Mental Status: She is alert and oriented to person, place, and time.   Psychiatric:         Mood and Affect: Mood normal.         Behavior: Behavior normal.         DIFFERENTIAL  DIAGNOSIS     PLAN (LABS / IMAGING / EKG):  Orders Placed This Encounter   Procedures    Culture, Urine    Urinalysis Reflex to Culture    Pregnancy, Urine    Microscopic Urinalysis       MEDICATIONS ORDERED:  No orders of the defined types were placed in this encounter. DDX: Urinary tract infection, STD, benign hematuria, urethral trauma, vaginal bleeding    Initial MDM/Plan: 26 y.o. female who presents with hematuria of 2 days duration, no dysuria abdominal pain nausea or vomiting. Patient recently has [facility] and reports she did not have hematuria at that time. Patient seen and examined. Vital signs are normal. She is well-appearing, speaking full sentences. Abdominal exam is benign. Will obtain urinalysis, urine pregnancy. DIAGNOSTIC RESULTS / EMERGENCY DEPARTMENT COURSE / MDM     LABS:  Labs Reviewed   URINE RT REFLEX TO CULTURE - Abnormal; Notable for the following components:       Result Value    Urine Hgb LARGE (*)     Leukocyte Esterase, Urine MODERATE (*)     All other components within normal limits   CULTURE, URINE   PREGNANCY, URINE   MICROSCOPIC URINALYSIS         RADIOLOGY:  No results found. EMERGENCY DEPARTMENT COURSE:  ED Course as of Apr 21 0758 Wed Apr 21, 2021   7686 Patient was able to provide urine sample however stated that she had to leave. I counseled patient urine had not resulted and thus I cannot offer a complete evaluation or treatment. Patient verbalized understanding and stated she had to leave and was not willing to wait for results. Patient eloped before visit was completed    [DS]      ED Course User Index  [DS] Mitra Alvarez DO         PROCEDURES:  None    CONSULTS:  None    CRITICAL CARE:  Please see attending note    FINAL IMPRESSION      1.  Eloped from emergency department          DISPOSITION / [address] - Left Before Treatment Complete 04/21/2021 06:34:44 AM        PATIENTREFERRED TO:  No follow-up provider specified.     DISCHARGE MEDICATIONS:  Discharge Medication List as of 4/21/2021  6:37 AM          Reba Kaur DO  EmergencyMedicine Resident    (Please note that portions of this note were completed with a voice recognition program.  Efforts were made to edit the dictations but occasionally words are mis-transcribed.)        Reba Kaur DO  Resident  04/21/21 1328

## 2021-04-21 NOTE — ED TRIAGE NOTES
Pt wheeled into triage in personal wheelchair requesting her brief to be changed, pt states that she is 'pissing blood', states that she was started on antibiotics and has appt with OB/GYN in May, pt states that bleeding increased with urination, pt states that bleeding is from urination and not vagina, denies chest pain or shortness of breath, no seizure activity noted

## 2021-04-22 LAB
CULTURE: NORMAL
Lab: NORMAL
SPECIMEN DESCRIPTION: NORMAL

## 2021-05-11 ENCOUNTER — HOSPITAL ENCOUNTER (EMERGENCY)
Age: 31
Discharge: LEFT BEFORE DISCHARGE | End: 2021-05-11
Payer: MEDICAID

## 2021-05-11 VITALS
OXYGEN SATURATION: 97 % | HEART RATE: 72 BPM | TEMPERATURE: 96.8 F | RESPIRATION RATE: 17 BRPM | DIASTOLIC BLOOD PRESSURE: 61 MMHG | SYSTOLIC BLOOD PRESSURE: 106 MMHG

## 2021-05-11 DIAGNOSIS — Z53.21 ELOPED FROM EMERGENCY DEPARTMENT: Primary | ICD-10-CM

## 2021-05-11 ASSESSMENT — PAIN SCALES - GENERAL: PAINLEVEL_OUTOF10: 8

## 2021-05-11 NOTE — ED NOTES
Pt refusing to be seen in a room without a bed. Pt left department.      Saintclair Capers, RN  05/11/21 8972

## 2021-05-29 ENCOUNTER — HOSPITAL ENCOUNTER (EMERGENCY)
Age: 31
Discharge: LEFT BEFORE DISCHARGE | End: 2021-05-29
Attending: EMERGENCY MEDICINE
Payer: MEDICAID

## 2021-05-29 VITALS
BODY MASS INDEX: 14.69 KG/M2 | OXYGEN SATURATION: 97 % | HEART RATE: 79 BPM | DIASTOLIC BLOOD PRESSURE: 94 MMHG | RESPIRATION RATE: 18 BRPM | SYSTOLIC BLOOD PRESSURE: 122 MMHG | WEIGHT: 70 LBS | TEMPERATURE: 97.7 F | HEIGHT: 58 IN

## 2021-05-29 DIAGNOSIS — R56.9 SEIZURE-LIKE ACTIVITY (HCC): Primary | ICD-10-CM

## 2021-05-29 LAB
ANION GAP SERPL CALCULATED.3IONS-SCNC: 10 MMOL/L (ref 9–17)
BUN BLDV-MCNC: 12 MG/DL (ref 6–20)
BUN/CREAT BLD: ABNORMAL (ref 9–20)
CALCIUM SERPL-MCNC: 9 MG/DL (ref 8.6–10.4)
CHLORIDE BLD-SCNC: 102 MMOL/L (ref 98–107)
CO2: 22 MMOL/L (ref 20–31)
CREAT SERPL-MCNC: 0.86 MG/DL (ref 0.5–0.9)
GFR AFRICAN AMERICAN: >60 ML/MIN
GFR NON-AFRICAN AMERICAN: >60 ML/MIN
GFR SERPL CREATININE-BSD FRML MDRD: ABNORMAL ML/MIN/{1.73_M2}
GFR SERPL CREATININE-BSD FRML MDRD: ABNORMAL ML/MIN/{1.73_M2}
GLUCOSE BLD-MCNC: 113 MG/DL (ref 70–99)
HCG QUALITATIVE: NEGATIVE
POTASSIUM SERPL-SCNC: 4.2 MMOL/L (ref 3.7–5.3)
SODIUM BLD-SCNC: 134 MMOL/L (ref 135–144)

## 2021-05-29 PROCEDURE — 80048 BASIC METABOLIC PNL TOTAL CA: CPT

## 2021-05-29 PROCEDURE — 84703 CHORIONIC GONADOTROPIN ASSAY: CPT

## 2021-05-29 PROCEDURE — 99284 EMERGENCY DEPT VISIT MOD MDM: CPT

## 2021-05-29 RX ORDER — 0.9 % SODIUM CHLORIDE 0.9 %
1000 INTRAVENOUS SOLUTION INTRAVENOUS ONCE
Status: DISCONTINUED | OUTPATIENT
Start: 2021-05-29 | End: 2021-05-29 | Stop reason: HOSPADM

## 2021-05-29 RX ORDER — ONDANSETRON 2 MG/ML
4 INJECTION INTRAMUSCULAR; INTRAVENOUS ONCE
Status: DISCONTINUED | OUTPATIENT
Start: 2021-05-29 | End: 2021-05-29 | Stop reason: HOSPADM

## 2021-05-29 RX ORDER — DIPHENHYDRAMINE HYDROCHLORIDE 50 MG/ML
12.5 INJECTION INTRAMUSCULAR; INTRAVENOUS EVERY 6 HOURS PRN
Status: DISCONTINUED | OUTPATIENT
Start: 2021-05-29 | End: 2021-05-29 | Stop reason: HOSPADM

## 2021-05-29 ASSESSMENT — ENCOUNTER SYMPTOMS
ABDOMINAL PAIN: 0
COUGH: 0
SHORTNESS OF BREATH: 0

## 2021-05-29 NOTE — ED NOTES
Bed: 14  Expected date:   Expected time:   Means of arrival:   Comments:     Mendel Landing, RN  05/29/21 6736

## 2021-05-29 NOTE — ED PROVIDER NOTES
[facility]  ED  Emergency Department Encounter  Emergency Medicine Resident     Pt Name: Winsome Burch  MRN: [id number]  [place] 1990  Date of evaluation: 5/29/21  PCP:  No primary care provider on file. CHIEF COMPLAINT       Chief Complaint   Patient presents with    Seizures     pt brought into ED by police for \"seizure. \" pt alert and oriented and responsive. HISTORY OFPRESENT ILLNESS  (Location/Symptom, Timing/Onset, Context/Setting, Quality, Duration, Modifying Factors,Severity.)      Winsome Burch is a 26 y. o.yo female who presents with [employer] due to seizure-like activity. [employer] states that they received a phone call from patient stating that \" she knows where the drug is located in [place]". Thus went to where she was located. When they arrived, patient's told him that she was about to have a seizure activity. [employer] states that patient started shaking however not tonic-clonic in nature, no loss of urine, no loss of consciousness, she did not hit her head. And thus they brought her to the emergency room for further evaluation. Further questioning, patient states that she takes Lamictal however she has not been taking it because she cannot afford it. She denies any headache, blurry vision, neck pain, shortness of breath, chest pain, abdominal pain, nausea or vomiting. Denies any urinary dysuria frequency or vaginal bleeding vaginal discharge. PAST MEDICAL / SURGICAL / SOCIAL / FAMILY HISTORY      has a past medical history of Asthma, Bipolar disorder ([address] Cervical dysplasia, COPD (chronic obstructive pulmonary disease) ([address] Migraine, Movement disorder, and Seizures ([address] has a past surgical history that includes Cholecystectomy (2007); Tubal ligation; and Appendectomy.      Social History     Socioeconomic History    Marital status:      Spouse name: Not on file    Number of children: Not on file    Years of education: Not on file    Highest education level: Not on file   Occupational History    Not on file   Tobacco Use    Smoking status: Current Every Day Smoker     Packs/day: 1.00     Years: 11.00     Pack years: 11.00     Types: Cigarettes    Smokeless tobacco: Never Used   Vaping Use    Vaping Use: Never used   Substance and Sexual Activity    Alcohol use: Never     Alcohol/week: 0.0 standard drinks    Drug use: Not Currently     Types: Opiates , Marijuana     Comment: Hx Percocet abuse sober for 2 years.  Sexual activity: Not on file   Other Topics Concern    Not on file   Social History Narrative    Not on file     Social Determinants of Health     Financial Resource Strain:     Difficulty of Paying Living Expenses:    Food Insecurity:     Worried About Running Out of Food in the Last Year:     [address] in the Last Year:    Transportation Needs:     Lack of Transportation (Medical):      Lack of Transportation (Non-Medical):    Physical Activity:     Days of Exercise per Week:     Minutes of Exercise per Session:    Stress:     Feeling of Stress :    Social Connections:     Frequency of Communication with Friends and Family:     Frequency of Social Gatherings with Friends and Family:     Attends [employer] Services:     Active Member of Clubs or Organizations:     Attends Club or Organization Meetings:     Marital Status:    Intimate Partner Violence:     Fear of Current or Ex-Partner:     Emotionally Abused:     Physically Abused:     Sexually Abused:        Family History   Problem Relation Age of Onset    Diabetes Mother         G.Parents    Hypertension Mother         G.Parents    Diabetes Father     Hypertension Father     Stroke Father         G.Parents    Cancer Other         G.Parents, Pancrease and ??    Coronary Art Dis Other         G.Parents       Allergies:  Claritin [loratadine], Diclofenac, Morphine, Nicotine, Pcn [penicillins], and Peanut-containing drug products    Home Medications:  Prior to Admission medications    Medication Sig Start Date End Date Taking? Authorizing Provider   lamoTRIgine (LAMICTAL) 25 MG tablet Take 2 tablets by mouth daily 4/21/21   Jamaal Moss MD       REVIEW OFSYSTEMS    (2-9 systems for level 4, 10 or more for level 5)      Review of Systems   Constitutional: Negative for fatigue and fever. Respiratory: Negative for cough and shortness of breath. Gastrointestinal: Negative for abdominal pain. Neurological: Positive for seizures. Negative for light-headedness and headaches. Psychiatric/Behavioral: Negative for behavioral problems and confusion. PHYSICAL EXAM   (up to 7 for level 4, 8 or more forlevel 5)      INITIAL VITALS:   ED Triage Vitals [05/29/21 0538]   BP Temp Temp Source Pulse Resp SpO2 Height Weight   (!) 122/94 97.7 °F (36.5 °C) Oral 79 18 97 % 4' 10\" (1.473 m) 70 lb (31.8 kg)       Physical Exam  Constitutional:       Appearance: Normal appearance. HENT:      Head: Normocephalic and atraumatic. Right Ear: Tympanic membrane normal.      Left Ear: Tympanic membrane normal.      Nose: Nose normal.      Mouth/Throat:      Mouth: Mucous membranes are moist.   Eyes:      Extraocular Movements: Extraocular movements intact. Pupils: Pupils are equal, round, and reactive to light. Cardiovascular:      Rate and Rhythm: Normal rate and regular rhythm. Pulmonary:      Effort: No respiratory distress. Breath sounds: Normal breath sounds. Abdominal:      General: There is no distension. Palpations: Abdomen is soft. Tenderness: There is no abdominal tenderness. Musculoskeletal:         General: No swelling. Normal range of motion. Cervical back: No rigidity or tenderness. Skin:     General: Skin is warm. Coloration: Skin is not jaundiced. Neurological:      General: No focal deficit present. Mental Status: She is alert and oriented to person, place, and time.    Psychiatric:         Mood and Affect: Mood normal.         Behavior: Behavior normal.         DIFFERENTIAL  DIAGNOSIS     PLAN (LABS / IMAGING / EKG):  Orders Placed This Encounter   Procedures    Basic Metabolic Panel w/ Reflex to MG    HCG Qualitative, Serum       MEDICATIONS ORDERED:  Orders Placed This Encounter   Medications    0.9 % sodium chloride bolus    ondansetron (ZOFRAN) injection 4 mg    diphenhydrAMINE (BENADRYL) injection 12.5 mg       DDX: Seizure-like activity versus electrolyte abnormalities    Initial MDM/Plan: 26 y.o. female who examined emergency room due to concerns of seizure-like activity. In the emergency room patient is body movement/take disorder, no active tonic-clonic seizure noted. No loss of urine, normotensive, afebrile, heart rate 79. She is awake, alert and talking having full conversation, answering questions. Pupils are 4 mm equal and reactive. No obvious head trauma, no hemotympanum, no septal hematoma. Lungs clear to auscultate bilaterally, heart regular rate rhythm. Plan is to give patient IV bolus fluids, obtain serum hCG, and check for electrolytes. Patient also given Zofran for nausea. When she is able to tolerate orals, will dose her with her Lamictal dose. If labs are within normal limits, plan to discharge patient with PCP follow-up. Patient able to tolerate    DIAGNOSTIC RESULTS / EMERGENCYDEPARTMENT COURSE / MDM     LABS:  Labs Reviewed   BASIC METABOLIC PANEL W/ REFLEX TO MG FOR LOW K   HCG, SERUM, QUALITATIVE         RADIOLOGY:  No results found. EKG      All EKG's are interpreted by the Emergency Department Physicianwho either signs or Co-signs this chart in the absence of a cardiologist.    EMERGENCY DEPARTMENT COURSE:  ED Course as of May 29 0634   Sat May 29, 2021   0724 Patient wanted to be discharged, did not want to be treated. AVS not printed, patient refused.  Pt screaming in ED.    [AN]      ED Course User Index  [AN] Eual Koyanagi, MD      PATIENT LEFT AMA    PROCEDURES:  None    CONSULTS:  None    CRITICAL CARE:      FINAL IMPRESSION      1. Seizure-like activity ([facility] [address]          DISPOSITION / PLAN     DISPOSITION  05/29/2021 05:53:09 AM      PATIENT REFERRED TO:  No follow-up provider specified.     DISCHARGE MEDICATIONS:  New Prescriptions    No medications on file       Adam Champion MD  Emergency Medicine Resident    (Please note that portions of this note were completed with a voice recognition program.Efforts were made to edit the dictations but occasionally words are mis-transcribed.)        Adam Champion MD  Resident  05/29/21 3769       Adam Champion MD  Resident  05/29/21 6767

## 2021-05-29 NOTE — ED NOTES
Pt brought to ED by [employer] for \"seizure like activity. \" Upon arrival to ED, pt alert, oriented, and responding appropriately to questions. Pt stated she called EMS and EMS would not transport her to ED and called her a medical cab. Pt did not want to wait for medical cab. [employer] stated while being transported to ED, \"I am going to have a seizure. \"      Shyanne Montero, RN  05/29/21 0610 negative

## 2021-05-29 NOTE — ED PROVIDER NOTES
[address] [facility]     Emergency Department     Faculty Attestation    I performed a history and physical examination of the patient and discussed management with the resident. I have reviewed and agree with the residents findings including all diagnostic interpretations, and treatment plans as written. Any areas of disagreement are noted on the chart. I was personally present for the key portions of any procedures. I have documented in the chart those procedures where I was not present during the key portions. I have reviewed the emergency nurses triage note. I agree with the chief complaint, past medical history, past surgical history, allergies, medications, social and family history as documented unless otherwise noted below. Documentation of the HPI, Physical Exam and Medical Decision Making performed by [email or web address] is based on my personal performance of the HPI, PE and MDM. For Physician Assistant/ Nurse Practitioner cases/documentation I have personally evaluated this patient and have completed at least one if not all key elements of the E/M (history, physical exam, and MDM). Additional findings are as noted.     25 yo F brought to er by police for [facility] like activity\" pt answering ?, police report no loc, pt denies fever, denies vomit, denies incontinence, denies injury,   pe vss gcs 15 isaiah, no cervical tenderness, crepitus or deformity, chest symmetric, abdomen non tender, no distension, no rigidity, no mass, extremities full rom x 4, atraumatic, no calf tenderness,     Bmp -, hcg-, vss, pt declining further tx against our direction,   Pt appears to have decision making capacity, pt left prior to receiving discharge instruction or further eval \\ treatment, pt aggressive towards staff & acting rude     EKG Interpretation    Interpreted by me      CRITICAL CARE: There was a high probability of clinically significant/life threatening deterioration in this

## 2021-05-29 NOTE — ED NOTES
Pt state she does not want IV fluids or medications. Pt would like a cab ride home. Pt IV removed.      Claudette Allen, RN  05/29/21 0644

## 2021-06-04 ENCOUNTER — APPOINTMENT (OUTPATIENT)
Dept: GENERAL RADIOLOGY | Age: 31
End: 2021-06-04
Payer: MEDICAID

## 2021-06-04 ENCOUNTER — HOSPITAL ENCOUNTER (EMERGENCY)
Age: 31
Discharge: HOME | End: 2021-06-05
Attending: EMERGENCY MEDICINE
Payer: MEDICAID

## 2021-06-04 ENCOUNTER — HOSPITAL ENCOUNTER (EMERGENCY)
Age: 31
Discharge: LEFT BEFORE DISCHARGE | End: 2021-06-04
Payer: MEDICAID

## 2021-06-04 VITALS
DIASTOLIC BLOOD PRESSURE: 76 MMHG | BODY MASS INDEX: 14.63 KG/M2 | TEMPERATURE: 97.8 F | HEART RATE: 71 BPM | WEIGHT: 70 LBS | OXYGEN SATURATION: 96 % | RESPIRATION RATE: 17 BRPM | SYSTOLIC BLOOD PRESSURE: 132 MMHG

## 2021-06-04 VITALS
SYSTOLIC BLOOD PRESSURE: 133 MMHG | RESPIRATION RATE: 14 BRPM | WEIGHT: 75 LBS | TEMPERATURE: 97.2 F | OXYGEN SATURATION: 98 % | BODY MASS INDEX: 15.68 KG/M2 | HEART RATE: 61 BPM | DIASTOLIC BLOOD PRESSURE: 71 MMHG

## 2021-06-04 DIAGNOSIS — R07.9 ACUTE CHEST PAIN: Primary | ICD-10-CM

## 2021-06-04 LAB
ABSOLUTE EOS #: 0 K/UL (ref 0–0.4)
ABSOLUTE IMMATURE GRANULOCYTE: ABNORMAL K/UL (ref 0–0.3)
ABSOLUTE LYMPH #: 2 K/UL (ref 1–4.8)
ABSOLUTE MONO #: 0.5 K/UL (ref 0.1–1.3)
ANION GAP SERPL CALCULATED.3IONS-SCNC: 9 MMOL/L (ref 9–17)
BASOPHILS # BLD: 0 % (ref 0–2)
BASOPHILS ABSOLUTE: 0 K/UL (ref 0–0.2)
BUN BLDV-MCNC: 17 MG/DL (ref 6–20)
BUN/CREAT BLD: ABNORMAL (ref 9–20)
CALCIUM SERPL-MCNC: 9 MG/DL (ref 8.6–10.4)
CHLORIDE BLD-SCNC: 106 MMOL/L (ref 98–107)
CO2: 24 MMOL/L (ref 20–31)
CREAT SERPL-MCNC: 0.85 MG/DL (ref 0.5–0.9)
D-DIMER QUANTITATIVE: <0.27 MG/L FEU (ref 0–0.59)
DIFFERENTIAL TYPE: ABNORMAL
EOSINOPHILS RELATIVE PERCENT: 0 % (ref 0–4)
GFR AFRICAN AMERICAN: >60 ML/MIN
GFR NON-AFRICAN AMERICAN: >60 ML/MIN
GFR SERPL CREATININE-BSD FRML MDRD: ABNORMAL ML/MIN/{1.73_M2}
GFR SERPL CREATININE-BSD FRML MDRD: ABNORMAL ML/MIN/{1.73_M2}
GLUCOSE BLD-MCNC: 97 MG/DL (ref 70–99)
HCG QUALITATIVE: NEGATIVE
HCT VFR BLD CALC: 41.6 % (ref 36–46)
HEMOGLOBIN: 13.6 G/DL (ref 12–16)
IMMATURE GRANULOCYTES: ABNORMAL %
INR BLD: 1
LYMPHOCYTES # BLD: 14 % (ref 24–44)
MCH RBC QN AUTO: 30.4 PG (ref 26–34)
MCHC RBC AUTO-ENTMCNC: 32.8 G/DL (ref 31–37)
MCV RBC AUTO: 92.8 FL (ref 80–100)
MONOCYTES # BLD: 4 % (ref 1–7)
NRBC AUTOMATED: ABNORMAL PER 100 WBC
PARTIAL THROMBOPLASTIN TIME: 30.7 SEC (ref 24–36)
PDW BLD-RTO: 13.8 % (ref 11.5–14.9)
PLATELET # BLD: 209 K/UL (ref 150–450)
PLATELET ESTIMATE: ABNORMAL
PMV BLD AUTO: 8.4 FL (ref 6–12)
POTASSIUM SERPL-SCNC: 3.6 MMOL/L (ref 3.7–5.3)
PROTHROMBIN TIME: 13.6 SEC (ref 11.8–14.6)
RBC # BLD: 4.48 M/UL (ref 4–5.2)
RBC # BLD: ABNORMAL 10*6/UL
SEG NEUTROPHILS: 82 % (ref 36–66)
SEGMENTED NEUTROPHILS ABSOLUTE COUNT: 11.6 K/UL (ref 1.3–9.1)
SODIUM BLD-SCNC: 139 MMOL/L (ref 135–144)
TROPONIN INTERP: NORMAL
TROPONIN T: NORMAL NG/ML
TROPONIN, HIGH SENSITIVITY: <6 NG/L (ref 0–14)
WBC # BLD: 14.1 K/UL (ref 3.5–11)
WBC # BLD: ABNORMAL 10*3/UL

## 2021-06-04 PROCEDURE — 84703 CHORIONIC GONADOTROPIN ASSAY: CPT

## 2021-06-04 PROCEDURE — 93005 ELECTROCARDIOGRAM TRACING: CPT | Performed by: EMERGENCY MEDICINE

## 2021-06-04 PROCEDURE — 85730 THROMBOPLASTIN TIME PARTIAL: CPT

## 2021-06-04 PROCEDURE — 80048 BASIC METABOLIC PNL TOTAL CA: CPT

## 2021-06-04 PROCEDURE — 36415 COLL VENOUS BLD VENIPUNCTURE: CPT

## 2021-06-04 PROCEDURE — 85025 COMPLETE CBC W/AUTO DIFF WBC: CPT

## 2021-06-04 PROCEDURE — 85379 FIBRIN DEGRADATION QUANT: CPT

## 2021-06-04 PROCEDURE — 71045 X-RAY EXAM CHEST 1 VIEW: CPT

## 2021-06-04 PROCEDURE — 99282 EMERGENCY DEPT VISIT SF MDM: CPT

## 2021-06-04 PROCEDURE — 85610 PROTHROMBIN TIME: CPT

## 2021-06-04 PROCEDURE — 84484 ASSAY OF TROPONIN QUANT: CPT

## 2021-06-04 ASSESSMENT — PAIN DESCRIPTION - ORIENTATION
ORIENTATION: MID
ORIENTATION: LEFT

## 2021-06-04 ASSESSMENT — PAIN DESCRIPTION - PAIN TYPE
TYPE: ACUTE PAIN
TYPE: ACUTE PAIN

## 2021-06-04 ASSESSMENT — PAIN SCALES - GENERAL
PAINLEVEL_OUTOF10: 8
PAINLEVEL_OUTOF10: 10

## 2021-06-04 ASSESSMENT — ENCOUNTER SYMPTOMS
SHORTNESS OF BREATH: 0
BACK PAIN: 0
ABDOMINAL PAIN: 0
EYE PAIN: 0
COLOR CHANGE: 0

## 2021-06-04 ASSESSMENT — PAIN DESCRIPTION - ONSET: ONSET: ON-GOING

## 2021-06-04 ASSESSMENT — PAIN DESCRIPTION - DESCRIPTORS
DESCRIPTORS: DISCOMFORT
DESCRIPTORS: SHARP;STABBING

## 2021-06-04 ASSESSMENT — PAIN DESCRIPTION - LOCATION
LOCATION: CHEST
LOCATION: CHEST

## 2021-06-04 ASSESSMENT — PAIN DESCRIPTION - FREQUENCY: FREQUENCY: INTERMITTENT

## 2021-06-04 NOTE — ED NOTES
Patient yelling, upset when told room not available  Patient asked boyfriend to pick her up and place her in wheelchair  Patient asked to wait in waiting room  Patient immediately wheeled out of waiting room and got into vehicle and drove away (boyfriend driving)     Amor Tillman RN  06/04/21 [facility]

## 2021-06-05 LAB
TROPONIN INTERP: NORMAL
TROPONIN T: NORMAL NG/ML
TROPONIN, HIGH SENSITIVITY: <6 NG/L (ref 0–14)

## 2021-06-05 ASSESSMENT — HEART SCORE: ECG: 0

## 2021-06-05 NOTE — ED NOTES
Bed: 12  Expected date:   Expected time:   Means of arrival:   Comments:  [facility] [address]  06/04/21 2055
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
## 2021-06-06 ENCOUNTER — HOSPITAL ENCOUNTER (EMERGENCY)
Age: 31
Discharge: LEFT BEFORE DISCHARGE | End: 2021-06-06
Payer: MEDICAID

## 2021-06-06 LAB
EKG ATRIAL RATE: 69 BPM
EKG P AXIS: 66 DEGREES
EKG P-R INTERVAL: 116 MS
EKG Q-T INTERVAL: 464 MS
EKG QRS DURATION: 88 MS
EKG QTC CALCULATION (BAZETT): 497 MS
EKG R AXIS: 83 DEGREES
EKG T AXIS: 69 DEGREES
EKG VENTRICULAR RATE: 69 BPM

## 2021-06-06 PROCEDURE — 93010 ELECTROCARDIOGRAM REPORT: CPT | Performed by: INTERNAL MEDICINE

## 2021-06-06 ASSESSMENT — PAIN DESCRIPTION - LOCATION: LOCATION: ANKLE

## 2021-06-06 ASSESSMENT — PAIN DESCRIPTION - ORIENTATION: ORIENTATION: LEFT

## 2021-06-06 ASSESSMENT — PAIN SCALES - GENERAL: PAINLEVEL_OUTOF10: 10

## 2021-06-06 ASSESSMENT — PAIN DESCRIPTION - PAIN TYPE: TYPE: ACUTE PAIN

## 2021-06-07 ENCOUNTER — APPOINTMENT (OUTPATIENT)
Dept: GENERAL RADIOLOGY | Age: 31
End: 2021-06-07
Payer: MEDICAID

## 2021-06-07 ENCOUNTER — HOSPITAL ENCOUNTER (EMERGENCY)
Age: 31
Discharge: HOME | End: 2021-06-07
Attending: EMERGENCY MEDICINE
Payer: MEDICAID

## 2021-06-07 VITALS
OXYGEN SATURATION: 99 % | SYSTOLIC BLOOD PRESSURE: 90 MMHG | HEART RATE: 61 BPM | TEMPERATURE: 97.7 F | DIASTOLIC BLOOD PRESSURE: 48 MMHG | RESPIRATION RATE: 16 BRPM

## 2021-06-07 DIAGNOSIS — S93.402A SPRAIN OF LEFT ANKLE, UNSPECIFIED LIGAMENT, INITIAL ENCOUNTER: Primary | ICD-10-CM

## 2021-06-07 PROCEDURE — 73610 X-RAY EXAM OF ANKLE: CPT

## 2021-06-07 PROCEDURE — 73590 X-RAY EXAM OF LOWER LEG: CPT

## 2021-06-07 PROCEDURE — 6370000000 HC RX 637 (ALT 250 FOR IP): Performed by: HEALTH CARE PROVIDER

## 2021-06-07 RX ORDER — IBUPROFEN 800 MG/1
800 TABLET ORAL ONCE
Status: COMPLETED | OUTPATIENT
Start: 2021-06-07 | End: 2021-06-07

## 2021-06-07 RX ORDER — IBUPROFEN 800 MG/1
800 TABLET ORAL EVERY 8 HOURS PRN
Qty: 120 TABLET | Refills: 0 | Status: SHIPPED | OUTPATIENT
Start: 2021-06-07 | End: 2021-06-13

## 2021-06-07 RX ADMIN — IBUPROFEN 800 MG: 800 TABLET, FILM COATED ORAL at 01:36

## 2021-06-07 ASSESSMENT — PAIN SCALES - GENERAL: PAINLEVEL_OUTOF10: 10

## 2021-06-07 ASSESSMENT — ENCOUNTER SYMPTOMS: SHORTNESS OF BREATH: 0
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
## 2021-06-07 NOTE — ED PROVIDER NOTES
[facility]   Emergency Department  Emergency Medicine Attending Sign-out     Care of Padmini Chavarria was assumed from previous attending Dr. Mildred Sepulveda and is being seen for Ankle Pain (Left ankle pain)  . The patient's initial evaluation and plan have been discussed with the prior provider who initially evaluated the patient. Attestation  I was available and discussed any additional care issues that arose and coordinated the management plans with the resident(s) caring for the patient during my duty period. Any areas of disagreement with resident's documentation of care or procedures are noted on the chart. I was personally present for the key portions of any/all procedures, during my duty period. I have documented in the chart those procedures where I was not present during the key portions. BRIEF PATIENT SUMMARY/MDM COURSE PER INITIAL PROVIDER:   RECENT VITALS:     Temp: 97.7 °F (36.5 °C),  Pulse: 61, Resp: 16, BP: (!) 90/48, SpO2: 99 %    This patient is a 26 y.o. Female with ankle pain. Awaiting Xray. Likely d/c     DIAGNOSTICS/MEDICATIONS:     MEDICATIONS GIVEN:  ED Medication Orders (From admission, onward)    Start Ordered     Status Ordering Provider    06/07/21 0115 06/07/21 0102  ibuprofen (ADVIL;MOTRIN) tablet 800 mg  ONCE      Last MAR action: Given - by Nancy Gonzalez on 06/07/21 at [facility], [address] - No data to display    RADIOLOGY  No results found. OUTSTANDING TASKS / ADDITIONAL COMMENTS   1.  Antwan Kim MD  Emergency Medicine Attending  [facility]        Diantha Bumpers, MD  06/07/21 Adriana Lopez

## 2021-06-08 ENCOUNTER — HOSPITAL ENCOUNTER (EMERGENCY)
Age: 31
Discharge: LEFT BEFORE DISCHARGE | End: 2021-06-08
Attending: EMERGENCY MEDICINE
Payer: MEDICAID

## 2021-06-08 VITALS
HEIGHT: 56 IN | WEIGHT: 70 LBS | HEART RATE: 71 BPM | BODY MASS INDEX: 15.75 KG/M2 | SYSTOLIC BLOOD PRESSURE: 102 MMHG | RESPIRATION RATE: 16 BRPM | OXYGEN SATURATION: 97 % | TEMPERATURE: 97.7 F | DIASTOLIC BLOOD PRESSURE: 61 MMHG

## 2021-06-08 LAB
EKG ATRIAL RATE: 61 BPM
EKG P AXIS: 70 DEGREES
EKG P-R INTERVAL: 116 MS
EKG Q-T INTERVAL: 442 MS
EKG QRS DURATION: 90 MS
EKG QTC CALCULATION (BAZETT): 444 MS
EKG R AXIS: 81 DEGREES
EKG T AXIS: 69 DEGREES
EKG VENTRICULAR RATE: 61 BPM

## 2021-06-08 ASSESSMENT — PAIN DESCRIPTION - LOCATION: LOCATION: FOOT

## 2021-06-08 ASSESSMENT — PAIN SCALES - GENERAL: PAINLEVEL_OUTOF10: 10

## 2021-06-08 ASSESSMENT — PAIN DESCRIPTION - ORIENTATION: ORIENTATION: LEFT

## 2021-06-10 ENCOUNTER — HOSPITAL ENCOUNTER (EMERGENCY)
Age: 31
Discharge: LEFT BEFORE DISCHARGE | End: 2021-06-11
Payer: MEDICAID

## 2021-06-10 VITALS
HEIGHT: 56 IN | OXYGEN SATURATION: 97 % | HEART RATE: 69 BPM | SYSTOLIC BLOOD PRESSURE: 90 MMHG | RESPIRATION RATE: 16 BRPM | BODY MASS INDEX: 15.75 KG/M2 | WEIGHT: 70 LBS | DIASTOLIC BLOOD PRESSURE: 54 MMHG | TEMPERATURE: 97.5 F

## 2021-06-10 ASSESSMENT — PAIN SCALES - GENERAL: PAINLEVEL_OUTOF10: 8

## 2021-06-10 ASSESSMENT — PAIN DESCRIPTION - ORIENTATION: ORIENTATION: LEFT

## 2021-06-10 ASSESSMENT — PAIN DESCRIPTION - LOCATION: LOCATION: ANKLE

## 2021-06-11 NOTE — ED TRIAGE NOTES
Pt arrived to unit with complaints of left ankle pain. Was seen in ED a week ago for same injury. Patient has removed splint. Patient also reports that she has not taken ibuprofen 800 mg because they do not work. Patient reports she needs to be seen because she has to work. Patient using wheelchair in unit.

## 2021-06-12 ENCOUNTER — HOSPITAL ENCOUNTER (EMERGENCY)
Age: 31
Discharge: LEFT BEFORE DISCHARGE | End: 2021-06-12
Payer: MEDICAID

## 2021-06-13 ENCOUNTER — APPOINTMENT (OUTPATIENT)
Dept: GENERAL RADIOLOGY | Age: 31
End: 2021-06-13
Payer: MEDICAID

## 2021-06-13 ENCOUNTER — HOSPITAL ENCOUNTER (EMERGENCY)
Age: 31
Discharge: HOME | End: 2021-06-13
Attending: EMERGENCY MEDICINE
Payer: MEDICAID

## 2021-06-13 ENCOUNTER — HOSPITAL ENCOUNTER (EMERGENCY)
Age: 31
Discharge: LEFT BEFORE DISCHARGE | End: 2021-06-13
Attending: EMERGENCY MEDICINE
Payer: MEDICAID

## 2021-06-13 VITALS
TEMPERATURE: 98.1 F | BODY MASS INDEX: 15.75 KG/M2 | RESPIRATION RATE: 16 BRPM | DIASTOLIC BLOOD PRESSURE: 63 MMHG | HEIGHT: 56 IN | OXYGEN SATURATION: 98 % | HEART RATE: 73 BPM | WEIGHT: 70 LBS | SYSTOLIC BLOOD PRESSURE: 116 MMHG

## 2021-06-13 VITALS
RESPIRATION RATE: 16 BRPM | DIASTOLIC BLOOD PRESSURE: 78 MMHG | HEART RATE: 80 BPM | HEIGHT: 57 IN | OXYGEN SATURATION: 99 % | BODY MASS INDEX: 15.1 KG/M2 | TEMPERATURE: 97.9 F | SYSTOLIC BLOOD PRESSURE: 112 MMHG | WEIGHT: 70 LBS

## 2021-06-13 DIAGNOSIS — M25.562 ACUTE PAIN OF LEFT KNEE: Primary | ICD-10-CM

## 2021-06-13 DIAGNOSIS — T74.91XA DOMESTIC VIOLENCE OF ADULT, INITIAL ENCOUNTER: Primary | ICD-10-CM

## 2021-06-13 PROCEDURE — 99284 EMERGENCY DEPT VISIT MOD MDM: CPT

## 2021-06-13 PROCEDURE — 73562 X-RAY EXAM OF KNEE 3: CPT

## 2021-06-13 PROCEDURE — 6370000000 HC RX 637 (ALT 250 FOR IP): Performed by: STUDENT IN AN ORGANIZED HEALTH CARE EDUCATION/TRAINING PROGRAM

## 2021-06-13 PROCEDURE — 99282 EMERGENCY DEPT VISIT SF MDM: CPT

## 2021-06-13 RX ORDER — ACETAMINOPHEN 325 MG/1
650 TABLET ORAL EVERY 6 HOURS PRN
Qty: 60 TABLET | Refills: 0 | Status: SHIPPED | OUTPATIENT
Start: 2021-06-13 | End: 2021-07-23

## 2021-06-13 RX ORDER — ACETAMINOPHEN 325 MG/1
650 TABLET ORAL ONCE
Status: COMPLETED | OUTPATIENT
Start: 2021-06-13 | End: 2021-06-13

## 2021-06-13 RX ORDER — IBUPROFEN 200 MG
400 TABLET ORAL EVERY 6 HOURS PRN
Qty: 60 TABLET | Refills: 0 | Status: SHIPPED | OUTPATIENT
Start: 2021-06-13 | End: 2021-07-23

## 2021-06-13 RX ORDER — BACITRACIN, NEOMYCIN, POLYMYXIN B 400; 3.5; 5 [USP'U]/G; MG/G; [USP'U]/G
OINTMENT TOPICAL
Qty: 1 TUBE | Refills: 0 | Status: SHIPPED | OUTPATIENT
Start: 2021-06-13 | End: 2021-06-23

## 2021-06-13 RX ADMIN — ACETAMINOPHEN 650 MG: 325 TABLET ORAL at 13:44

## 2021-06-13 ASSESSMENT — PAIN SCALES - GENERAL
PAINLEVEL_OUTOF10: 10
PAINLEVEL_OUTOF10: 9

## 2021-06-13 ASSESSMENT — PAIN DESCRIPTION - LOCATION: LOCATION: LEG

## 2021-06-13 ASSESSMENT — PAIN DESCRIPTION - ORIENTATION: ORIENTATION: LEFT

## 2021-06-13 ASSESSMENT — PAIN DESCRIPTION - PAIN TYPE: TYPE: ACUTE PAIN

## 2021-06-13 ASSESSMENT — ENCOUNTER SYMPTOMS
SHORTNESS OF BREATH: 0
ABDOMINAL PAIN: 0

## 2021-06-13 NOTE — ED PROVIDER NOTES
[facility]     Emergency Department     Faculty Attestation    I performed a history and physical examination of the patient and discussed management with the resident. I reviewed the residents note and agree with the documented findings and plan of care. Any areas of disagreement are noted on the chart. I was personally present for the key portions of any procedures. I have documented in the chart those procedures where I was not present during the key portions. I have reviewed the emergency nurses triage note. I agree with the chief complaint, past medical history, past surgical history, allergies, medications, social and family history as documented unless otherwise noted below. For Physician Assistant/ Nurse Practitioner cases/documentation I have personally evaluated this patient and have completed at least one if not all key elements of the E/M (history, physical exam, and MDM). Additional findings are as noted. I have personally seen and evaluated the patient. I find the patient's history and physical exam are consistent with the NP/PA documentation. I agree with the care provided, treatment rendered, disposition and follow-up plan. 56-year-old female presenting with left knee and ankle pain. Patient was seen on 6/6 for ankle pain, had a negative x-ray at that time. She typically uses a wheelchair for ambulation, and has been able to use her wheelchair since then. She now has pain in the knee, as well as sunburn overlying the top of the legs.     Exam:  General: Laying on the bed, awake, alert and in no acute distress  CV: normal rate and regular rhythm  Lungs: Breathing comfortably on room air with no tachypnea, hypoxia, or increased work of breathing    Plan:  X-ray affected knee  Orthopedic follow-up  Local skin care including bacitracin for sunburn        Sudheer Lainez MD   Attending Emergency  Physician    (Please note that portions of this note were completed

## 2021-06-13 NOTE — PROGRESS NOTES
I did not evaluate this patient or participate in their care. I erroneously signed-up for this patient and clicked on their chart.     Anjali Dockery, DO  Emergency Medicine Resident
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
## 2021-06-14 NOTE — ED NOTES
Writer attempted to meet with patient, patient not in room. Patient wrist band located on bed.        JA Antonio  06/13/21 1157
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
## 2021-06-14 NOTE — ED PROVIDER NOTES
[address] ED     Emergency Department     Faculty Attestation        I performed a history and physical examination of the patient and discussed management with the resident. I reviewed the residents note and agree with the documented findings and plan of care. Any areas of disagreement are noted on the chart. I was personally present for the key portions of any procedures. I have documented in the chart those procedures where I was not present during the key portions. I have reviewed the emergency nurses triage note. I agree with the chief complaint, past medical history, past surgical history, allergies, medications, social and family history as documented unless otherwise noted below. For Physician Assistant/ Nurse Practitioner cases/documentation I have personally evaluated this patient and have completed at least one if not all key elements of the E/M (history, physical exam, and MDM). Additional findings are as noted. Vital Signs: BP: 112/78  Pulse: 80  Resp: 16  Temp: 97.9 °F (36.6 °C) SpO2: 99 %  PCP:  No primary care provider on file. Pertinent Comments:         Critical Care  None    This patient was evaluated in the Emergency Department for symptoms described in the history of present illness. He/she was evaluated in the context of the global COVID-19 pandemic, which necessitated consideration that the patient might be at risk for infection with the SARS-CoV-2 virus that causes COVID-19. Institutional protocols and algorithms that pertain to the evaluation of patients at risk for COVID-19 are in a state of rapid change based on information released by regulatory bodies including the CDC and federal and state organizations. These policies and algorithms were followed during the patient's care in the ED.     (Please note that portions of this note were completed with a voice recognition program. Efforts were made to edit the dictations but occasionally words are mis-transcribed.  Whenever words are used in this note in any gender, they shall be construed as though they were used in the gender appropriate to the circumstances; and whenever words are used in this note in the singular or plural form, they shall be construed as though they were used in the form appropriate to the circumstances.)    MD Raleigh Heredia  Attending Emergency Medicine Physician             Jazmyne Morales MD  06/13/21 7580
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
## 2021-06-22 ENCOUNTER — HOSPITAL ENCOUNTER (EMERGENCY)
Age: 31
Discharge: LEFT BEFORE DISCHARGE | End: 2021-06-22
Payer: MEDICAID

## 2021-06-25 ENCOUNTER — HOSPITAL ENCOUNTER (EMERGENCY)
Age: 31
Discharge: HOME | End: 2021-06-25
Attending: EMERGENCY MEDICINE
Payer: MEDICAID

## 2021-06-25 ENCOUNTER — APPOINTMENT (OUTPATIENT)
Dept: GENERAL RADIOLOGY | Age: 31
End: 2021-06-25
Payer: MEDICAID

## 2021-06-25 VITALS
RESPIRATION RATE: 22 BRPM | TEMPERATURE: 97.9 F | DIASTOLIC BLOOD PRESSURE: 81 MMHG | HEART RATE: 84 BPM | OXYGEN SATURATION: 99 % | SYSTOLIC BLOOD PRESSURE: 125 MMHG

## 2021-06-25 DIAGNOSIS — J45.21 MILD INTERMITTENT ASTHMA WITH EXACERBATION: Primary | ICD-10-CM

## 2021-06-25 LAB
ANION GAP SERPL CALCULATED.3IONS-SCNC: 11 MMOL/L (ref 9–17)
BUN BLDV-MCNC: 13 MG/DL (ref 6–20)
BUN/CREAT BLD: ABNORMAL (ref 9–20)
CALCIUM SERPL-MCNC: 9.1 MG/DL (ref 8.6–10.4)
CHLORIDE BLD-SCNC: 99 MMOL/L (ref 98–107)
CO2: 26 MMOL/L (ref 20–31)
CREAT SERPL-MCNC: 0.94 MG/DL (ref 0.5–0.9)
D-DIMER QUANTITATIVE: 0.19 MG/L FEU
GFR AFRICAN AMERICAN: >60 ML/MIN
GFR NON-AFRICAN AMERICAN: >60 ML/MIN
GFR SERPL CREATININE-BSD FRML MDRD: ABNORMAL ML/MIN/{1.73_M2}
GFR SERPL CREATININE-BSD FRML MDRD: ABNORMAL ML/MIN/{1.73_M2}
GLUCOSE BLD-MCNC: 88 MG/DL (ref 70–99)
HCG QUALITATIVE: NEGATIVE
POTASSIUM SERPL-SCNC: 3.8 MMOL/L (ref 3.7–5.3)
SODIUM BLD-SCNC: 136 MMOL/L (ref 135–144)
TROPONIN INTERP: NORMAL
TROPONIN T: NORMAL NG/ML
TROPONIN, HIGH SENSITIVITY: <6 NG/L (ref 0–14)

## 2021-06-25 PROCEDURE — 6360000002 HC RX W HCPCS: Performed by: EMERGENCY MEDICINE

## 2021-06-25 PROCEDURE — 93005 ELECTROCARDIOGRAM TRACING: CPT | Performed by: EMERGENCY MEDICINE

## 2021-06-25 PROCEDURE — 84484 ASSAY OF TROPONIN QUANT: CPT

## 2021-06-25 PROCEDURE — 84703 CHORIONIC GONADOTROPIN ASSAY: CPT

## 2021-06-25 PROCEDURE — 96372 THER/PROPH/DIAG INJ SC/IM: CPT

## 2021-06-25 PROCEDURE — 96365 THER/PROPH/DIAG IV INF INIT: CPT

## 2021-06-25 PROCEDURE — 85025 COMPLETE CBC W/AUTO DIFF WBC: CPT

## 2021-06-25 PROCEDURE — 99284 EMERGENCY DEPT VISIT MOD MDM: CPT

## 2021-06-25 PROCEDURE — 80048 BASIC METABOLIC PNL TOTAL CA: CPT

## 2021-06-25 PROCEDURE — 85379 FIBRIN DEGRADATION QUANT: CPT

## 2021-06-25 PROCEDURE — 71045 X-RAY EXAM CHEST 1 VIEW: CPT

## 2021-06-25 PROCEDURE — 96375 TX/PRO/DX INJ NEW DRUG ADDON: CPT

## 2021-06-25 RX ORDER — ALBUTEROL SULFATE 90 UG/1
1-2 AEROSOL, METERED RESPIRATORY (INHALATION) EVERY 4 HOURS PRN
Qty: 1 INHALER | Refills: 0 | Status: SHIPPED | OUTPATIENT
Start: 2021-06-25 | End: 2021-07-19

## 2021-06-25 RX ORDER — BENZONATATE 100 MG/1
100 CAPSULE ORAL 3 TIMES DAILY PRN
Qty: 30 CAPSULE | Refills: 0 | Status: SHIPPED | OUTPATIENT
Start: 2021-06-25 | End: 2021-07-02

## 2021-06-25 RX ORDER — MAGNESIUM SULFATE IN WATER 40 MG/ML
2000 INJECTION, SOLUTION INTRAVENOUS ONCE
Status: COMPLETED | OUTPATIENT
Start: 2021-06-25 | End: 2021-06-25

## 2021-06-25 RX ORDER — BENZONATATE 100 MG/1
100 CAPSULE ORAL 3 TIMES DAILY PRN
Qty: 30 CAPSULE | Refills: 0 | Status: SHIPPED | OUTPATIENT
Start: 2021-06-25 | End: 2021-06-25 | Stop reason: SDUPTHER

## 2021-06-25 RX ORDER — ALBUTEROL SULFATE 90 UG/1
1-2 AEROSOL, METERED RESPIRATORY (INHALATION) EVERY 4 HOURS PRN
Qty: 1 INHALER | Refills: 0 | Status: SHIPPED | OUTPATIENT
Start: 2021-06-25 | End: 2021-06-25 | Stop reason: SDUPTHER

## 2021-06-25 RX ORDER — METHYLPREDNISOLONE SODIUM SUCCINATE 125 MG/2ML
125 INJECTION, POWDER, LYOPHILIZED, FOR SOLUTION INTRAMUSCULAR; INTRAVENOUS ONCE
Status: COMPLETED | OUTPATIENT
Start: 2021-06-25 | End: 2021-06-25

## 2021-06-25 RX ADMIN — METHYLPREDNISOLONE SODIUM SUCCINATE 125 MG: 125 INJECTION, POWDER, FOR SOLUTION INTRAMUSCULAR; INTRAVENOUS at 22:44

## 2021-06-25 RX ADMIN — EPINEPHRINE 0.3 MG: 1 INJECTION INTRAMUSCULAR; INTRAVENOUS; SUBCUTANEOUS at 22:41

## 2021-06-25 RX ADMIN — MAGNESIUM SULFATE 2000 MG: 2 INJECTION INTRAVENOUS at 22:48

## 2021-06-25 ASSESSMENT — ENCOUNTER SYMPTOMS
STRIDOR: 1
CHOKING: 1
RHINORRHEA: 0
ABDOMINAL PAIN: 0
VOICE CHANGE: 1
NAUSEA: 0
SHORTNESS OF BREATH: 1
WHEEZING: 1
DIARRHEA: 0
EYE REDNESS: 0
TROUBLE SWALLOWING: 1
COUGH: 1

## 2021-06-26 ENCOUNTER — HOSPITAL ENCOUNTER (EMERGENCY)
Age: 31
Discharge: LEFT BEFORE DISCHARGE | End: 2021-06-26
Payer: MEDICAID

## 2021-06-26 VITALS
HEIGHT: 56 IN | HEART RATE: 85 BPM | WEIGHT: 70 LBS | DIASTOLIC BLOOD PRESSURE: 53 MMHG | RESPIRATION RATE: 14 BRPM | OXYGEN SATURATION: 95 % | SYSTOLIC BLOOD PRESSURE: 92 MMHG | TEMPERATURE: 98.4 F | BODY MASS INDEX: 15.75 KG/M2

## 2021-06-26 LAB
ABSOLUTE EOS #: 0.47 K/UL (ref 0–0.4)
ABSOLUTE IMMATURE GRANULOCYTE: 0 K/UL (ref 0–0.3)
ABSOLUTE LYMPH #: 5.58 K/UL (ref 1–4.8)
ABSOLUTE MONO #: 0.47 K/UL (ref 0.1–0.8)
BASOPHILS # BLD: 0 % (ref 0–2)
BASOPHILS ABSOLUTE: 0 K/UL (ref 0–0.2)
DIFFERENTIAL TYPE: ABNORMAL
EOSINOPHILS RELATIVE PERCENT: 3 % (ref 1–4)
HCT VFR BLD CALC: 46.1 % (ref 36.3–47.1)
HEMOGLOBIN: 15.1 G/DL (ref 11.9–15.1)
IMMATURE GRANULOCYTES: 0 %
LYMPHOCYTES # BLD: 36 % (ref 24–44)
MCH RBC QN AUTO: 30.4 PG (ref 25.2–33.5)
MCHC RBC AUTO-ENTMCNC: 32.8 G/DL (ref 28.4–34.8)
MCV RBC AUTO: 92.9 FL (ref 82.6–102.9)
MONOCYTES # BLD: 3 % (ref 1–7)
MORPHOLOGY: NORMAL
NRBC AUTOMATED: 0 PER 100 WBC
PDW BLD-RTO: 13.1 % (ref 11.8–14.4)
PLATELET # BLD: 225 K/UL (ref 138–453)
PLATELET ESTIMATE: ABNORMAL
PMV BLD AUTO: 9.8 FL (ref 8.1–13.5)
RBC # BLD: 4.96 M/UL (ref 3.95–5.11)
RBC # BLD: ABNORMAL 10*6/UL
SEG NEUTROPHILS: 58 % (ref 36–66)
SEGMENTED NEUTROPHILS ABSOLUTE COUNT: 8.98 K/UL (ref 1.8–7.7)
WBC # BLD: 15.5 K/UL (ref 3.5–11.3)
WBC # BLD: ABNORMAL 10*3/UL

## 2021-06-26 ASSESSMENT — PAIN SCALES - GENERAL: PAINLEVEL_OUTOF10: 8

## 2021-06-26 ASSESSMENT — PAIN DESCRIPTION - ORIENTATION: ORIENTATION: LEFT

## 2021-06-26 ASSESSMENT — PAIN DESCRIPTION - LOCATION: LOCATION: RIB CAGE

## 2021-06-26 ASSESSMENT — PAIN DESCRIPTION - FREQUENCY: FREQUENCY: CONTINUOUS

## 2021-06-26 NOTE — ED TRIAGE NOTES
PT PRESENTS to ED with SOB that started today. No other complaints at this time. NAD, resp even and non labored. speech clear and appropriate. A&Ox4. pt ambulatory with steady gait. side rails up x 2 call light within reach.

## 2021-06-26 NOTE — ED PROVIDER NOTES
[facility]  eMERGENCY dEPARTMENT eNCOUnter      Pt Name: Zayra Marti  MRN: [id number]  [place] 1990  Date of evaluation: 6/25/2021  PCP:    No primary care provider on file. CHIEF COMPLAINT       Chief Complaint   Patient presents with    Shortness of Breath         HISTORY OF PRESENT ILLNESS    Zayra Marti is a 26 y.o. female who presents with difficulty breathing since this morning when it started raining. She has intermittent episodes of stridor. She has dry cough. She has difficulty with speaking. She denies fevers myalgias nausea vomiting rhinorrhea loss of smell/taste or other Covid symptoms. No Covid vaccine as yet. No prior history of asthma. She denies being anxious or panicky or hyperventilating. No known history of COPD or use of inhalers. No reflux. There is some chest discomfort with a substernal without radiation. Nothing makes the breathing better or worse other than coughing. REVIEW OF SYSTEMS         Review of Systems   Constitutional: Negative for chills, diaphoresis, fatigue and fever. HENT: Positive for trouble swallowing and voice change. Negative for postnasal drip and rhinorrhea. Eyes: Negative for redness. Respiratory: Positive for cough, choking, shortness of breath, wheezing and stridor. Cardiovascular: Positive for chest pain. Negative for palpitations and leg swelling. Gastrointestinal: Negative for abdominal pain, diarrhea and nausea. Genitourinary: Negative for dysuria. Musculoskeletal: Negative for arthralgias, joint swelling, myalgias and neck pain. Neurological: Negative. PAST MEDICAL HISTORY    has a past medical history of Asthma, Bipolar disorder ([address] Cervical dysplasia, COPD (chronic obstructive pulmonary disease) ([address] Migraine, Movement disorder, and Seizures ([facility] [address] SURGICAL HISTORY      has a past surgical history that includes Cholecystectomy (2007);  Tubal ligation; and Appendectomy. CURRENT MEDICATIONS       Current Discharge Medication List      CONTINUE these medications which have NOT CHANGED    Details   acetaminophen (TYLENOL) 325 MG tablet Take 2 tablets by mouth every 6 hours as needed for Pain  Qty: 60 tablet, Refills: 0      ibuprofen (ADVIL;MOTRIN) 200 MG tablet Take 2 tablets by mouth every 6 hours as needed for Pain or Fever  Qty: 60 tablet, Refills: 0      lamoTRIgine (LAMICTAL) 25 MG tablet Take 2 tablets by mouth daily  Qty: 60 tablet, Refills: 3             ALLERGIES     is allergic to claritin [loratadine], diclofenac, morphine, nicotine, pcn [penicillins], and peanut-containing drug products. FAMILY HISTORY     She indicated that the status of her mother is unknown. She indicated that the status of her father is unknown.     family history includes Cancer in an other family member; Coronary Art Dis in an other family member; Diabetes in her father and mother; Hypertension in her father and mother; Stroke in her father. SOCIAL HISTORY      reports that she has been smoking cigarettes. She has a 11.00 pack-year smoking history. She has never used smokeless tobacco. She reports previous drug use. Drugs: Opiates  and Marijuana. She reports that she does not drink alcohol. PHYSICAL EXAM     INITIAL VITALS:  oral temperature is 97.9 °F (36.6 °C). Her blood pressure is 125/81 and her pulse is 84. Her respiration is 22 and oxygen saturation is 99%. Physical Exam  Vitals and nursing note reviewed. Constitutional:       Appearance: She is well-developed. She is not diaphoretic. Comments: Initially when I enter the room patient appears to be resting comfortably without apparent dyspnea or coughing or stridor. After beginning in the discussion she holds her throat as if she is choking, shakes her head as she cannot speak. She is able to say a few words normally and then has several episodes of stridor. There is no drooling.   She is handling her secretions. She does now. Mild respiratory distress. HENT:      Head: Normocephalic and atraumatic. Right Ear: External ear normal.      Left Ear: External ear normal.      Nose: Nose normal.      Mouth/Throat:      Pharynx: Pharyngeal swelling present. Comments: Normal uvula, no angioedema, mild injection of the pillars  Eyes:      General: No scleral icterus. Right eye: No discharge. Left eye: No discharge. Pupils: Pupils are equal, round, and reactive to light. Neck:      Trachea: No tracheal deviation. Cardiovascular:      Rate and Rhythm: Normal rate and regular rhythm. Heart sounds: No murmur heard. No friction rub. No gallop. Pulmonary:      Effort: Pulmonary effort is normal. No respiratory distress. Breath sounds: No stridor. Decreased breath sounds, wheezing and rhonchi present. No rales. Chest:      Chest wall: No mass or tenderness. Abdominal:      Palpations: Abdomen is soft. Tenderness: There is no abdominal tenderness. There is no guarding or rebound. Musculoskeletal:         General: Normal range of motion. Cervical back: Normal range of motion and neck supple. Skin:     General: Skin is warm and dry. Capillary Refill: Capillary refill takes less than 2 seconds. Comments: No urticaria noted   Neurological:      General: No focal deficit present. Mental Status: She is alert. She is disoriented. Cranial Nerves: No cranial nerve deficit. Motor: No weakness.       Coordination: Coordination normal.   Psychiatric:         Behavior: Behavior normal.           DIFFERENTIAL DIAGNOSIS/ MDM:     Bronchospasm most likely, consider longer spasm, unlikely true anaphylaxis, consider anxiety reaction, allergies, unlikely cardiac cause    DIAGNOSTIC RESULTS     EKG: All EKG's are interpreted by the Emergency Department Physician who either signs or Co-signs this chart in the absence of a cardiologist.    EKG Interpretation    Interpreted by me    Rhythm: normal sinus   Rate: Bradycardia  Axis: normal  Ectopy: none  Conduction: normal  ST Segments: no acute change  T Waves: Inversion in V2, V3, aVL  Q Waves: none    Clinical Impression: Bradycardia, nonspecific T inversion    RADIOLOGY:   I directly visualized the following  images and reviewed the radiologist interpretations:  XR CHEST PORTABLE   Final Result   No evidence of acute cardiopulmonary disease                  ED BEDSIDE ULTRASOUND:       LABS:  Labs Reviewed   CBC WITH AUTO DIFFERENTIAL - Abnormal; Notable for the following components:       Result Value    WBC 15.5 (*)     All other components within normal limits   BASIC METABOLIC PANEL - Abnormal; Notable for the following components:    CREATININE 0.94 (*)     All other components within normal limits   D-DIMER, QUANTITATIVE   TROPONIN   HCG, SERUM, QUALITATIVE       Patient has mild leukocytosis of uncertain significance, normal D-dimer, troponin, chemistries, negative hCG    EMERGENCY DEPARTMENT COURSE:   Vitals:    Vitals:    06/25/21 2131   BP: 125/81   Pulse: 84   Resp: 22   Temp: 97.9 °F (36.6 °C)   TempSrc: Oral   SpO2: 99%     -------------------------  BP: 125/81, Temp: 97.9 °F (36.6 °C), Pulse: 84, Resp: 22    Patient has complete resolution of symptoms with epinephrine Solu-Medrol magnesium albuterol  On reexam there is no active wheezing, no stridor tachypnea or accessory muscle use    CRITICAL CARE:   None        CONSULTS:      PROCEDURES:  None    FINAL IMPRESSION      1.  Mild intermittent asthma with exacerbation          DISPOSITION/PLAN   DISPOSITION        PATIENT REFERRED TO:  [address] [address] [phone]  Schedule an appointment as soon as possible for a visit in 1 week        DISCHARGE MEDICATIONS:  Current Discharge Medication List      START taking these medications    Details   albuterol sulfate HFA (PROVENTIL HFA) 108 (90 Base) MCG/ACT inhaler Inhale 1-2 puffs into the lungs every 4 hours as needed for Wheezing or Shortness of Breath (Space out to every 6 hours as symptoms improve) Space out to every 6 hours as symptoms improve. Qty: 1 Inhaler, Refills: 0      benzonatate (TESSALON PERLES) 100 MG capsule Take 1 capsule by mouth 3 times daily as needed for Cough  Qty: 30 capsule, Refills: 0             (Please note that portions of this note were completed with a voice recognition program.  Efforts were made to edit the dictations but occasionally words are mis-transcribed. )    Aneta Goldman MD,, MD, F.A.C.E.P.   Attending Emergency Physician                    Aneta Goldman MD  06/25/21 3186 Statement Selected

## 2021-06-27 LAB
EKG ATRIAL RATE: 59 BPM
EKG P AXIS: 66 DEGREES
EKG P-R INTERVAL: 124 MS
EKG Q-T INTERVAL: 458 MS
EKG QRS DURATION: 84 MS
EKG QTC CALCULATION (BAZETT): 453 MS
EKG R AXIS: 86 DEGREES
EKG T AXIS: 83 DEGREES
EKG VENTRICULAR RATE: 59 BPM

## 2021-06-27 PROCEDURE — 93010 ELECTROCARDIOGRAM REPORT: CPT | Performed by: INTERNAL MEDICINE

## 2021-06-28 ENCOUNTER — HOSPITAL ENCOUNTER (EMERGENCY)
Age: 31
Discharge: LEFT BEFORE DISCHARGE | End: 2021-06-28
Attending: EMERGENCY MEDICINE
Payer: MEDICAID

## 2021-06-28 VITALS — DIASTOLIC BLOOD PRESSURE: 69 MMHG | SYSTOLIC BLOOD PRESSURE: 111 MMHG

## 2021-06-28 DIAGNOSIS — Z53.21 ELOPED FROM EMERGENCY DEPARTMENT: Primary | ICD-10-CM

## 2021-06-28 PROCEDURE — 4500000002 HC ER NO CHARGE

## 2021-06-28 ASSESSMENT — PAIN SCALES - GENERAL: PAINLEVEL_OUTOF10: 10

## 2021-06-29 NOTE — ED NOTES
RN and MD at bedside to complete assessment. Pt became verbally aggressive and was refusing to answer questions. Pt requested her  and was informed that he would not be coming back because we need her to answer her own questions. Pt began shouting and her aggression increased. Pt requested her wheelchair so she could leave.  Pt left without assistance from staff and was observed leaving the hospital.      [facility]  06/28/21 4992

## 2021-07-05 ENCOUNTER — HOSPITAL ENCOUNTER (EMERGENCY)
Age: 31
Discharge: LEFT BEFORE DISCHARGE | End: 2021-07-05
Payer: MEDICAID

## 2021-07-05 NOTE — ED TRIAGE NOTES
Pt left AMA from Triage, immediately leaving after put in [address] by EMS.   Pt stating \"I ain't staying if I don't get into a room right away\"  Leaving triage

## 2021-07-06 ENCOUNTER — HOSPITAL ENCOUNTER (EMERGENCY)
Age: 31
Discharge: LEFT BEFORE DISCHARGE | End: 2021-07-06
Payer: MEDICAID

## 2021-07-12 ENCOUNTER — HOSPITAL ENCOUNTER (EMERGENCY)
Age: 31
Discharge: LEFT BEFORE DISCHARGE | End: 2021-07-12
Payer: MEDICAID

## 2021-07-12 VITALS
WEIGHT: 65 LBS | TEMPERATURE: 98.2 F | DIASTOLIC BLOOD PRESSURE: 51 MMHG | HEIGHT: 56 IN | SYSTOLIC BLOOD PRESSURE: 103 MMHG | OXYGEN SATURATION: 95 % | BODY MASS INDEX: 14.62 KG/M2 | RESPIRATION RATE: 20 BRPM | HEART RATE: 69 BPM

## 2021-07-12 PROCEDURE — 99284 EMERGENCY DEPT VISIT MOD MDM: CPT

## 2021-07-12 PROCEDURE — 6370000000 HC RX 637 (ALT 250 FOR IP): Performed by: STUDENT IN AN ORGANIZED HEALTH CARE EDUCATION/TRAINING PROGRAM

## 2021-07-12 RX ORDER — DIPHENHYDRAMINE HCL 25 MG
50 TABLET ORAL EVERY 6 HOURS PRN
Status: DISCONTINUED | OUTPATIENT
Start: 2021-07-12 | End: 2021-07-12 | Stop reason: HOSPADM

## 2021-07-12 RX ADMIN — DIPHENHYDRAMINE HCL 50 MG: 25 TABLET ORAL at 03:38

## 2021-07-12 ASSESSMENT — ENCOUNTER SYMPTOMS
VOICE CHANGE: 0
COLOR CHANGE: 1
CHEST TIGHTNESS: 0
COUGH: 0
ABDOMINAL PAIN: 0

## 2021-07-12 NOTE — ED PROVIDER NOTES
[facility]  ED  Emergency Department Encounter  EmergencyMedicine Resident     Pt Name:Janet Mcqueen  MRN: [id number]  [place] 1990  Date of evaluation: 7/12/21  PCP:  No primary care provider on file. This patient was evaluated in the Emergency Department for symptoms described in the history of present illness. The patient was evaluated in the context of the global COVID-19 pandemic, which necessitated consideration that the patient might be at risk for infection with the SARS-CoV-2 virus that causes COVID-19. Institutional protocols and algorithms that pertain to the evaluation of patients at risk for COVID-19 are in a state of rapid change based on information released by regulatory bodies including the CDC and federal and state organizations. These policies and algorithms were followed during the patient's care in the ED. CHIEF COMPLAINT       Chief Complaint   Patient presents with    Allergic Reaction     reaction to stuffing       HISTORY OF PRESENT ILLNESS  (Location/Symptom, Timing/Onset, Context/Setting, Quality, Duration, Modifying Factors, Severity.)      Jun Vazquez is a 26 y.o. female who presents with generalized itching. Patient states that she ate an unspecified \"herb stuffing\" and began to have generalized itching symptoms on her arms. Patient has not had any difficulty breathing, she has not had any chest pain nausea vomiting or dyspnea. Patient was allergic reactions to penicillin and morphine and peanuts. Patient further states that her entire family has MS. PAST MEDICAL / SURGICAL / SOCIAL / FAMILY HISTORY      has a past medical history of Asthma, Bipolar disorder ([address] Cervical dysplasia, COPD (chronic obstructive pulmonary disease) ([address] Migraine, Movement disorder, and Seizures ([address] has a past surgical history that includes Cholecystectomy (2007); Tubal ligation; and Appendectomy.       Social History     Socioeconomic History    Marital status:      Spouse name: Not on file    Number of children: Not on file    Years of education: Not on file    Highest education level: Not on file   Occupational History    Not on file   Tobacco Use    Smoking status: Current Every Day Smoker     Packs/day: 1.00     Years: 11.00     Pack years: 11.00     Types: Cigarettes    Smokeless tobacco: Never Used   Vaping Use    Vaping Use: Never used   Substance and Sexual Activity    Alcohol use: Never     Alcohol/week: 0.0 standard drinks    Drug use: Not Currently     Types: Opiates , Marijuana     Comment: Hx Percocet abuse sober for 2 years.  Sexual activity: Not on file   Other Topics Concern    Not on file   Social History Narrative    Not on file     Social Determinants of Health     Financial Resource Strain:     Difficulty of Paying Living Expenses:    Food Insecurity:     Worried About Running Out of Food in the Last Year:     [address] in the Last Year:    Transportation Needs:     Lack of Transportation (Medical):      Lack of Transportation (Non-Medical):    Physical Activity:     Days of Exercise per Week:     Minutes of Exercise per Session:    Stress:     Feeling of Stress :    Social Connections:     Frequency of Communication with Friends and Family:     Frequency of Social Gatherings with Friends and Family:     Attends Orthodoxy Services:     Active Member of Clubs or Organizations:     Attends Club or Organization Meetings:     Marital Status:    Intimate Partner Violence:     Fear of Current or Ex-Partner:     Emotionally Abused:     Physically Abused:     Sexually Abused:        Family History   Problem Relation Age of Onset    Diabetes Mother         G.Parents    Hypertension Mother         G.Parents    Diabetes Father     Hypertension Father     Stroke Father         G.Parents    Cancer Other         G.Parents, Pancrease and ??    Coronary Art Dis Other         G.Parents       Allergies: Claritin [loratadine], Diclofenac, Morphine, Nicotine, Pcn [penicillins], and Peanut-containing drug products    Home Medications:  Prior to Admission medications    Medication Sig Start Date End Date Taking? Authorizing Provider   albuterol sulfate HFA (PROVENTIL HFA) 108 (90 Base) MCG/ACT inhaler Inhale 1-2 puffs into the lungs every 4 hours as needed for Wheezing or Shortness of Breath (Space out to every 6 hours as symptoms improve) Space out to every 6 hours as symptoms improve. 6/25/21   Jasmyne Alfredo MD   acetaminophen (TYLENOL) 325 MG tablet Take 2 tablets by mouth every 6 hours as needed for Pain 6/13/21   Lissett Finch MD   ibuprofen (ADVIL;MOTRIN) 200 MG tablet Take 2 tablets by mouth every 6 hours as needed for Pain or Fever 6/13/21   Lissett Finch MD   lamoTRIgine (LAMICTAL) 25 MG tablet Take 2 tablets by mouth daily 4/21/21   Kartik Carver MD       REVIEW OF SYSTEMS    (2-9 systems for level 4, 10 or more for level 5)      Review of Systems   Constitutional: Negative for activity change. HENT: Negative for congestion, sneezing and voice change. Eyes: Negative for visual disturbance. Respiratory: Negative for cough and chest tightness. Cardiovascular: Negative for chest pain. Gastrointestinal: Negative for abdominal pain. Genitourinary: Negative for decreased urine volume. Skin: Positive for color change. Negative for pallor, rash and wound. Neurological: Negative for seizures. Hematological: Negative for adenopathy. PHYSICAL EXAM   (up to 7 for level 4, 8 or more for level 5)      INITIAL VITALS:   BP (!) 103/51   Pulse 69   Temp 98.2 °F (36.8 °C) (Oral)   Resp 20   Ht 4' 8\" (1.422 m)   Wt 65 lb (29.5 kg)   SpO2 95%   BMI 14.57 kg/m²     Physical Exam  Constitutional:       Appearance: Normal appearance. HENT:      Head: Normocephalic and atraumatic.       Mouth/Throat:      Mouth: Mucous membranes are moist.   Eyes:      Pupils: Pupils are equal, round, and reactive to light. Cardiovascular:      Rate and Rhythm: Normal rate and regular rhythm. Pulses: Normal pulses. Heart sounds: Normal heart sounds. Pulmonary:      Effort: Pulmonary effort is normal.      Breath sounds: Normal breath sounds. Skin:     General: Skin is warm and dry. Neurological:      General: No focal deficit present. Mental Status: She is alert. DIFFERENTIAL  DIAGNOSIS     PLAN (LABS / IMAGING / EKG):  No orders of the defined types were placed in this encounter. MEDICATIONS ORDERED:  Orders Placed This Encounter   Medications    diphenhydrAMINE (BENADRYL) tablet 50 mg       DDX: Pt eloped    DIAGNOSTIC RESULTS / EMERGENCY DEPARTMENT COURSE / MDM   LAB RESULTS:  No results found for this visit on 07/12/21. IMTICAL CARE:      FINAL IMPRESSION      No diagnosis found. DISPOSITION / PLAN     DISPOSITION        PATIENT REFERRED TO:  No follow-up provider specified.     DISCHARGE MEDICATIONS:  New Prescriptions    No medications on file       Louise Kaur DO  Emergency Medicine Resident    (Please note that portions of thisnote were completed with a voice recognition program.  Efforts were made to edit the dictations but occasionally words are mis-transcribed.)        Jarvis Cottrell DO  Resident  07/12/21 6243

## 2021-07-18 ENCOUNTER — APPOINTMENT (OUTPATIENT)
Dept: GENERAL RADIOLOGY | Age: 31
End: 2021-07-18
Payer: MEDICAID

## 2021-07-18 ENCOUNTER — HOSPITAL ENCOUNTER (EMERGENCY)
Age: 31
Discharge: HOME | End: 2021-07-18
Attending: EMERGENCY MEDICINE
Payer: MEDICAID

## 2021-07-18 VITALS
SYSTOLIC BLOOD PRESSURE: 92 MMHG | RESPIRATION RATE: 16 BRPM | OXYGEN SATURATION: 98 % | DIASTOLIC BLOOD PRESSURE: 48 MMHG | TEMPERATURE: 97.7 F | HEART RATE: 78 BPM

## 2021-07-18 DIAGNOSIS — M25.572 ACUTE LEFT ANKLE PAIN: Primary | ICD-10-CM

## 2021-07-18 PROCEDURE — 73630 X-RAY EXAM OF FOOT: CPT

## 2021-07-18 PROCEDURE — 73610 X-RAY EXAM OF ANKLE: CPT

## 2021-07-18 PROCEDURE — 99282 EMERGENCY DEPT VISIT SF MDM: CPT

## 2021-07-18 ASSESSMENT — ENCOUNTER SYMPTOMS
BACK PAIN: 0
ABDOMINAL PAIN: 0
SHORTNESS OF BREATH: 0
NAUSEA: 0

## 2021-07-18 ASSESSMENT — PAIN SCALES - GENERAL: PAINLEVEL_OUTOF10: 9

## 2021-07-18 NOTE — ED PROVIDER NOTES
[facility]     Emergency Department     Faculty Attestation    I performed a history and physical examination of the patient and discussed management with the resident. I reviewed the resident´s note and agree with the documented findings and plan of care. Any areas of disagreement are noted on the chart. I was personally present for the key portions of any procedures. I have documented in the chart those procedures where I was not present during the key portions. I have reviewed the emergency nurses triage note. I agree with the chief complaint, past medical history, past surgical history, allergies, medications, social and family history as documented unless otherwise noted below. For Physician Assistant/ Nurse Practitioner cases/documentation I have personally evaluated this patient and have completed at least one if not all key elements of the E/M (history, physical exam, and MDM). Additional findings are as noted. Normal left ankle and foot exam, Achilles tendon intact, no bruising swelling erythema or warmth, no signs of DVT.      Rolo Campbell MD  07/18/21 8035

## 2021-07-18 NOTE — ED PROVIDER NOTES
[facility] ED  Emergency Department Encounter  Emergency Medicine Resident     Pt Name: Basia Quiñones  MRN: [id number]  [place] 1990  Date of evaluation: 7/18/21  PCP:  No primary care provider on file. CHIEF COMPLAINT       Chief Complaint   Patient presents with    Ankle Pain     Pt to ED with c/o left ankle pain that started a few minutes ago, denies injury, pt is in heels eating hot oatmeal in triage, pt accidentally dumped oatmeal on lap       HISTORY OFPRESENT ILLNESS  (Location/Symptom, Timing/Onset, Context/Setting, Quality, Duration, Modifying Factors,Severity.)      Basia Quiñones is a 26 y.o. female who presents with left ankle pain and swelling that began a few minutes prior to arrival.  Patient states she was just sitting when she noticed increased swelling. She denies any fall, but did fall in a hole 2 weeks ago. She states she usually sits in a wheelchair because she has MS, and states that this was diagnosed at birth. She happened to be walking when she fell 2 weeks ago. She tells me she was evaluated at that time and nothing was wrong. No recent injury. No specific areas of pain. She does have occasional tingling in the dorsal aspect of both feet. No calf pain. No fevers no chills. No other systemic complaints at this time. She denies any alcohol or drug use. Some tobacco use, a few cigarettes per day by report. PAST MEDICAL / SURGICAL / SOCIAL / FAMILY HISTORY      has a past medical history of Asthma, Bipolar disorder ([address] Cervical dysplasia, COPD (chronic obstructive pulmonary disease) ([address] Migraine, Movement disorder, and Seizures ([address] has a past surgical history that includes Cholecystectomy (2007); Tubal ligation; and Appendectomy.      Social History     Socioeconomic History    Marital status:      Spouse name: Not on file    Number of children: Not on file    Years of education: Not on file    Highest education level: Not on file   Occupational History    Not on file   Tobacco Use    Smoking status: Current Every Day Smoker     Packs/day: 1.00     Years: 11.00     Pack years: 11.00     Types: Cigarettes    Smokeless tobacco: Never Used   Vaping Use    Vaping Use: Never used   Substance and Sexual Activity    Alcohol use: Never     Alcohol/week: 0.0 standard drinks    Drug use: Not Currently     Types: Opiates , Marijuana     Comment: Hx Percocet abuse sober for 2 years.  Sexual activity: Not on file   Other Topics Concern    Not on file   Social History Narrative    Not on file     Social Determinants of Health     Financial Resource Strain:     Difficulty of Paying Living Expenses:    Food Insecurity:     Worried About Running Out of Food in the Last Year:     [address] in the Last Year:    Transportation Needs:     Lack of Transportation (Medical):      Lack of Transportation (Non-Medical):    Physical Activity:     Days of Exercise per Week:     Minutes of Exercise per Session:    Stress:     Feeling of Stress :    Social Connections:     Frequency of Communication with Friends and Family:     Frequency of Social Gatherings with Friends and Family:     Attends [employer] Services:     Active Member of Clubs or Organizations:     Attends Club or Organization Meetings:     Marital Status:    Intimate Partner Violence:     Fear of Current or Ex-Partner:     Emotionally Abused:     Physically Abused:     Sexually Abused:        Family History   Problem Relation Age of Onset    Diabetes Mother         G.Parents    Hypertension Mother         G.Parents    Diabetes Father     Hypertension Father     Stroke Father         G.Parents    Cancer Other         G.Parents, Pancrease and ??    Coronary Art Dis Other         G.Parents        Allergies:  Claritin [loratadine], Diclofenac, Morphine, Nicotine, Pcn [penicillins], and Peanut-containing drug products    Home Medications:  Prior to Admission medications    Medication Sig Start Date End Date Taking? Authorizing Provider   albuterol sulfate HFA (PROVENTIL HFA) 108 (90 Base) MCG/ACT inhaler Inhale 1-2 puffs into the lungs every 4 hours as needed for Wheezing or Shortness of Breath (Space out to every 6 hours as symptoms improve) Space out to every 6 hours as symptoms improve. 6/25/21   Jasmyne Alfredo MD   acetaminophen (TYLENOL) 325 MG tablet Take 2 tablets by mouth every 6 hours as needed for Pain 6/13/21   Lissett Finch MD   ibuprofen (ADVIL;MOTRIN) 200 MG tablet Take 2 tablets by mouth every 6 hours as needed for Pain or Fever 6/13/21   Lissett Finch MD   lamoTRIgine (LAMICTAL) 25 MG tablet Take 2 tablets by mouth daily 4/21/21   Kartik Carver MD       REVIEW OFSYSTEMS    (2-9 systems for level 4, 10 or more for level 5)      Review of Systems   Constitutional: Negative for chills and fever. Respiratory: Negative for shortness of breath. Cardiovascular: Negative for chest pain. Gastrointestinal: Negative for abdominal pain and nausea. Musculoskeletal: Positive for arthralgias, gait problem and joint swelling. Negative for back pain, myalgias and neck pain. Skin: Negative for wound. Neurological: Negative for weakness, numbness and headaches. PHYSICAL EXAM   (up to 7 for level 4, 8 or more forlevel 5)      INITIAL VITALS:   ED Triage Vitals   BP Temp Temp src Pulse Resp SpO2 Height Weight   07/18/21 1658 07/18/21 1657 -- 07/18/21 1657 07/18/21 1657 07/18/21 1657 -- --   (!) 92/48 97.7 °F (36.5 °C)  78 16 98 %         Physical Exam  Constitutional:       Comments: Alert, normal appearance, sitting in bed eating pretzels and soup, in no acute distress. HENT:      Head: Normocephalic and atraumatic. Eyes:      Extraocular Movements: Extraocular movements intact. Cardiovascular:      Rate and Rhythm: Normal rate and regular rhythm. Pulses: Normal pulses. Heart sounds: Normal heart sounds. No murmur heard. Pulmonary:      Effort: Pulmonary effort is normal.      Breath sounds: Normal breath sounds. Abdominal:      Palpations: Abdomen is soft. Musculoskeletal:      Cervical back: Normal range of motion. Comments: On initial exam, patient wearing heeled sandals. These were removed, no significant swelling noted, possible minimal swelling at the lateral aspect of the left ankle. No specific areas of tenderness. No obvious deformities or injuries. No overlying bruising or lesions. Skin:     General: Skin is warm and dry. Findings: No bruising, lesion or rash. Neurological:      General: No focal deficit present. Mental Status: She is oriented to person, place, and time. Comments: Motor and sensation intact bilateral lower extremities. DIFFERENTIAL  DIAGNOSIS     PLAN (LABS / IMAGING / EKG):  Orders Placed This Encounter   Procedures    XR ANKLE LEFT (MIN 3 VIEWS)    XR FOOT LEFT (MIN 3 VIEWS)       MEDICATIONS ORDERED:  No orders of the defined types were placed in this encounter. Initial MDM/Plan/ED COURSE:    26 y.o. female who presents with left ankle pain and swelling. On exam, patient is in no acute distress, blood pressure slightly low at 92/48. She is lying comfortably in bed eating snacks and asking for a boxed lunch. Patient reports history of MS since birth although no findings of this in her chart. Left ankle is minimally swollen at the lateral aspect of pedal compared to right. No localized areas of tenderness. No overlying lesions, bruises. Achilles tendon intact. We will do x-ray of the left ankle to rule out fracture or stress fracture. Otherwise anticipate conservative management. ED Course as of Jul 18 2316   Sun Jul 18, 2021   1833 IMPRESSION:  No acute bony abnormalities are noted   XR ANKLE LEFT (MIN 3 VIEWS) [JS]      ED Course User Index  [JS] Shelia Bourgeois, DO     Patient eloped prior to results of x-ray or any further discussion.

## 2021-07-19 ENCOUNTER — HOSPITAL ENCOUNTER (EMERGENCY)
Age: 31
Discharge: HOME | End: 2021-07-19
Attending: EMERGENCY MEDICINE
Payer: MEDICAID

## 2021-07-19 VITALS
RESPIRATION RATE: 18 BRPM | SYSTOLIC BLOOD PRESSURE: 101 MMHG | WEIGHT: 95 LBS | HEART RATE: 70 BPM | OXYGEN SATURATION: 96 % | DIASTOLIC BLOOD PRESSURE: 61 MMHG | TEMPERATURE: 99.3 F | BODY MASS INDEX: 21.3 KG/M2

## 2021-07-19 VITALS
TEMPERATURE: 98.4 F | RESPIRATION RATE: 16 BRPM | DIASTOLIC BLOOD PRESSURE: 59 MMHG | OXYGEN SATURATION: 99 % | SYSTOLIC BLOOD PRESSURE: 130 MMHG | HEART RATE: 97 BPM

## 2021-07-19 DIAGNOSIS — T22.112A SUPERFICIAL BURN OF LEFT FOREARM, INITIAL ENCOUNTER: Primary | ICD-10-CM

## 2021-07-19 DIAGNOSIS — R11.0 NAUSEA: Primary | ICD-10-CM

## 2021-07-19 LAB
ABSOLUTE EOS #: 0.21 K/UL (ref 0–0.44)
ABSOLUTE IMMATURE GRANULOCYTE: 0.03 K/UL (ref 0–0.3)
ABSOLUTE LYMPH #: 2.74 K/UL (ref 1.1–3.7)
ABSOLUTE MONO #: 0.52 K/UL (ref 0.1–1.2)
BASOPHILS # BLD: 0 % (ref 0–2)
BASOPHILS ABSOLUTE: 0.04 K/UL (ref 0–0.2)
DIFFERENTIAL TYPE: ABNORMAL
EOSINOPHILS RELATIVE PERCENT: 2 % (ref 1–4)
HCG QUALITATIVE: NEGATIVE
HCT VFR BLD CALC: 44.5 % (ref 36.3–47.1)
HEMOGLOBIN: 14.2 G/DL (ref 11.9–15.1)
IMMATURE GRANULOCYTES: 0 %
LYMPHOCYTES # BLD: 24 % (ref 24–43)
MCH RBC QN AUTO: 30 PG (ref 25.2–33.5)
MCHC RBC AUTO-ENTMCNC: 31.9 G/DL (ref 28.4–34.8)
MCV RBC AUTO: 93.9 FL (ref 82.6–102.9)
MONOCYTES # BLD: 5 % (ref 3–12)
NRBC AUTOMATED: 0 PER 100 WBC
PDW BLD-RTO: 12.7 % (ref 11.8–14.4)
PLATELET # BLD: 206 K/UL (ref 138–453)
PLATELET ESTIMATE: ABNORMAL
PMV BLD AUTO: 10.1 FL (ref 8.1–13.5)
RBC # BLD: 4.74 M/UL (ref 3.95–5.11)
RBC # BLD: ABNORMAL 10*6/UL
SEG NEUTROPHILS: 69 % (ref 36–65)
SEGMENTED NEUTROPHILS ABSOLUTE COUNT: 7.95 K/UL (ref 1.5–8.1)
WBC # BLD: 11.5 K/UL (ref 3.5–11.3)
WBC # BLD: ABNORMAL 10*3/UL

## 2021-07-19 PROCEDURE — 6370000000 HC RX 637 (ALT 250 FOR IP)

## 2021-07-19 PROCEDURE — 85025 COMPLETE CBC W/AUTO DIFF WBC: CPT

## 2021-07-19 PROCEDURE — 6370000000 HC RX 637 (ALT 250 FOR IP): Performed by: STUDENT IN AN ORGANIZED HEALTH CARE EDUCATION/TRAINING PROGRAM

## 2021-07-19 PROCEDURE — 99283 EMERGENCY DEPT VISIT LOW MDM: CPT

## 2021-07-19 PROCEDURE — 84703 CHORIONIC GONADOTROPIN ASSAY: CPT

## 2021-07-19 PROCEDURE — 99282 EMERGENCY DEPT VISIT SF MDM: CPT

## 2021-07-19 RX ORDER — GINSENG 100 MG
CAPSULE ORAL 2 TIMES DAILY
Qty: 1 TUBE | Refills: 3 | Status: SHIPPED | OUTPATIENT
Start: 2021-07-19 | End: 2021-07-23

## 2021-07-19 RX ORDER — ONDANSETRON 4 MG/1
TABLET, FILM COATED ORAL
Status: COMPLETED
Start: 2021-07-19 | End: 2021-07-19

## 2021-07-19 RX ORDER — ONDANSETRON 4 MG/1
4 TABLET, ORALLY DISINTEGRATING ORAL ONCE
Status: COMPLETED | OUTPATIENT
Start: 2021-07-19 | End: 2021-07-19

## 2021-07-19 RX ADMIN — ONDANSETRON HYDROCHLORIDE 4 MG: 4 TABLET, FILM COATED ORAL at 05:19

## 2021-07-19 RX ADMIN — ONDANSETRON 4 MG: 4 TABLET, ORALLY DISINTEGRATING ORAL at 05:20

## 2021-07-19 ASSESSMENT — ENCOUNTER SYMPTOMS
DIARRHEA: 0
COUGH: 0
ROS SKIN COMMENTS: BURN
ABDOMINAL PAIN: 0
NAUSEA: 1
VOMITING: 0
COLOR CHANGE: 0
VOMITING: 0
NAUSEA: 0
ABDOMINAL PAIN: 0
SHORTNESS OF BREATH: 0
SHORTNESS OF BREATH: 0

## 2021-07-19 ASSESSMENT — PAIN SCALES - GENERAL: PAINLEVEL_OUTOF10: 10

## 2021-07-19 NOTE — ED NOTES
Pt presents to ED c/o burn on left forearm acquired while grilling. Pt rates pain as a 10/10. Upon observation pt has a slightly round red shahla, about the size of a 2x2. Pt is A&O x 4, sitting on stretcher with physician at bedside.      Paulo Vergara LPN  59/16/85 1325

## 2021-07-19 NOTE — ED NOTES
PT given and briefed over Discharge papers  PT had no further questions about stay or about Prescriptions/follow up Appointment. PT was able to ambulate to the Waiting Room without assistance or issues.        Marcelino Rosales, RN  07/19/21 0321

## 2021-07-19 NOTE — ED PROVIDER NOTES
[facility]     Emergency Department     Faculty Attestation    I performed a history and physical examination of the patient and discussed management with the resident. I have reviewed and agree with the residents findings including all diagnostic interpretations, and treatment plans as written. Any areas of disagreement are noted on the chart. I was personally present for the key portions of any procedures. I have documented in the chart those procedures where I was not present during the key portions. I have reviewed the emergency nurses triage note. I agree with the chief complaint, past medical history, past surgical history, allergies, medications, social and family history as documented unless otherwise noted below. Documentation of the HPI, Physical Exam and Medical Decision Making performed by [email or web address] is based on my personal performance of the HPI, PE and MDM. For Physician Assistant/ Nurse Practitioner cases/documentation I have personally evaluated this patient and have completed at least one if not all key elements of the E/M (history, physical exam, and MDM). Additional findings are as noted. 25 yo F c/o n/v/d, no fever, diffuse abdominal pain, dysuria  No injury, no cp,   pe Sheamus RN escort for exam: vss gcs 15, isaiah, neck supple, abdomen mild lower quadrant tenderness, no rigidity, no rebound, no guarding, no mass,     hcg-, pt now requesting food, slight elevation in wbc, no acute abdomen at this time,     EKG Interpretation    Interpreted by me      CRITICAL CARE: There was a high probability of clinically significant/life threatening deterioration in this patient's condition which required my urgent intervention. Total critical care time was 0 minutes. This excludes any time for separately reportable procedures.        [facility], DO  07/19/21 [address] [place] [address],   07/19/21 0215

## 2021-07-19 NOTE — ED PROVIDER NOTES
[facility]  ED  Emergency Department Encounter  Emergency Medicine Resident     Pt Name: Elena Anderson  MRN: [id number]  [place] 1990  Date of evaluation: 7/19/21  PCP:  No primary care provider on file. CHIEF COMPLAINT       Chief Complaint   Patient presents with    Nausea     starting this AM. Denies fevers, chills, vomiting, diarrhea, abd pain and difficulty urinating. HISTORY OFPRESENT ILLNESS  (Location/Symptom, Timing/Onset, Context/Setting, Quality, Duration, Modifying Factors,Severity.)      Elena Anderson is a 26 y.o. female with past medical history bipolar disorder, depression who presents emergency department with complaints of acute onset of nausea that started 30 minutes prior to arrival.  Patient states she awoke from sleep feeling nauseous. She immediately came to emergency department. Patient denies symptoms yesterday. No fevers, chills, diarrhea, abdominal pain, vomiting. Patient is homeless and frequently visits the emergency department. PAST MEDICAL / SURGICAL / SOCIAL / FAMILY HISTORY      has a past medical history of Asthma, Bipolar disorder ([facility] [address] Cervical dysplasia, COPD (chronic obstructive pulmonary disease) ([address] Migraine, Movement disorder, and Seizures ([facility] [address] has a past surgical history that includes Cholecystectomy (2007); Tubal ligation; and Appendectomy.     Social History     Socioeconomic History    Marital status:      Spouse name: Not on file    Number of children: Not on file    Years of education: Not on file    Highest education level: Not on file   Occupational History    Not on file   Tobacco Use    Smoking status: Current Every Day Smoker     Packs/day: 1.00     Years: 11.00     Pack years: 11.00     Types: Cigarettes    Smokeless tobacco: Never Used   Vaping Use    Vaping Use: Never used   Substance and Sexual Activity    Alcohol use: Never     Alcohol/week: 0.0 standard drinks    Drug use: Not Currently     Types: Opiates , Marijuana     Comment: Hx Percocet abuse sober for 2 years.  Sexual activity: Not on file   Other Topics Concern    Not on file   Social History Narrative    Not on file     Social Determinants of Health     Financial Resource Strain:     Difficulty of Paying Living Expenses:    Food Insecurity:     Worried About Running Out of Food in the Last Year:     [address] in the Last Year:    Transportation Needs:     Lack of Transportation (Medical):  Lack of Transportation (Non-Medical):    Physical Activity:     Days of Exercise per Week:     Minutes of Exercise per Session:    Stress:     Feeling of Stress :    Social Connections:     Frequency of Communication with Friends and Family:     Frequency of Social Gatherings with Friends and Family:     Attends [employer] Services:     Active Member of Clubs or Organizations:     Attends Club or Organization Meetings:     Marital Status:    Intimate Partner Violence:     Fear of Current or Ex-Partner:     Emotionally Abused:     Physically Abused:     Sexually Abused:        Family History   Problem Relation Age of Onset    Diabetes Mother         G.Parents    Hypertension Mother         G.Parents    Diabetes Father     Hypertension Father     Stroke Father         G.Parents    Cancer Other         G.Parents, Pancrease and ??    Coronary Art Dis Other         G.Parents       Allergies:  Claritin [loratadine], Diclofenac, Morphine, Nicotine, Pcn [penicillins], and Peanut-containing drug products    Home Medications:  Prior to Admission medications    Medication Sig Start Date End Date Taking? Authorizing Provider   albuterol sulfate HFA (PROVENTIL HFA) 108 (90 Base) MCG/ACT inhaler Inhale 1-2 puffs into the lungs every 4 hours as needed for Wheezing or Shortness of Breath (Space out to every 6 hours as symptoms improve) Space out to every 6 hours as symptoms improve.  6/25/21   Waqas Capone MD acetaminophen (TYLENOL) 325 MG tablet Take 2 tablets by mouth every 6 hours as needed for Pain 6/13/21   Raissa Snell MD   ibuprofen (ADVIL;MOTRIN) 200 MG tablet Take 2 tablets by mouth every 6 hours as needed for Pain or Fever 6/13/21   Raissa Snell MD   lamoTRIgine (LAMICTAL) 25 MG tablet Take 2 tablets by mouth daily 4/21/21   Hugo Brand MD       REVIEW OF SYSTEMS    (2-9 systems for level 4, 10 or more for level 5)      Review of Systems   Constitutional: Negative for chills and fever. Eyes: Negative for visual disturbance. Respiratory: Negative for cough and shortness of breath. Cardiovascular: Negative for chest pain. Gastrointestinal: Positive for nausea. Negative for abdominal pain, diarrhea and vomiting. Genitourinary: Negative for dysuria. Musculoskeletal: Negative for myalgias. Skin: Negative for color change and rash. Psychiatric/Behavioral: Negative for confusion. PHYSICAL EXAM   (up to 7 for level 4, 8 or more for level 5)     INITIAL VITALS:    oral temperature is 98.4 °F (36.9 °C). Her blood pressure is 130/59 (abnormal) and her pulse is 97. Her respiration is 16 and oxygen saturation is 99%. Physical Exam  Constitutional:       General: She is not in acute distress. Appearance: Normal appearance. She is not ill-appearing or toxic-appearing. Cardiovascular:      Rate and Rhythm: Normal rate and regular rhythm. Pulses: Normal pulses. Heart sounds: No murmur heard. Pulmonary:      Effort: Pulmonary effort is normal. No respiratory distress. Breath sounds: Normal breath sounds. No wheezing. Abdominal:      General: Abdomen is flat. There is no distension. Palpations: Abdomen is soft. Tenderness: There is no abdominal tenderness. There is no guarding. Musculoskeletal:         General: No tenderness. Right lower leg: No edema. Left lower leg: No edema. Skin:     General: Skin is warm and dry.       Capillary Refill: Capillary refill takes less than 2 seconds. Findings: No rash. Neurological:      General: No focal deficit present. Mental Status: She is alert and oriented to person, place, and time. Gait: Gait normal.   Psychiatric:         Mood and Affect: Mood normal.         Behavior: Behavior normal.         DIFFERENTIAL  DIAGNOSIS     PLAN (LABS / IMAGING / EKG):  Orders Placed This Encounter   Procedures    HCG Qualitative, Serum    CBC WITH AUTO DIFFERENTIAL       MEDICATIONS ORDERED:  Orders Placed This Encounter   Medications    ondansetron (ZOFRAN-ODT) disintegrating tablet 4 mg    ondansetron (ZOFRAN) 4 MG tablet     Antoni Méndez Zoeyjennifer: cabinet override       DDX: Viral gastroenteritis, malingering, pregnancy    Initial MDM/Plan: 26 y.o. female who presents with acute onset of nausea, no vomiting, no abdominal pain. Patient seen and examined, vital signs are unremarkable. She is in no acute distress. Abdominal exam is benign. Upon being roomed in the emergency department patient immediately began spooning with significant other. Multiple times we attempted to turn the lights on and pull back the curtains however each time patient significant other got out of bed turn the lights off and close the curtains. Will obtain basic screening labs as well as urine pregnancy and give ODT Zofran with p.o. challenge.     DIAGNOSTIC RESULTS / EMERGENCY DEPARTMENT COURSE / MDM     LABS:  Labs Reviewed   CBC WITH AUTO DIFFERENTIAL - Abnormal; Notable for the following components:       Result Value    WBC 11.5 (*)     Seg Neutrophils 69 (*)     All other components within normal limits   HCG, SERUM, QUALITATIVE         RADIOLOGY:  XR ANKLE LEFT (MIN 3 VIEWS)    Result Date: 7/18/2021  EXAMINATION: THREE XRAY VIEWS OF THE LEFT FOOT; THREE XRAY VIEWS OF THE LEFT ANKLE 7/18/2021 2:43 pm COMPARISON: August 2020 HISTORY: ORDERING SYSTEM PROVIDED HISTORY: pain, swelling TECHNOLOGIST PROVIDED HISTORY: pain, swelling Reason for Exam: pain/ no known injury; ORDERING SYSTEM PROVIDED HISTORY: pain, swelling TECHNOLOGIST PROVIDED HISTORY: pain, swelling Reason for Exam: no known injury/ pain FINDINGS: The visualized bones are normal.  There is no evidence of fracture or dislocation. The  joint spaces appear well maintained. The soft tissues are unremarkable. No acute bony abnormalities are noted     XR FOOT LEFT (MIN 3 VIEWS)    Result Date: 7/18/2021  EXAMINATION: THREE XRAY VIEWS OF THE LEFT FOOT; THREE XRAY VIEWS OF THE LEFT ANKLE 7/18/2021 2:43 pm COMPARISON: August 2020 HISTORY: ORDERING SYSTEM PROVIDED HISTORY: pain, swelling TECHNOLOGIST PROVIDED HISTORY: pain, swelling Reason for Exam: pain/ no known injury; ORDERING SYSTEM PROVIDED HISTORY: pain, swelling TECHNOLOGIST PROVIDED HISTORY: pain, swelling Reason for Exam: no known injury/ pain FINDINGS: The visualized bones are normal.  There is no evidence of fracture or dislocation. The  joint spaces appear well maintained. The soft tissues are unremarkable. No acute bony abnormalities are noted           EMERGENCY DEPARTMENT COURSE:     Pregnancy test negative, CBC unremarkable. Patient given ODT Zofran unable to tolerate p.o. Will discharge home. PROCEDURES:  None    CONSULTS:  None    CRITICAL CARE:  Please see attending note    FINAL IMPRESSION      1.  Nausea          DISPOSITION / PLAN     DISPOSITION Decision To Discharge 07/19/2021 05:39:23 AM        PATIENTREFERRED TO:  [address] [place] [id number] [phone]  Call         DISCHARGE MEDICATIONS:  Discharge Medication List as of 7/19/2021  5:39 AM          Poppy Danielle DO  EmergencyMedicine Resident    (Please note that portions of this note were completed with a voice recognition program.  Efforts were made to edit the dictations but occasionally words are mis-transcribed.)       Poppy Danielle DO  Resident  07/19/21 0700
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
## 2021-07-21 ENCOUNTER — HOSPITAL ENCOUNTER (EMERGENCY)
Age: 31
Discharge: LEFT BEFORE DISCHARGE | End: 2021-07-21
Payer: MEDICAID

## 2021-07-21 VITALS
BODY MASS INDEX: 21.37 KG/M2 | OXYGEN SATURATION: 97 % | TEMPERATURE: 97.9 F | DIASTOLIC BLOOD PRESSURE: 63 MMHG | WEIGHT: 95 LBS | HEIGHT: 56 IN | SYSTOLIC BLOOD PRESSURE: 108 MMHG | RESPIRATION RATE: 16 BRPM | HEART RATE: 88 BPM

## 2021-07-21 ASSESSMENT — PAIN DESCRIPTION - ORIENTATION: ORIENTATION: LOWER

## 2021-07-21 ASSESSMENT — PAIN DESCRIPTION - PAIN TYPE: TYPE: ACUTE PAIN;CHRONIC PAIN

## 2021-07-21 ASSESSMENT — PAIN DESCRIPTION - LOCATION: LOCATION: BACK

## 2021-07-21 ASSESSMENT — PAIN SCALES - GENERAL: PAINLEVEL_OUTOF10: 10

## 2021-07-23 ENCOUNTER — HOSPITAL ENCOUNTER (EMERGENCY)
Age: 31
Discharge: HOME | End: 2021-07-23
Attending: EMERGENCY MEDICINE
Payer: MEDICAID

## 2021-07-23 VITALS
BODY MASS INDEX: 21.37 KG/M2 | OXYGEN SATURATION: 100 % | RESPIRATION RATE: 18 BRPM | TEMPERATURE: 98.9 F | HEIGHT: 56 IN | DIASTOLIC BLOOD PRESSURE: 73 MMHG | SYSTOLIC BLOOD PRESSURE: 114 MMHG | WEIGHT: 95 LBS | HEART RATE: 55 BPM

## 2021-07-23 VITALS
HEART RATE: 64 BPM | BODY MASS INDEX: 21.37 KG/M2 | DIASTOLIC BLOOD PRESSURE: 63 MMHG | WEIGHT: 95 LBS | SYSTOLIC BLOOD PRESSURE: 102 MMHG | HEIGHT: 56 IN | OXYGEN SATURATION: 97 % | RESPIRATION RATE: 16 BRPM | TEMPERATURE: 98.2 F

## 2021-07-23 DIAGNOSIS — T63.444A BEE STING, UNDETERMINED INTENT, INITIAL ENCOUNTER: ICD-10-CM

## 2021-07-23 DIAGNOSIS — T63.441A BEE STING, ACCIDENTAL OR UNINTENTIONAL, INITIAL ENCOUNTER: Primary | ICD-10-CM

## 2021-07-23 DIAGNOSIS — L29.9 ITCHING: Primary | ICD-10-CM

## 2021-07-23 PROCEDURE — 99283 EMERGENCY DEPT VISIT LOW MDM: CPT

## 2021-07-23 PROCEDURE — 6370000000 HC RX 637 (ALT 250 FOR IP): Performed by: EMERGENCY MEDICINE

## 2021-07-23 RX ORDER — DIPHENHYDRAMINE HCL 25 MG
25 TABLET ORAL ONCE
Status: COMPLETED | OUTPATIENT
Start: 2021-07-23 | End: 2021-07-23

## 2021-07-23 RX ORDER — HYDROXYZINE HYDROCHLORIDE 50 MG/ML
50 INJECTION, SOLUTION INTRAMUSCULAR ONCE
Status: DISCONTINUED | OUTPATIENT
Start: 2021-07-23 | End: 2021-07-23

## 2021-07-23 RX ORDER — HYDROXYZINE HYDROCHLORIDE 25 MG/1
25 TABLET, FILM COATED ORAL EVERY 8 HOURS PRN
Qty: 10 TABLET | Refills: 0 | Status: SHIPPED | OUTPATIENT
Start: 2021-07-23 | End: 2021-07-23

## 2021-07-23 RX ADMIN — DIPHENHYDRAMINE HCL 25 MG: 25 TABLET ORAL at 05:31

## 2021-07-23 ASSESSMENT — PAIN SCALES - GENERAL: PAINLEVEL_OUTOF10: 10

## 2021-07-23 ASSESSMENT — ENCOUNTER SYMPTOMS
SHORTNESS OF BREATH: 0
WHEEZING: 0
ROS SKIN COMMENTS: ITCHING
ABDOMINAL PAIN: 0

## 2021-07-23 ASSESSMENT — PAIN DESCRIPTION - PAIN TYPE: TYPE: ACUTE PAIN

## 2021-07-23 ASSESSMENT — PAIN DESCRIPTION - ORIENTATION: ORIENTATION: LEFT;INNER

## 2021-07-23 ASSESSMENT — PAIN DESCRIPTION - LOCATION: LOCATION: ANKLE;FOOT

## 2021-07-23 ASSESSMENT — PAIN DESCRIPTION - FREQUENCY: FREQUENCY: CONTINUOUS

## 2021-07-23 NOTE — ED PROVIDER NOTES
[facility]     Emergency Department     Faculty Attestation    I performed a history and physical examination of the patient and discussed management with the resident. I have reviewed and agree with the residents findings including all diagnostic interpretations, and treatment plans as written. Any areas of disagreement are noted on the chart. I was personally present for the key portions of any procedures. I have documented in the chart those procedures where I was not present during the key portions. I have reviewed the emergency nurses triage note. I agree with the chief complaint, past medical history, past surgical history, allergies, medications, social and family history as documented unless otherwise noted below. Documentation of the HPI, Physical Exam and Medical Decision Making performed by [email or web address] is based on my personal performance of the HPI, PE and MDM. For Physician Assistant/ Nurse Practitioner cases/documentation I have personally evaluated this patient and have completed at least one if not all key elements of the E/M (history, physical exam, and MDM). Additional findings are as noted. 27 yo F insect bite L ankle, no throat complaint, no hive, no vomit, no fever, no injury  pe airway intact, vss gcs 15, neck supple, sat 100 % ra,   L medial ankle, minimal erythema surrounding possible puncture, appears to be healing, no induration, no urticaria,     I feel pt having minimal localized reaction at site, no anaphylaxis,     EKG Interpretation    Interpreted by me      CRITICAL CARE: There was a high probability of clinically significant/life threatening deterioration in this patient's condition which required my urgent intervention. Total critical care time was 0 minutes. This excludes any time for separately reportable procedures.        [address]  07/23/21 6171

## 2021-07-23 NOTE — ED NOTES
Pt to ED for bee sting on her left ankle. Pt is unsure when the sting happened. She states it's itchy and swollen. Airway intact, RR even and nonlabored. Pt alert and oriented x4 and provided with warm blanket for comfort.       Blayne Hernandez RN  07/23/21 6105

## 2021-07-23 NOTE — ED PROVIDER NOTES
[facility]  ED  Emergency Department Encounter  EmergencyMedicine Resident     Pt Name:Janet Escalante  MRN: [id number]  [place] 1990  Date of evaluation: 7/23/21  PCP:  No primary care provider on file. CHIEF COMPLAINT       Chief Complaint   Patient presents with    Insect Bite     bee sting at unknown time; L ankle       HISTORY OF PRESENT ILLNESS  (Location/Symptom, Timing/Onset, Context/Setting, Quality, Duration, Modifying Factors, Severity.)      Maco Yap is a 26 y.o. female who presents with bee sting and itching to left ankle. Nonpainful, no radiation of itching. No associated hives. No complaints of shortness of breath or sore throat or trouble swallowing. Denies injury otherwise. PAST MEDICAL / SURGICAL / SOCIAL / FAMILY HISTORY      has a past medical history of Asthma, Bipolar disorder ([facility] [address] Cervical dysplasia, COPD (chronic obstructive pulmonary disease) ([facility] [address] Migraine, Movement disorder, and Seizures ([facility] [address] has a past surgical history that includes Cholecystectomy (2007); Tubal ligation; and Appendectomy. Social History     Socioeconomic History    Marital status:      Spouse name: Not on file    Number of children: Not on file    Years of education: Not on file    Highest education level: Not on file   Occupational History    Not on file   Tobacco Use    Smoking status: Current Every Day Smoker     Packs/day: 0.25     Years: 11.00     Pack years: 2.75     Types: Cigarettes    Smokeless tobacco: Never Used   Vaping Use    Vaping Use: Never used   Substance and Sexual Activity    Alcohol use: Never     Alcohol/week: 0.0 standard drinks    Drug use: Not Currently     Types: Opiates , Marijuana     Comment: Hx Percocet abuse sober for 2 years.     Sexual activity: Not on file   Other Topics Concern    Not on file   Social History Narrative    Not on file     Social Determinants of Health     Financial Resource Strain:    Robina Ernst Difficulty of Paying Living Expenses:    Food Insecurity:     Worried About Running Out of Food in the Last Year:     [address] in the Last Year:    Transportation Needs:     Lack of Transportation (Medical):  Lack of Transportation (Non-Medical):    Physical Activity:     Days of Exercise per Week:     Minutes of Exercise per Session:    Stress:     Feeling of Stress :    Social Connections:     Frequency of Communication with Friends and Family:     Frequency of Social Gatherings with Friends and Family:     Attends Baptism Services:     Active Member of Clubs or Organizations:     Attends Club or Organization Meetings:     Marital Status:    Intimate Partner Violence:     Fear of Current or Ex-Partner:     Emotionally Abused:     Physically Abused:     Sexually Abused:        Family History   Problem Relation Age of Onset    Diabetes Mother         G.Parents    Hypertension Mother         G.Parents    Diabetes Father     Hypertension Father     Stroke Father         G.Parents    Cancer Other         G.Parents, Pancrease and ??    Coronary Art Dis Other         G.Parents       Allergies:  Diclofenac, Morphine, Nicotine, Pcn [penicillins], and Peanut-containing drug products    Home Medications:  Prior to Admission medications    Medication Sig Start Date End Date Taking? Authorizing Provider   hydrOXYzine (ATARAX) 25 MG tablet Take 1 tablet by mouth every 8 hours as needed for Itching 7/23/21 8/2/21 Yes Grant Wilson, DO   bacitracin 500 UNIT/GM ointment Apply topically 2 times daily Apply topically 2 times daily.  7/19/21   Steve Hernandez,    acetaminophen (TYLENOL) 325 MG tablet Take 2 tablets by mouth every 6 hours as needed for Pain 6/13/21   Oswaldo Grace MD   ibuprofen (ADVIL;MOTRIN) 200 MG tablet Take 2 tablets by mouth every 6 hours as needed for Pain or Fever 6/13/21   Oswaldo Grace MD       REVIEW OF SYSTEMS    (2-9 systems for level 4, 10 or more for level 5) Review of Systems   Constitutional: Negative for fatigue and fever. Respiratory: Negative for shortness of breath and wheezing. Cardiovascular: Negative for chest pain. Gastrointestinal: Negative for abdominal pain. Genitourinary: Negative for dysuria. Skin: Positive for wound (bee sting). Negative for rash. Itching     Neurological: Negative for syncope and headaches. Psychiatric/Behavioral: Negative for confusion. PHYSICAL EXAM   (up to 7 for level 4, 8 or more for level 5)      INITIAL VITALS:   /73   Pulse 55   Temp 98.9 °F (37.2 °C) (Oral)   Resp 18   Ht 4' 8\" (1.422 m)   Wt 95 lb (43.1 kg)   LMP 07/14/2021   SpO2 100%   BMI 21.30 kg/m²     Physical Exam  Constitutional:       General: She is not in acute distress. Appearance: She is not toxic-appearing. HENT:      Head: Normocephalic and atraumatic. Mouth/Throat:      Mouth: Mucous membranes are moist.      Pharynx: Oropharynx is clear. No oropharyngeal exudate or posterior oropharyngeal erythema. Cardiovascular:      Rate and Rhythm: Normal rate. Pulmonary:      Effort: No respiratory distress. Breath sounds: No wheezing, rhonchi or rales. Abdominal:      General: There is no distension. Tenderness: There is no abdominal tenderness. There is no guarding. Musculoskeletal:      Right lower leg: No edema. Left lower leg: No edema. Skin:     Findings: Rash (Small area of erythema overlying left medial ankle with no induration, no palpable abscess, no evidence of cellulitis.) present. Neurological:      Mental Status: She is alert. Motor: No weakness. Gait: Gait normal.         DIFFERENTIAL  DIAGNOSIS     PLAN (LABS / IMAGING / EKG):  No orders of the defined types were placed in this encounter.       MEDICATIONS ORDERED:  Orders Placed This Encounter   Medications    DISCONTD: hydrOXYzine (VISTARIL) injection 50 mg    hydrOXYzine (ATARAX) 25 MG tablet     Sig: Take 1

## 2021-07-23 NOTE — ED TRIAGE NOTES
Pt presents to ED c/o bee sting on L ankle that pt reports wasn't sure if it was a bee, but reports that it must have been a bee. Pt reports having anaphylactic reaction to bees in the past. Pt is unsure of exactly what time she was stung. Pt maintaining airway, RR even and unlabored, NAD noted.

## 2021-07-24 ENCOUNTER — HOSPITAL ENCOUNTER (EMERGENCY)
Age: 31
Discharge: HOME | End: 2021-07-24
Attending: EMERGENCY MEDICINE
Payer: MEDICAID

## 2021-07-24 VITALS
TEMPERATURE: 98.1 F | DIASTOLIC BLOOD PRESSURE: 68 MMHG | SYSTOLIC BLOOD PRESSURE: 123 MMHG | OXYGEN SATURATION: 97 % | HEIGHT: 56 IN | BODY MASS INDEX: 21.37 KG/M2 | WEIGHT: 95 LBS | RESPIRATION RATE: 16 BRPM | HEART RATE: 75 BPM

## 2021-07-24 VITALS
SYSTOLIC BLOOD PRESSURE: 97 MMHG | OXYGEN SATURATION: 97 % | WEIGHT: 95 LBS | RESPIRATION RATE: 22 BRPM | DIASTOLIC BLOOD PRESSURE: 61 MMHG | HEART RATE: 87 BPM | TEMPERATURE: 98.2 F | HEIGHT: 56 IN | BODY MASS INDEX: 21.37 KG/M2

## 2021-07-24 DIAGNOSIS — N93.9 VAGINAL BLEEDING: Primary | ICD-10-CM

## 2021-07-24 DIAGNOSIS — N93.8 DUB (DYSFUNCTIONAL UTERINE BLEEDING): Primary | ICD-10-CM

## 2021-07-24 LAB
ABO/RH: NORMAL
ABSOLUTE EOS #: 0.24 K/UL (ref 0–0.44)
ABSOLUTE IMMATURE GRANULOCYTE: 0.06 K/UL (ref 0–0.3)
ABSOLUTE LYMPH #: 2.97 K/UL (ref 1.1–3.7)
ABSOLUTE MONO #: 0.69 K/UL (ref 0.1–1.2)
ANION GAP SERPL CALCULATED.3IONS-SCNC: 13 MMOL/L (ref 9–17)
ANTIBODY SCREEN: NEGATIVE
ARM BAND NUMBER: NORMAL
BASOPHILS # BLD: 0 % (ref 0–2)
BASOPHILS ABSOLUTE: 0.05 K/UL (ref 0–0.2)
BUN BLDV-MCNC: 15 MG/DL (ref 6–20)
BUN/CREAT BLD: 20 (ref 9–20)
CALCIUM SERPL-MCNC: 9.1 MG/DL (ref 8.6–10.4)
CHLORIDE BLD-SCNC: 99 MMOL/L (ref 98–107)
CO2: 23 MMOL/L (ref 20–31)
CREAT SERPL-MCNC: 0.75 MG/DL (ref 0.5–0.9)
DIFFERENTIAL TYPE: ABNORMAL
EOSINOPHILS RELATIVE PERCENT: 2 % (ref 1–4)
EXPIRATION DATE: NORMAL
GFR AFRICAN AMERICAN: >60 ML/MIN
GFR NON-AFRICAN AMERICAN: >60 ML/MIN
GFR SERPL CREATININE-BSD FRML MDRD: ABNORMAL ML/MIN/{1.73_M2}
GFR SERPL CREATININE-BSD FRML MDRD: ABNORMAL ML/MIN/{1.73_M2}
GLUCOSE BLD-MCNC: 122 MG/DL (ref 70–99)
HCG QUALITATIVE: NEGATIVE
HCT VFR BLD CALC: 39.9 % (ref 36.3–47.1)
HEMOGLOBIN: 12.9 G/DL (ref 11.9–15.1)
IMMATURE GRANULOCYTES: 1 %
INR BLD: 1
LYMPHOCYTES # BLD: 26 % (ref 24–43)
MCH RBC QN AUTO: 30.4 PG (ref 25.2–33.5)
MCHC RBC AUTO-ENTMCNC: 32.3 G/DL (ref 28.4–34.8)
MCV RBC AUTO: 94.1 FL (ref 82.6–102.9)
MONOCYTES # BLD: 6 % (ref 3–12)
NRBC AUTOMATED: 0 PER 100 WBC
PARTIAL THROMBOPLASTIN TIME: 28.5 SEC (ref 23.9–33.8)
PDW BLD-RTO: 12.8 % (ref 11.8–14.4)
PLATELET # BLD: 197 K/UL (ref 138–453)
PLATELET ESTIMATE: ABNORMAL
PMV BLD AUTO: 10.3 FL (ref 8.1–13.5)
POTASSIUM SERPL-SCNC: 4 MMOL/L (ref 3.7–5.3)
PREGNANCY TEST URINE, POC: NORMAL
PROTHROMBIN TIME: 12.7 SEC (ref 11.5–14.2)
RBC # BLD: 4.24 M/UL (ref 3.95–5.11)
RBC # BLD: ABNORMAL 10*6/UL
SEG NEUTROPHILS: 65 % (ref 36–65)
SEGMENTED NEUTROPHILS ABSOLUTE COUNT: 7.42 K/UL (ref 1.5–8.1)
SODIUM BLD-SCNC: 135 MMOL/L (ref 135–144)
WBC # BLD: 11.4 K/UL (ref 3.5–11.3)
WBC # BLD: ABNORMAL 10*3/UL

## 2021-07-24 PROCEDURE — 84703 CHORIONIC GONADOTROPIN ASSAY: CPT

## 2021-07-24 PROCEDURE — 99283 EMERGENCY DEPT VISIT LOW MDM: CPT

## 2021-07-24 PROCEDURE — 99282 EMERGENCY DEPT VISIT SF MDM: CPT

## 2021-07-24 PROCEDURE — 2580000003 HC RX 258: Performed by: EMERGENCY MEDICINE

## 2021-07-24 PROCEDURE — 86900 BLOOD TYPING SEROLOGIC ABO: CPT

## 2021-07-24 PROCEDURE — 86850 RBC ANTIBODY SCREEN: CPT

## 2021-07-24 PROCEDURE — 85730 THROMBOPLASTIN TIME PARTIAL: CPT

## 2021-07-24 PROCEDURE — 85025 COMPLETE CBC W/AUTO DIFF WBC: CPT

## 2021-07-24 PROCEDURE — 80048 BASIC METABOLIC PNL TOTAL CA: CPT

## 2021-07-24 PROCEDURE — 85610 PROTHROMBIN TIME: CPT

## 2021-07-24 PROCEDURE — 86901 BLOOD TYPING SEROLOGIC RH(D): CPT

## 2021-07-24 RX ORDER — 0.9 % SODIUM CHLORIDE 0.9 %
1000 INTRAVENOUS SOLUTION INTRAVENOUS ONCE
Status: COMPLETED | OUTPATIENT
Start: 2021-07-24 | End: 2021-07-24

## 2021-07-24 RX ADMIN — SODIUM CHLORIDE 1000 ML: 9 INJECTION, SOLUTION INTRAVENOUS at 20:09

## 2021-07-24 ASSESSMENT — ENCOUNTER SYMPTOMS
SHORTNESS OF BREATH: 0
EYE REDNESS: 0
RHINORRHEA: 0
COUGH: 0
DIARRHEA: 0
COUGH: 0
NAUSEA: 0
CHEST TIGHTNESS: 0
SHORTNESS OF BREATH: 0
ABDOMINAL PAIN: 0
VOMITING: 0
SHORTNESS OF BREATH: 0
VOMITING: 0
COLOR CHANGE: 0
CONSTIPATION: 0
SORE THROAT: 0
VOMITING: 0
BACK PAIN: 0
DIARRHEA: 0
EYE DISCHARGE: 0
COLOR CHANGE: 0

## 2021-07-24 ASSESSMENT — PAIN DESCRIPTION - PAIN TYPE: TYPE: ACUTE PAIN

## 2021-07-24 ASSESSMENT — PAIN DESCRIPTION - LOCATION: LOCATION: ABDOMEN

## 2021-07-24 ASSESSMENT — PAIN SCALES - GENERAL: PAINLEVEL_OUTOF10: 10

## 2021-07-24 NOTE — ED PROVIDER NOTES
[facility] ED     Emergency Department     Faculty Attestation    I performed a history and physical examination of the patient and discussed management with the resident. I reviewed the residents note and agree with the documented findings and plan of care. Any areas of disagreement are noted on the chart. I was personally present for the key portions of any procedures. I have documented in the chart those procedures where I was not present during the key portions. I have reviewed the emergency nurses triage note. I agree with the chief complaint, past medical history, past surgical history, allergies, medications, social and family history as documented unless otherwise noted below. For Physician Assistant/ Nurse Practitioner cases/documentation I have personally evaluated this patient and have completed at least one if not all key elements of the E/M (history, physical exam, and MDM). Additional findings are as noted. Patient presents with abnormal vaginal bleeding that she says is heavier and earlier than normal for her. She denies any abdominal pain or cramping. She says she has passed a couple of clots. She says she has had a tubal ligation. On exam, patient is resting comfortably in the bed. She appears well. Abdomen is soft and nontender.   The resident will offer to perform a pelvic exam.  Will check a pregnancy test.      Julian Connolly MD  Attending Emergency  Physician              Oksana Jimenez MD  07/24/21 5196

## 2021-07-24 NOTE — ED NOTES
Pt to ED c/o abnormal vaginal bleeding. Pt reports she has regular periods and it's too early to get hers. Pt reports that it started last night; she felt the urge to pee and filled a cup full of blood. Pt presents upset because she was seen at [facility] today but reports not much was done for her.         Valentino Shan, RN  07/24/21 1944

## 2021-07-24 NOTE — ED PROVIDER NOTES
[facility]  Emergency Medicine Department    Pt Name: Umu Ferreira  MRN: [id number]  [place] 1990  Date of evaluation: 7/23/2021  Provider: Renetta Hyman MD    CHIEF COMPLAINT     Chief Complaint   Patient presents with   [address]     occured this a.m.; L medial ankle       HISTORY OF PRESENT ILLNESS  (Location/Symptom, Timing/Onset, Context/Setting,Quality, Duration, Modifying Factors, Severity.)   Umu Ferreira is a 26 y.o. female who presents to the emergency department complaining of a bee sting to her left ankle. It happened around 5 AM this morning. She states she has had difficulty walking on it due to pain and swelling. She rates the pain as a 10 out of 10. She denies any fevers. She has no other complaints at this time. Nursing Notes were reviewed. ALLERGIES     Bee venom, Diclofenac, Morphine, Nicotine, Pcn [penicillins], and Peanut-containing drug products    CURRENT MEDICATIONS       Discharge Medication List as of 7/23/2021 10:34 PM          PAST MEDICAL HISTORY         Diagnosis Date    Asthma     [facility] ([facility]     Cervical dysplasia     COPD (chronic obstructive pulmonary disease) ([facility]     Migraine 11/11/2011    Movement disorder     Seizures ([facility] [address]        SURGICAL HISTORY           Procedure Laterality Date    APPENDECTOMY      CHOLECYSTECTOMY  2007    at [address] [facility]. S.W HISTORY           Problem Relation Age of Onset    Diabetes Mother         G.Parents    Hypertension Mother         G.Parents    Diabetes Father     Hypertension Father     Stroke Father         G.Parents    Cancer Other         G.Parents, Pancrease and ??    Coronary Art Dis Other         G.Parents     Family Status   Relation Name Status    Mother  (Not Specified)    Father  (Not Specified)    Other  (Not Specified)    Other  (Not Specified)        SOCIAL HISTORY      reports that she has been smoking cigarettes.  She has a 2.75 pack-year smoking history. She has never used smokeless tobacco. She reports previous drug use. Drugs: Opiates  and Marijuana. She reports that she does not drink alcohol. REVIEW OF SYSTEMS    (2-9 systems for level 4, 10 or more for level 5)     Review of Systems   Constitutional: Negative for fever. Respiratory: Negative for shortness of breath. Gastrointestinal: Negative for nausea and vomiting. Musculoskeletal: Positive for arthralgias. Skin: Positive for rash. Allergic/Immunologic: Negative for immunocompromised state. Neurological: Negative for weakness and numbness. All other systems reviewed and are negative. PHYSICAL EXAM    (up to 7 for level 4, 8 or more for level 5)     ED Triage Vitals [07/23/21 2134]   BP Temp Temp Source Pulse Resp SpO2 Height Weight   102/63 98.2 °F (36.8 °C) Oral 64 16 97 % 4' 8\" (1.422 m) 95 lb (43.1 kg)       Physical Exam  Vitals and nursing note reviewed. Constitutional:       General: She is not in acute distress. Appearance: Normal appearance. She is not ill-appearing. HENT:      Head: Normocephalic and atraumatic. Nose: Nose normal.      Mouth/Throat:      Mouth: Mucous membranes are moist.   Eyes:      Extraocular Movements: Extraocular movements intact. Pulmonary:      Effort: Pulmonary effort is normal. No respiratory distress. Musculoskeletal:         General: Swelling (left medial ankle) and tenderness (left medial ankle) present. Cervical back: Normal range of motion and neck supple. Skin:     General: Skin is warm and dry. Findings: Erythema (left medial ankle) present. Neurological:      Mental Status: She is alert and oriented to person, place, and time.    Psychiatric:         Mood and Affect: Mood normal.         Behavior: Behavior normal.         DIAGNOSTIC RESULTS     RADIOLOGY:   Non-plain film images such as CT, Ultrasound and MRI are read by theradiologist. Plain radiographic images are visualized and preliminarily interpreted by the emergency physician with the below findings:    None indicated     ED BEDSIDE ULTRASOUND:   Performed by ED Physician - none    LABS:  Labs Reviewed - No data to display    All other labs were within normal range or not returned as of this dictation. EMERGENCYDEPARTMENT COURSE and DIFFERENTIAL DIAGNOSIS/MDM:   Vitals:    Vitals:    07/23/21 2134   BP: 102/63   Pulse: 64   Resp: 16   Temp: 98.2 °F (36.8 °C)   TempSrc: Oral   SpO2: 97%   Weight: 95 lb (43.1 kg)   Height: 4' 8\" (1.422 m)     77-year-old female presenting with swelling and tenderness to the left medial ankle after a bee sting earlier this morning. No evidence of infection on exam.  No sign of systemic reaction. Will recommend ice and elevation. CONSULTS:  None    PROCEDURES:  None indicated    FINAL IMPRESSION     1. Bee sting, accidental or unintentional, initial encounter          DISPOSITION/PLAN   DISPOSITION Decision To Discharge 07/23/2021 10:18:13 PM    PATIENT REFERRED TO:   No follow-up provider specified.   DISCHARGE MEDICATIONS:     Discharge Medication List as of 7/23/2021 10:34 PM        (Please note that portions of this note were completed with a voice recognition program.  Efforts were made to edit the dictations butoccasionally words are mis-transcribed.)    Devin Barajas MD  Attending Emergency Physician          Devin Barajas MD  07/24/21 1304
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
## 2021-07-24 NOTE — ED NOTES
Bed: 30  Expected date:   Expected time:   Means of arrival:   Comments:     Saritha iDas RN  07/24/21 6290

## 2021-07-24 NOTE — ED NOTES
Pt updated on pregnancy test result. Pt states she will follow up with her OB so she will be leaving AMA. Pt wheeled out of the department in personal [facility] with boyfriend. Resident notified.       Anthony Rodarte RN  07/24/21 8853

## 2021-07-24 NOTE — ED PROVIDER NOTES
[facility] ED  Emergency Department Encounter  Emergency Medicine Resident     Pt Name: Rosemary Hurd  MRN: [id number]  [place] 1990  Date of evaluation: 7/24/21  PCP:  No primary care provider on file. CHIEF COMPLAINT       Chief Complaint   Patient presents with    Vaginal Bleeding       HISTORY OFPRESENT ILLNESS  (Location/Symptom, Timing/Onset, Context/Setting, Quality, Duration, Modifying Factors,Severity.)      Rosemary Hurd is a 26 y. o.yo female who presents with complaints of vaginal bleeding. Patient states she has a history of irregular periods and had her last menstrual cycle on 7/1. Patient states she started bleeding last night, denies passing any large clots. She has no pain, nausea vomiting or lightheadedness. Patient denies any urinary symptoms. Patient states she does not believe she is pregnant. Patient does report to recent intercourse. PAST MEDICAL / SURGICAL / SOCIAL / FAMILY HISTORY      has a past medical history of Asthma, Bipolar disorder ([facility] [address] Cervical dysplasia, COPD (chronic obstructive pulmonary disease) ([facility] [address] [facility] Migraine, Movement disorder, and Seizures ([facility] [address] has a past surgical history that includes Cholecystectomy (2007); Tubal ligation; and Appendectomy. Social History     Socioeconomic History    Marital status:      Spouse name: Not on file    Number of children: Not on file    Years of education: Not on file    Highest education level: Not on file   Occupational History    Not on file   Tobacco Use    Smoking status: Current Every Day Smoker     Packs/day: 0.25     Years: 11.00     Pack years: 2.75     Types: Cigarettes    Smokeless tobacco: Never Used   Vaping Use    Vaping Use: Never used   Substance and Sexual Activity    Alcohol use: Never     Alcohol/week: 0.0 standard drinks    Drug use: Not Currently     Types: Opiates , Marijuana     Comment: Hx Percocet abuse sober for 2 years.     Sexual activity: Not on bleeding. Negative for difficulty urinating, dysuria, flank pain, urgency, vaginal discharge and vaginal pain. Musculoskeletal: Negative for back pain, neck pain and neck stiffness. Skin: Negative for color change, pallor and rash. Neurological: Negative for dizziness, tremors, speech difficulty, weakness, light-headedness and headaches. PHYSICAL EXAM   (up to 7 for level 4, 8 or more forlevel 5)      INITIAL VITALS:   ED Triage Vitals [07/24/21 0643]   BP Temp Temp Source Pulse Resp SpO2 Height Weight   123/68 98.1 °F (36.7 °C) Oral 75 16 97 % 4' 8\" (1.422 m) 95 lb (43.1 kg)       Physical Exam  Constitutional:       General: She is not in acute distress. Appearance: Normal appearance. She is not ill-appearing. HENT:      Head: Normocephalic and atraumatic. Right Ear: External ear normal.      Left Ear: External ear normal.      Nose: Nose normal. No rhinorrhea. Eyes:      General: No scleral icterus. Extraocular Movements: Extraocular movements intact. Pupils: Pupils are equal, round, and reactive to light. Cardiovascular:      Rate and Rhythm: Normal rate and regular rhythm. Pulmonary:      Effort: Pulmonary effort is normal. No respiratory distress. Comments: Patient unwilling for physical exam, speaking in full sentences, no obvious respiratory distress  Musculoskeletal:         General: No swelling. Normal range of motion. Cervical back: Normal range of motion. Skin:     General: Skin is warm and dry. Neurological:      General: No focal deficit present. Mental Status: She is alert and oriented to person, place, and time. Motor: No weakness. DIFFERENTIAL  DIAGNOSIS     PLAN (LABS / IMAGING / EKG):  Orders Placed This Encounter   Procedures    POCT Urine Pregnancy       MEDICATIONS ORDERED:  No orders of the defined types were placed in this encounter.       DDX: Pregnancy, vaginal bleeding, irregular menstrual cycle, postcoital bleeding    Initial MDM/Plan: 26 y.o. female who presents with vaginal bleeding. She denies any pain, cramps dysuria lightheadedness or dizziness. Patient states she is not pregnant. Patient is refusing a close physical exam at this time but she appears in no acute distress, able to speak in full sentences, ambulating without difficulty and vital signs are stable. We will plan for urine pregnancy and offered a pelvic exam.    DIAGNOSTIC RESULTS / EMERGENCYDEPARTMENT COURSE / MDM     LABS:  Labs Reviewed   POCT URINE PREGNANCY - Normal         RADIOLOGY:  No results found. EKG      All EKG's are interpreted by the Emergency Department Physicianwho either signs or Co-signs this chart in the absence of a cardiologist.    EMERGENCY DEPARTMENT COURSE:  ED Course as of Jul 24 0721   Sat Jul 24, 2021   0329 Patient seen on room arrival, she is currently cuddling with her significant other in the bed, refusing to have the lights turned on and that exam    [CD]   [phone] Pregnancy, Urine: NEG [CD]   0008 Patient is all negative pregnancy test and stated she wanted to leave AMA. Patient discussed with the nurse that she was leaving and that she would follow-up with her OB/GYN.    [CD]      ED Course User Index  [CD] Iram Barrett DO          PROCEDURES:  None    CONSULTS:  None    CRITICAL CARE:  Please see attending documentation    FINAL IMPRESSION      1. Vaginal bleeding          DISPOSITION / PLAN     DISPOSITION [place] 07/24/2021 07:16:45 AM      PATIENT REFERRED TO:  [facility]  [address] [facility] [address] [phone]  Go to   If symptoms worsen      DISCHARGE MEDICATIONS:  There are no discharge medications for this patient.       Iram Barrett DO  Emergency Medicine Resident    (Please note that portions of this note were completed with a voice recognition program.Efforts were made to edit the dictations but occasionally words are mis-transcribed.)        Iram Barrett DO  Resident  07/24/21 1024

## 2021-07-25 ENCOUNTER — HOSPITAL ENCOUNTER (EMERGENCY)
Age: 31
Discharge: LEFT BEFORE DISCHARGE | End: 2021-07-25
Payer: MEDICAID

## 2021-07-25 NOTE — ED NOTES
Writer at bedside with Dr. Tarsha Ferrera for pelvic exam. Pt tolerated well.       Elvis Ramos RN  07/24/21 4130

## 2021-07-25 NOTE — ED NOTES
Pt reports her migraine is better and does not wish to be seen.       Xavi Martinez RN  07/25/21 6425

## 2021-07-27 ENCOUNTER — HOSPITAL ENCOUNTER (EMERGENCY)
Age: 31
Discharge: LEFT BEFORE DISCHARGE | End: 2021-07-27
Attending: EMERGENCY MEDICINE
Payer: MEDICAID

## 2021-07-27 VITALS
OXYGEN SATURATION: 96 % | SYSTOLIC BLOOD PRESSURE: 121 MMHG | DIASTOLIC BLOOD PRESSURE: 68 MMHG | BODY MASS INDEX: 21.37 KG/M2 | RESPIRATION RATE: 20 BRPM | TEMPERATURE: 98.3 F | HEART RATE: 76 BPM | HEIGHT: 56 IN | WEIGHT: 95 LBS

## 2021-07-27 DIAGNOSIS — K52.9 GASTROENTERITIS: ICD-10-CM

## 2021-07-27 DIAGNOSIS — R11.0 NAUSEA: Primary | ICD-10-CM

## 2021-07-27 PROCEDURE — 6370000000 HC RX 637 (ALT 250 FOR IP): Performed by: HEALTH CARE PROVIDER

## 2021-07-27 PROCEDURE — 99281 EMR DPT VST MAYX REQ PHY/QHP: CPT

## 2021-07-27 RX ORDER — ONDANSETRON 4 MG/1
4 TABLET, ORALLY DISINTEGRATING ORAL ONCE
Status: COMPLETED | OUTPATIENT
Start: 2021-07-27 | End: 2021-07-27

## 2021-07-27 RX ADMIN — ONDANSETRON 4 MG: 4 TABLET, ORALLY DISINTEGRATING ORAL at 05:32

## 2021-07-27 NOTE — ED PROVIDER NOTES
[facility]  ED  Emergency Department Encounter  EmergencyMedicine Resident     Pt Name:Janet Stovall  MRN: [id number]  [place] 1990  Date of evaluation: 7/27/21  PCP:  No primary care provider on file. This patient was evaluated in the Emergency Department for symptoms described in the history of present illness. The patient was evaluated in the context of the global COVID-19 pandemic, which necessitated consideration that the patient might be at risk for infection with the SARS-CoV-2 virus that causes COVID-19. Institutional protocols and algorithms that pertain to the evaluation of patients at risk for COVID-19 are in a state of rapid change based on information released by regulatory bodies including the CDC and federal and state organizations. These policies and algorithms were followed during the patient's care in the ED. CHIEF COMPLAINT       Chief Complaint   Patient presents with    Nausea       HISTORY OF PRESENT ILLNESS  (Location/Symptom, Timing/Onset, Context/Setting, Quality, Duration, Modifying Factors, Severity.)      Melanie Santizo is a 26 y.o. female who suffers from bipolar disorder and homelessness and visits the ED frequently. Today she presents with nausea and vomiting, she states she had x3 turkey sandwiches from a shelter earlier in the day that were suspect. Started having nausea through the night and vomited x1 at 0400. Vomiting has not relieved her nausea. No other concerns at this time, concerned for vaginal bleeding which she was seen earlier in the week for. Says she is still bleeding but does not to wish to address this today, says she will come back. PAST MEDICAL / SURGICAL / SOCIAL / FAMILY HISTORY      has a past medical history of Asthma, Bipolar disorder ([address] Cervical dysplasia, COPD (chronic obstructive pulmonary disease) ([address] Migraine, Movement disorder, and Seizures ([address]      has a past surgical history that includes Other         G.Parents, Pancrease and ??    Coronary Art Dis Other         G.Parents       Allergies:  Bee venom, Diclofenac, Morphine, Nicotine, Pcn [penicillins], and Peanut-containing drug products    Home Medications:  Prior to Admission medications    Not on File       REVIEW OF SYSTEMS    (2-9 systems for level 4, 10 or more for level 5)      Review of Systems   Constitutional: Negative for appetite change and fever. Respiratory: Negative for cough and shortness of breath. Cardiovascular: Negative for chest pain. Gastrointestinal: Positive for abdominal pain, nausea and vomiting. Negative for constipation and diarrhea. Endocrine: Positive for cold intolerance. Genitourinary: Positive for vaginal bleeding. Negative for dysuria. Skin: Negative for color change. Neurological: Negative for headaches. Psychiatric/Behavioral: Positive for agitation and sleep disturbance. The patient is nervous/anxious and is hyperactive. PHYSICAL EXAM   (up to 7 for level 4, 8 or more for level 5)      INITIAL VITALS:   /68   Pulse 76   Temp 98.3 °F (36.8 °C)   Resp 20   Ht 4' 8\" (1.422 m)   Wt 95 lb (43.1 kg)   LMP 07/14/2021   SpO2 96%   BMI 21.30 kg/m²     Physical Exam  Vitals reviewed. Constitutional:       General: She is not in acute distress. Appearance: She is not ill-appearing. Cardiovascular:      Rate and Rhythm: Normal rate and regular rhythm. Pulses: Normal pulses. Heart sounds: Normal heart sounds. Pulmonary:      Effort: Pulmonary effort is normal.      Breath sounds: Normal breath sounds. Abdominal:      General: Abdomen is flat. Palpations: Abdomen is soft. Tenderness: There is abdominal tenderness. Musculoskeletal:         General: Normal range of motion. Skin:     General: Skin is warm and dry. Capillary Refill: Capillary refill takes less than 2 seconds. Neurological:      General: No focal deficit present.       Mental Status: She is alert and oriented to person, place, and time. Psychiatric:         Attention and Perception: She is inattentive. Speech: Speech is rapid and pressured. Behavior: Behavior is aggressive and hyperactive. INITIAL IMPRESSION / DIFFERENTIAL  DIAGNOSIS / PLAN     INITIAL IMPRESSION / DDX:   Nausea and vomiting after eating suspect food, no fever, no blood. EMERGENCY DEPARTMENT COURSE:  ED Course as of Jul 28 0754   Tue Jul 27, 2021   0513 Ate turkey sandwiches earlier that were suspect, nausea, then vomited x1, nausea worse after vomiting.     [SM]   2931 Also concerned for vaginal bleeding that she was seen for last week, but would like to address that another time. []   0920 S/o in bed with her on entry to room, asked for him to move to chair to complete exam, cooperative    []   [phone] When told that SO could not stay in bed with her she left    []      ED Course User Index  [] Thais Haddad MD       PLAN (LABS / IMAGING / EKG):  No orders of the defined types were placed in this encounter. MEDICATIONS ORDERED:  Orders Placed This Encounter   Medications    ondansetron (ZOFRAN-ODT) disintegrating tablet 4 mg       DIAGNOSTIC RESULTS / PROCEDURES / CONSULTS       FINAL IMPRESSION      1. Nausea    2. Gastroenteritis          DISPOSITION / PLAN     DISPOSITION Eloped - Left Before Treatment Complete 07/27/2021 05:37:56 AM    Became verbally aggressive and left when told SO needed to stay in the chair rather than in bed with her. PATIENT REFERRED TO:  No follow-up provider specified. DISCHARGE MEDICATIONS:  There are no discharge medications for this patient.       Lulu Klein MD  Emergency Medicine Resident    (Please note that portions of thisnote were completed with a voice recognition program.  Efforts were made to edit the dictations but occasionally words are mis-transcribed.)       Thais Haddad MD  Resident  07/28/21 3823

## 2021-07-27 NOTE — ED TRIAGE NOTES
Pt is taken to room 2 and is lying in bed with her BF when this writer walks in. Pt states she is nauseous and threw up once. Pt states it may be something she ate. Absolutely no distress.  Dr. Minh Boo in to eval.

## 2021-07-28 ASSESSMENT — ENCOUNTER SYMPTOMS
COLOR CHANGE: 0
DIARRHEA: 0
COUGH: 0
ABDOMINAL PAIN: 1
SHORTNESS OF BREATH: 0
VOMITING: 1
NAUSEA: 1
CONSTIPATION: 0

## 2021-07-29 ENCOUNTER — HOSPITAL ENCOUNTER (EMERGENCY)
Age: 31
Discharge: HOME | End: 2021-07-29
Attending: EMERGENCY MEDICINE
Payer: MEDICAID

## 2021-07-29 ENCOUNTER — APPOINTMENT (OUTPATIENT)
Dept: GENERAL RADIOLOGY | Age: 31
End: 2021-07-29
Payer: MEDICAID

## 2021-07-29 VITALS
HEIGHT: 56 IN | SYSTOLIC BLOOD PRESSURE: 106 MMHG | WEIGHT: 95 LBS | RESPIRATION RATE: 14 BRPM | HEART RATE: 87 BPM | BODY MASS INDEX: 21.37 KG/M2 | TEMPERATURE: 98.1 F | OXYGEN SATURATION: 96 % | DIASTOLIC BLOOD PRESSURE: 58 MMHG

## 2021-07-29 DIAGNOSIS — S39.012A BACK STRAIN, INITIAL ENCOUNTER: Primary | ICD-10-CM

## 2021-07-29 LAB — HCG QUALITATIVE: NEGATIVE

## 2021-07-29 PROCEDURE — 84703 CHORIONIC GONADOTROPIN ASSAY: CPT

## 2021-07-29 PROCEDURE — 72100 X-RAY EXAM L-S SPINE 2/3 VWS: CPT

## 2021-07-29 PROCEDURE — 99283 EMERGENCY DEPT VISIT LOW MDM: CPT

## 2021-07-29 PROCEDURE — 72072 X-RAY EXAM THORAC SPINE 3VWS: CPT

## 2021-07-29 RX ORDER — ACETAMINOPHEN 500 MG
1000 TABLET ORAL ONCE
Status: DISCONTINUED | OUTPATIENT
Start: 2021-07-29 | End: 2021-07-29 | Stop reason: HOSPADM

## 2021-07-29 ASSESSMENT — PAIN DESCRIPTION - PROGRESSION: CLINICAL_PROGRESSION: NOT CHANGED

## 2021-07-29 ASSESSMENT — PAIN DESCRIPTION - PAIN TYPE: TYPE: ACUTE PAIN

## 2021-07-29 ASSESSMENT — ENCOUNTER SYMPTOMS
SHORTNESS OF BREATH: 0
BACK PAIN: 1
COUGH: 0
ABDOMINAL PAIN: 0

## 2021-07-29 ASSESSMENT — PAIN DESCRIPTION - ONSET: ONSET: ON-GOING

## 2021-07-29 ASSESSMENT — PAIN SCALES - GENERAL
PAINLEVEL_OUTOF10: 10
PAINLEVEL_OUTOF10: 10

## 2021-07-29 ASSESSMENT — PAIN DESCRIPTION - DESCRIPTORS: DESCRIPTORS: ACHING

## 2021-07-29 ASSESSMENT — PAIN DESCRIPTION - LOCATION: LOCATION: BACK

## 2021-07-29 ASSESSMENT — PAIN DESCRIPTION - FREQUENCY: FREQUENCY: CONTINUOUS

## 2021-07-29 NOTE — ED NOTES
Pt arrives to ED via self. Pt is very well known to the ED. Pt complains of back pain today. Pt VSS. rr even and unlabored.      Jennie Alberto RN  07/29/21 0992

## 2021-07-29 NOTE — ED PROVIDER NOTES
[address] [facility]     Emergency Department     Faculty Attestation    I performed a history and physical examination of the patient and discussed management with the resident. I have reviewed and agree with the residents findings including all diagnostic interpretations, and treatment plans as written. Any areas of disagreement are noted on the chart. I was personally present for the key portions of any procedures. I have documented in the chart those procedures where I was not present during the key portions. I have reviewed the emergency nurses triage note. I agree with the chief complaint, past medical history, past surgical history, allergies, medications, social and family history as documented unless otherwise noted below. Documentation of the HPI, Physical Exam and Medical Decision Making performed by [email or web address] is based on my personal performance of the HPI, PE and MDM. For Physician Assistant/ Nurse Practitioner cases/documentation I have personally evaluated this patient and have completed at least one if not all key elements of the E/M (history, physical exam, and MDM). Additional findings are as noted. 25 yo F c./o falling back & hitting L lower back, no loc, no paresthesia, pt urinated 1 h prior to arrival  pe Boris RN escort for exam  gcs 15, vss, no cervical tenderness, crepitus or deformity, tender L of T10 - L4 no midline deformity, dorsiflexion symmetric bilateral lower extremities, sensation intact bilateral lower extremity,     xr T spine -, xr L spine -, vss, neuro intact,   Discharged,     EKG Interpretation    Interpreted by me      CRITICAL CARE: There was a high probability of clinically significant/life threatening deterioration in this patient's condition which required my urgent intervention. Total critical care time was 0 minutes. This excludes any time for separately reportable procedures.        Khanh 24, DO  07/29/21 1222 bOinna Brady, DO  07/29/21 0869

## 2021-07-29 NOTE — ED NOTES
Pt yelling in room about not receiving pain medications. When writer said he can give tylenol pt became agitated and swearing at [facility]. Writer gave DC papers to pt and left the room.      Alcira Tolbert RN  07/29/21 4571

## 2021-07-29 NOTE — ED PROVIDER NOTES
[facility]  ED  Emergency Department Encounter  EmergencyMedicine Resident     Pt Name:Danielle Ladd Runner  MRN: [id number]  [place] 1990  Date of evaluation: 7/29/21  PCP:  No primary care provider on file. This patient was evaluated in the Emergency Department for symptoms described in the history of present illness. The patient was evaluated in the context of the global COVID-19 pandemic, which necessitated consideration that the patient might be at risk for infection with the SARS-CoV-2 virus that causes COVID-19. Institutional protocols and algorithms that pertain to the evaluation of patients at risk for COVID-19 are in a state of rapid change based on information released by regulatory bodies including the CDC and federal and state organizations. These policies and algorithms were followed during the patient's care in the ED. CHIEF COMPLAINT       Chief Complaint   Patient presents with    Back Pain       HISTORY OF PRESENT ILLNESS  (Location/Symptom, Timing/Onset, Context/Setting, Quality, Duration, Modifying Factors, Severity.)      Gina Lundberg is a 26 y.o. female well known to the ED who presents for left sided back pain. She had a fall on the 23rd and landed on her butt/tailbone and has been ok until today when she went to sit down on pallets and kind of fell into a space in between. Then when she rolled to the left to get up had increased pain along the length of her spine on the left side and radiating into her left leg. PAST MEDICAL / SURGICAL / SOCIAL / FAMILY HISTORY      has a past medical history of Asthma, Bipolar disorder ([address] Cervical dysplasia, COPD (chronic obstructive pulmonary disease) ([address] Migraine, Movement disorder, and Seizures ([address] has a past surgical history that includes Cholecystectomy (2007); Tubal ligation; and Appendectomy.     Social History     Socioeconomic History    Marital status:      Spouse name: Not on file    [penicillins], and Peanut-containing drug products    Home Medications:  Prior to Admission medications    Not on File       REVIEW OF SYSTEMS    (2-9 systems for level 4, 10 or more for level 5)      Review of Systems   Constitutional: Negative for fever. Respiratory: Negative for cough and shortness of breath. Cardiovascular: Negative for chest pain. Gastrointestinal: Negative for abdominal pain. Genitourinary: Negative for decreased urine volume, dysuria and urgency. Musculoskeletal: Positive for back pain. Negative for gait problem and neck pain. Neurological: Negative for numbness and headaches. Psychiatric/Behavioral: The patient is hyperactive. PHYSICAL EXAM   (up to 7 for level 4, 8 or more for level 5)      INITIAL VITALS:   BP (!) 106/58   Pulse 87   Temp 98.1 °F (36.7 °C) (Oral)   Resp 14   Ht 4' 8\" (1.422 m)   Wt 95 lb (43.1 kg)   LMP 07/14/2021   SpO2 96%   BMI 21.30 kg/m²     Physical Exam  Constitutional:       General: She is not in acute distress. Eyes:      Extraocular Movements: Extraocular movements intact. Pupils: Pupils are equal, round, and reactive to light. Cardiovascular:      Rate and Rhythm: Normal rate and regular rhythm. Pulses: Normal pulses. Heart sounds: Normal heart sounds. Pulmonary:      Effort: Pulmonary effort is normal.      Breath sounds: Normal breath sounds. Abdominal:      General: Abdomen is flat. Palpations: Abdomen is soft. Musculoskeletal:         General: Tenderness and signs of injury present. No swelling or deformity. Cervical back: Normal range of motion and neck supple. Thoracic back: Tenderness present. No swelling, deformity or bony tenderness. Lumbar back: Spasms and tenderness present.  Positive left straight leg raise test.        Back:       Comments: Small bruise on thoracic spine  Spasm on left lower paraspinals  Reports decreased sensation throughout left leg - not following specific dermatome  Weak effort on plantar flexion on left due to pain     Skin:     General: Skin is warm and dry. Capillary Refill: Capillary refill takes less than 2 seconds. Neurological:      General: No focal deficit present. Mental Status: She is alert and oriented to person, place, and time. Sensory: Sensory deficit present. Gait: Gait normal.   Psychiatric:         Mood and Affect: Mood normal.       INITIAL IMPRESSION / DIFFERENTIAL  DIAGNOSIS / PLAN     INITIAL IMPRESSION / DDX:   27 yo emaciated female who presents to the ED multiple times per week. Today with muscle spasm on the left paraspinals, inconsistent reporting on strength and sensation in the left leg. EMERGENCY DEPARTMENT COURSE:  ED Course as of Jul 29 0841   Thu Jul 29, 2021   0135 Provided with warm blankets  Complete pregnancy test and plain films    []   0214 Imaging in progress    []      ED Course User Index  [SM] Chino Ford MD       PLAN (LABS / Flonnie Yarsanism / EKG):  Orders Placed This Encounter   Procedures    XR LUMBAR SPINE (2-3 VIEWS)    XR THORACIC SPINE (3 VIEWS)    HCG Qualitative, Serum    HCG Qualitative, Serum    POCT urine pregnancy       MEDICATIONS ORDERED:  Orders Placed This Encounter   Medications    DISCONTD: acetaminophen (TYLENOL) tablet 1,000 mg       DIAGNOSTIC RESULTS / PROCEDURES / CONSULTS   LAB RESULTS:  Results for orders placed or performed during the hospital encounter of 07/29/21   HCG Qualitative, Serum   Result Value Ref Range    hCG Qual NEGATIVE NEGATIVE       RADIOLOGY:  XR THORACIC SPINE (3 VIEWS)    Addendum Date: 7/29/2021    ADDENDUM: The Impression should read, \"Unremarkable radiographic views of the thoracic and lumbar spine. \"     Result Date: 7/29/2021  EXAMINATION: THREE XRAY VIEWS OF THE THORACIC SPINE; THREE XRAY VIEWS OF THE LUMBAR SPINE 7/29/2021 1:35 am COMPARISON: CT thoracic spine without contrast December 12, 2020.   CT cervical spine without Bone mineralization is within normal limits. Paraspinal soft tissues are unremarkable in appearance. Lumbar spine: The lumbar spine demonstrates normal lordosis and alignment. Vertebral body heights are normal.  No evidence of acute fracture, dislocation or subluxation is identified. Intervertebral joint spaces are preserved. Facet joints are unremarkable. Bone mineralization is within normal limits. Paravertebral soft tissues are unremarkable. Unremarkable radiographic views of the cervical and thoracic spine. FINAL IMPRESSION      1. Back strain, initial encounter          DISPOSITION / [address]    Discharged, will return if symptoms worsen    PATIENT REFERRED TO:  [address] [place] [id number] [phone]  Call in 1 day  TO ESTABLISH PRIMARY CARE      DISCHARGE MEDICATIONS:  There are no discharge medications for this patient.       Leidy Hylton MD  Emergency Medicine Resident    (Please note that portions of thisnote were completed with a voice recognition program.  Efforts were made to edit the dictations but occasionally words are mis-transcribed.)       Billy Sharma MD  Resident  07/29/21 2438

## 2021-07-30 NOTE — ED PROVIDER NOTES
[address] [facility]   Emergency Department  Faculty Attestation       I performed a history and physical examination of the patient and discussed management with the resident. I reviewed the residents note and agree with the documented findings including all diagnostic interpretations and plan of care. Any areas of disagreement are noted on the chart. I was personally present for the key portions of any procedures. I have documented in the chart those procedures where I was not present during the key portions. I have reviewed the emergency nurses triage note. I agree with the chief complaint, past medical history, past surgical history, allergies, medications, social and family history as documented unless otherwise noted below. Documentation of the HPI, Physical Exam and Medical Decision Making performed by [email or web address] is based on my personal performance of the HPI, PE and MDM. For Physician Assistant/ Nurse Practitioner cases/documentation I have personally evaluated this patient and have completed at least one if not all key elements of the E/M (history, physical exam, and MDM). Additional findings are as noted. Pertinent Comments     Primary Care Physician: No primary care provider on file. ED Triage Vitals [07/27/21 0504]   BP Temp Temp src Pulse Resp SpO2 Height Weight   121/68 98.3 °F (36.8 °C) -- 76 20 96 % 4' 8\" (1.422 m) 95 lb (43.1 kg)        History/Physical: This is a 26 y.o. female who presents to the Emergency Department with complaint of nausea and vomiting starting tonight. Patient is reluctant to answer majority of my questions as patient was upset that I asked her significant other to get out of the bed with her. On exam, patient is resting comfortably in bed was initially cuddling with her significant other and asleep when I entered the room. Wearing a complete 1 piece snow suit in bed. Normocephalic atraumatic with moist mucous membranes.   Heart sounds are regular lungs clear to auscultation. Abdomen was soft nontender nondistended. Patient was awake and alert with no confusio      MDM/Plan:   Nausea and vomiting  Benign abdomen  While in the emergency room, at this time we will try symptomatic treatment and p.o. challenge. However, patient significant other have been asked to leave the bed during the resident evaluation, I again reiterated during my evaluation of the there was only allowed to be one patient in the bed and that her significant other was welcome to pull the chair next to bed, but he could not be in bed with her. He did get out of the bed during my evaluation, however when I went back to check on patient is back in the bed again I once again informed him that he will need to get out of the bed or that he would be asked to leave this at this time only 1 person is allowing the bed. At that time patient became very irate stating that she has MS and he is keeping her warm. And that if she is cold and palpated the complaint of me telling her that I wanted her to have a seizure and die. I tried to reiterate that that was in fact not what I was applying and I offered to get her blankets, but I did warn her that she could become overheated since she was wearing a full snow suit in the middle of July. At that time patient continued to be very angry stating that she was going to memo us for allowing her to have basic rights.   And patient eloped from the emergency department      Critical Care: None    Oswaldo Soto MD  Attending Emergency Physician        Oswaldo Soto MD  07/30/21 1307

## 2021-08-01 ENCOUNTER — HOSPITAL ENCOUNTER (EMERGENCY)
Age: 31
Discharge: LEFT BEFORE DISCHARGE | End: 2021-08-01
Payer: MEDICAID

## 2021-08-01 VITALS
WEIGHT: 95 LBS | BODY MASS INDEX: 21.37 KG/M2 | RESPIRATION RATE: 20 BRPM | SYSTOLIC BLOOD PRESSURE: 104 MMHG | TEMPERATURE: 98.6 F | HEART RATE: 57 BPM | OXYGEN SATURATION: 100 % | HEIGHT: 56 IN | DIASTOLIC BLOOD PRESSURE: 57 MMHG

## 2021-08-01 ASSESSMENT — PAIN DESCRIPTION - LOCATION: LOCATION: BACK

## 2021-08-01 ASSESSMENT — PAIN SCALES - GENERAL: PAINLEVEL_OUTOF10: 10

## 2021-08-01 NOTE — ED TRIAGE NOTES
Patient presents with c/o back pain, states she just came from [facility] and \"they did nothing\".

## 2021-09-09 ENCOUNTER — HOSPITAL ENCOUNTER (EMERGENCY)
Age: 31
Discharge: LEFT BEFORE DISCHARGE | End: 2021-09-09
Attending: EMERGENCY MEDICINE
Payer: MEDICAID

## 2021-09-09 VITALS
SYSTOLIC BLOOD PRESSURE: 119 MMHG | OXYGEN SATURATION: 97 % | DIASTOLIC BLOOD PRESSURE: 67 MMHG | RESPIRATION RATE: 19 BRPM | TEMPERATURE: 97.9 F | HEART RATE: 69 BPM

## 2021-09-09 DIAGNOSIS — Z53.21 ELOPED FROM EMERGENCY DEPARTMENT: Primary | ICD-10-CM

## 2021-09-09 PROCEDURE — 99281 EMR DPT VST MAYX REQ PHY/QHP: CPT

## 2021-09-09 NOTE — ED NOTES
PT claiming to need a wheelchair. Able to ambulate without difficulty, move all extremities. Yelling at significant other. PT eloped from department at this time.       Anastasiya Thomas RN  09/09/21 1442
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
## 2021-09-09 NOTE — ED PROVIDER NOTES
[facility]     Emergency Department     Faculty Attestation    I performed a history and physical examination of the patient and discussed management with the resident. I have reviewed and agree with the residents findings including all diagnostic interpretations, and treatment plans as written. Any areas of disagreement are noted on the chart. I was personally present for the key portions of any procedures. I have documented in the chart those procedures where I was not present during the key portions. I have reviewed the emergency nurses triage note. I agree with the chief complaint, past medical history, past surgical history, allergies, medications, social and family history as documented unless otherwise noted below. Documentation of the HPI, Physical Exam and Medical Decision Making performed by [email or web address] is based on my personal performance of the HPI, PE and MDM. For Physician Assistant/ Nurse Practitioner cases/documentation I have personally evaluated this patient and have completed at least one if not all key elements of the E/M (history, physical exam, and MDM). Additional findings are as noted. 25 yo F c./o L back pain without injury, no fever, no paresthesia, no upper or lower extremity weakness,   pe Sheamus RN escort for exam:   Abdomen non tender, no distension, no rigidity, no mass,   Back mild tenderness L lateral to T 1- L 5, skin intact, no deformity, no crepitus, atraumatic appearance, no finding of infection, sensation intact upper lower extremities, strength intact upper lower extremities, ambulating with steady even gait,     -pt left prior to completing tx,     EKG Interpretation    Interpreted by me      CRITICAL CARE: There was a high probability of clinically significant/life threatening deterioration in this patient's condition which required my urgent intervention. Total critical care time was 0 minutes.   This excludes any time for separately reportable procedures.        [facility], [address]  09/09/21 Evens Henriquez

## 2021-10-24 ENCOUNTER — HOSPITAL ENCOUNTER (EMERGENCY)
Age: 31
Discharge: HOME | End: 2021-10-24
Attending: EMERGENCY MEDICINE
Payer: MEDICAID

## 2021-10-24 VITALS
BODY MASS INDEX: 20.58 KG/M2 | WEIGHT: 91.5 LBS | HEIGHT: 56 IN | OXYGEN SATURATION: 97 % | TEMPERATURE: 98.6 F | DIASTOLIC BLOOD PRESSURE: 62 MMHG | SYSTOLIC BLOOD PRESSURE: 103 MMHG | HEART RATE: 83 BPM | RESPIRATION RATE: 18 BRPM

## 2021-10-24 DIAGNOSIS — H53.8 BLURRY VISION: Primary | ICD-10-CM

## 2021-10-24 PROCEDURE — 99283 EMERGENCY DEPT VISIT LOW MDM: CPT

## 2021-10-24 RX ORDER — LEVETIRACETAM 250 MG/1
100 TABLET ORAL 2 TIMES DAILY
COMMUNITY
End: 2022-01-16

## 2021-10-24 ASSESSMENT — ENCOUNTER SYMPTOMS
RHINORRHEA: 0
EYE PAIN: 1
SORE THROAT: 0
NAUSEA: 0
CONSTIPATION: 0
VOMITING: 0
WHEEZING: 0
TROUBLE SWALLOWING: 0
SINUS PRESSURE: 0
EYE DISCHARGE: 1
COUGH: 0
PHOTOPHOBIA: 1
SINUS PAIN: 0
CHEST TIGHTNESS: 0
BLOOD IN STOOL: 0
SHORTNESS OF BREATH: 0
ABDOMINAL PAIN: 0
DIARRHEA: 0
FACIAL SWELLING: 0

## 2021-10-24 ASSESSMENT — PAIN DESCRIPTION - PAIN TYPE: TYPE: ACUTE PAIN

## 2021-10-24 ASSESSMENT — PAIN SCALES - GENERAL: PAINLEVEL_OUTOF10: 8

## 2021-10-24 ASSESSMENT — PAIN DESCRIPTION - LOCATION: LOCATION: EYE

## 2021-10-24 ASSESSMENT — PAIN DESCRIPTION - DESCRIPTORS: DESCRIPTORS: BURNING

## 2021-10-24 NOTE — ED PROVIDER NOTES
[facility]     Emergency Department     Faculty Attestation    I performed a history and physical examination of the patient and discussed management with the resident. I have reviewed and agree with the residents findings including all diagnostic interpretations, and treatment plans as written. Any areas of disagreement are noted on the chart. I was personally present for the key portions of any procedures. I have documented in the chart those procedures where I was not present during the key portions. I have reviewed the emergency nurses triage note. I agree with the chief complaint, past medical history, past surgical history, allergies, medications, social and family history as documented unless otherwise noted below. Documentation of the HPI, Physical Exam and Medical Decision Making performed by [email or web address] is based on my personal performance of the HPI, PE and MDM. For Physician Assistant/ Nurse Practitioner cases/documentation I have personally evaluated this patient and have completed at least one if not all key elements of the E/M (history, physical exam, and MDM). Additional findings are as noted. 27 yo F c/o blurred vision with white eye discharge, no fever, no vomit, no injury,   PE vss, isaiah, eomi, injected conjunctiva, no swelling to bilateral periorbital region, no cervical tenderness, crepitus or deformity,   iop 7 bilaterally,     -vision spontaneously improving, vss, pt talkative, ambulatory (steady even gait without assistance & able to navigate without assistance, witnessed by myself while the pt had reported her vision complaint), I feel pt stable for out pt tx,   Possible complaint unfounded    EKG Interpretation    Interpreted by me      CRITICAL CARE: There was a high probability of clinically significant/life threatening deterioration in this patient's condition which required my urgent intervention.   Total critical care time was 5 minutes. This excludes any time for separately reportable procedures.        [facility], DO  10/24/21 [address], DO  10/24/21 2381

## 2021-10-24 NOTE — ED PROVIDER NOTES
[address]  ED  Emergency Department Encounter  EmergencyMedicine Resident     Pt Name:Janet Ray  MRN: [id number]  [place] 1990  Date of evaluation: 10/24/21  PCP:  No primary care provider on file. This patient was evaluated in the Emergency Department for symptoms described in the history of present illness. The patient was evaluated in the context of the global COVID-19 pandemic, which necessitated consideration that the patient might be at risk for infection with the SARS-CoV-2 virus that causes COVID-19. Institutional protocols and algorithms that pertain to the evaluation of patients at risk for COVID-19 are in a state of rapid change based on information released by regulatory bodies including the CDC and federal and state organizations. These policies and algorithms were followed during the patient's care in the ED. CHIEF COMPLAINT       Chief Complaint   Patient presents with    Eye Pain     white discahrge       HISTORY OF PRESENT ILLNESS  (Location/Symptom, Timing/Onset, Context/Setting, Quality, Duration, Modifying Factors, Severity.)      Lovely Hess is a 26 y.o. female who presents with blurry vision. Patient presents with hours of blurry vision, eye pain, discharge. Patient denies fevers, chills, cough, congestion, chest pain, shortness of breath, abdominal pain, nausea, vomiting, diarrhea. Patient has history of bipolar disorder, seizure disorder, no history of eye problems. Patient denies history of contact use, no trauma to the eye, no vision loss, no floaters or visual disturbances other than blurry vision. PAST MEDICAL / SURGICAL / SOCIAL / FAMILY HISTORY      has a past medical history of Asthma, Bipolar disorder ([address] Cervical dysplasia, COPD (chronic obstructive pulmonary disease) ([address] Migraine, Movement disorder, and Seizures ([address] has a past surgical history that includes Cholecystectomy (2007);  Tubal ligation; and Appendectomy. Social History     Socioeconomic History    Marital status:      Spouse name: Not on file    Number of children: Not on file    Years of education: Not on file    Highest education level: Not on file   Occupational History    Not on file   Tobacco Use    Smoking status: Current Every Day Smoker     Packs/day: 0.25     Years: 11.00     Pack years: 2.75     Types: Cigarettes    Smokeless tobacco: Never Used   Vaping Use    Vaping Use: Never used   Substance and Sexual Activity    Alcohol use: Never     Alcohol/week: 0.0 standard drinks    Drug use: Not Currently     Types: Opiates , Marijuana     Comment: Hx Percocet abuse sober for 2 years.  Sexual activity: Not on file   Other Topics Concern    Not on file   Social History Narrative    Not on file     Social Determinants of Health     Financial Resource Strain:     Difficulty of Paying Living Expenses:    Food Insecurity:     Worried About Running Out of Food in the Last Year:     [address] in the Last Year:    Transportation Needs:     Lack of Transportation (Medical):      Lack of Transportation (Non-Medical):    Physical Activity:     Days of Exercise per Week:     Minutes of Exercise per Session:    Stress:     Feeling of Stress :    Social Connections:     Frequency of Communication with Friends and Family:     Frequency of Social Gatherings with Friends and Family:     Attends [employer]ical Services:     Active Member of Clubs or Organizations:     Attends Club or Organization Meetings:     Marital Status:    Intimate Partner Violence:     Fear of Current or Ex-Partner:     Emotionally Abused:     Physically Abused:     Sexually Abused:        Family History   Problem Relation Age of Onset    Diabetes Mother         G.Parents    Hypertension Mother         G.Parents    Diabetes Father     Hypertension Father     Stroke Father         G.Parents    Cancer Other         G.Parents, Pancrease and ??  Coronary Art Dis Other         G.Parents       Allergies:  Bee venom, Diclofenac, Morphine, Nicotine, Pcn [penicillins], and Peanut-containing drug products    Home Medications:  Prior to Admission medications    Medication Sig Start Date End Date Taking? Authorizing Provider   levETIRAcetam (KEPPRA) 250 MG tablet Take 100 mg by mouth 2 times daily   Yes Historical Provider, MD       REVIEW OF SYSTEMS    (2-9 systems for level 4, 10 or more for level 5)      Review of Systems   Constitutional: Negative for chills, diaphoresis, fatigue and fever. HENT: Negative for congestion, dental problem, drooling, ear discharge, ear pain, facial swelling, hearing loss, rhinorrhea, sinus pressure, sinus pain, sore throat, tinnitus and trouble swallowing. Eyes: Positive for photophobia, pain, discharge and visual disturbance. Respiratory: Negative for cough, chest tightness, shortness of breath and wheezing. Cardiovascular: Negative for chest pain, palpitations and leg swelling. Gastrointestinal: Negative for abdominal pain, blood in stool, constipation, diarrhea, nausea and vomiting. Genitourinary: Negative for dysuria, frequency, hematuria, vaginal bleeding and vaginal discharge. Musculoskeletal: Negative for neck pain and neck stiffness. Skin: Negative for pallor, rash and wound. Neurological: Negative for dizziness, seizures, syncope, facial asymmetry, weakness, light-headedness and headaches. Psychiatric/Behavioral: Negative for confusion. PHYSICAL EXAM   (up to 7 for level 4, 8 or more for level 5)      INITIAL VITALS:   /62   Pulse 83   Temp 98.6 °F (37 °C) (Oral)   Resp 18   Ht 4' 8\" (1.422 m)   Wt 91 lb 8 oz (41.5 kg)   LMP 08/24/2021   SpO2 97%   BMI 20.51 kg/m²     Physical Exam  Constitutional:       General: She is not in acute distress. Appearance: Normal appearance. She is well-developed. She is not ill-appearing, toxic-appearing or diaphoretic.    HENT:      Head: Normocephalic and atraumatic. Right Ear: Tympanic membrane, ear canal and external ear normal. There is no impacted cerumen. Left Ear: Tympanic membrane, ear canal and external ear normal. There is no impacted cerumen. Nose: Nose normal. No congestion or rhinorrhea. Mouth/Throat:      Mouth: Mucous membranes are moist.      Pharynx: Oropharynx is clear. No oropharyngeal exudate or posterior oropharyngeal erythema. Eyes:      General: No scleral icterus. Right eye: No discharge. Left eye: No discharge. Extraocular Movements: Extraocular movements intact. Conjunctiva/sclera: Conjunctivae normal.      Pupils: Pupils are equal, round, and reactive to light. Comments: EOMI, PERRLA, no pain with eye movements, no conjunctival injection, no eye discharge noted on exam, intraocular pressure is 7 bilaterally, no corneal abrasion, no hypopyon, no hyphema, normal slit-lamp exam, normal optic nerve size on ultrasound, no evidence of retinal detachment or vitreous detachment on ultrasound, patient can see fingers, cannot read the Snellen chart, however, when not in view of others, patient is actively using her eyes, appears to have no visual problems as she is walking and pulling stuff out of her pocket and looking at things. Neck:      Vascular: No JVD. Trachea: No tracheal deviation. Cardiovascular:      Rate and Rhythm: Normal rate and regular rhythm. Pulses: Normal pulses. Heart sounds: Normal heart sounds. No murmur heard. No friction rub. No gallop. Pulmonary:      Effort: Pulmonary effort is normal. No respiratory distress. Breath sounds: Normal breath sounds. No stridor. No wheezing, rhonchi or rales. Chest:      Chest wall: No tenderness. Abdominal:      General: There is no distension. Palpations: Abdomen is soft. There is no mass. Tenderness: There is no abdominal tenderness.  There is no right CVA tenderness, left CVA tenderness or guarding. Musculoskeletal:         General: No tenderness. Normal range of motion. Cervical back: Normal range of motion and neck supple. No rigidity or tenderness. Right lower leg: No edema. Left lower leg: No edema. Skin:     General: Skin is warm. Capillary Refill: Capillary refill takes less than 2 seconds. Neurological:      General: No focal deficit present. Mental Status: She is alert and oriented to person, place, and time. Cranial Nerves: No cranial nerve deficit. Sensory: No sensory deficit. Motor: No weakness. Coordination: Coordination normal.      Gait: Gait normal.         DIFFERENTIAL  DIAGNOSIS     PLAN (LABS / IMAGING / EKG):  No orders of the defined types were placed in this encounter. MEDICATIONS ORDERED:  Orders Placed This Encounter   Medications    DISCONTD: fluorescein ophthalmic strip 1 mg       DDX: Acute angle-closure glaucoma, amaurosis fugax, retinal detachment, vitreous detachment, CRAO, CRVO, malingering    DIAGNOSTIC RESULTS / EMERGENCY DEPARTMENT COURSE / MDM   LAB RESULTS:  No results found for this visit on 10/24/21. IMPRESSION: 56-year-old female with known psychiatric history, presenting to the emergency department with reported blurry vision, eye discharge, no evidence of eye discharge on physical exam, ultrasound of the eyes found no abnormalities, slit lamp of the eyes found no abnormalities, Woods lamp found no corneal abrasion, intraocular pressures normal, EOMI, PERRLA, patient is using her vision when she believes she is not being observed, concern for malingering. RADIOLOGY:      EKG      All EKG's are interpreted by the Emergency Department Physician who either signs or Co-signs this chart in the absence of a cardiologist.    EMERGENCY DEPARTMENT COURSE:  Patient came to emergency department, HPI and physical exam were conducted. All nursing notes were reviewed.   On reevaluation, patient is reporting improvement in vision, ready for discharge. Patient remained stable in the emergency department with normal vital signs. Gave strict return precautions to the emergency department and discharge patient home. Recommended patient return to the emergency department immediately with any changes in vision, or any other concerning sign or symptom. Recommend patient follow-up with primary care provider tomorrow for reevaluation. PROCEDURES:  OCULAR ULTRASOUND:      INDICATION:  Patient presenting with blurry vision. The medical necessity was to evaluate for detached retina, hematoma and increased intracranial pressure    PROCEDURE:  A limited, bedside ultrasound of the eyes was performed. Using the high frequency linear probe, sagittal and transverse views of the eyes were obtained    FINDINGS:  Evidence of retinal detachment was not visualized  Optic nerve sheath was measured 0.3 cm from the retina and was 0.51 cm in diameter bilaterally  This finding was not concerning for potential elevated intracranial pressure. The study was technically adequate. IMAGES:  Electronically uploaded to the PACS system    Getachew Kohler MD  Emergency Medicine Resident      CONSULTS:  None    CRITICAL CARE:      FINAL IMPRESSION      1.  Blurry vision          DISPOSITION / PLAN     DISPOSITION Decision To Discharge 10/24/2021 03:09:47 AM      PATIENT REFERRED TO:  [facility]  [address] [place] [address]  [phone]  Go to   As needed, If symptoms worsen    [place] [address]  [phone]  Schedule an appointment as soon as possible for a visit in 1 day  For reassessment      DISCHARGE MEDICATIONS:  Discharge Medication List as of 10/24/2021  3:10 AM          Devaughn Collet, MD  Emergency Medicine Resident    (Please note that portions of thisnote were completed with a voice recognition program.  Efforts were made to edit the dictations but occasionally words are mis-transcribed.)        Elise Vegas MD  Resident  10/24/21 0014

## 2021-10-24 NOTE — ED TRIAGE NOTES
Pt stated she here for bilateral eye pain   that started about 10/1030 pm     Says she has blurry vision, eye pain, light sensative.  White drainage     Doesn't wear glasses or contacts

## 2021-11-15 ENCOUNTER — HOSPITAL ENCOUNTER (EMERGENCY)
Age: 31
Discharge: HOME | End: 2021-11-15
Attending: EMERGENCY MEDICINE
Payer: MEDICAID

## 2021-11-15 ENCOUNTER — APPOINTMENT (OUTPATIENT)
Dept: GENERAL RADIOLOGY | Age: 31
End: 2021-11-15
Payer: MEDICAID

## 2021-11-15 VITALS
RESPIRATION RATE: 20 BRPM | BODY MASS INDEX: 20.47 KG/M2 | WEIGHT: 91 LBS | HEIGHT: 56 IN | OXYGEN SATURATION: 99 % | TEMPERATURE: 97.3 F | HEART RATE: 93 BPM | DIASTOLIC BLOOD PRESSURE: 79 MMHG | SYSTOLIC BLOOD PRESSURE: 119 MMHG

## 2021-11-15 DIAGNOSIS — S22.32XA CLOSED FRACTURE OF ONE RIB OF LEFT SIDE, INITIAL ENCOUNTER: Primary | ICD-10-CM

## 2021-11-15 PROCEDURE — 71101 X-RAY EXAM UNILAT RIBS/CHEST: CPT

## 2021-11-15 PROCEDURE — 6370000000 HC RX 637 (ALT 250 FOR IP)

## 2021-11-15 PROCEDURE — 99283 EMERGENCY DEPT VISIT LOW MDM: CPT

## 2021-11-15 RX ORDER — OXYCODONE HYDROCHLORIDE AND ACETAMINOPHEN 5; 325 MG/1; MG/1
1 TABLET ORAL EVERY 6 HOURS PRN
Qty: 20 TABLET | Refills: 0 | Status: SHIPPED | OUTPATIENT
Start: 2021-11-15 | End: 2021-11-20

## 2021-11-15 RX ORDER — OXYCODONE HYDROCHLORIDE AND ACETAMINOPHEN 5; 325 MG/1; MG/1
1 TABLET ORAL ONCE
Status: COMPLETED | OUTPATIENT
Start: 2021-11-15 | End: 2021-11-15

## 2021-11-15 RX ADMIN — OXYCODONE HYDROCHLORIDE AND ACETAMINOPHEN 1 TABLET: 5; 325 TABLET ORAL at 08:01

## 2021-11-15 ASSESSMENT — PAIN DESCRIPTION - PAIN TYPE: TYPE: ACUTE PAIN

## 2021-11-15 ASSESSMENT — ENCOUNTER SYMPTOMS
ABDOMINAL PAIN: 0
CHEST TIGHTNESS: 0
NAUSEA: 0
VOMITING: 0
DIARRHEA: 0
COUGH: 0
SHORTNESS OF BREATH: 0

## 2021-11-15 ASSESSMENT — PAIN DESCRIPTION - LOCATION: LOCATION: RIB CAGE

## 2021-11-15 ASSESSMENT — PAIN SCALES - GENERAL
PAINLEVEL_OUTOF10: 10
PAINLEVEL_OUTOF10: 10

## 2021-11-15 ASSESSMENT — PAIN DESCRIPTION - ORIENTATION: ORIENTATION: LEFT
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
## 2021-11-21 ENCOUNTER — APPOINTMENT (OUTPATIENT)
Dept: CT IMAGING | Age: 31
End: 2021-11-21
Payer: MEDICAID

## 2021-11-21 ENCOUNTER — HOSPITAL ENCOUNTER (EMERGENCY)
Age: 31
Discharge: HOME HEALTH | End: 2021-11-21
Attending: EMERGENCY MEDICINE
Payer: MEDICAID

## 2021-11-21 VITALS
SYSTOLIC BLOOD PRESSURE: 114 MMHG | DIASTOLIC BLOOD PRESSURE: 65 MMHG | OXYGEN SATURATION: 98 % | TEMPERATURE: 98.3 F | HEART RATE: 83 BPM | RESPIRATION RATE: 16 BRPM

## 2021-11-21 DIAGNOSIS — H53.9 VISION CHANGES: Primary | ICD-10-CM

## 2021-11-21 PROCEDURE — 99283 EMERGENCY DEPT VISIT LOW MDM: CPT

## 2021-11-21 PROCEDURE — 96375 TX/PRO/DX INJ NEW DRUG ADDON: CPT

## 2021-11-21 PROCEDURE — 6360000002 HC RX W HCPCS: Performed by: STUDENT IN AN ORGANIZED HEALTH CARE EDUCATION/TRAINING PROGRAM

## 2021-11-21 PROCEDURE — 96374 THER/PROPH/DIAG INJ IV PUSH: CPT

## 2021-11-21 PROCEDURE — 70450 CT HEAD/BRAIN W/O DYE: CPT

## 2021-11-21 RX ORDER — PROCHLORPERAZINE EDISYLATE 5 MG/ML
10 INJECTION INTRAMUSCULAR; INTRAVENOUS ONCE
Status: COMPLETED | OUTPATIENT
Start: 2021-11-21 | End: 2021-11-21

## 2021-11-21 RX ORDER — DIPHENHYDRAMINE HYDROCHLORIDE 50 MG/ML
25 INJECTION INTRAMUSCULAR; INTRAVENOUS ONCE
Status: COMPLETED | OUTPATIENT
Start: 2021-11-21 | End: 2021-11-21

## 2021-11-21 RX ADMIN — PROCHLORPERAZINE EDISYLATE 10 MG: 5 INJECTION INTRAMUSCULAR; INTRAVENOUS at 12:17

## 2021-11-21 RX ADMIN — DIPHENHYDRAMINE HYDROCHLORIDE 25 MG: 50 INJECTION, SOLUTION INTRAMUSCULAR; INTRAVENOUS at 12:18

## 2021-11-21 ASSESSMENT — ENCOUNTER SYMPTOMS
SHORTNESS OF BREATH: 0
COUGH: 0
BACK PAIN: 0
NAUSEA: 0
EYE DISCHARGE: 0
ABDOMINAL PAIN: 0
SORE THROAT: 0

## 2021-11-21 NOTE — ED PROVIDER NOTES
[facility] ED  Emergency Department Encounter  EmergencyMedicine Resident     Pt Name:Janet Zaidi  MRN: [id number]  [place] 1990  Date of evaluation: 11/21/21  PCP:  No primary care provider on file. This patient was evaluated in the Emergency Department for symptoms described in the history of present illness. The patient was evaluated in the context of the global COVID-19 pandemic, which necessitated consideration that the patient might be at risk for infection with the SARS-CoV-2 virus that causes COVID-19. Institutional protocols and algorithms that pertain to the evaluation of patients at risk for COVID-19 are in a state of rapid change based on information released by regulatory bodies including the CDC and federal and state organizations. These policies and algorithms were followed during the patient's care in the ED. CHIEF COMPLAINT       Chief Complaint   Patient presents with    Loss of Vision     HISTORY OF PRESENT ILLNESS  (Location/Symptom, Timing/Onset, Context/Setting, Quality, Duration, Modifying Factors, Severity.)      Eric Danielle is a 26 y.o. female who presents with 1 week or multiple days history of headache and vision changes, patient's history is inconsistent. Patient states that she has a history of MS, and ever since then she has had vision changes including vision loss every 3 months, this is not a new symptom. Patient states that she has a history of migraines, however last week onset of headache was the worst of her life. Patient did not follow-up with neurology for her multiple sclerosis, states that \"I was born with it and do not need to be followed up\". PAST MEDICAL / SURGICAL / SOCIAL / FAMILY HISTORY      has a past medical history of Asthma, Bipolar disorder ([address] Cervical dysplasia, COPD (chronic obstructive pulmonary disease) [facility] Migraine, Movement disorder, and Seizures ([address]      has a past surgical history that includes Cholecystectomy (2007); Tubal ligation; and Appendectomy. Social History     Socioeconomic History    Marital status:      Spouse name: Not on file    Number of children: Not on file    Years of education: Not on file    Highest education level: Not on file   Occupational History    Not on file   Tobacco Use    Smoking status: Current Every Day Smoker     Packs/day: 0.25     Years: 11.00     Pack years: 2.75     Types: Cigarettes    Smokeless tobacco: Never Used   Vaping Use    Vaping Use: Never used   Substance and Sexual Activity    Alcohol use: Never     Alcohol/week: 0.0 standard drinks    Drug use: Not Currently     Types: Opiates , Marijuana (Weed)     Comment: Hx Percocet abuse sober for 2 years.  Sexual activity: Not on file   Other Topics Concern    Not on file   Social History Narrative    Not on file     Social Determinants of Health     Financial Resource Strain:     Difficulty of Paying Living Expenses: Not on file   Food Insecurity:     Worried About Running Out of Food in the Last Year: Not on file    Emelyn of Food in the Last Year: Not on file   Transportation Needs:     Lack of Transportation (Medical): Not on file    Lack of Transportation (Non-Medical):  Not on file   Physical Activity:     Days of Exercise per Week: Not on file    Minutes of Exercise per Session: Not on file   Stress:     Feeling of Stress : Not on file   Social Connections:     Frequency of Communication with Friends and Family: Not on file    Frequency of Social Gatherings with Friends and Family: Not on file    Attends Jainism Services: Not on file    Active Member of Clubs or Organizations: Not on file    Attends Club or Organization Meetings: Not on file    Marital Status: Not on file   Intimate Partner Violence:     Fear of Current or Ex-Partner: Not on file    Emotionally Abused: Not on file    Physically Abused: Not on file    Sexually Abused: Not on file   Housing Stability:  Unable to Pay for Housing in the Last Year: Not on file    Number of Places Lived in the Last Year: Not on file    Unstable Housing in the Last Year: Not on file       Family History   Problem Relation Age of Onset    Diabetes Mother         G.Parents    Hypertension Mother         G.Parents    Diabetes Father     Hypertension Father     Stroke Father         G.Parents    Cancer Other         G.Parents, Pancrease and ??    Coronary Art Dis Other         G.Parents       Allergies:  Bee venom, Diclofenac, Morphine, Nicotine, Pcn [penicillins], and Peanut-containing drug products    Home Medications:  Prior to Admission medications    Medication Sig Start Date End Date Taking? Authorizing Provider   levETIRAcetam (KEPPRA) 250 MG tablet Take 100 mg by mouth 2 times daily    Historical Provider, MD       REVIEW OF SYSTEMS    (2-9 systems for level 4, 10 or more for level 5)      Review of Systems   Constitutional: Negative for chills and fever. HENT: Negative for sore throat. Eyes: Negative for discharge. Respiratory: Negative for cough and shortness of breath. Gastrointestinal: Negative for abdominal pain and nausea. Endocrine: Negative for polyuria. Genitourinary: Negative for dysuria and hematuria. Musculoskeletal: Negative for back pain. Neurological: Positive for headaches. Negative for light-headedness. Psychiatric/Behavioral: Negative for confusion. PHYSICAL EXAM   (up to 7 for level 4, 8 or more for level 5)      INITIAL VITALS:   /65   Pulse 83   Temp 98.3 °F (36.8 °C) (Oral)   Resp 16   SpO2 98%     Physical Exam  Constitutional:       Appearance: Normal appearance. HENT:      Head: Normocephalic. Nose: Nose normal.      Mouth/Throat:      Mouth: Mucous membranes are moist.      Pharynx: Oropharynx is clear. Eyes:      Extraocular Movements: Extraocular movements intact.       Conjunctiva/sclera: Conjunctivae normal.      Pupils: Pupils are equal, round, and reactive to light. Comments: Blinks to threat, patient looking around, looking at her boyfriend directly, instructing boyfriend to move     Cardiovascular:      Rate and Rhythm: Normal rate and regular rhythm. Pulses: Normal pulses. Heart sounds: Normal heart sounds. Pulmonary:      Effort: Pulmonary effort is normal.      Breath sounds: Normal breath sounds. Abdominal:      General: Abdomen is flat. Tenderness: There is no abdominal tenderness. There is no right CVA tenderness or left CVA tenderness. Musculoskeletal:      Cervical back: Normal range of motion and neck supple. No tenderness. Right lower leg: No edema. Left lower leg: No edema. Skin:     General: Skin is warm. Capillary Refill: Capillary refill takes less than 2 seconds. Neurological:      General: No focal deficit present. Mental Status: She is alert and oriented to person, place, and time. Cranial Nerves: No cranial nerve deficit. Psychiatric:         Mood and Affect: Mood normal.       DIFFERENTIAL  DIAGNOSIS     PLAN (LABS / IMAGING / EKG):  Orders Placed This Encounter   Procedures    CT HEAD WO CONTRAST    Inpatient consult to Neurology       MEDICATIONS ORDERED:  Orders Placed This Encounter   Medications    prochlorperazine (COMPAZINE) injection 10 mg    diphenhydrAMINE (BENADRYL) injection 25 mg       DDX: Optic neuritis, MS flare, subarachnoid hemorrhage, migraine, complex migraine, CRVO. CRAO, temporal arteritis. DIAGNOSTIC RESULTS / EMERGENCY DEPARTMENT COURSE / MDM   LAB RESULTS:  No results found for this visit on 11/21/21. IMPRESSION: 25-year-old lady presents to the emergency department with recurrent \"blindness\" bilaterally, patient has a history of MS and states that this happens every 3 months. Patient also complaining of the worst headache of her life. Physical exam showing patient blinking to threat, EOMI, looking directly at boyfriend, instructing move. Otherwise grossly unremarkable, vital signs stable. Patient declined Compazine and Benadryl, stating she would like a turkey sandwich at this time. Explained to patient that she needs to be n.p.o. until CT head. CT head unremarkable. Plan for neurology consult for history of MS and recurrent symptoms. On neurology examination, patient decided she would like to elope at this time and was uncooperative, was notified by neurology resident that patient had eloped. RADIOLOGY:  See radiology report    EMERGENCY DEPARTMENT COURSE:  ED Course as of 11/21/21 1401   [place] Nov 21, 2021   1153 Patient declining Compazine and Benadryl at this time. States that she would like to eat. Explained to patient that she must be n.p.o. until CT head [EM]   1302 14 [facility] [EM]      ED Course User Index  [EM] Corina Mendes MD        PROCEDURES:  None    CONSULTS:  IP CONSULT TO NEUROLOGY    FINAL IMPRESSION      1.  Vision changes          DISPOSITION / PLAN     DISPOSITION        PATIENT REFERRED TO:  [facility] AT [address] [address] [phone]  Schedule an appointment as soon as possible for a visit   For follow up    [facility]  [address] [address]  [phone]  Go to   As needed      DISCHARGE MEDICATIONS:  Discharge Medication List as of 11/21/2021  1:52 PM          Corina Mendes MD  Emergency Medicine Resident    (Please note that portions of thisnote were completed with a voice recognition program.  Efforts were made to edit the dictations but occasionally words are mis-transcribed.)       Corina Mendes MD  Resident  11/21/21 1401

## 2021-11-21 NOTE — ED PROVIDER NOTES
[address] ED     Emergency Department     Faculty Attestation        I performed a history and physical examination of the patient and discussed management with the resident. I reviewed the residents note and agree with the documented findings and plan of care. Any areas of disagreement are noted on the chart. I was personally present for the key portions of any procedures. I have documented in the chart those procedures where I was not present during the key portions. I have reviewed the emergency nurses triage note. I agree with the chief complaint, past medical history, past surgical history, allergies, medications, social and family history as documented unless otherwise noted below. For mid-level providers such as nurse practitioners as well as physicians assistants:    I have personally seen and evaluated the patient. I find the patient's history and physical exam are consistent with NP/PA documentation. I agree with the care provided, treatment rendered, disposition, & follow-up plan. Additional findings are as noted. Vital Signs: /65   Pulse 83   Temp 98.3 °F (36.8 °C) (Oral)   Resp 16   SpO2 98%   PCP:  No primary care provider on file. Pertinent Comments:     History of MS she states every 3 months she will get eye pain and visual loss. She states this is her typical flare for MS she also has a headache associated with it. She denies any double vision. She is able to quite easily track me across the room and report response of visual stimuli appropriately looking at her boyfriend across the room and telling her to give him his couple coffee in his hand. No other focal neurological deficits.   Given her history of MS and headache will scan brain, pain control, discussion with neurology      Critical Care  None          Maria Alejandra Reinoso MD    Attending Emergency Medicine Physician              Soren Jordan MD  11/21/21 1700 [facility]

## 2021-11-21 NOTE — ED NOTES
Pt. Yelling at significant other in room, stating \"I am fucking hungry!  You ate and I didn't!\"  Writer notified pt she needs to stop yelling, arguing and cursing     Chad Muñoz RN  11/21/21 4166

## 2021-11-21 NOTE — ED NOTES
Pt. Left after receiving box lunch, refused neurology resident exam and was very aggressive with physician and Emy Berry, MARJAN  11/21/21 6702

## 2021-11-21 NOTE — ED NOTES
Pt. Ambulatory with steady gait to ER room 5 from triage  Pt. Presents with vision loss and states this happens every 3 months  Pt. States she has a significant medical history including MS \"since birth,\" and has stomach cancer   Pt. Requesting box lunch on arrival, has on several layers of clothing  Pt. Cannot see writer standing in front of her and reaches her hand out saying \"where are you,\" upon examination  Pt.  Denies CP and SOB, A/Ox4, RR even and unlabored, vitals stable  Awaiting orders  Will continue to monitor and reassess     Melia Velasquez RN  11/21/21 8144

## 2021-11-21 NOTE — ED NOTES
Writer back from CT, pt significant other immediately approaches desk and requests box lunch for patient  Writer went to patient's room and told her she is not going to receive said box lunch until her CT scan results  Pt. States, \"you said after my CT scan,\" pt begins arguing with writer and cursing  Writer expressed this behavior won't be tolerated  Pt.  Continues to yell in room as writer is in [address] [place] [address]  11/21/21 6159

## 2021-11-21 NOTE — SIGNIFICANT EVENT
Attempted to obtain history and examine patient. Patient sleeping and refusing to provide history, boyfriend at bedside answering questions for patient. He states that her vision problems happen every 3 months and last 3 days. Attempted to obtain history of vision loss and MS diagnosis from patient and patient is frustrated and replies that \"I dont know, I was diagnosed as a baby\". Nurse arrives to bedside and attempts to calm patient down. Patient irritated and upset and yelling, \"she's asking me so many questions! The other doctor didn't ask me questions. I know why I can't see, it's because I haven't slept in 3 months. She's not even examining me. \" Attempted to explain my role as a neurologist, but patient declining any more questions. Attempted to examine patient but patient stated that she wishes to leave AMA. Advised patient to return the ER if her symptoms persist or recur. Recommended patient to follow up in neurology clinic. Upon chart review, patient was worked up for [employer] MS\". Patient has had multiple MRIs of  Brain and spine. Most recent MRI brain in April 2021 negative for lesions. Patient did have one abnormal MRI C Spine in 2012 that neurology reviewed and believed to be artifactual and unremarkable.        Elda Solorio MD  PGY-2 Neurology  11/21/2021  1:50 PM

## 2021-11-21 NOTE — ED NOTES
Writer to room to explain plan to pt and to place IV  Pt.  Becomes extremely agitated and argumentative when told again that she cannot have anything to eat until her test is done  Writer told patient she is not going to place an IV while the pt is flailing her arms and cursing at [facility]  Dr. Jay Armas aware     Varghese Ghotra, MARJAN  11/21/21 [phone]

## 2021-11-21 NOTE — ED NOTES
Pt. Requesting box lunch, argues with writer when told she must wait for the physician evaluation first     Rica Salinas RN  11/21/21 8663

## 2021-11-21 NOTE — ED NOTES
Pt. Arguing loudly with significant other in room; pt notified pt and significant other that they cannot argue like this in this facility   Pt.  Verbalized understanding     Pawan Solorio RN  11/21/21 4221

## 2021-12-27 ENCOUNTER — HOSPITAL ENCOUNTER (EMERGENCY)
Age: 31
Discharge: LEFT BEFORE DISCHARGE | End: 2021-12-27
Attending: EMERGENCY MEDICINE
Payer: MEDICAID

## 2021-12-27 VITALS
HEART RATE: 84 BPM | DIASTOLIC BLOOD PRESSURE: 74 MMHG | TEMPERATURE: 98 F | OXYGEN SATURATION: 94 % | RESPIRATION RATE: 20 BRPM | SYSTOLIC BLOOD PRESSURE: 119 MMHG

## 2021-12-27 DIAGNOSIS — Z76.5 DRUG-SEEKING BEHAVIOR: ICD-10-CM
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
## 2021-12-27 DIAGNOSIS — Z53.21 ELOPED FROM EMERGENCY DEPARTMENT: Primary | ICD-10-CM

## 2021-12-27 PROCEDURE — 99282 EMERGENCY DEPT VISIT SF MDM: CPT

## 2021-12-27 RX ORDER — ORPHENADRINE CITRATE 30 MG/ML
60 INJECTION INTRAMUSCULAR; INTRAVENOUS ONCE
Status: DISCONTINUED | OUTPATIENT
Start: 2021-12-27 | End: 2021-12-27 | Stop reason: HOSPADM

## 2021-12-27 ASSESSMENT — ENCOUNTER SYMPTOMS
DIARRHEA: 0
EYE REDNESS: 0
ABDOMINAL PAIN: 0
COUGH: 0
RHINORRHEA: 0
BACK PAIN: 1
VOMITING: 0
NAUSEA: 0

## 2021-12-27 ASSESSMENT — PAIN DESCRIPTION - ORIENTATION: ORIENTATION: LOWER

## 2021-12-27 ASSESSMENT — PAIN DESCRIPTION - FREQUENCY: FREQUENCY: CONTINUOUS

## 2021-12-27 ASSESSMENT — PAIN SCALES - GENERAL: PAINLEVEL_OUTOF10: 9

## 2021-12-27 ASSESSMENT — PAIN DESCRIPTION - DESCRIPTORS: DESCRIPTORS: THROBBING;PRESSURE

## 2021-12-27 ASSESSMENT — PAIN DESCRIPTION - LOCATION: LOCATION: BACK

## 2021-12-27 NOTE — ED NOTES
Pt refused care, stated, \"Nobody is giving me a shot, huh honey(looked up at significant other)I'll just leave, you can let me go now! \"     Lily Stapleton RN  12/27/21 9942

## 2021-12-27 NOTE — ED PROVIDER NOTES
[facility] ED  Emergency Department Encounter  Emergency Medicine Resident     Pt Name: Bharati Peralta  MRN: [id number]  [place] 1990  Date of evaluation: 12/27/21  PCP:  No primary care provider on file. This patient was evaluated in the Emergency Department for symptoms described in the history of present illness. The patient was evaluated in the context of the global COVID-19 pandemic, which necessitated consideration that the patient might be at risk for infection with the SARS-CoV-2 virus that causes COVID-19. Institutional protocols and algorithms that pertain to the evaluation of patients at risk for COVID-19 are in a state of rapid change based on information released by regulatory bodies including the CDC and federal and state organizations. These policies and algorithms were followed during the patient's care in the ED. CHIEF COMPLAINT       Chief Complaint   Patient presents with    Back Pain     HISTORY OFPRESENT ILLNESS  (Location/Symptom, Timing/Onset, Context/Setting, Quality, Duration, Modifying Nilsa Bertrand.)      Bharati Peralta is a 33 y.o. female who presents with lumbar/sacral back pain after slipping and falling on \"black ice\" or wet pavement around 0100. Patient states she fell backwards and landed on her backside, and is unsure if she hit her head. She states she crawled over to the bus bench and \"passed out\" and does not recall anything afterwards. She states that her boyfriend came and got her and they walked to the ED. Patient denies any fevers, headaches, vision changes, chest pain, shortness of breath, cough, congestion, runny nose, abdominal pain, nausea, vomiting, or diarrhea. PAST MEDICAL / SURGICAL / SOCIAL / FAMILY HISTORY      has a past medical history of Asthma, Bipolar disorder ([address] Cervical dysplasia, COPD (chronic obstructive pulmonary disease) ([facility] [address] Migraine, Movement disorder, and Seizures ([facility] [address]      has a past surgical history that includes Cholecystectomy (2007); Tubal ligation; and Appendectomy. Social History     Socioeconomic History    Marital status:      Spouse name: Not on file    Number of children: Not on file    Years of education: Not on file    Highest education level: Not on file   Occupational History    Not on file   Tobacco Use    Smoking status: Current Every Day Smoker     Packs/day: 0.25     Years: 11.00     Pack years: 2.75     Types: Cigarettes    Smokeless tobacco: Never Used   Vaping Use    Vaping Use: Never used   Substance and Sexual Activity    Alcohol use: Never     Alcohol/week: 0.0 standard drinks    Drug use: Not Currently     Types: Opiates , Marijuana (Weed)     Comment: Hx Percocet abuse sober for 2 years.  Sexual activity: Not on file   Other Topics Concern    Not on file   Social History Narrative    Not on file     Social Determinants of Health     Financial Resource Strain:     Difficulty of Paying Living Expenses: Not on file   Food Insecurity:     Worried About Running Out of Food in the Last Year: Not on file    Emelyn of Food in the Last Year: Not on file   Transportation Needs:     Lack of Transportation (Medical): Not on file    Lack of Transportation (Non-Medical):  Not on file   Physical Activity:     Days of Exercise per Week: Not on file    Minutes of Exercise per Session: Not on file   Stress:     Feeling of Stress : Not on file   Social Connections:     Frequency of Communication with Friends and Family: Not on file    Frequency of Social Gatherings with Friends and Family: Not on file    Attends Mormon Services: Not on file    Active Member of Clubs or Organizations: Not on file    Attends Club or Organization Meetings: Not on file    Marital Status: Not on file   Intimate Partner Violence:     Fear of Current or Ex-Partner: Not on file    Emotionally Abused: Not on file    Physically Abused: Not on file    Sexually Abused: Not on file Housing Stability:     Unable to Pay for Housing in the Last Year: Not on file    Number of Places Lived in the Last Year: Not on file    Unstable Housing in the Last Year: Not on file       Family History   Problem Relation Age of Onset    Diabetes Mother         G.Parents    Hypertension Mother         G.Parents    Diabetes Father     Hypertension Father     Stroke Father         G.Parents    Cancer Other         G.Parents, Pancrease and ??    Coronary Art Dis Other         G.Parents       Allergies:  Bee venom, Diclofenac, Morphine, Nicotine, Pcn [penicillins], and Peanut-containing drug products    Home Medications:  Prior to Admission medications    Medication Sig Start Date End Date Taking? Authorizing Provider   levETIRAcetam (KEPPRA) 250 MG tablet Take 100 mg by mouth 2 times daily    Historical Provider, MD       REVIEW OF SYSTEMS    (2-9 systems for level 4, 10 or more for level 5)      Review of Systems   Constitutional: Negative for activity change, appetite change and fever. HENT: Negative for congestion and rhinorrhea. Eyes: Negative for redness. Respiratory: Negative for cough. Cardiovascular: Negative for chest pain. Gastrointestinal: Negative for abdominal pain, diarrhea, nausea and vomiting. Genitourinary: Negative for dysuria and hematuria. Musculoskeletal: Positive for back pain. Skin: Negative for wound. Neurological: Positive for syncope. Negative for headaches. PHYSICAL EXAM   (up to 7 for level 4, 8 or more for level 5)     INITIAL VITALS:    oral temperature is 98 °F (36.7 °C). Her blood pressure is 119/74 and her pulse is 84. Her respiration is 20 and oxygen saturation is 94%. Physical Exam  Constitutional:       Appearance: Normal appearance. HENT:      Head: Normocephalic and atraumatic. Mouth/Throat:      Mouth: Mucous membranes are moist.   Eyes:      Extraocular Movements: Extraocular movements intact.       Pupils: Pupils are equal, round, and reactive to light. Cardiovascular:      Rate and Rhythm: Normal rate and regular rhythm. Pulses: Normal pulses. Heart sounds: No murmur heard. Pulmonary:      Effort: Pulmonary effort is normal. No respiratory distress. Breath sounds: Normal breath sounds. No wheezing. Abdominal:      General: There is no distension. Palpations: Abdomen is soft. Tenderness: There is no abdominal tenderness. Musculoskeletal:      Cervical back: Normal range of motion. No rigidity or tenderness. Right lower leg: No edema. Left lower leg: No edema. Comments: Patient with midline tenderness with palpation in thoracic spine, lumbar spine and overlying the sacrum/coccyx. Questionable mild swelling over coccyx. No bruising or erythema. No step-offs or deformity visualized. Patient refusing straight leg raise both passive and active complaining of back pain with any movement of the leg. Lymphadenopathy:      Cervical: No cervical adenopathy. Skin:     General: Skin is warm. Capillary Refill: Capillary refill takes less than 2 seconds. Findings: No bruising, erythema or rash. Neurological:      Mental Status: She is alert and oriented to person, place, and time. Cranial Nerves: No cranial nerve deficit. Sensory: No sensory deficit. Motor: No weakness (pain with dorsiflexion/plantarflexion bilateral feet although strength symmetric bilaterally. ). DIFFERENTIAL  DIAGNOSIS     PLAN (LABS / IMAGING / EKG):  Orders Placed This Encounter   Procedures    CT HEAD WO CONTRAST    CT CERVICAL SPINE WO CONTRAST    CT THORACIC SPINE WO CONTRAST    CT LUMBAR SPINE WO CONTRAST    CT PELVIS WO CONTRAST Additional Contrast? None    HCG Qualitative, Serum     MEDICATIONS ORDERED:  Orders Placed This Encounter   Medications    orphenadrine (NORFLEX) injection 60 mg     DDX: intracranial hemorrhage, fractured coccyx, fracture of spine    Initial MDM/Plan: 33 y.o. female who presents with lower back pain after fall onto backside. Patient believes she lost consciousness and does not recall events after crawling to bench. PE reveals pain in thoracic spine, and overlying sacrum and coccyx. Will get hcg prior to CT imaging of head, CTLS spine due to syncope. DIAGNOSTIC RESULTS / EMERGENCY DEPARTMENT COURSE / MDM     LABS:  Labs Reviewed   HCG, SERUM, QUALITATIVE     RADIOLOGY:  No results found. EMERGENCY DEPARTMENT COURSE:  ED Course as of 12/27/21 0524   Mon Dec 27, 2021   5303 Nurse Pio Mcgregor asked physician back into room as patient refused blood draw to check hcg. Discussed reason for obtaining pregnancy test, and patient agreeable. When nurse Pio Mcgregor returned to room for blood draw, patient again refused stating she would just leave because she did not want to take Toradol because she is allergic. [CP]   0520 Attempted to once again speak with patient and offer alternative for pain management, but she was not in her room.  Patient eloped without completing treatment and prior to counseling about risks of leaving. [CP]      ED Course User Index  [CP] Elsa Canavan, MD     PROCEDURES:  None    CONSULTS:  None    CRITICAL CARE:  Please see attending note    MIPS 415     A head CT was ordered, but not by an emergency care provider: No    A head CT was ordered by an emergency care provider, and some of the indications for ordering the head CT included  Measure Exclusions:  Patient has a ventricular shunt: No  Patient has a brain tumor: No  Patient has multi-system trauma: No  Patient is pregnant: unsure, obtaining hcg  Patient is taking an antiplatelet medication (excluding aspirin): No  Patient is 72years old or older: No    Signs and Symptoms:  Patients GCS was less than 15: No  Focal neurological deficit: No  Severe Headache: No  Vomiting: No  Physical signs of a basilar skull fracture: No  Coagulopathy: No  Thrombocytopenia: No  Patient suspected of taking an anticoagulant medication: No  Dangerous mechanism of injury: No      Patient had loss of consciousness OR posttraumatic amnesia AND:   Headache: No  Patient is 61years old or older: No  Drug or Alcohol intoxication: No  Short-term memory deficits: Yes  Evidence of trauma above the clavicles (any visible or detected trauma to the head or neck, including lacerations, abrasions, bruising, swelling or fracture): No  Post-traumatic seizure: No    FINAL IMPRESSION      1. Eloped from emergency department    2. Fall, initial encounter        DISPOSITION / [address] - Left Before Treatment Complete 12/27/2021 05:19:48 AM    Eloped     PATIENTREFERRED TO:  No follow-up provider specified.     DISCHARGE MEDICATIONS:  New Prescriptions    No medications on file       Carla Yost MD  Emergency Medicine Resident    (Please note that portions of this note were completed with a voice recognition program.  Efforts were made to edit the dictations but occasionally words are mis-transcribed.)        David Hannon MD  Resident  12/27/21 2160

## 2021-12-27 NOTE — ED NOTES
Writer went to notify  that care was refused and pt wanted to leave, room 11 was empty when Nikia campo Dr. returned.      Bobo Lee RN  12/27/21 5896

## 2021-12-27 NOTE — ED TRIAGE NOTES
Pt was walking and slipped on the concrete, c/o lower back pain 9/10, pressure/throbbing. Pt states she is supposed to be on keppra for seizures but has been off for four months. Last seizure was three weeks ago.

## 2022-01-01 ENCOUNTER — HOSPITAL ENCOUNTER (EMERGENCY)
Age: 32
Discharge: HOME | End: 2022-01-01
Attending: EMERGENCY MEDICINE
Payer: MEDICAID

## 2022-01-01 VITALS
SYSTOLIC BLOOD PRESSURE: 100 MMHG | TEMPERATURE: 98.7 F | HEART RATE: 75 BPM | DIASTOLIC BLOOD PRESSURE: 61 MMHG | OXYGEN SATURATION: 93 % | RESPIRATION RATE: 18 BRPM

## 2022-01-01 DIAGNOSIS — H10.232 SEROUS CONJUNCTIVITIS OF LEFT EYE: ICD-10-CM

## 2022-01-01 DIAGNOSIS — L03.213 PRESEPTAL CELLULITIS OF LEFT EYE: Primary | ICD-10-CM

## 2022-01-01 PROCEDURE — 99283 EMERGENCY DEPT VISIT LOW MDM: CPT

## 2022-01-01 PROCEDURE — 6370000000 HC RX 637 (ALT 250 FOR IP): Performed by: STUDENT IN AN ORGANIZED HEALTH CARE EDUCATION/TRAINING PROGRAM

## 2022-01-01 PROCEDURE — 2500000003 HC RX 250 WO HCPCS: Performed by: STUDENT IN AN ORGANIZED HEALTH CARE EDUCATION/TRAINING PROGRAM

## 2022-01-01 PROCEDURE — 6370000000 HC RX 637 (ALT 250 FOR IP)

## 2022-01-01 RX ORDER — CIPROFLOXACIN HYDROCHLORIDE 3.5 MG/ML
SOLUTION/ DROPS TOPICAL
Status: DISCONTINUED
Start: 2022-01-01 | End: 2022-01-01

## 2022-01-01 RX ORDER — PROPARACAINE HYDROCHLORIDE 5 MG/ML
1 SOLUTION/ DROPS OPHTHALMIC ONCE
Status: COMPLETED | OUTPATIENT
Start: 2022-01-01 | End: 2022-01-01

## 2022-01-01 RX ORDER — CIPROFLOXACIN HYDROCHLORIDE 3.5 MG/ML
1 SOLUTION/ DROPS TOPICAL ONCE
Status: COMPLETED | OUTPATIENT
Start: 2022-01-01 | End: 2022-01-01

## 2022-01-01 RX ADMIN — FLUORESCEIN SODIUM 1 MG: 1 STRIP OPHTHALMIC at 02:13

## 2022-01-01 RX ADMIN — PROPARACAINE HYDROCHLORIDE 1 DROP: 5 SOLUTION/ DROPS OPHTHALMIC at 02:14

## 2022-01-01 RX ADMIN — CIPROFLOXACIN HYDROCHLORIDE 2 DROP: 3.5 SOLUTION/ DROPS TOPICAL at 02:35

## 2022-01-01 ASSESSMENT — PAIN SCALES - GENERAL: PAINLEVEL_OUTOF10: 10

## 2022-01-01 ASSESSMENT — ENCOUNTER SYMPTOMS
BACK PAIN: 0
COUGH: 0
ABDOMINAL PAIN: 0
SINUS PAIN: 0
DIARRHEA: 0
EYE REDNESS: 1
EYE PAIN: 1
SORE THROAT: 0
SHORTNESS OF BREATH: 0
PHOTOPHOBIA: 1
NAUSEA: 0
EYE DISCHARGE: 1
VOMITING: 0

## 2022-01-01 ASSESSMENT — VISUAL ACUITY
OD: 20/70
OS: 20/200

## 2022-01-01 NOTE — ED NOTES
Pt's friend at the nurses station requesting a boxed lunch. Patient resting in stretcher eating a bag of [employer].      New Patel RN  01/01/22 2516

## 2022-01-01 NOTE — ED PROVIDER NOTES
[facility] ED  Emergency Department Encounter  EmergencyMedicineResident     This patient was seen during the COVID-19 crisis. There were limited resources and those resources we did have had to be conserved for the sickest of patients. Pt Name: Bharati Peralta  MRN: [id number]  [place] 1990  Date of evaluation: 1/1/22  PCP: No primary care provider on file. CHIEF COMPLAINT       Chief Complaint   Patient presents with    Eye Problem       HISTORY OF PRESENT ILLNESS  (Location/Symptom, Timing/Onset, Context/Setting, Quality, Duration, Modifying Factors, Severity.)      Bharati Peralta is a 33 y.o. female who presents valuation of left eye swelling. Patient states she woke up the left eye was swollen. She denies any injury or trauma. Denies any new allergen exposure. She states she had similar swelling in the left eye previously that \"no one could figure out what was wrong with her\". Patient denies fever chills. She states that she does have blurry vision but is not sure if it is any different from her baseline she wears glasses and she sunglasses available. She reports that the light does irritate her eyes. Her significant other states that he noticed white discharge coming from the left eye. PAST MEDICAL / SURGICAL /SOCIAL / FAMILY HISTORY      has a past medical history of Asthma, Bipolar disorder ([address] Cervical dysplasia, COPD (chronic obstructive pulmonary disease) ([address] Migraine, Movement disorder, and Seizures ([address] has a past surgical history that includes Cholecystectomy (2007); Tubal ligation; and Appendectomy.       Social History     Socioeconomic History    Marital status:      Spouse name: Not on file    Number of children: Not on file    Years of education: Not on file    Highest education level: Not on file   Occupational History    Not on file   Tobacco Use    Smoking status: Current Every Day Smoker     Packs/day: 0.25     Years: 11.00     Pack years: 2.75     Types: Cigarettes    Smokeless tobacco: Never Used   Vaping Use    Vaping Use: Never used   Substance and Sexual Activity    Alcohol use: Never     Alcohol/week: 0.0 standard drinks    Drug use: Not Currently     Types: Opiates , Marijuana (Weed)     Comment: Hx Percocet abuse sober for 2 years.  Sexual activity: Not on file   Other Topics Concern    Not on file   Social History Narrative    Not on file     Social Determinants of Health     Financial Resource Strain:     Difficulty of Paying Living Expenses: Not on file   Food Insecurity:     Worried About Running Out of Food in the Last Year: Not on file    Emelyn of Food in the Last Year: Not on file   Transportation Needs:     Lack of Transportation (Medical): Not on file    Lack of Transportation (Non-Medical):  Not on file   Physical Activity:     Days of Exercise per Week: Not on file    Minutes of Exercise per Session: Not on file   Stress:     Feeling of Stress : Not on file   Social Connections:     Frequency of Communication with Friends and Family: Not on file    Frequency of Social Gatherings with Friends and Family: Not on file    Attends Restorationism Services: Not on file    Active Member of [address] [place] [address] [employer] or Organizations: Not on file    Attends Club or Organization Meetings: Not on file    Marital Status: Not on file   Intimate Partner Violence:     Fear of Current or Ex-Partner: Not on file    Emotionally Abused: Not on file    Physically Abused: Not on file    Sexually Abused: Not on file   Housing Stability:     Unable to Pay for Housing in the Last Year: Not on file    Number of [place] in the Last Year: Not on file    Unstable Housing in the Last Year: Not on file       Family History   Problem Relation Age of Onset    Diabetes Mother         G.Parents    Hypertension Mother         G.Parents    Diabetes Father     Hypertension Father     Stroke Father         G.Parents    Cancer Other G.Parents, Pancrease and ??    Coronary Art Dis Other         G.Parents       Allergies:  Bee venom, Diclofenac, Morphine, Nicotine, Pcn [penicillins], and Peanut-containing drug products    Home Medications:  Prior to Admission medications    Medication Sig Start Date End Date Taking? Authorizing Provider   levETIRAcetam (KEPPRA) 250 MG tablet Take 100 mg by mouth 2 times daily    Historical Provider, MD       REVIEW OF SYSTEMS    (2-9 systems for level 4, 10 or more forlevel 5)      Review of Systems   Constitutional: Negative for activity change, chills and fever. HENT: Negative for congestion, sinus pain and sore throat. Eyes: Positive for photophobia, pain, discharge, redness and visual disturbance. Respiratory: Negative for cough and shortness of breath. Cardiovascular: Negative for chest pain. Gastrointestinal: Negative for abdominal pain, diarrhea, nausea and vomiting. Genitourinary: Negative for difficulty urinating, dysuria and hematuria. Musculoskeletal: Negative for back pain and myalgias. Skin: Negative for rash and wound. Neurological: Negative for dizziness, light-headedness and headaches. Psychiatric/Behavioral: Negative for agitation and confusion. PHYSICAL EXAM   (up to 7 for level 4, 8 or more forlevel 5)      ED TRIAGE VITALS BP: 100/61, Temp: 98.7 °F (37.1 °C), Pulse: 75, Resp: 18, SpO2: 93 %    Vitals:    01/01/22 0158   BP: 100/61   Pulse: 75   Resp: 18   Temp: 98.7 °F (37.1 °C)   SpO2: 93%         Physical Exam  Vitals and nursing note reviewed. Constitutional:       Appearance: Normal appearance. HENT:      Head: Normocephalic and atraumatic.       Nose: Nose normal.      Mouth/Throat:      Mouth: Mucous membranes are moist.   Eyes:      Comments: Preseptal swelling in the left eye both upper and lower eyelids, minimal conjunctival injection left eye, mucus present at the medial aspect of the left eye, Woods lamp negative for abrasion   Cardiovascular: Rate and Rhythm: Normal rate and regular rhythm. Pulses: Normal pulses. Heart sounds: Normal heart sounds. Pulmonary:      Effort: Pulmonary effort is normal.      Breath sounds: Normal breath sounds. Abdominal:      General: Abdomen is flat. Palpations: Abdomen is soft. Musculoskeletal:         General: Normal range of motion. Cervical back: Normal range of motion. Skin:     General: Skin is warm and dry. Capillary Refill: Capillary refill takes less than 2 seconds. Neurological:      General: No focal deficit present. Mental Status: She is alert and oriented to person, place, and time. Psychiatric:         Mood and Affect: Mood normal.         Behavior: Behavior normal.           DIFFERENTIAL  DIAGNOSIS     PLAN (LABS / IMAGING / EKG):  No orders of the defined types were placed in this encounter. MEDICATIONS ORDERED:  ED Medication Orders (From admission, onward)    Start Ordered     Status Ordering Provider    01/01/22 0215 01/01/22 0203  fluorescein ophthalmic strip 1 mg  ONCE         Last MAR action: Given - by MENDOZA HINTON on 01/01/22 at 0213 Miguel Nelson    01/01/22 0215 01/01/22 0203  proparacaine (ALCAINE) 0.5 % ophthalmic solution 1 drop  ONCE         [facility] action: [facility] at 0214 Miguel Nelson    01/01/22 0215 01/01/22 0211  ciprofloxacin HCl (CILOXAN) 0.3 % ophthalmic ointment 0.5 inch  ONCE         Acknowledged MT HART          DDX: Preseptal cellulitis    DIAGNOSTIC RESULTS / EMERGENCY DEPARTMENT COURSE / MDM     IMPRESSION & INITIAL PLAN:  This is a 57-year-old female presenting return today with upper and lower left eyelid swelling decreased visual acuity and discharge from the left eye. History physical exam consistent with preseptal cellulitis/conjunctivitis. Will provide patient with ciprofloxacin eyedrops and follow-up with ophthalmology.   Visual daily testing was done however the patient not participating with exam.    LABS:  No results found for this visit on 01/01/22. RADIOLOGY:  No orders to display       CONSULTS:  None    CRITICAL CARE:  See attending physician note    FINAL IMPRESSION      1. Preseptal cellulitis of left eye          DISPOSITION / PLAN     DISPOSITION Decision To Discharge 01/01/2022 02:20:04 AM      PATIENT REFERRED TO:  No follow-up provider specified.     DISCHARGE MEDICATIONS:  New Prescriptions    No medications on file     Modified Medications    No medications on file        Maudine Fabry, MD  Emergency Medicine Resident    (Please note that portions of this note were completed with a voice recognition program.  Efforts were made to edit the dictations but occasionally words are mis-transcribed.)       Maudine Fabry, MD  Resident  01/01/22 1714

## 2022-01-01 NOTE — ED PROVIDER NOTES
[facility]     Emergency Department     Faculty Attestation    I performed a history and physical examination of the patient and discussed management with the resident. I have reviewed and agree with the residents findings including all diagnostic interpretations, and treatment plans as written. Any areas of disagreement are noted on the chart. I was personally present for the key portions of any procedures. I have documented in the chart those procedures where I was not present during the key portions. I have reviewed the emergency nurses triage note. I agree with the chief complaint, past medical history, past surgical history, allergies, medications, social and family history as documented unless otherwise noted below. Documentation of the HPI, Physical Exam and Medical Decision Making performed by [email or web address] is based on my personal performance of the HPI, PE and MDM. For Physician Assistant/ Nurse Practitioner cases/documentation I have personally evaluated this patient and have completed at least one if not all key elements of the E/M (history, physical exam, and MDM). Additional findings are as noted. 32 yo F c/o L eye discomfort, mild drainage, no injury, no fever,   pe vss gcs 15, isaiah, eomi, neck supple,   Minimal edema upper lower L lid, mild injected conjunctiva L  Neck supple with full rom,     Hx pe consistent with conjunctivitis // vss    EKG Interpretation    Interpreted by me      CRITICAL CARE: There was a high probability of clinically significant/life threatening deterioration in this patient's condition which required my urgent intervention. Total critical care time was 0 minutes. This excludes any time for separately reportable procedures.        Khanh [address], DO  01/01/22 [address], DO  01/01/22 4348       Arlin Gamma, DO 01/01/22 [address] [facility], DO 01/01/22 9601

## 2022-01-01 NOTE — GROUP NOTE
"Subjective:       Patient ID: Arnie Manning Group Therapy Note    Date: 4/20/2021    Group Start Time: 1430  Group End Time: 2279  Group Topic: Cognitive Skills    LAUREN Freeman        Group Therapy Note    Attendees: 9/14         Patient's Goal:  To increase social interaction and to practice problem solving, and communication skills        Notes: Pt participated fully in group . Pt was able to practice problem solving, and communication skills. Pt was pleasant and supportive of peers. Status After Intervention:  Improved         Participation Level: Active Listener and Interactive     Participation Quality: Appropriate, Attentive, Sharing and Supportive        Speech:  Loud but positive in content        Thought Process/Content: Logical but easily distracted, focused on talking with Doctor about discharge.         Affective Functioning: Expanded, bright     Mood: manic      Level of consciousness:  Alert, Oriented x4 and Attentive        Response to Learning: Able to verbalize current knowledge/experience, Able to verbalize/acknowledge new learning,  and Progressing to goal        Endings: None Reported     Modes of Intervention: Education, Support, Socialization, Exploration, Clarifying and Problem-solving        Discipline Responsible: Psychoeducational Specialist        Signature:  Aníbal Fan infant    2. Wheezing          Plan:         Teething infant    Wheezing  -     Cancel: POCT respiratory syncytial virus  -     POCT RSV by Molecular               Patient Instructions   See additional patient Instructions provided    Alternate Ibuprofen and Tylenol as directed for fever and pain.  Humidifier in room for cough.  Nasal suction as needed for congestion.  Encourage fluids.    Rest.  Follow up with your pediatrician if there is no improvement over the next 7-10 days  Go to the ER for any worsening symptoms.    Strict ER precautions     Patient Instructions   - You must understand that you have received an Urgent Care treatment only and that you may be released before all of your medical problems are known or treated.   - You, the patient, will arrange for follow up care as instructed.   - If your condition worsens or fails to improve we recommend that you receive another evaluation at the ER immediately or contact your PCP to discuss your concerns or return here.     Advised on return/follow-up precautions. Advised on ER precautions. Answered all patient questions. Patient verbalized understanding and voiced agreement with current treatment plan.
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
## 2022-01-07 ENCOUNTER — APPOINTMENT (OUTPATIENT)
Dept: GENERAL RADIOLOGY | Age: 32
End: 2022-01-07
Payer: MEDICAID

## 2022-01-07 ENCOUNTER — APPOINTMENT (OUTPATIENT)
Dept: CT IMAGING | Age: 32
End: 2022-01-07
Payer: MEDICAID

## 2022-01-07 ENCOUNTER — HOSPITAL ENCOUNTER (EMERGENCY)
Age: 32
Discharge: HOME | End: 2022-01-07
Attending: EMERGENCY MEDICINE
Payer: MEDICAID

## 2022-01-07 VITALS
BODY MASS INDEX: 22.5 KG/M2 | DIASTOLIC BLOOD PRESSURE: 69 MMHG | HEART RATE: 71 BPM | OXYGEN SATURATION: 99 % | HEIGHT: 56 IN | RESPIRATION RATE: 15 BRPM | SYSTOLIC BLOOD PRESSURE: 100 MMHG | WEIGHT: 100 LBS | TEMPERATURE: 97 F

## 2022-01-07 DIAGNOSIS — R51.9 FACIAL PAIN: ICD-10-CM

## 2022-01-07 DIAGNOSIS — R07.89 ANTERIOR CHEST WALL PAIN: ICD-10-CM
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
## 2022-01-07 LAB
ABSOLUTE EOS #: 0.21 K/UL (ref 0–0.44)
ABSOLUTE IMMATURE GRANULOCYTE: 0.08 K/UL (ref 0–0.3)
ABSOLUTE LYMPH #: 2.26 K/UL (ref 1.1–3.7)
ABSOLUTE MONO #: 0.64 K/UL (ref 0.1–1.2)
ANION GAP SERPL CALCULATED.3IONS-SCNC: 9 MMOL/L (ref 9–17)
BASOPHILS # BLD: 0 % (ref 0–2)
BASOPHILS ABSOLUTE: 0.04 K/UL (ref 0–0.2)
BUN BLDV-MCNC: 17 MG/DL (ref 6–20)
BUN/CREAT BLD: ABNORMAL (ref 9–20)
CALCIUM SERPL-MCNC: 9 MG/DL (ref 8.6–10.4)
CHLORIDE BLD-SCNC: 100 MMOL/L (ref 98–107)
CO2: 23 MMOL/L (ref 20–31)
CREAT SERPL-MCNC: 0.98 MG/DL (ref 0.5–0.9)
DIFFERENTIAL TYPE: ABNORMAL
EOSINOPHILS RELATIVE PERCENT: 1 % (ref 1–4)
GFR AFRICAN AMERICAN: >60 ML/MIN
GFR NON-AFRICAN AMERICAN: >60 ML/MIN
GFR SERPL CREATININE-BSD FRML MDRD: ABNORMAL ML/MIN/{1.73_M2}
GFR SERPL CREATININE-BSD FRML MDRD: ABNORMAL ML/MIN/{1.73_M2}
GLUCOSE BLD-MCNC: 80 MG/DL (ref 70–99)
HCG QUALITATIVE: NEGATIVE
HCT VFR BLD CALC: 42.1 % (ref 36.3–47.1)
HEMOGLOBIN: 13.9 G/DL (ref 11.9–15.1)
IMMATURE GRANULOCYTES: 1 %
LYMPHOCYTES # BLD: 14 % (ref 24–43)
MCH RBC QN AUTO: 30.1 PG (ref 25.2–33.5)
MCHC RBC AUTO-ENTMCNC: 33 G/DL (ref 28.4–34.8)
MCV RBC AUTO: 91.1 FL (ref 82.6–102.9)
MONOCYTES # BLD: 4 % (ref 3–12)
NRBC AUTOMATED: 0 PER 100 WBC
PDW BLD-RTO: 13 % (ref 11.8–14.4)
PLATELET # BLD: 232 K/UL (ref 138–453)
PLATELET ESTIMATE: ABNORMAL
PMV BLD AUTO: 9.8 FL (ref 8.1–13.5)
POTASSIUM SERPL-SCNC: 3.7 MMOL/L (ref 3.7–5.3)
RBC # BLD: 4.62 M/UL (ref 3.95–5.11)
RBC # BLD: ABNORMAL 10*6/UL
SEG NEUTROPHILS: 80 % (ref 36–65)
SEGMENTED NEUTROPHILS ABSOLUTE COUNT: 12.76 K/UL (ref 1.5–8.1)
SODIUM BLD-SCNC: 132 MMOL/L (ref 135–144)
WBC # BLD: 16 K/UL (ref 3.5–11.3)
WBC # BLD: ABNORMAL 10*3/UL

## 2022-01-07 PROCEDURE — 72125 CT NECK SPINE W/O DYE: CPT

## 2022-01-07 PROCEDURE — 80048 BASIC METABOLIC PNL TOTAL CA: CPT

## 2022-01-07 PROCEDURE — 6370000000 HC RX 637 (ALT 250 FOR IP): Performed by: STUDENT IN AN ORGANIZED HEALTH CARE EDUCATION/TRAINING PROGRAM

## 2022-01-07 PROCEDURE — 70498 CT ANGIOGRAPHY NECK: CPT

## 2022-01-07 PROCEDURE — 85025 COMPLETE CBC W/AUTO DIFF WBC: CPT

## 2022-01-07 PROCEDURE — 70450 CT HEAD/BRAIN W/O DYE: CPT

## 2022-01-07 PROCEDURE — 70486 CT MAXILLOFACIAL W/O DYE: CPT

## 2022-01-07 PROCEDURE — 84703 CHORIONIC GONADOTROPIN ASSAY: CPT

## 2022-01-07 PROCEDURE — 6360000004 HC RX CONTRAST MEDICATION: Performed by: STUDENT IN AN ORGANIZED HEALTH CARE EDUCATION/TRAINING PROGRAM

## 2022-01-07 PROCEDURE — 71101 X-RAY EXAM UNILAT RIBS/CHEST: CPT

## 2022-01-07 PROCEDURE — 99284 EMERGENCY DEPT VISIT MOD MDM: CPT

## 2022-01-07 RX ORDER — OXYCODONE HYDROCHLORIDE 5 MG/1
5 TABLET ORAL ONCE
Status: COMPLETED | OUTPATIENT
Start: 2022-01-07 | End: 2022-01-07

## 2022-01-07 RX ORDER — ACETAMINOPHEN 500 MG
1000 TABLET ORAL ONCE
Status: COMPLETED | OUTPATIENT
Start: 2022-01-07 | End: 2022-01-07

## 2022-01-07 RX ORDER — ACETAMINOPHEN 325 MG/1
650 TABLET ORAL EVERY 6 HOURS PRN
Qty: 60 TABLET | Refills: 0 | Status: SHIPPED | OUTPATIENT
Start: 2022-01-07 | End: 2022-01-16

## 2022-01-07 RX ADMIN — IOPAMIDOL 90 ML: 755 INJECTION, SOLUTION INTRAVENOUS at 04:47

## 2022-01-07 RX ADMIN — ACETAMINOPHEN 1000 MG: 500 TABLET ORAL at 04:08

## 2022-01-07 RX ADMIN — OXYCODONE HYDROCHLORIDE 5 MG: 5 TABLET ORAL at 04:08

## 2022-01-07 ASSESSMENT — ENCOUNTER SYMPTOMS
SHORTNESS OF BREATH: 0
NAUSEA: 0
EYE REDNESS: 0
COUGH: 0
RHINORRHEA: 0
ABDOMINAL PAIN: 0
DIARRHEA: 0
VOMITING: 0
BACK PAIN: 0
PHOTOPHOBIA: 0

## 2022-01-07 ASSESSMENT — PAIN SCALES - GENERAL
PAINLEVEL_OUTOF10: 10
PAINLEVEL_OUTOF10: 10

## 2022-01-07 ASSESSMENT — PAIN DESCRIPTION - DESCRIPTORS: DESCRIPTORS: ACHING;THROBBING

## 2022-01-07 ASSESSMENT — PAIN DESCRIPTION - LOCATION: LOCATION: HEAD;BACK;CHEST
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
## 2022-01-07 NOTE — ED NOTES
The following labs labeled with pt sticker and tubed to lab:     [] Blue     [x] Lavender   [] on ice  [x] Green/yellow  [] Green/black [] on ice  [x] Yellow  [] Red  [] Pink      [] COVID-19 swab    [] Rapid  [] PCR  [] Flu swab  [] Peds Viral Panel     [] Urine Sample  [] Pelvic Cultures  [] Blood Cultures          Michelet Vale LPN  54/72/17 0959
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
## 2022-01-16 ENCOUNTER — HOSPITAL ENCOUNTER (EMERGENCY)
Age: 32
Discharge: HOME | End: 2022-01-16
Attending: EMERGENCY MEDICINE
Payer: MEDICAID

## 2022-01-16 ENCOUNTER — APPOINTMENT (OUTPATIENT)
Dept: GENERAL RADIOLOGY | Age: 32
End: 2022-01-16
Payer: MEDICAID

## 2022-01-16 VITALS
OXYGEN SATURATION: 97 % | DIASTOLIC BLOOD PRESSURE: 66 MMHG | HEART RATE: 85 BPM | SYSTOLIC BLOOD PRESSURE: 107 MMHG | RESPIRATION RATE: 18 BRPM | TEMPERATURE: 96.9 F

## 2022-01-16 DIAGNOSIS — R07.81 PAINFUL RIB: Primary | ICD-10-CM

## 2022-01-16 PROCEDURE — 93005 ELECTROCARDIOGRAM TRACING: CPT | Performed by: STUDENT IN AN ORGANIZED HEALTH CARE EDUCATION/TRAINING PROGRAM

## 2022-01-16 PROCEDURE — 6370000000 HC RX 637 (ALT 250 FOR IP): Performed by: STUDENT IN AN ORGANIZED HEALTH CARE EDUCATION/TRAINING PROGRAM

## 2022-01-16 PROCEDURE — 71046 X-RAY EXAM CHEST 2 VIEWS: CPT

## 2022-01-16 PROCEDURE — 99284 EMERGENCY DEPT VISIT MOD MDM: CPT

## 2022-01-16 RX ORDER — LIDOCAINE 50 MG/G
1 PATCH TOPICAL DAILY
Qty: 30 PATCH | Refills: 0 | Status: SHIPPED | OUTPATIENT
Start: 2022-01-16

## 2022-01-16 RX ORDER — ACETAMINOPHEN 500 MG
1000 TABLET ORAL EVERY 6 HOURS PRN
Qty: 20 TABLET | Refills: 1 | Status: SHIPPED | OUTPATIENT
Start: 2022-01-16 | End: 2022-04-17 | Stop reason: SDUPTHER

## 2022-01-16 RX ORDER — LEVETIRACETAM 500 MG/1
250 TABLET ORAL 2 TIMES DAILY
Qty: 60 TABLET | Refills: 3 | Status: SHIPPED | OUTPATIENT
Start: 2022-01-16

## 2022-01-16 RX ORDER — LEVETIRACETAM 10 MG/ML
1000 INJECTION INTRAVASCULAR ONCE
Status: DISCONTINUED | OUTPATIENT
Start: 2022-01-16 | End: 2022-01-16

## 2022-01-16 RX ORDER — LEVETIRACETAM 500 MG/1
1000 TABLET ORAL ONCE
Status: COMPLETED | OUTPATIENT
Start: 2022-01-16 | End: 2022-01-16

## 2022-01-16 RX ADMIN — LEVETIRACETAM 1000 MG: 500 TABLET ORAL at 15:48

## 2022-01-16 ASSESSMENT — PAIN SCALES - GENERAL: PAINLEVEL_OUTOF10: 7

## 2022-01-16 ASSESSMENT — ENCOUNTER SYMPTOMS
NAUSEA: 0
VOMITING: 0
SHORTNESS OF BREATH: 0
COLOR CHANGE: 0
PHOTOPHOBIA: 0
ABDOMINAL PAIN: 0
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
## 2022-01-17 LAB
EKG ATRIAL RATE: 75 BPM
EKG P AXIS: 68 DEGREES
EKG P-R INTERVAL: 116 MS
EKG Q-T INTERVAL: 402 MS
EKG QRS DURATION: 82 MS
EKG QTC CALCULATION (BAZETT): 448 MS
EKG R AXIS: 77 DEGREES
EKG T AXIS: 63 DEGREES
EKG VENTRICULAR RATE: 75 BPM

## 2022-01-17 PROCEDURE — 93010 ELECTROCARDIOGRAM REPORT: CPT | Performed by: INTERNAL MEDICINE

## 2022-01-18 ENCOUNTER — APPOINTMENT (OUTPATIENT)
Dept: GENERAL RADIOLOGY | Age: 32
End: 2022-01-18
Payer: MEDICAID

## 2022-01-18 ENCOUNTER — HOSPITAL ENCOUNTER (EMERGENCY)
Age: 32
Discharge: HOME | End: 2022-01-18
Attending: EMERGENCY MEDICINE
Payer: MEDICAID

## 2022-01-18 VITALS
OXYGEN SATURATION: 95 % | WEIGHT: 95 LBS | SYSTOLIC BLOOD PRESSURE: 94 MMHG | TEMPERATURE: 97.8 F | BODY MASS INDEX: 21.37 KG/M2 | HEART RATE: 63 BPM | RESPIRATION RATE: 13 BRPM | HEIGHT: 56 IN | DIASTOLIC BLOOD PRESSURE: 63 MMHG

## 2022-01-18 DIAGNOSIS — R07.9 CHEST PAIN, UNSPECIFIED TYPE: Primary | ICD-10-CM

## 2022-01-18 LAB
EKG ATRIAL RATE: 67 BPM
EKG P AXIS: 75 DEGREES
EKG P-R INTERVAL: 122 MS
EKG Q-T INTERVAL: 450 MS
EKG QRS DURATION: 86 MS
EKG QTC CALCULATION (BAZETT): 475 MS
EKG R AXIS: 84 DEGREES
EKG T AXIS: 79 DEGREES
EKG VENTRICULAR RATE: 67 BPM
HCG QUALITATIVE: NEGATIVE
TROPONIN INTERP: NORMAL
TROPONIN INTERP: NORMAL
TROPONIN T: NORMAL NG/ML
TROPONIN T: NORMAL NG/ML
TROPONIN, HIGH SENSITIVITY: <6 NG/L (ref 0–14)
TROPONIN, HIGH SENSITIVITY: <6 NG/L (ref 0–14)

## 2022-01-18 PROCEDURE — 84703 CHORIONIC GONADOTROPIN ASSAY: CPT

## 2022-01-18 PROCEDURE — 99285 EMERGENCY DEPT VISIT HI MDM: CPT

## 2022-01-18 PROCEDURE — 93005 ELECTROCARDIOGRAM TRACING: CPT | Performed by: STUDENT IN AN ORGANIZED HEALTH CARE EDUCATION/TRAINING PROGRAM

## 2022-01-18 PROCEDURE — 6370000000 HC RX 637 (ALT 250 FOR IP): Performed by: STUDENT IN AN ORGANIZED HEALTH CARE EDUCATION/TRAINING PROGRAM

## 2022-01-18 PROCEDURE — 84484 ASSAY OF TROPONIN QUANT: CPT

## 2022-01-18 PROCEDURE — 71045 X-RAY EXAM CHEST 1 VIEW: CPT

## 2022-01-18 RX ORDER — ACETAMINOPHEN 500 MG
1000 TABLET ORAL ONCE
Status: COMPLETED | OUTPATIENT
Start: 2022-01-18 | End: 2022-01-18

## 2022-01-18 RX ADMIN — ACETAMINOPHEN 1000 MG: 500 TABLET ORAL at 02:59

## 2022-01-18 ASSESSMENT — PAIN SCALES - GENERAL
PAINLEVEL_OUTOF10: 10

## 2022-01-18 ASSESSMENT — PAIN DESCRIPTION - LOCATION: LOCATION: CHEST

## 2022-01-18 ASSESSMENT — ENCOUNTER SYMPTOMS
TROUBLE SWALLOWING: 0
SHORTNESS OF BREATH: 0
ABDOMINAL DISTENTION: 0
COUGH: 0
ABDOMINAL PAIN: 0
DIARRHEA: 0
STRIDOR: 0
BACK PAIN: 0
WHEEZING: 0
VOMITING: 0
BLOOD IN STOOL: 0
CHEST TIGHTNESS: 0
COLOR CHANGE: 0
NAUSEA: 0

## 2022-01-18 ASSESSMENT — PAIN DESCRIPTION - DESCRIPTORS: DESCRIPTORS: SHARP

## 2022-01-18 ASSESSMENT — PAIN DESCRIPTION - FREQUENCY: FREQUENCY: CONTINUOUS
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
## 2022-01-18 NOTE — ED TRIAGE NOTES
Pt arrived to Ed with c/o left sided chest pain which started about 10 minutes ago and radiates to back.

## 2022-01-18 NOTE — ED PROVIDER NOTES
[facility]     Emergency Department     Faculty Attestation    I performed a history and physical examination of the patient and discussed management with the resident. I have reviewed and agree with the residents findings including all diagnostic interpretations, and treatment plans as written. Any areas of disagreement are noted on the chart. I was personally present for the key portions of any procedures. I have documented in the chart those procedures where I was not present during the key portions. I have reviewed the emergency nurses triage note. I agree with the chief complaint, past medical history, past surgical history, allergies, medications, social and family history as documented unless otherwise noted below. Documentation of the HPI, Physical Exam and Medical Decision Making performed by [email or web address] is based on my personal performance of the HPI, PE and MDM. For Physician Assistant/ Nurse Practitioner cases/documentation I have personally evaluated this patient and have completed at least one if not all key elements of the E/M (history, physical exam, and MDM). Additional findings are as noted. 34 yo F c/o cp & sob, no fever, no injury, no diaphoresis, no nausea or vomiting, + smoker,   Perc-  pe [facility] RN escort for exam;vss gcs 15,   Neck supple, abdomen non tender, no distension, no rigidity, no guarding, no calf swelling, no calf tenderness,     cxr-, hcg-, perc -, trop x 2 <6  //vss > discharged, I feel pt stable for out pt tx    EKG Interpretation    Interpreted by me  Normal sinus, hr 67, no ischemia, normal axis, qtc 475    CRITICAL CARE: There was a high probability of clinically significant/life threatening deterioration in this patient's condition which required my urgent intervention. Total critical care time was 5 minutes. This excludes any time for separately reportable procedures.        Khanh 24, DO  01/18/22 1 Louie Armendariz, DO  01/18/22 3150 [facility], DO  01/18/22 0104 [facility] DO  01/18/22 1 Louie Armendariz, DO  01/18/22 8571

## 2022-01-18 NOTE — ED NOTES
No changes in status pt sleeping w/o respiratory compromise awaiting pending labs to result      Heather Ko RN  01/18/22 [id number]

## 2022-01-18 NOTE — ED PROVIDER NOTES
[facility]  ED  Emergency Department Encounter  EmergencyMedicine Resident     Pt Name:Janet Hanson  MRN: [id number]  [place] 1990  Date of evaluation: 1/18/22  PCP:  No primary care provider on file. This patient was evaluated in the Emergency Department for symptoms described in the history of present illness. The patient was evaluated in the context of the global COVID-19 pandemic, which necessitated consideration that the patient might be at risk for infection with the SARS-CoV-2 virus that causes COVID-19. Institutional protocols and algorithms that pertain to the evaluation of patients at risk for COVID-19 are in a state of rapid change based on information released by regulatory bodies including the CDC and federal and state organizations. These policies and algorithms were followed during the patient's care in the ED. CHIEF COMPLAINT       Chief Complaint   Patient presents with    Chest Pain      pt arrived with c/o  left sided chest pain with sob  onset 15 min pta  worse with movement 14    Shortness of Breath       HISTORY OF PRESENT ILLNESS  (Location/Symptom, Timing/Onset, Context/Setting, Quality, Duration, Modifying Factors, Severity.)      Morgan Garcia is a 33 y.o. female who presents with chest pain that started 15 minutes prior to arrival.  Patient is well-known to the emergency department, has been seen multiple times most recently for rib pain was treated and discharged from the ER on January 7 approximately 10 days ago. At that time work-up was negative. Patient has been seen multiple times for chest pain, she is PERC negative, not experiencing any fevers chills nausea vomiting dizziness drowsiness or constitutional symptoms. Patient states that her chest pain is nonradiating 8-9 out of 10 in severity and located in her central chest.  No history of blood clots, not actively on any contraception.     PAST MEDICAL / SURGICAL / SOCIAL / FAMILY HISTORY has a past medical history of Asthma, Bipolar disorder ([facility] [address] Cervical dysplasia, COPD (chronic obstructive pulmonary disease) ([facility] [address] Migraine, Movement disorder, and Seizures ([facility] has a past surgical history that includes Cholecystectomy (2007); Tubal ligation; and Appendectomy. Social History     Socioeconomic History    Marital status:      Spouse name: Not on file    Number of children: Not on file    Years of education: Not on file    Highest education level: Not on file   Occupational History    Not on file   Tobacco Use    Smoking status: Current Every Day Smoker     Packs/day: 0.25     Years: 11.00     Pack years: 2.75     Types: Cigarettes    Smokeless tobacco: Never Used   Vaping Use    Vaping Use: Never used   Substance and Sexual Activity    Alcohol use: Never     Alcohol/week: 0.0 standard drinks    Drug use: Not Currently     Types: Opiates , Marijuana (Weed)     Comment: Hx Percocet abuse sober for 2 years.  Sexual activity: Not on file   Other Topics Concern    Not on file   Social History Narrative    Not on file     Social Determinants of Health     Financial Resource Strain:     Difficulty of Paying Living Expenses: Not on file   Food Insecurity:     Worried About Running Out of Food in the Last Year: Not on file    Emelyn of Food in the Last Year: Not on file   Transportation Needs:     Lack of Transportation (Medical): Not on file    Lack of Transportation (Non-Medical):  Not on file   Physical Activity:     Days of Exercise per Week: Not on file    Minutes of Exercise per Session: Not on file   Stress:     Feeling of Stress : Not on file   Social Connections:     Frequency of Communication with Friends and Family: Not on file    Frequency of Social Gatherings with Friends and Family: Not on file    Attends Restorationism Services: Not on file    Active Member of Clubs or Organizations: Not on file    Attends Club or Organization Meetings: Not on file    Marital Status: Not on file   Intimate Partner Violence:     Fear of Current or Ex-Partner: Not on file    Emotionally Abused: Not on file    Physically Abused: Not on file    Sexually Abused: Not on file   Housing Stability:     Unable to Pay for Housing in the Last Year: Not on file    Number of Places Lived in the Last Year: Not on file    Unstable Housing in the Last Year: Not on file       Family History   Problem Relation Age of Onset    Diabetes Mother         G.Parents    Hypertension Mother         G.Parents    Diabetes Father     Hypertension Father     Stroke Father         G.Parents    Cancer Other         G.Parents, Pancrease and ??    Coronary Art Dis Other         G.Parents       Allergies:  Bee venom, Diclofenac, Morphine, Nicotine, Pcn [penicillins], and Peanut-containing drug products    Home Medications:  Prior to Admission medications    Medication Sig Start Date End Date Taking? Authorizing Provider   lidocaine (LIDODERM) 5 % Place 1 patch onto the skin daily 12 hours on, 12 hours off. 1/16/22   Daniel Ribeiro, DO   levETIRAcetam (KEPPRA) 500 MG tablet Take 0.5 tablets by mouth 2 times daily 1/16/22   Daniel Ribeiro, DO   acetaminophen (TYLENOL) 500 MG tablet Take 2 tablets by mouth every 6 hours as needed for Pain 1/16/22   Daniel Ribeiro, DO       REVIEW OF SYSTEMS    (2-9 systems for level 4, 10 or more for level 5)      Review of Systems   Constitutional: Negative for chills, fatigue and fever. HENT: Negative for congestion and trouble swallowing. Eyes: Negative for visual disturbance. Respiratory: Negative for cough, chest tightness, shortness of breath, wheezing and stridor. Cardiovascular: Positive for chest pain. Gastrointestinal: Negative for abdominal distention, abdominal pain, blood in stool, diarrhea, nausea and vomiting. Genitourinary: Negative for dysuria, flank pain, hematuria and urgency.    Musculoskeletal: Negative for back pain and myalgias. Skin: Negative for color change. Neurological: Negative for dizziness, syncope, weakness, light-headedness and headaches. PHYSICAL EXAM   (up to 7 for level 4, 8 or more for level 5)      INITIAL VITALS:   BP 94/63   Pulse 63   Temp 97.8 °F (36.6 °C) (Oral)   Resp 13   Ht 4' 8\" (1.422 m)   Wt 95 lb (43.1 kg)   LMP 01/07/2022   SpO2 95%   BMI 21.30 kg/m²     Physical Exam  Constitutional:       General: She is not in acute distress. Appearance: Normal appearance. She is not ill-appearing or toxic-appearing. HENT:      Head: Normocephalic and atraumatic. Nose: No congestion. Mouth/Throat:      Mouth: Mucous membranes are moist.   Eyes:      Conjunctiva/sclera: Conjunctivae normal.   Cardiovascular:      Rate and Rhythm: Normal rate and regular rhythm. Pulses: Normal pulses. Heart sounds: Normal heart sounds. No murmur heard. No gallop. Pulmonary:      Effort: Pulmonary effort is normal. No respiratory distress. Breath sounds: Normal breath sounds. No stridor. No wheezing or rhonchi. Chest:      Chest wall: No tenderness. Abdominal:      General: Abdomen is flat. There is no distension. Palpations: There is no mass. Tenderness: There is no abdominal tenderness. There is no guarding or rebound. Musculoskeletal:         General: No swelling, tenderness or deformity. Skin:     General: Skin is warm. Coloration: Skin is not jaundiced. Findings: No bruising. Neurological:      General: No focal deficit present. Mental Status: She is alert.          DIFFERENTIAL  DIAGNOSIS     PLAN (LABS / IMAGING / EKG):  Orders Placed This Encounter   Procedures    XR CHEST PORTABLE    Troponin    Troponin    HCG Qualitative, Serum    EKG 12 Lead       MEDICATIONS ORDERED:  Orders Placed This Encounter   Medications    acetaminophen (TYLENOL) tablet 1,000 mg       DDX: ACS, arrhythmia, trauma, PE, PNA, pneumothroax, esophageal rupture, tamponade, drug use, pneumonia, pericarditis, GERD, MSK, Endocarditis, anxiety           DIAGNOSTIC RESULTS / EMERGENCY DEPARTMENT COURSE / MDM   LAB RESULTS:  Results for orders placed or performed during the hospital encounter of 01/18/22   Troponin   Result Value Ref Range    Troponin, High Sensitivity <6 0 - 14 ng/L    Troponin T NOT REPORTED <0.03 ng/mL    Troponin Interp NOT REPORTED    HCG Qualitative, Serum   Result Value Ref Range    hCG Qual NEGATIVE NEGATIVE         RADIOLOGY:  XR CHEST (2 VW)    Result Date: 1/16/2022  No acute process. XR RIBS RIGHT INCLUDE CHEST (MIN 3 VIEWS)    Result Date: 1/7/2022  1. No radiographic evidence of an acute cardiopulmonary process. 2. No convincing acute osseous abnormality. CT HEAD WO CONTRAST    Result Date: 1/7/2022  Suboptimal evaluation secondary to motion degradation. 1. No acute intracranial abnormality. 2. No acute traumatic injury of the facial bones. 3. Left periorbital soft tissue edema. 4. Poor dentition with dental caries and periapical lucencies of the bilateral mandibular molars. Can correlate with dental examination. CT FACIAL BONES WO CONTRAST    Result Date: 1/7/2022  Suboptimal evaluation secondary to motion degradation. 1. No acute intracranial abnormality. 2. No acute traumatic injury of the facial bones. 3. Left periorbital soft tissue edema. 4. Poor dentition with dental caries and periapical lucencies of the bilateral mandibular molars. Can correlate with dental examination. CT CERVICAL SPINE WO CONTRAST    Result Date: 1/7/2022  1. No acute abnormality of the cervical spine. 2. Poor dentition with dental caries and periapical lucencies of the bilateral mandibular molars. Can correlate with dental examination. XR CHEST PORTABLE    Result Date: 1/18/2022  Normal examination. CTA NECK W CONTRAST    Result Date: 1/7/2022  1. No acute trauma of the major arterial vessels of the neck.  2. Poor dentition with dental caries and periapical lucencies of the bilateral mandibular molars. Can correlate with dental examination. EKG  Normal sinus rhythm, no ST elevations or depressions, no T wave inversions, regular rate. Nonacute EKG. All EKG's are interpreted by the Emergency Department Physician who either signs or Co-signs this chart in the absence of a cardiologist.    EMERGENCY DEPARTMENT COURSE:  ED Course as of 01/18/22 0507   Tue Jan 18, 2022   0259 Troponin, High Sensitivity: <6  Trop negative. Pending hcg. Plan to discharge after resulted   [KK]      ED Course User Index  301 Loi Brady DO           Assessment/Plan: Patient is a 57-year-old female presenting with acute onset chest pain. Patient was treated conservatively for pain she is well-known to the ER she has been seen multiple times for similar issues with negative work-ups. Today chest x-ray was negative, troponin was negative, hCG was negative again. Patient is PERC negative, low suspicion for pulmonary embolism at this time, patient is not oral contraceptives, no recent travel, will discharge with ER and PCP follow-up. No further intervention needed at this time. FINAL IMPRESSION      1.  Chest pain, unspecified type          DISPOSITION / PLAN     DISPOSITION Discharge - Pending Orders Complete 01/18/2022 04:05:26 AM      PATIENT REFERRED TO:  [facility]  [address] [address]  [phone]  Go to   As needed      DISCHARGE MEDICATIONS:  New Prescriptions    No medications on file       Guy Ha DO  Emergency Medicine Resident    (Please note that portions of thisnote were completed with a voice recognition program.  Efforts were made to edit the dictations but occasionally words are mis-transcribed.)        Stefani Jaimes DO  Resident  01/18/22 9449

## 2022-01-18 NOTE — ED NOTES
Iv access dc 'd with cath intact bleeding  Controlled  Dc instructions verbally  Given to patient voiced understanding see dc instruction sheet for further      Ishmael Chavez RN  01/18/22 8623

## 2022-02-05 ENCOUNTER — HOSPITAL ENCOUNTER (EMERGENCY)
Age: 32
Discharge: LEFT BEFORE DISCHARGE | End: 2022-02-05
Attending: EMERGENCY MEDICINE
Payer: MEDICAID

## 2022-02-05 VITALS
SYSTOLIC BLOOD PRESSURE: 117 MMHG | TEMPERATURE: 97.3 F | OXYGEN SATURATION: 99 % | HEIGHT: 56 IN | BODY MASS INDEX: 21.37 KG/M2 | WEIGHT: 95 LBS | DIASTOLIC BLOOD PRESSURE: 76 MMHG | HEART RATE: 79 BPM

## 2022-02-05 DIAGNOSIS — Z53.20 LEFT BEFORE TREATMENT COMPLETED: Primary | ICD-10-CM

## 2022-02-05 PROCEDURE — 99284 EMERGENCY DEPT VISIT MOD MDM: CPT

## 2022-02-05 PROCEDURE — 6370000000 HC RX 637 (ALT 250 FOR IP)

## 2022-02-05 PROCEDURE — 2500000003 HC RX 250 WO HCPCS: Performed by: STUDENT IN AN ORGANIZED HEALTH CARE EDUCATION/TRAINING PROGRAM

## 2022-02-05 RX ORDER — PROPARACAINE HYDROCHLORIDE 5 MG/ML
1 SOLUTION/ DROPS OPHTHALMIC ONCE
Status: COMPLETED | OUTPATIENT
Start: 2022-02-05 | End: 2022-02-05

## 2022-02-05 RX ADMIN — PROPARACAINE HYDROCHLORIDE 1 DROP: 5 SOLUTION/ DROPS OPHTHALMIC at 05:27

## 2022-02-05 RX ADMIN — FLUORESCEIN SODIUM 1 STRIP: 1 STRIP OPHTHALMIC at 05:28

## 2022-02-05 ASSESSMENT — ENCOUNTER SYMPTOMS
EYE REDNESS: 1
COUGH: 0
EYE DISCHARGE: 1
CHEST TIGHTNESS: 0
SHORTNESS OF BREATH: 0
EYE ITCHING: 1
TROUBLE SWALLOWING: 0
BACK PAIN: 0

## 2022-02-05 ASSESSMENT — PAIN SCALES - GENERAL: PAINLEVEL_OUTOF10: 7

## 2022-02-05 ASSESSMENT — PAIN DESCRIPTION - PAIN TYPE: TYPE: ACUTE PAIN

## 2022-02-05 ASSESSMENT — PAIN DESCRIPTION - LOCATION: LOCATION: EYE

## 2022-02-05 ASSESSMENT — PAIN DESCRIPTION - ORIENTATION: ORIENTATION: RIGHT

## 2022-02-05 ASSESSMENT — VISUAL ACUITY
OS: 20/40
OD: 20/70

## 2022-02-05 NOTE — ED PROVIDER NOTES
[facility] ED  Emergency Department Encounter  EmergencyMedicine Resident     Pt Name:Janet Hanson  MRN: [id number]  [place] 1990  Date of evaluation: 2/5/22  PCP:  No primary care provider on file. This patient was evaluated in the Emergency Department for symptoms described in the history of present illness. The patient was evaluated in the context of the global COVID-19 pandemic, which necessitated consideration that the patient might be at risk for infection with the SARS-CoV-2 virus that causes COVID-19. Institutional protocols and algorithms that pertain to the evaluation of patients at risk for COVID-19 are in a state of rapid change based on information released by regulatory bodies including the CDC and federal and state organizations. These policies and algorithms were followed during the patient's care in the ED. CHIEF COMPLAINT       Chief Complaint   Patient presents with    Eye Pain       HISTORY OF PRESENT ILLNESS  (Location/Symptom, Timing/Onset, Context/Setting, Quality, Duration, Modifying Factors, Severity.)      Morgan Garcia is a 33 y.o. female who presents with right eye redness and itching for 1 to 2 days. Patient states that she got a cat recently, she states that she has had allergies to the cat. This is the fourth time in the past 3 months she has had symptoms similar to this. Patient states that in the past episodes she has received eyedrops and had a resolution of her symptoms. No fevers chills nausea vomiting dizziness drowsiness or other constitutional symptoms. She does not have any pain with eye movements    PAST MEDICAL / SURGICAL / SOCIAL / FAMILY HISTORY      has a past medical history of Asthma, Bipolar disorder ([address] Cervical dysplasia, COPD (chronic obstructive pulmonary disease) ([address] Migraine, Movement disorder, and Seizures ([address] has a past surgical history that includes Cholecystectomy (2007);  Tubal ligation; and Year: Not on file    Number of Places Lived in the Last Year: Not on file    Unstable Housing in the Last Year: Not on file       Family History   Problem Relation Age of Onset    Diabetes Mother         G.Parents    Hypertension Mother         G.Parents    Diabetes Father     Hypertension Father     Stroke Father         G.Parents    Cancer Other         G.Parents, Pancrease and ??    Coronary Art Dis Other         G.Parents       Allergies:  Bee venom, Diclofenac, Morphine, Nicotine, Pcn [penicillins], and Peanut-containing drug products    Home Medications:  Prior to Admission medications    Medication Sig Start Date End Date Taking? Authorizing Provider   lidocaine (LIDODERM) 5 % Place 1 patch onto the skin daily 12 hours on, 12 hours off. 1/16/22   Bird Desmond, DO   levETIRAcetam (KEPPRA) 500 MG tablet Take 0.5 tablets by mouth 2 times daily 1/16/22   Bird Pimple, DO   acetaminophen (TYLENOL) 500 MG tablet Take 2 tablets by mouth every 6 hours as needed for Pain 1/16/22   Bird Pimple, DO       REVIEW OF SYSTEMS    (2-9 systems for level 4, 10 or more for level 5)      Review of Systems   Constitutional: Negative for chills, fatigue and fever. HENT: Negative for congestion and trouble swallowing. Eyes: Positive for discharge, redness and itching. Negative for visual disturbance. Respiratory: Negative for cough, chest tightness and shortness of breath. Genitourinary: Negative for dysuria, flank pain, hematuria and urgency. Musculoskeletal: Negative for back pain and myalgias. Neurological: Negative for syncope and weakness. PHYSICAL EXAM   (up to 7 for level 4, 8 or more for level 5)      INITIAL VITALS:   /76   Pulse 79   Temp 97.3 °F (36.3 °C) (Oral)   Ht 4' 8\" (1.422 m)   Wt 95 lb (43.1 kg)   LMP 01/07/2022   SpO2 99%   BMI 21.30 kg/m²     Physical Exam  Vitals reviewed. Constitutional:       General: She is not in acute distress.      Appearance: Normal appearance. She is not ill-appearing or toxic-appearing. HENT:      Head: Normocephalic and atraumatic. Nose: No congestion. Mouth/Throat:      Mouth: Mucous membranes are moist.   Eyes:      General:         Right eye: Discharge present. Extraocular Movements: Extraocular movements intact. Conjunctiva/sclera:      Right eye: Right conjunctiva is injected. Comments: No foreign body no corneal abrasion, see fluorescein stain exam images below. Slit-lamp exam normal   Cardiovascular:      Rate and Rhythm: Normal rate and regular rhythm. Pulses: Normal pulses. Heart sounds: Normal heart sounds. No murmur heard. No gallop. Pulmonary:      Effort: Pulmonary effort is normal. No respiratory distress. Breath sounds: Normal breath sounds. No stridor. Musculoskeletal:         General: No swelling, tenderness or deformity. Skin:     General: Skin is warm. Coloration: Skin is not jaundiced. Findings: No bruising. Neurological:      General: No focal deficit present. Mental Status: She is alert. DIFFERENTIAL  DIAGNOSIS     PLAN (LABS / IMAGING / EKG):  No orders of the defined types were placed in this encounter. MEDICATIONS ORDERED:  Orders Placed This Encounter   Medications    fluorescein 1 MG ophthalmic strip     Srinivasa Greenwood: cabinet override    fluorescein 1 MG ophthalmic strip     Lesly Martinez: cabinet override    fluorescein ophthalmic strip 1 mg    proparacaine (ALCAINE) 0.5 % ophthalmic solution 1 drop       DDX: . Conjunctivitis, foreign body, allergies, corneal abrasion, corneal ulcer    DIAGNOSTIC RESULTS / EMERGENCY DEPARTMENT COURSE / MDM   LAB RESULTS:  No results found for this visit on 02/05/22. Assessment/Plan: 47-year-old female with right-sided eye redness and itching. Patient was evaluated with fluorescein stain that was unremarkable. Slit-lamp was unremarkable.   Patient left prior to being evaluated with tonometry pen, pressures were not obtained as the patient left prior to evaluation and treatment complete. FINAL IMPRESSION      1. Left before treatment completed          DISPOSITION / PLAN     DISPOSITION Eloped - Left Before Treatment Complete 02/05/2022 05:50:58 AM      PATIENT REFERRED TO:  No follow-up provider specified.     DISCHARGE MEDICATIONS:  New Prescriptions    No medications on file       Lea Naylor DO  Emergency Medicine Resident    (Please note that portions of thisnote were completed with a voice recognition program.  Efforts were made to edit the dictations but occasionally words are mis-transcribed.)        Rick Escalante DO  Resident  02/05/22 4203

## 2022-02-05 NOTE — ED NOTES
PT. Did not want to wait for her discharge paperwork. PT. Left to go to work and said she will come back if she needs to.       Amalia Ortiz RN  02/05/22 0360

## 2022-02-05 NOTE — ED PROVIDER NOTES
[facility]   Emergency Department  Faculty Attestation       I performed a history and physical examination of the patient and discussed management with the resident. I reviewed the residents note and agree with the documented findings including all diagnostic interpretations and plan of care. Any areas of disagreement are noted on the chart. I was personally present for the key portions of any procedures. I have documented in the chart those procedures where I was not present during the key portions. I have reviewed the emergency nurses triage note. I agree with the chief complaint, past medical history, past surgical history, allergies, medications, social and family history as documented unless otherwise noted below. Documentation of the HPI, Physical Exam and Medical Decision Making performed by [email or web address] is based on my personal performance of the HPI, PE and MDM. For Physician Assistant/ Nurse Practitioner cases/documentation I have personally evaluated this patient and have completed at least one if not all key elements of the E/M (history, physical exam, and MDM). Additional findings are as noted. Pertinent Comments     Primary Care Physician: No primary care provider on file. ED Triage Vitals   BP Temp Temp Source Pulse Resp SpO2 Height Weight   02/05/22 0501 02/05/22 0501 02/05/22 0501 02/05/22 0501 -- 02/05/22 0537 02/05/22 0501 02/05/22 0501   117/76 97.3 °F (36.3 °C) Oral 79  99 % 4' 8\" (1.422 m) 95 lb (43.1 kg)        History/Physical: This is a 33 y.o. female who presents to the Emergency Department with complaint of right eye swelling, erythema and itching. Occurs intermittently over the last 2 months after being around a cat. Does wear glasses and does not with her. States that she denies any actual vision changes. On exam, patient does have injected conjunctiva on the right with discharge. However pupils equal and reactive with normal extraocular eye movement.   No obvious foreign bodies. MDM/Plan:   I injection and mild swelling. Likely allergic conjunctivitis. Plan for full eye exam and staining. However, patient did elope from the ED prior to getting remainder vital signs taken and prior to getting any form of treatment.       Critical Care: None     Darryl Anderson MD  Attending Emergency Physician         Darryl Anderson MD  02/05/22 0682

## 2022-04-17 ENCOUNTER — HOSPITAL ENCOUNTER (EMERGENCY)
Age: 32
Discharge: HOME | End: 2022-04-17
Attending: EMERGENCY MEDICINE
Payer: MEDICAID

## 2022-04-17 ENCOUNTER — APPOINTMENT (OUTPATIENT)
Dept: GENERAL RADIOLOGY | Age: 32
End: 2022-04-17
Payer: MEDICAID

## 2022-04-17 VITALS
DIASTOLIC BLOOD PRESSURE: 70 MMHG | HEART RATE: 75 BPM | TEMPERATURE: 96.1 F | SYSTOLIC BLOOD PRESSURE: 116 MMHG | RESPIRATION RATE: 16 BRPM | OXYGEN SATURATION: 98 %

## 2022-04-17 DIAGNOSIS — M25.532 LEFT WRIST PAIN: Primary | ICD-10-CM

## 2022-04-17 PROCEDURE — 99283 EMERGENCY DEPT VISIT LOW MDM: CPT

## 2022-04-17 PROCEDURE — 73110 X-RAY EXAM OF WRIST: CPT

## 2022-04-17 PROCEDURE — 73130 X-RAY EXAM OF HAND: CPT

## 2022-04-17 PROCEDURE — 6370000000 HC RX 637 (ALT 250 FOR IP): Performed by: STUDENT IN AN ORGANIZED HEALTH CARE EDUCATION/TRAINING PROGRAM

## 2022-04-17 RX ORDER — ACETAMINOPHEN 500 MG
1000 TABLET ORAL EVERY 6 HOURS PRN
Qty: 10 TABLET | Refills: 1 | Status: SHIPPED | OUTPATIENT
Start: 2022-04-17

## 2022-04-17 RX ORDER — ACETAMINOPHEN 325 MG/1
650 TABLET ORAL ONCE
Status: COMPLETED | OUTPATIENT
Start: 2022-04-17 | End: 2022-04-17

## 2022-04-17 RX ADMIN — ACETAMINOPHEN 650 MG: 325 TABLET ORAL at 06:12

## 2022-04-17 ASSESSMENT — ENCOUNTER SYMPTOMS
VOMITING: 0
WHEEZING: 0
DIARRHEA: 0
TROUBLE SWALLOWING: 0
PHOTOPHOBIA: 0
BACK PAIN: 0
SORE THROAT: 0
CHEST TIGHTNESS: 0
CONSTIPATION: 0
NAUSEA: 0
ABDOMINAL DISTENTION: 0
SHORTNESS OF BREATH: 0
RHINORRHEA: 0
ABDOMINAL PAIN: 0

## 2022-04-17 ASSESSMENT — PAIN DESCRIPTION - LOCATION: LOCATION: WRIST

## 2022-04-17 ASSESSMENT — PAIN DESCRIPTION - ORIENTATION: ORIENTATION: LEFT

## 2022-04-17 ASSESSMENT — PAIN DESCRIPTION - PAIN TYPE: TYPE: ACUTE PAIN

## 2022-04-17 ASSESSMENT — PAIN SCALES - GENERAL: PAINLEVEL_OUTOF10: 8
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
## 2022-04-17 NOTE — Clinical Note
Prasad Nieto was seen and treated in our emergency department on 4/17/2022. She may return to work on 04/18/2022. If you have any questions or concerns, please don't hesitate to call.       Rashaad Nugent MD
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
## 2022-04-17 NOTE — ED NOTES
Patient registered with the social security number given to nurse. Patient verified identity with 2 identifying factors.       Woo Lo RN  04/17/22 5309

## 2022-05-17 ENCOUNTER — HOSPITAL ENCOUNTER (EMERGENCY)
Age: 32
Discharge: LEFT BEFORE DISCHARGE | End: 2022-05-17
Attending: EMERGENCY MEDICINE
Payer: MEDICAID

## 2022-05-17 VITALS — HEART RATE: 80 BPM | TEMPERATURE: 97.6 F | DIASTOLIC BLOOD PRESSURE: 72 MMHG | SYSTOLIC BLOOD PRESSURE: 121 MMHG

## 2022-05-17 DIAGNOSIS — Z53.21 ELOPED FROM EMERGENCY DEPARTMENT: Primary | ICD-10-CM

## 2022-05-17 PROCEDURE — 99283 EMERGENCY DEPT VISIT LOW MDM: CPT

## 2022-05-17 ASSESSMENT — ENCOUNTER SYMPTOMS
COUGH: 0
CONSTIPATION: 0
EYE REDNESS: 0
NAUSEA: 0
EYE PAIN: 0
PHOTOPHOBIA: 0
VOMITING: 0
SORE THROAT: 0
RHINORRHEA: 0
ABDOMINAL PAIN: 1
SINUS PRESSURE: 0
CHEST TIGHTNESS: 0
SHORTNESS OF BREATH: 0
DIARRHEA: 0
COLOR CHANGE: 0

## 2022-05-18 NOTE — ED PROVIDER NOTES
[facility]     Emergency Department     Faculty Attestation    The patient left prior to my examination. The patient reportedly was seen by the resident. (Please note that portions of this note were completed with a voice recognition program.  Efforts were made to edit the dictations but occasionally words are mis-transcribed.)    Heriberto Macedo.  Macho Shaffer MD, [address] [facility]  Attending Emergency Medicine Physician        Alicia Larios MD  05/17/22 8793

## 2022-05-18 NOTE — ED PROVIDER NOTES
[facility] ED  Emergency Department Encounter  EmergencyMedicine Resident     Pt Name:Janet Chapin  MRN: [id number]  [place] 1990  Date of evaluation: 5/17/22  PCP:  No primary care provider on file. This patient was evaluated in the Emergency Department for symptoms described in the history of present illness. The patient was evaluated in the context of the global COVID-19 pandemic, which necessitated consideration that the patient might be at risk for infection with the SARS-CoV-2 virus that causes COVID-19. Institutional protocols and algorithms that pertain to the evaluation of patients at risk for COVID-19 are in a state of rapid change based on information released by regulatory bodies including the CDC and federal and state organizations. These policies and algorithms were followed during the patient's care in the ED. CHIEF COMPLAINT       Chief Complaint   Patient presents with    Abdominal Pain       HISTORY OF PRESENT ILLNESS  (Location/Symptom, Timing/Onset, Context/Setting, Quality, Duration, Modifying Factors, Severity.)      Mariluz Jack is a 33 y.o. female who presents by EMS. Patient was pulled over by police at a traffic stop, unknown reason. Patient complaining of lower abdominal pains been ongoing for the past couple days. Patient very anxious and tearful on exam.  Denies any fevers or chills or nausea or vomiting, no dysuria. He is no chance to be pregnant. After exam, patient states she no longer wants to be treated and eloped. Patient was alert and oriented x4 and answering questions appropriately. PAST MEDICAL / SURGICAL / SOCIAL / FAMILY HISTORY      has a past medical history of Asthma, Bipolar disorder ([address] Cervical dysplasia, COPD (chronic obstructive pulmonary disease) ([address] Migraine, Movement disorder, and Seizures ([address] has a past surgical history that includes Cholecystectomy (2007);  Tubal ligation; and Appendectomy. Social History     Socioeconomic History    Marital status:      Spouse name: Not on file    Number of children: Not on file    Years of education: Not on file    Highest education level: Not on file   Occupational History    Not on file   Tobacco Use    Smoking status: Current Every Day Smoker     Packs/day: 0.25     Years: 11.00     Pack years: 2.75     Types: Cigarettes    Smokeless tobacco: Never Used   Vaping Use    Vaping Use: Never used   Substance and Sexual Activity    Alcohol use: Never     Alcohol/week: 0.0 standard drinks    Drug use: Not Currently     Types: Opiates , Marijuana (Weed)     Comment: Hx Percocet abuse sober for 2 years.  Sexual activity: Not on file   Other Topics Concern    Not on file   Social History Narrative    Not on file     Social Determinants of Health     Financial Resource Strain:     Difficulty of Paying Living Expenses: Not on file   Food Insecurity:     Worried About Running Out of Food in the Last Year: Not on file    Emelyn of Food in the Last Year: Not on file   Transportation Needs:     Lack of Transportation (Medical): Not on file    Lack of Transportation (Non-Medical):  Not on file   Physical Activity:     Days of Exercise per Week: Not on file    Minutes of Exercise per Session: Not on file   Stress:     Feeling of Stress : Not on file   Social Connections:     Frequency of Communication with Friends and Family: Not on file    Frequency of Social Gatherings with Friends and Family: Not on file    Attends Sikhism Services: Not on file    Active Member of Clubs or Organizations: Not on file    Attends Club or Organization Meetings: Not on file    Marital Status: Not on file   Intimate Partner Violence:     Fear of Current or Ex-Partner: Not on file    Emotionally Abused: Not on file    Physically Abused: Not on file    Sexually Abused: Not on file   Housing Stability:     Unable to Pay for Housing in the Last Year: Not on file    Number of Places Lived in the Last Year: Not on file    Unstable Housing in the Last Year: Not on file       Family History   Problem Relation Age of Onset    Diabetes Mother         G.Parents    Hypertension Mother         G.Parents    Diabetes Father     Hypertension Father     Stroke Father         G.Parents    Cancer Other         G.Parents, Pancrease and ??    Coronary Art Dis Other         G.Parents       Allergies:  Bee venom, Diclofenac, Morphine, Nicotine, Pcn [penicillins], and Peanut-containing drug products    Home Medications:  Prior to Admission medications    Medication Sig Start Date End Date Taking? Authorizing Provider   acetaminophen (TYLENOL) 500 MG tablet Take 2 tablets by mouth every 6 hours as needed for Pain 4/17/22   Elise Rosa MD   lidocaine (LIDODERM) 5 % Place 1 patch onto the skin daily 12 hours on, 12 hours off. 1/16/22   Lafonda Bio, DO   levETIRAcetam (KEPPRA) 500 MG tablet Take 0.5 tablets by mouth 2 times daily 1/16/22   Lafonda Bio, DO       REVIEW OF SYSTEMS    (2-9 systems for level 4, 10 or more for level 5)      Review of Systems   Constitutional: Negative for activity change, chills, diaphoresis, fatigue and fever. HENT: Negative for congestion, rhinorrhea, sinus pressure and sore throat. Eyes: Negative for photophobia, pain and redness. Respiratory: Negative for cough, chest tightness and shortness of breath. Cardiovascular: Negative for chest pain and leg swelling. Gastrointestinal: Positive for abdominal pain. Negative for constipation, diarrhea, nausea and vomiting. Genitourinary: Negative for dysuria, flank pain, frequency and urgency. Musculoskeletal: Negative for arthralgias, myalgias and neck stiffness. Skin: Negative for color change, pallor and rash. Neurological: Negative for dizziness, syncope, weakness, light-headedness, numbness and headaches.          PHYSICAL EXAM   (up to 7 for level 4, 8 or more for level 5)      INITIAL VITALS:   /72   Pulse 80   Temp 97.6 °F (36.4 °C)     Physical Exam  Constitutional: Thin appearing, tearful  HENT:  Normocephalic, Atraumatic, Bilateral external ears normal,  Nose normal.   Neck: Normal range of motion, No stridor. Eyes:   No discharge. Respiratory:   No respiratory distress, Normal breath sounds without any wheezing, rales or rhonchi. Cardiovascular: Normal S1, S2. No rubs, gallops or murmurs. Gastrointestinal:  No organomegaly, no tenderness, no rebound or guarding. Musculoskeletal:  No extremity deformity. Lymphatic: No lymphadenopathy noted  Skin:  Warm, Dry,  No rash. Neurologic:  Alert & oriented x 3, Normal motor function,  No focal deficits noted. Psychiatric:  Affect normal, Judgment normal, Mood normal.              DIFFERENTIAL  DIAGNOSIS     PLAN (LABS / IMAGING / EKG):  No orders of the defined types were placed in this encounter. MEDICATIONS ORDERED:  No orders of the defined types were placed in this encounter. DDX: UTI, Sanjay, ectopic pregnancy, cystitis vaginitis, appendicitis, pancreatitis, GERD, peptic ulcer gastroenteritis    DIAGNOSTIC RESULTS / EMERGENCY DEPARTMENT COURSE / MDM   LAB RESULTS:  No results found for this visit on 05/17/22. RADIOLOGY:  No results found. IMPRESSION: 59-year-old female complaining of abdominal pain after being pulled over by police and arrived via EMS. She has an extensive psych history has been seen in this ED before and has eloped on multiple occasions. Patient is significantly tearful on exam, states she is on her honeymoon. States she is most to go back to her . Patient eloped. EMERGENCY DEPARTMENT COURSE:               PROCEDURES:      CONSULTS:  None        FINAL IMPRESSION      1.  Eloped from emergency department          DISPOSITION / [address] - Left Before Treatment Complete 05/17/2022 11:06:08 PM      PATIENT REFERRED TO:  No follow-up provider specified.     DISCHARGE MEDICATIONS:  New Prescriptions    No medications on file       Chanel Major MD  Emergency Medicine Resident    (Please note that portions of thisnote were completed with a voice recognition program.  Efforts were made to edit the dictations but occasionally words are mis-transcribed.)         Chanel Major MD  Resident  05/17/22 [id number]

## 2022-05-18 NOTE — ED NOTES
Pt states she is not staying and does not want to be seen, pt understands risk and walks out before papers can be signed.      Sandralee Bence, RN  05/17/22 [id number]

## 2022-11-14 ENCOUNTER — APPOINTMENT (OUTPATIENT)
Dept: CT IMAGING | Age: 32
End: 2022-11-14
Payer: MEDICAID

## 2022-11-14 ENCOUNTER — HOSPITAL ENCOUNTER (EMERGENCY)
Age: 32
Discharge: HOME | End: 2022-11-14
Attending: EMERGENCY MEDICINE
Payer: MEDICAID

## 2022-11-14 VITALS
WEIGHT: 95 LBS | TEMPERATURE: 98.2 F | BODY MASS INDEX: 21.37 KG/M2 | HEIGHT: 56 IN | SYSTOLIC BLOOD PRESSURE: 104 MMHG | RESPIRATION RATE: 11 BRPM | OXYGEN SATURATION: 98 % | HEART RATE: 81 BPM | DIASTOLIC BLOOD PRESSURE: 59 MMHG

## 2022-11-14 DIAGNOSIS — R42 DIZZINESS: Primary | ICD-10-CM

## 2022-11-14 DIAGNOSIS — R42 VERTIGO: ICD-10-CM

## 2022-11-14 LAB
ABSOLUTE EOS #: 0.28 K/UL (ref 0–0.44)
ABSOLUTE IMMATURE GRANULOCYTE: 0.04 K/UL (ref 0–0.3)
ABSOLUTE LYMPH #: 2.78 K/UL (ref 1.1–3.7)
ABSOLUTE MONO #: 0.56 K/UL (ref 0.1–1.2)
ACETAMINOPHEN LEVEL: <5 UG/ML (ref 10–30)
ANION GAP SERPL CALCULATED.3IONS-SCNC: 12 MMOL/L (ref 9–17)
BASOPHILS # BLD: 0 % (ref 0–2)
BASOPHILS ABSOLUTE: 0.04 K/UL (ref 0–0.2)
BUN BLDV-MCNC: 15 MG/DL (ref 6–20)
CALCIUM SERPL-MCNC: 8.8 MG/DL (ref 8.6–10.4)
CHLORIDE BLD-SCNC: 99 MMOL/L (ref 98–107)
CO2: 24 MMOL/L (ref 20–31)
CREAT SERPL-MCNC: 0.72 MG/DL (ref 0.5–0.9)
EOSINOPHILS RELATIVE PERCENT: 3 % (ref 1–4)
ETHANOL PERCENT: <0.01 %
ETHANOL: <10 MG/DL
GFR SERPL CREATININE-BSD FRML MDRD: >60 ML/MIN/1.73M2
GLUCOSE BLD-MCNC: 116 MG/DL (ref 70–99)
HCG QUALITATIVE: NEGATIVE
HCT VFR BLD CALC: 43 % (ref 36.3–47.1)
HEMOGLOBIN: 14.4 G/DL (ref 11.9–15.1)
IMMATURE GRANULOCYTES: 0 %
LYMPHOCYTES # BLD: 25 % (ref 24–43)
MCH RBC QN AUTO: 29.8 PG (ref 25.2–33.5)
MCHC RBC AUTO-ENTMCNC: 33.5 G/DL (ref 28.4–34.8)
MCV RBC AUTO: 89 FL (ref 82.6–102.9)
MONOCYTES # BLD: 5 % (ref 3–12)
NRBC AUTOMATED: 0 PER 100 WBC
PDW BLD-RTO: 13.9 % (ref 11.8–14.4)
PLATELET # BLD: 245 K/UL (ref 138–453)
PMV BLD AUTO: 9.6 FL (ref 8.1–13.5)
POTASSIUM SERPL-SCNC: 3.4 MMOL/L (ref 3.7–5.3)
RBC # BLD: 4.83 M/UL (ref 3.95–5.11)
SALICYLATE LEVEL: <1 MG/DL (ref 3–10)
SEG NEUTROPHILS: 67 % (ref 36–65)
SEGMENTED NEUTROPHILS ABSOLUTE COUNT: 7.5 K/UL (ref 1.5–8.1)
SODIUM BLD-SCNC: 135 MMOL/L (ref 135–144)
TOXIC TRICYCLIC SC,BLOOD: NEGATIVE
WBC # BLD: 11.2 K/UL (ref 3.5–11.3)

## 2022-11-14 PROCEDURE — 80048 BASIC METABOLIC PNL TOTAL CA: CPT

## 2022-11-14 PROCEDURE — 70450 CT HEAD/BRAIN W/O DYE: CPT

## 2022-11-14 PROCEDURE — 80307 DRUG TEST PRSMV CHEM ANLYZR: CPT

## 2022-11-14 PROCEDURE — 85025 COMPLETE CBC W/AUTO DIFF WBC: CPT

## 2022-11-14 PROCEDURE — 99203 OFFICE O/P NEW LOW 30 MIN: CPT | Performed by: PSYCHIATRY & NEUROLOGY

## 2022-11-14 PROCEDURE — 80177 DRUG SCRN QUAN LEVETIRACETAM: CPT

## 2022-11-14 PROCEDURE — 93005 ELECTROCARDIOGRAM TRACING: CPT | Performed by: EMERGENCY MEDICINE

## 2022-11-14 PROCEDURE — 84703 CHORIONIC GONADOTROPIN ASSAY: CPT

## 2022-11-14 PROCEDURE — 99284 EMERGENCY DEPT VISIT MOD MDM: CPT

## 2022-11-14 PROCEDURE — G0480 DRUG TEST DEF 1-7 CLASSES: HCPCS

## 2022-11-14 PROCEDURE — 80179 DRUG ASSAY SALICYLATE: CPT

## 2022-11-14 PROCEDURE — 2580000003 HC RX 258: Performed by: EMERGENCY MEDICINE

## 2022-11-14 PROCEDURE — 6370000000 HC RX 637 (ALT 250 FOR IP): Performed by: EMERGENCY MEDICINE

## 2022-11-14 PROCEDURE — 96365 THER/PROPH/DIAG IV INF INIT: CPT

## 2022-11-14 PROCEDURE — 6360000002 HC RX W HCPCS

## 2022-11-14 PROCEDURE — 80143 DRUG ASSAY ACETAMINOPHEN: CPT

## 2022-11-14 RX ORDER — MAGNESIUM SULFATE 1 G/100ML
INJECTION INTRAVENOUS
Status: COMPLETED
Start: 2022-11-14 | End: 2022-11-14

## 2022-11-14 RX ORDER — MAGNESIUM SULFATE 1 G/100ML
1000 INJECTION INTRAVENOUS ONCE
Status: COMPLETED | OUTPATIENT
Start: 2022-11-14 | End: 2022-11-14

## 2022-11-14 RX ORDER — MECLIZINE HCL 12.5 MG/1
12.5 TABLET ORAL ONCE
Status: COMPLETED | OUTPATIENT
Start: 2022-11-14 | End: 2022-11-14

## 2022-11-14 RX ORDER — 0.9 % SODIUM CHLORIDE 0.9 %
1000 INTRAVENOUS SOLUTION INTRAVENOUS ONCE
Status: COMPLETED | OUTPATIENT
Start: 2022-11-14 | End: 2022-11-14

## 2022-11-14 RX ADMIN — MECLIZINE 12.5 MG: 12.5 TABLET ORAL at 05:40

## 2022-11-14 RX ADMIN — SODIUM CHLORIDE 1000 ML: 9 INJECTION, SOLUTION INTRAVENOUS at 06:56

## 2022-11-14 RX ADMIN — MAGNESIUM SULFATE 1000 MG: 1 INJECTION INTRAVENOUS at 12:49

## 2022-11-14 RX ADMIN — MAGNESIUM SULFATE HEPTAHYDRATE 1000 MG: 1 INJECTION, SOLUTION INTRAVENOUS at 12:49

## 2022-11-14 ASSESSMENT — ENCOUNTER SYMPTOMS
COLOR CHANGE: 0
SHORTNESS OF BREATH: 0
VOMITING: 0
DIARRHEA: 0
NAUSEA: 0
CONSTIPATION: 0
PHOTOPHOBIA: 1
CHEST TIGHTNESS: 0
ABDOMINAL PAIN: 0

## 2022-11-14 ASSESSMENT — PAIN - FUNCTIONAL ASSESSMENT: PAIN_FUNCTIONAL_ASSESSMENT: 0-10

## 2022-11-14 ASSESSMENT — PAIN SCALES - GENERAL: PAINLEVEL_OUTOF10: 0

## 2022-11-14 NOTE — DISCHARGE INSTRUCTIONS
Seen in the emergency department for complaints of vertigo. Based on evaluation, you are safe for discharge. Please follow-up with ophthalmology in 1 week to have your vision examined, as you had double vision in 1 eye. Please follow-up with neurology in 1 week. If you experience worsening dizziness, headache, weakness, numbness/tingling, chest pain, shortness of breath, fevers, chills, or any other symptom you find concerning, please return to the emergency department immediately for reevaluation.

## 2022-11-14 NOTE — ED PROVIDER NOTES
[facility] ED  Emergency Department Encounter  EmergencyMedicine Resident     Pt Name:Janet Silver  MRN: [id number]  [place] 1990  Date of evaluation: 11/14/22  PCP:  No primary care provider on file. CHIEF COMPLAINT       Chief Complaint   Patient presents with    Dizziness    Eye Problem       HISTORY OF PRESENT ILLNESS  (Location/Symptom, Timing/Onset, Context/Setting, Quality, Duration, Modifying Factors, Severity.)      Ree Brunner is a 33 y.o. female who presents with dizziness, double vision and photophobia. Patient states that starting yesterday, estimates approximately 11 PM.  Patient states it was worse this morning on her way to work. Patient has been noncompliant with her medications for MS and seizures. Patient denies any fevers, chills, nausea, vomiting, chest pain, shortness of breath or any recent illness. Patient denying any change in bowel or bladder habits. Patient denies any weakness or numbness or neurologic changes. Patient noted to be drowsy throughout exam.    PAST MEDICAL / SURGICAL / SOCIAL / FAMILY HISTORY      has a past medical history of Asthma, Bipolar disorder ([facility] [address] Cervical dysplasia, COPD (chronic obstructive pulmonary disease) ([facility] [address] Migraine, Movement disorder, and Seizures ([facility] [address] MS, Pnes , no additional pertinent     has a past surgical history that includes Cholecystectomy (2007); Tubal ligation; and Appendectomy.   No additional pertinent    Social History     Socioeconomic History    Marital status:      Spouse name: Not on file    Number of children: Not on file    Years of education: Not on file    Highest education level: Not on file   Occupational History    Not on file   Tobacco Use    Smoking status: Every Day     Packs/day: 0.25     Years: 11.00     Pack years: 2.75     Types: Cigarettes    Smokeless tobacco: Never   Vaping Use    Vaping Use: Never used   Substance and Sexual Activity    Alcohol use: Never Alcohol/week: 0.0 standard drinks    Drug use: Not Currently     Types: Opiates , Marijuana (Weed)     Comment: Hx Percocet abuse sober for 2 years. Sexual activity: Not on file   Other Topics Concern    Not on file   Social History Narrative    Not on file     Social Determinants of Health     Financial Resource Strain: Not on file   Food Insecurity: Not on file   Transportation Needs: Not on file   Physical Activity: Not on file   Stress: Not on file   Social Connections: Not on file   Intimate Partner Violence: Not on file   Housing Stability: Not on file       Family History   Problem Relation Age of Onset    Diabetes Mother         G.Parents    Hypertension Mother         G.Parents    Diabetes Father     Hypertension Father     Stroke Father         G.Parents    Cancer Other         G.Parents, Pancrease and ?? Coronary Art Dis Other         G.Parents       Allergies:  Bee venom, Diclofenac, Morphine, Nicotine, Pcn [penicillins], and Peanut-containing drug products    Home Medications:  Prior to Admission medications    Medication Sig Start Date End Date Taking? Authorizing Provider   acetaminophen (TYLENOL) 500 MG tablet Take 2 tablets by mouth every 6 hours as needed for Pain 4/17/22   Konstantin William MD   lidocaine (LIDODERM) 5 % Place 1 patch onto the skin daily 12 hours on, 12 hours off. 1/16/22   Jarvis Olsen,    levETIRAcetam (KEPPRA) 500 MG tablet Take 0.5 tablets by mouth 2 times daily 1/16/22   Jarvis Olsen, DO       REVIEW OF SYSTEMS    (2-9 systems for level 4, 10 or more for level 5)      Review of Systems   Constitutional:  Negative for chills and fever. HENT:  Negative for congestion. Eyes:  Positive for photophobia and visual disturbance. Respiratory:  Negative for chest tightness and shortness of breath. Cardiovascular:  Negative for chest pain and leg swelling. Gastrointestinal:  Negative for abdominal pain, constipation, diarrhea, nausea and vomiting.    Endocrine: Negative for polyuria. Genitourinary:  Negative for difficulty urinating. Skin:  Negative for color change. Neurological:  Positive for dizziness and light-headedness. Negative for weakness and headaches. Psychiatric/Behavioral:  Negative for confusion. PHYSICAL EXAM   (up to 7 for level 4, 8 or more for level 5)      INITIAL VITALS:   /65   Pulse 83   Temp 98.2 °F (36.8 °C) (Oral)   Resp 13   Ht 4' 8\" (1.422 m)   Wt 95 lb (43.1 kg)   LMP  (Within Weeks)   SpO2 97%   Breastfeeding No   BMI 21.30 kg/m²     Physical Exam  Constitutional:       Appearance: Normal appearance. HENT:      Head: Normocephalic and atraumatic. Mouth/Throat:      Mouth: Mucous membranes are moist.      Pharynx: Oropharynx is clear. Eyes:      Extraocular Movements: Extraocular movements intact. Conjunctiva/sclera: Conjunctivae normal.   Cardiovascular:      Rate and Rhythm: Normal rate and regular rhythm. Pulses: Normal pulses. Heart sounds: Normal heart sounds. No murmur heard. Pulmonary:      Effort: Pulmonary effort is normal.      Breath sounds: Normal breath sounds. Abdominal:      General: Bowel sounds are normal. There is no distension. Tenderness: There is no abdominal tenderness. There is no guarding. Musculoskeletal:         General: Normal range of motion. Comments: Range of motion noted to be normal with patient's natural movements   Skin:     General: Skin is warm and dry. Findings: No rash (On exposed skin). Neurological:      General: No focal deficit present. Mental Status: She is alert and oriented to person, place, and time. GCS: GCS eye subscore is 4. GCS verbal subscore is 5. GCS motor subscore is 6. Cranial Nerves: No cranial nerve deficit. Sensory: No sensory deficit. Motor: No weakness.       Coordination: Finger-Nose-Finger Test abnormal. Heel to Shin Test normal.   Psychiatric:         Mood and Affect: Mood normal. Behavior: Behavior normal.       DIFFERENTIAL  DIAGNOSIS     PLAN (LABS / IMAGING / EKG):  Orders Placed This Encounter   Procedures    CT Head W/O Contrast    CBC with Auto Differential    BMP    HCG Qualitative, Serum    TOX SCR, BLD, ED    Levetiracetam Level    EKG 12 Lead       MEDICATIONS ORDERED:  Orders Placed This Encounter   Medications    meclizine (ANTIVERT) tablet 12.5 mg    0.9 % sodium chloride bolus         DIAGNOSTIC RESULTS / EMERGENCY DEPARTMENT COURSE / MDM   LAB RESULTS:  Results for orders placed or performed during the hospital encounter of 11/14/22   CBC with Auto Differential   Result Value Ref Range    WBC 11.2 3.5 - 11.3 k/uL    RBC 4.83 3.95 - 5.11 m/uL    Hemoglobin 14.4 11.9 - 15.1 g/dL    Hematocrit 43.0 36.3 - 47.1 %    MCV 89.0 82.6 - 102.9 fL    MCH 29.8 25.2 - 33.5 pg    MCHC 33.5 28.4 - 34.8 g/dL    RDW 13.9 11.8 - 14.4 %    Platelets 750 883 - 186 k/uL    MPV 9.6 8.1 - 13.5 fL    NRBC Automated 0.0 0.0 per 100 WBC    Seg Neutrophils 67 (H) 36 - 65 %    Lymphocytes 25 24 - 43 %    Monocytes 5 3 - 12 %    Eosinophils % 3 1 - 4 %    Basophils 0 0 - 2 %    Immature Granulocytes 0 0 %    Segs Absolute 7.50 1.50 - 8.10 k/uL    Absolute Lymph # 2.78 1.10 - 3.70 k/uL    Absolute Mono # 0.56 0.10 - 1.20 k/uL    Absolute Eos # 0.28 0.00 - 0.44 k/uL    Basophils Absolute 0.04 0.00 - 0.20 k/uL    Absolute Immature Granulocyte 0.04 0.00 - 0.30 k/uL   BMP   Result Value Ref Range    Glucose 116 (H) 70 - 99 mg/dL    BUN 15 6 - 20 mg/dL    Creatinine 0.72 0.50 - 0.90 mg/dL    Est, Glom Filt Rate >60 >60 mL/min/1.73m2    Calcium 8.8 8.6 - 10.4 mg/dL    Sodium 135 135 - 144 mmol/L    Potassium 3.4 (L) 3.7 - 5.3 mmol/L    Chloride 99 98 - 107 mmol/L    CO2 24 20 - 31 mmol/L    Anion Gap 12 9 - 17 mmol/L   HCG Qualitative, Serum   Result Value Ref Range    hCG Qual NEGATIVE NEGATIVE   TOX SCR, BLD, ED   Result Value Ref Range    Acetaminophen Level <5 (L) 10 - 30 ug/mL    Ethanol <10 <10 mg/dL Ethanol percent <5.895 <3.772 %    Salicylate Lvl <1 (L) 3 - 10 mg/dL    Toxic Tricyclic Sc,Blood NEGATIVE NEGATIVE       RADIOLOGY:  CT Head W/O Contrast    (Results Pending)          EKG  EKG Interpretation    Interpreted by emergency department physician    Rhythm: normal sinus   Rate: bradycardia  Axis: normal  Ectopy: none  Conduction: normal  ST Segments: normal  T Waves: normal  Q Waves: II, III, and aVf    Clinical Impression: non-specific EKG    Creed Elmo Leggett DO     All EKG's are interpreted by the Emergency Department Physician who either signs or Co-signs this chart in the absence of a cardiologist.    PROCEDURES:  None    CONSULTS:  None    MEDICAL DECISION MAKING:  Patient presenting with vertigo, dizziness, lightheadedness, photophobia and double vision. On evaluation patient neurologically intact other than abnormal finger-nose, most likely secondary to double vision. Patient given meclizine with minimal improvement. Plan to evaluate cardiac cause of vertigo and neurologic causes. Patient does have a history of MS and seizure disorder, noncompliant with medications. Concerned that this may be vertigo secondary to MS. Plan to give patient fluids, symptomatic control and reevaluate. CT head pending. Labs demonstrate no concerns for electrolyte abnormalities and obvious causes of vertigo at this time. Patient was signed out to Dr. Putnam Shock:  Please see attending note    FINAL IMPRESSION      1. Dizziness    2. Vertigo          DISPOSITION / PLAN     DISPOSITION        PATIENT REFERRED TO:  No follow-up provider specified.     DISCHARGE MEDICATIONS:  New Prescriptions    No medications on file       Creed Elmo Leggett DO  Emergency Medicine Resident    (Please note that portions of thisnote were completed with a voice recognition program.  Efforts were made to edit the dictations but occasionally words are mis-transcribed.)        Valente Alonso, DO  Resident  11/14/22 1365

## 2022-11-14 NOTE — ED PROVIDER NOTES
[address] HANDOFF       Handoff taken on the following patient from prior Attending Physician:  Pt Name: Alvarez Maldonado  PCP:  No primary care provider on file. Attestation  I was available and discussed any additional care issues that arose and coordinated the management plans with the resident(s) caring for the patient during my duty period. Any areas of disagreement with resident's documentation of care or procedures are noted on the chart. I was personally present for the key portions of any/all procedures during my duty period. I have documented in the chart those procedures where I was not present during the key portions.           Misty Samaniego MD  11/14/22 [id number]

## 2022-11-14 NOTE — CONSULTS
[facility] Neurology   IN-PATIENT SERVICE      NEUROLOGY CONSULT  NOTE            Date:   11/14/2022  Patient name:  Julian Carballo  Date of admission:  11/14/2022  YOB: 1990      Chief Complaint:     Chief Complaint   Patient presents with    Dizziness    Eye Problem        Reason for Consult:    Dizziness/ light headedness       History of Present Illness: The patient is a 33 y.o. female who presents with Dizziness and Eye Problem   and she is admitted to the hospital for the management of   Dizziness, lightheadedness. The patient was seen and examined and the chart was reviewed. 68-year-old female with past medical history of bipolar disorder, COPD, seizure disorder-(PNES), presented to ER with chief complaint of dizziness which is lightheadedness, room spinning sensation, feeling of passing out, and double vision which started last night and got worse this morning on the way to the work. Patient works 2 jobs, third shift. On arrival to the hospital patient blood pressure was 82/44, patient received meclizine and 1 L fluid bolus. Neurology consulted for questionable MS, dizziness and blurred vision. On evaluation patient is drowsy, easily arousable, alert oriented to x4, following all commands. Patient states that her dizziness, double vision has subsided. Patient continues to have photophobia, on examination patient has left monocular double vision. Complaining of frontal headache, 6/10 intensity, pressure-like, denies nausea, vomiting, tingling, numbness, neck pain. Patient has not taken any medication for a long time, and has not follow-up with neurology/missed appointment in clinic. Of note: There is questionable history of MS\" patient states that she was born with it\" never had LP done. Patient was never on any medication for MS treatment.     Past Medical History:     Past Medical History:   Diagnosis Date    Asthma     Bipolar disorder ([address]     Cervical dysplasia     COPD (chronic obstructive pulmonary disease) (HCC)     Migraine 11/11/2011    Movement disorder     Seizures ([facility])         Past Surgical History:     Past Surgical History:   Procedure Laterality Date    APPENDECTOMY      CHOLECYSTECTOMY  2007    at [facility]          Medications Prior to Admission:     Prior to Admission medications    Medication Sig Start Date End Date Taking? Authorizing Provider   acetaminophen (TYLENOL) 500 MG tablet Take 2 tablets by mouth every 6 hours as needed for Pain 4/17/22   Karen Samaniego MD   lidocaine (LIDODERM) 5 % Place 1 patch onto the skin daily 12 hours on, 12 hours off. 1/16/22   Gabe Mccarthy DO   levETIRAcetam (KEPPRA) 500 MG tablet Take 0.5 tablets by mouth 2 times daily 1/16/22   Gabe Mccarthy DO        Allergies:     Bee venom, Diclofenac, Morphine, Nicotine, Pcn [penicillins], and Peanut-containing drug products    Social History:     Tobacco:    reports that she has been smoking cigarettes. She has a 2.75 pack-year smoking history. She has never used smokeless tobacco.  Alcohol:      reports no history of alcohol use. Drug Use:  reports that she does not currently use drugs after having used the following drugs: Opiates  and Marijuana Michelle Garner). Family History:     Family History   Problem Relation Age of Onset    Diabetes Mother         G.Parents    Hypertension Mother         G.Parents    Diabetes Father     Hypertension Father     Stroke Father         G.Parents    Cancer Other         G.Parents, Pancrease and ??     Coronary Art Dis Other         G.Parents       Review of Systems:       Constitutional Negative for fever and chills   HEENT Negative for ear discharge, ear pain, nosebleed   Eyes  Positive for  photophobia,    Respiratory Negative for hemoptysis and sputum   Cardiovascular Negative for orthopnea, claudication and PND   Gastrointestinal Negative for abdominal pain, diarrhea, blood in stool   Musculoskeletal Negative for joint pain, negative for myalgia   Skin Negative for rash or itching   hematology Negative for ecchymosis, anemia   Psychiatric Negative for suicidal ideation, anxiety, depression, hallucinations       Physical Exam:   BP (!) 104/59   Pulse 81   Temp 98.2 °F (36.8 °C) (Oral)   Resp 11   Ht 4' 8\" (1.422 m)   Wt 95 lb (43.1 kg)   LMP  (Within Weeks)   SpO2 98%   Breastfeeding No   BMI 21.30 kg/m²   Temp (24hrs), Av.2 °F (36.8 °C), Min:98.2 °F (36.8 °C), Max:98.2 °F (36.8 °C)        General examination:      General Appearance:  alert, well appearing, and in no acute distress  HEENT: Normocephalic, atraumatic, moist mucus membranes  Neck: supple, no carotid bruits, (-) nuchal rigidity  Lungs:  Respirations unlabored, chest wall no deformity, BS normal  Cardiovascular: normal rate, regular rhythm  Abdomen: Soft, nontender, nondistended, normal bowel sounds  Skin: No gross lesions, rashes, bruising or bleeding on exposed skin area  Extremities:  peripheral pulses palpable, clubbing or edema  Psych: normal affect    NEUROLOGIC EXAMINATION    Mental status   Alert and oriented x 3; patient is drowsy but easily arousable following all commands;   speech is fluent, no dysarthria, aphasia.       Cranial nerves   II - visual fields intact to confrontation; pupils reactive  III, IV, VI - extraocular muscles intact; no ERIN; no nystagmus; no ptosis   V - normal facial sensation                                                               VII - normal facial symmetry                                                             VIII - intact hearing                                                                             IX, X - symmetrical palate elevation                                               XI - symmetrical shoulder shrug                                                       XII - midline tongue without atrophy or fasciculation     Motor function  Strength:   5/5 RUE, 5 RLE  5 LUE,  LLE  Normal bulk and tone. Sensory function Intact to touch,   throughout     Cerebellar Intact finger-nose-finger testing. Intact heel-shin testing. No dysdiadochokinesia present. No tremors                        Reflex function 2/4 symmetric throughout . Downgoing plantar response bilaterally. (-)Stover's sign bilaterally      Gait                   Did not assess as patient is drowsy              Diagnostics:      Laboratory Testing:  CBC:   Recent Labs     11/14/22  0542   WBC 11.2   HGB 14.4        BMP:    Recent Labs     11/14/22  0542      K 3.4*   CL 99   CO2 24   BUN 15   CREATININE 0.72   GLUCOSE 116*         Lab Results   Component Value Date    CHOL 87 07/02/2016    LDLCHOLESTEROL 27 07/02/2016    HDL 44 07/02/2016    TRIG 80 07/02/2016    ALT 12 04/18/2021    AST 17 04/18/2021    TSH 0.76 07/02/2016    INR 1.0 07/24/2021       Lab Results   Component Value Date    VALPROATE <3 (L) 04/12/2021    VALPROATE <0.4 (L) 04/12/2021         Imaging/Diagnostics:             CT head : 11/14/2022  Impression:     No acute intracranial abnormality evident by CT.                 Assessment and plan:        Dizziness/ Lightheaded  Feeling of passing out   Bifrontal headaches    Double vision    Seizure disorder mostly PNES   Questionable hx of MS without any documentation       -Orthostatic vitals   - Iv fluids bolus   -For monocular double vision follow up with Ophthalmology   - Magnesium 1 gm   - Tylenol   -Follow up with Neurology clinic outpatient       Electronically signed by Monse Larsen MD on 11/14/2022 at 1:33 PM      Tish Aguilera MD   PGY 4 Neurology Resident  11/14/2022 at 1:33 PM

## 2022-11-14 NOTE — ED NOTES
Pt admitted to ER with complaints of dizziness, lightheadedness, double vision. Pt states these s/s started last night, and these got worse this morning on the way to work. On exam, Pt was arousal to voice, but appeared drowsy during the assessment. Denies fever, chills, chest pain, n/v/d, respiratory and cardiac issue at this time. Pt is oriented x4.   Shiraz Crain, RN  11/14/22 0602       Annalise Crain, [address] [place] [address]  11/14/22 0100

## 2022-11-14 NOTE — ED NOTES
Pt went down to [facility] [address] Lepe) [facility]  11/14/22 4802

## 2022-11-14 NOTE — ED PROVIDER NOTES
[facility] ED     Emergency Department     Faculty Attestation    I performed a history and physical examination of the patient and discussed management with the resident. I reviewed the residents note and agree with the documented findings and plan of care. Any areas of disagreement are noted on the chart. I was personally present for the key portions of any procedures. I have documented in the chart those procedures where I was not present during the key portions. I have reviewed the emergency nurses triage note. I agree with the chief complaint, past medical history, past surgical history, allergies, medications, social and family history as documented unless otherwise noted below. For Physician Assistant/ Nurse Practitioner cases/documentation I have personally evaluated this patient and have completed at least one if not all key elements of the E/M (history, physical exam, and MDM). Additional findings are as noted. Presents with dizziness/lightheadedness as well as double vision. She says this started yesterday but seem to worsen this morning when she was on her way to work. Patient says she has a history of MS and seizures but says she has not been taking her medications. On review of patient's medical chart, it appears that patient's seizures are thought to be PNES and that it is unlikely that the patient has an actual diagnosis of MS. Patient denies any recent illness. She denies fever, chest pain, shortness of breath, cough, congestion, abdominal pain, vomiting or diarrhea. She denies any weakness or numbness to her extremities. On my exam, patient was sleeping when I entered the room. She was easily aroused by name. She does appear drowsy throughout my exam.  Lungs are clear to auscultation bilaterally heart sounds are normal.  Extraocular movements are intact. No nystagmus. Patient has abnormal finger-to-nose testing.   Strength and sensation is intact to all 4 extremities. We will get CT scan of the head, EKG, labs and reassess.     EKG Interpretation    Interpreted by emergency department physician    Rhythm: sinus bradycardia  Rate: 50-60  Axis: normal  Ectopy: none  Conduction: normal  ST Segments: normal  T Waves: non specific changes  Q Waves: none    Clinical Impression: non-specific EKG and sinus bradycardia    MD Blanca Stanford MD  Attending Emergency  Physician           Carlos Greene MD  11/14/22 6093

## 2022-11-14 NOTE — ED NOTES
Pt is back from CT. Still have s/s of dizziness and lightheadedness.      Pan Koroma) [facility], [address] [place] [address]  11/14/22 5786

## 2022-11-14 NOTE — ED PROVIDER NOTES
[facility] ED  Emergency Department  Emergency Medicine Resident Sign-out     Care of Harry Ortiz was assumed from Dr. Roxie Joseph and is being seen for Dizziness and Eye Problem  . The patient's initial evaluation and plan have been discussed with the prior provider who initially evaluated the patient. EMERGENCY DEPARTMENT COURSE / MEDICAL DECISION MAKING:       MEDICATIONS GIVEN:  Orders Placed This Encounter   Medications    meclizine (ANTIVERT) tablet 12.5 mg    0.9 % sodium chloride bolus    magnesium sulfate 1000 mg in dextrose 5% 100 mL IVPB    magnesium sulfate 1-5 GM/100ML-% IVPB (premix)     Fabio Campbell: cabinet override       LABS / RADIOLOGY:     Labs Reviewed   CBC WITH AUTO DIFFERENTIAL - Abnormal; Notable for the following components:       Result Value    Seg Neutrophils 67 (*)     All other components within normal limits   BASIC METABOLIC PANEL - Abnormal; Notable for the following components:    Glucose 116 (*)     Potassium 3.4 (*)     All other components within normal limits   TOX SCR, BLD, ED - Abnormal; Notable for the following components:    Acetaminophen Level <5 (*)     Salicylate Lvl <1 (*)     All other components within normal limits   HCG, SERUM, QUALITATIVE   LEVETIRACETAM LEVEL       No results found. RECENT VITALS:     Temp: 98.2 °F (36.8 °C),  Heart Rate: 81, Resp: 11, BP: (!) 104/59, SpO2: 98 %      This patient is a 33 y.o. 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 Patient is drowsy, does answer questions appropriately. Patient motor and sensation are intact and all are neurologically intact other than abnormal finger-nose testing. Patient pending CT head, labs within normal limits at this time. Giving IV fluids. Patient's dizziness did not improve with meclizine.       ED Course as of 11/14/22 1414   Mon Nov 14, 2022   1413 Patient did have improvement of her symptoms with meclizine and IV fluids. States she did still have some minor symptoms. She was seen and evaluated by neurology at bedside, who stated there were no acute findings and patient reported resolution of her complaints by the time they had seen her. Neurology resident did note some right-sided monocular diplopia and recommends follow-up with ophthalmology as outpatient. Also recommending follow-up with neurology as an outpatient. Patient tolerated diet and was able to ambulate on her own without issues. Will discharge home with instructions to follow-up with the above listed specialist.  Discussed return precautions. Patient acknowledged understanding and intent to comply. [GG]      ED Course User Index  [GG] Darvin Esparza MD       OUTSTANDING TASKS / RECOMMENDATIONS:    F/u CT  Possible neuro consult     FINAL IMPRESSION:     1. Dizziness    2.  Vertigo        DISPOSITION:         DISPOSITION:  [x]  Discharge   []  Transfer -    []  Admission -     []  Against Medical Advice   []  Eloped   FOLLOW-UP: [facility]  [address] [facility]  [phone]    If symptoms worsen    [address] [place] [address]  [phone]        [address]  [address]  In 1 week      [facility] [address] [id number] [phone]  In 1 week     DISCHARGE MEDICATIONS: Discharge Medication List as of 11/14/2022  1:30 PM             Darvin Esparza MD  Emergency Medicine Resident  [facility]        Darvin Esparza MD  Resident  11/14/22 0054

## 2022-11-16 LAB
EKG ATRIAL RATE: 59 BPM
EKG P AXIS: 63 DEGREES
EKG P-R INTERVAL: 126 MS
EKG Q-T INTERVAL: 464 MS
EKG QRS DURATION: 86 MS
EKG QTC CALCULATION (BAZETT): 459 MS
EKG R AXIS: 78 DEGREES
EKG T AXIS: 69 DEGREES
EKG VENTRICULAR RATE: 59 BPM

## 2022-11-16 PROCEDURE — 93010 ELECTROCARDIOGRAM REPORT: CPT | Performed by: INTERNAL MEDICINE

## 2022-12-08 ENCOUNTER — APPOINTMENT (OUTPATIENT)
Dept: GENERAL RADIOLOGY | Age: 32
End: 2022-12-08
Payer: MEDICAID

## 2022-12-08 ENCOUNTER — HOSPITAL ENCOUNTER (EMERGENCY)
Age: 32
Discharge: LEFT BEFORE DISCHARGE | End: 2022-12-08
Attending: EMERGENCY MEDICINE
Payer: MEDICAID

## 2022-12-08 VITALS
RESPIRATION RATE: 18 BRPM | OXYGEN SATURATION: 99 % | WEIGHT: 101.3 LBS | TEMPERATURE: 98.1 F | DIASTOLIC BLOOD PRESSURE: 70 MMHG | HEART RATE: 69 BPM | HEIGHT: 56 IN | SYSTOLIC BLOOD PRESSURE: 114 MMHG | BODY MASS INDEX: 22.79 KG/M2

## 2022-12-08 DIAGNOSIS — J06.9 VIRAL URI: Primary | ICD-10-CM

## 2022-12-08 LAB
CHP ED QC CHECK: NORMAL
FLU A ANTIGEN: NEGATIVE
FLU B ANTIGEN: NEGATIVE
PREGNANCY TEST URINE, POC: NORMAL

## 2022-12-08 PROCEDURE — 81025 URINE PREGNANCY TEST: CPT

## 2022-12-08 PROCEDURE — 87804 INFLUENZA ASSAY W/OPTIC: CPT

## 2022-12-08 PROCEDURE — 93005 ELECTROCARDIOGRAM TRACING: CPT | Performed by: EMERGENCY MEDICINE

## 2022-12-08 PROCEDURE — 99285 EMERGENCY DEPT VISIT HI MDM: CPT

## 2022-12-08 PROCEDURE — 71045 X-RAY EXAM CHEST 1 VIEW: CPT

## 2022-12-08 ASSESSMENT — PAIN SCALES - GENERAL: PAINLEVEL_OUTOF10: 8

## 2022-12-08 ASSESSMENT — PAIN - FUNCTIONAL ASSESSMENT: PAIN_FUNCTIONAL_ASSESSMENT: 0-10

## 2022-12-08 NOTE — ED NOTES
RN went to round on patient. Patient had left. Dr Cedeno Pass notified.      Marty Neely RN  12/08/22 6993

## 2022-12-08 NOTE — ED PROVIDER NOTES
EMERGENCY DEPARTMENT ENCOUNTER    Pt Name: Paul Huber  MRN: [id number]  [place] 1990  Date of evaluation: 12/8/22  CHIEF COMPLAINT       Chief Complaint   Patient presents with    Cough    Chest Pain    Dizziness     All symptoms started last night     HISTORY OF PRESENT ILLNESS   Patient is a 68-year-old female who presents to the ED with runny nose, nasal congestion, joint pain, cough and chest pain. Symptoms started last night. Chest pain present only during coughing spells. Took COVID test last night which was negative. No fevers, shortness of breath, abdominal pain, nausea, vomiting, changes in urine or stool. REVIEW OF SYSTEMS     Review of Systems   All other systems reviewed and are negative. PASTMEDICAL HISTORY     Past Medical History:   Diagnosis Date    Asthma     Bipolar disorder ([facility]     Cervical dysplasia     COPD (chronic obstructive pulmonary disease) ([facility]     Migraine 11/11/2011    Movement disorder     Seizures ([facility]      SURGICAL HISTORY       Past Surgical History:   Procedure Laterality Date    APPENDECTOMY      CHOLECYSTECTOMY  2007    at [facility] [address]       Discharge Medication List as of 12/8/2022  4:51 PM        CONTINUE these medications which have NOT CHANGED    Details   acetaminophen (TYLENOL) 500 MG tablet Take 2 tablets by mouth every 6 hours as needed for Pain, Disp-10 tablet, R-1Print      lidocaine (LIDODERM) 5 % Place 1 patch onto the skin daily 12 hours on, 12 hours off., Disp-30 patch, R-0Print      levETIRAcetam (KEPPRA) 500 MG tablet Take 0.5 tablets by mouth 2 times daily, Disp-60 tablet, R-3Print           ALLERGIES     is allergic to bee venom, diclofenac, morphine, nicotine, pcn [penicillins], and peanut-containing drug products. FAMILY HISTORY     She indicated that the status of her mother is unknown. She indicated that the status of her father is unknown.      SOCIAL HISTORY       Social History Tobacco Use    Smoking status: Every Day     Packs/day: 0.25     Years: 11.00     Pack years: 2.75     Types: Cigarettes    Smokeless tobacco: Never   Vaping Use    Vaping Use: Never used   Substance Use Topics    Alcohol use: Never     Alcohol/week: 0.0 standard drinks    Drug use: Not Currently     Types: Opiates , Marijuana (Weed)     Comment: Hx Percocet abuse sober for 2 years. PHYSICAL EXAM     INITIAL VITALS: /70   Pulse 69   Temp 98.1 °F (36.7 °C) (Oral)   Resp 18   Ht 4' 8\" (1.422 m)   Wt 101 lb 4.8 oz (45.9 kg)   LMP  (LMP Unknown)   SpO2 99%   BMI 22.71 kg/m²    Physical Exam  Constitutional:       Appearance: Normal appearance. Comments: Sleeping in exam bed when I arrived in exam room however patient able to wake up and answer questions. HENT:      Head: Normocephalic. Right Ear: External ear normal.      Left Ear: External ear normal.      Nose: Nose normal.   Eyes:      Conjunctiva/sclera: Conjunctivae normal.   Cardiovascular:      Rate and Rhythm: Regular rhythm. Heart sounds: Normal heart sounds. Pulmonary:      Breath sounds: Normal breath sounds. Abdominal:      General: There is no distension. Skin:     General: Skin is dry. Neurological:      Mental Status: She is alert. Mental status is at baseline. MEDICAL DECISION MAKING:   Temperature was 98.1, pulse was 69, and blood pressure was 114 over. Pulse oximetry on room air was 99%. Exam unremarkable patient is very tired. Based on history exam likely viral URI, influenza, less likely Covid 19 and pneumonia. Plan for chest x-ray, rapid flu, rapid COVID, reassess. ED Course as of 12/08/22 1726   Thu Dec 08, 2022   1539 EKG: NSR, no ST elevations. Heart rate 74, , QRS 80, QTc 472.  [RAFAELA]      ED Course User Index  [RAFAELA] Colonel Domonique MD     CRITICAL CARE:     The 30 ml/kg fluid bolus is not ordered due to concern for fluid overload and/or heart failure. PROCEDURES:    Procedures    DIAGNOSTIC RESULTS   EKG:All EKG's are interpreted by the Emergency Department Physician who either signs or Co-signs this chart in the absence of a cardiologist.        RADIOLOGY:All plain film, CT, MRI, and formal ultrasound images (except ED bedside ultrasound) are read by the radiologist, see reports below, unless otherwisenoted in MDM or here. XR CHEST PORTABLE   Final Result   No acute cardiopulmonary abnormality. LABS: All lab results were reviewed by myself, and all abnormals are listed below. Labs Reviewed   POCT URINE PREGNANCY - Normal   RAPID INFLUENZA A/B ANTIGENS       EMERGENCY DEPARTMENTCOURSE:   I offered patient DADMZ-40 rapid test however patient declined. Flu results negative, chest x-ray negative for infiltrate. I returned to patient's room to report results however patient eloped before final results reported. Vitals:    Vitals:    12/08/22 1304   BP: 114/70   Pulse: 69   Resp: 18   Temp: 98.1 °F (36.7 °C)   TempSrc: Oral   SpO2: 99%   Weight: 101 lb 4.8 oz (45.9 kg)   Height: 4' 8\" (1.422 m)       The patient was given the following medications while in the emergency department:  No orders of the defined types were placed in this encounter. CONSULTS:  None    FINAL IMPRESSION      1. Viral URI          DISPOSITION/PLAN   DISPOSITION Eloped - Left Before Treatment Complete 12/08/2022 04:14:44 PM      PATIENT REFERRED TO:  No follow-up provider specified.   DISCHARGE MEDICATIONS:  Discharge Medication List as of 12/8/2022  4:51 PM        Mary Robledo MD  Attending Emergency Physician                    Valencia Patel MD  12/08/22 4571

## 2022-12-10 LAB
EKG ATRIAL RATE: 74 BPM
EKG P AXIS: 60 DEGREES
EKG P-R INTERVAL: 124 MS
EKG Q-T INTERVAL: 426 MS
EKG QRS DURATION: 80 MS
EKG QTC CALCULATION (BAZETT): 472 MS
EKG R AXIS: 73 DEGREES
EKG T AXIS: 60 DEGREES
EKG VENTRICULAR RATE: 74 BPM

## 2022-12-10 PROCEDURE — 93010 ELECTROCARDIOGRAM REPORT: CPT | Performed by: INTERNAL MEDICINE

## 2022-12-12 LAB — HCG, PREGNANCY URINE (POC): NEGATIVE
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
## 2023-02-11 ENCOUNTER — HOSPITAL ENCOUNTER (EMERGENCY)
Age: 33
Discharge: HOME | End: 2023-02-11
Attending: STUDENT IN AN ORGANIZED HEALTH CARE EDUCATION/TRAINING PROGRAM
Payer: MEDICAID

## 2023-02-11 VITALS
TEMPERATURE: 98.7 F | OXYGEN SATURATION: 100 % | SYSTOLIC BLOOD PRESSURE: 108 MMHG | WEIGHT: 100 LBS | DIASTOLIC BLOOD PRESSURE: 66 MMHG | HEART RATE: 73 BPM | BODY MASS INDEX: 22.42 KG/M2 | RESPIRATION RATE: 21 BRPM

## 2023-02-11 DIAGNOSIS — T30.0 BURN: Primary | ICD-10-CM

## 2023-02-11 PROCEDURE — 6370000000 HC RX 637 (ALT 250 FOR IP): Performed by: PHYSICIAN ASSISTANT

## 2023-02-11 PROCEDURE — 99283 EMERGENCY DEPT VISIT LOW MDM: CPT

## 2023-02-11 RX ORDER — GINSENG 100 MG
CAPSULE ORAL
Qty: 144 G | Refills: 0 | Status: SHIPPED | OUTPATIENT
Start: 2023-02-11 | End: 2023-02-21

## 2023-02-11 RX ORDER — GINSENG 100 MG
CAPSULE ORAL 3 TIMES DAILY
Status: DISCONTINUED | OUTPATIENT
Start: 2023-02-11 | End: 2023-02-11 | Stop reason: HOSPADM

## 2023-02-11 RX ADMIN — BACITRACIN: 500 OINTMENT TOPICAL at 20:11

## 2023-02-11 ASSESSMENT — LIFESTYLE VARIABLES
HOW MANY STANDARD DRINKS CONTAINING ALCOHOL DO YOU HAVE ON A TYPICAL DAY: PATIENT DOES NOT DRINK
HOW OFTEN DO YOU HAVE A DRINK CONTAINING ALCOHOL: NEVER

## 2023-02-11 ASSESSMENT — PAIN DESCRIPTION - LOCATION: LOCATION: ABDOMEN

## 2023-02-11 ASSESSMENT — PAIN - FUNCTIONAL ASSESSMENT: PAIN_FUNCTIONAL_ASSESSMENT: 0-10

## 2023-02-11 ASSESSMENT — PAIN DESCRIPTION - DESCRIPTORS: DESCRIPTORS: BURNING

## 2023-02-11 ASSESSMENT — PAIN DESCRIPTION - ORIENTATION: ORIENTATION: RIGHT

## 2023-02-11 ASSESSMENT — PAIN SCALES - GENERAL: PAINLEVEL_OUTOF10: 10

## 2023-02-12 ASSESSMENT — ENCOUNTER SYMPTOMS
EYE DISCHARGE: 0
BACK PAIN: 0
RHINORRHEA: 0
EYE PAIN: 0
SORE THROAT: 0
VOMITING: 0
COUGH: 0
NAUSEA: 0
COLOR CHANGE: 0
WHEEZING: 0
EYE ITCHING: 0
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
## 2023-04-02 ENCOUNTER — HOSPITAL ENCOUNTER (EMERGENCY)
Age: 33
Discharge: HOME | End: 2023-04-03
Attending: EMERGENCY MEDICINE
Payer: MEDICAID

## 2023-04-02 DIAGNOSIS — M25.571 ACUTE RIGHT ANKLE PAIN: Primary | ICD-10-CM

## 2023-04-02 PROCEDURE — 99283 EMERGENCY DEPT VISIT LOW MDM: CPT

## 2023-04-03 ENCOUNTER — APPOINTMENT (OUTPATIENT)
Dept: GENERAL RADIOLOGY | Age: 33
End: 2023-04-03
Payer: MEDICAID

## 2023-04-03 VITALS
DIASTOLIC BLOOD PRESSURE: 66 MMHG | RESPIRATION RATE: 16 BRPM | TEMPERATURE: 98.7 F | SYSTOLIC BLOOD PRESSURE: 112 MMHG | OXYGEN SATURATION: 97 % | HEART RATE: 88 BPM

## 2023-04-03 PROCEDURE — 73630 X-RAY EXAM OF FOOT: CPT

## 2023-04-03 PROCEDURE — 73590 X-RAY EXAM OF LOWER LEG: CPT

## 2023-04-03 PROCEDURE — 73610 X-RAY EXAM OF ANKLE: CPT

## 2023-04-03 ASSESSMENT — ENCOUNTER SYMPTOMS
ANAL BLEEDING: 0
PHOTOPHOBIA: 0
VOMITING: 0
CONSTIPATION: 0
DIARRHEA: 0
SHORTNESS OF BREATH: 0
NAUSEA: 0
RHINORRHEA: 0
ABDOMINAL DISTENTION: 0
SORE THROAT: 0
CHEST TIGHTNESS: 0

## 2023-04-03 NOTE — ED NOTES
Angel booked transportation with pts insurance [facility]. Pt going to address on file.  Trip # [phone]     SHASHI Yepez  04/03/23 0139
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
## 2023-04-03 NOTE — DISCHARGE INSTRUCTIONS
Use an ice pack or bag filled with ice and apply to the injured area 3 - 4 times a day for 15 - 20 minutes each time. Use ibuprofen or Tylenol (unless prescribed medications that have Tylenol in it) for pain. You can take over the counter Ibuprofen (advil) tablets (4 every 8 hours or 3 every 6 hours or 2 every 4 hours)    Use your crutches for the next several days until you are able to take 10 steps without pain. Return to the Emergency Department for worsening of pain, increase swelling to the ankle, inability to move your toes, any other care or concern.

## 2023-04-03 NOTE — ED NOTES
PT yelling at Staff that it is \"Bull shit\" she shouldn't have to wait up to three hour for a cab     Carvel MARJAN Rogel  04/03/23 0113

## 2024-10-14 NOTE — DISCHARGE INSTRUCTIONS
BOTOX INJECTION NO PA REQUIRED  Received: 5 days ago  Monty Paniagua D Np Neu Nurse Msg Pool  Status: No Authorization Required  --------------------------------------------------------------------------------------------------------  Procedure code(s) and description(s): -[id number] TOXIN  CHEMODENERVATION MUSCLE BILAT INNERVATED FACIAL TRIGEM CERVICAL SPINAL  INJ IM SQ THERAPEUTIC PROPHYLACTIC DIAGNOS (CPT 31604  40134)    Diagnoses:    G43.719 (ICD-10-CM) - Intractable chronic migraine without aura and without status migrainosus  --------------------------------------------------------------------------------------------------------  Ordering provider: Chela Rubalcava  Rendering location: [facility] NEUROLOGY  --------------------------------------------------------------------------------------------------------  Insurance(s): MEDICARE/PARTA AND B   Verified Insurance: Web Portal    Request Details:  Primary Insurance: MEDICARE/PARTA AND B No Authorization Required  Online Portal: [facility] FOLLOWING THE DIAGNOSIS DRIVEN LIST    Per Medicare Guidelines G43.719 is on the list of approved Diagnosis Code(s) and does not require Prior Authorization for Cpt Code(s) , 83465, 60917. Ok to Buy and Bill.    Date of Service: 11/06/24 TO 11/06/25 FOR 4 VISITS  PATIENT DOESN'T HAVE ANTHEM/BCBS MEDICAID IT'S TERMED OUT.    Thank you  Monty Kaplan   To the area gently 2 times daily with soap and water, apply bacitracin and cover.   Continue to watch for signs of infection such as redness drainage swelling warmth or fever    You may call [phone] to schedule a same-day appointment with a new primary care provider    Please return to the emergency room for signs of infection or other emergent concerns    You may take 800 mg of ibuprofen up to 3 times per day or 650 mg of Tylenol up to 4 times a day for pain